# Patient Record
Sex: FEMALE | Race: WHITE | NOT HISPANIC OR LATINO | Employment: OTHER | URBAN - METROPOLITAN AREA
[De-identification: names, ages, dates, MRNs, and addresses within clinical notes are randomized per-mention and may not be internally consistent; named-entity substitution may affect disease eponyms.]

---

## 2020-03-10 ENCOUNTER — HOSPITAL ENCOUNTER (EMERGENCY)
Facility: HOSPITAL | Age: 40
Discharge: HOME | End: 2020-03-10
Admitting: EMERGENCY MEDICINE

## 2020-03-10 ENCOUNTER — APPOINTMENT (OUTPATIENT)
Dept: GENERAL RADIOLOGY | Facility: HOSPITAL | Age: 40
End: 2020-03-10

## 2020-03-10 VITALS
WEIGHT: 275.35 LBS | RESPIRATION RATE: 16 BRPM | OXYGEN SATURATION: 98 % | BODY MASS INDEX: 48.79 KG/M2 | DIASTOLIC BLOOD PRESSURE: 83 MMHG | HEART RATE: 79 BPM | SYSTOLIC BLOOD PRESSURE: 127 MMHG | HEIGHT: 63 IN | TEMPERATURE: 98.3 F

## 2020-03-10 DIAGNOSIS — S30.0XXA LUMBAR CONTUSION, INITIAL ENCOUNTER: Primary | ICD-10-CM

## 2020-03-10 PROCEDURE — 99282 EMERGENCY DEPT VISIT SF MDM: CPT

## 2020-03-10 PROCEDURE — 72110 X-RAY EXAM L-2 SPINE 4/>VWS: CPT

## 2020-03-10 RX ORDER — IBUPROFEN 800 MG/1
800 TABLET ORAL EVERY 8 HOURS PRN
Qty: 9 TABLET | Refills: 0 | Status: SHIPPED | OUTPATIENT
Start: 2020-03-10 | End: 2022-04-27
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
## 2020-06-01 ENCOUNTER — HOSPITAL ENCOUNTER (EMERGENCY)
Facility: HOSPITAL | Age: 40
Discharge: HOME | End: 2020-06-01
Admitting: EMERGENCY MEDICINE

## 2020-06-01 VITALS
DIASTOLIC BLOOD PRESSURE: 77 MMHG | RESPIRATION RATE: 18 BRPM | OXYGEN SATURATION: 100 % | TEMPERATURE: 98.2 F | HEART RATE: 90 BPM | WEIGHT: 289.24 LBS | BODY MASS INDEX: 49.38 KG/M2 | HEIGHT: 64 IN | SYSTOLIC BLOOD PRESSURE: 156 MMHG

## 2020-06-01 DIAGNOSIS — Z78.9 HAS RUN OUT OF MEDICATIONS: Primary | ICD-10-CM

## 2020-06-01 DIAGNOSIS — Z91.199 NONCOMPLIANCE WITH DIABETES TREATMENT: ICD-10-CM

## 2020-06-01 DIAGNOSIS — E11.9 TYPE 2 DIABETES MELLITUS WITHOUT COMPLICATION, WITHOUT LONG-TERM CURRENT USE OF INSULIN (HCC): ICD-10-CM

## 2020-06-01 LAB
ANION GAP SERPL CALCULATED.3IONS-SCNC: 10 MMOL/L (ref 5–15)
BACTERIA UR QL AUTO: ABNORMAL /HPF
BASOPHILS # BLD AUTO: 0 10*3/MM3 (ref 0–0.2)
BASOPHILS NFR BLD AUTO: 0.3 % (ref 0–1.5)
BILIRUB UR QL STRIP: NEGATIVE
BUN BLD-MCNC: 11 MG/DL (ref 6–20)
BUN/CREAT SERPL: 15.3 (ref 7–25)
CALCIUM SPEC-SCNC: 9.5 MG/DL (ref 8.6–10.5)
CHLORIDE SERPL-SCNC: 100 MMOL/L (ref 98–107)
CLARITY UR: CLEAR
CO2 SERPL-SCNC: 25 MMOL/L (ref 22–29)
COLOR UR: YELLOW
CREAT BLD-MCNC: 0.72 MG/DL (ref 0.57–1)
DEPRECATED RDW RBC AUTO: 43.3 FL (ref 37–54)
EOSINOPHIL # BLD AUTO: 0.3 10*3/MM3 (ref 0–0.4)
EOSINOPHIL NFR BLD AUTO: 3.1 % (ref 0.3–6.2)
ERYTHROCYTE [DISTWIDTH] IN BLOOD BY AUTOMATED COUNT: 15.8 % (ref 12.3–15.4)
GFR SERPL CREATININE-BSD FRML MDRD: 91 ML/MIN/1.73
GLUCOSE BLD-MCNC: 244 MG/DL (ref 65–99)
GLUCOSE BLDC GLUCOMTR-MCNC: 237 MG/DL (ref 70–105)
GLUCOSE UR STRIP-MCNC: ABNORMAL MG/DL
HCT VFR BLD AUTO: 33.5 % (ref 34–46.6)
HGB BLD-MCNC: 11.5 G/DL (ref 12–15.9)
HGB UR QL STRIP.AUTO: NEGATIVE
HYALINE CASTS UR QL AUTO: ABNORMAL /LPF
KETONES UR QL STRIP: NEGATIVE
LEUKOCYTE ESTERASE UR QL STRIP.AUTO: ABNORMAL
LYMPHOCYTES # BLD AUTO: 2.1 10*3/MM3 (ref 0.7–3.1)
LYMPHOCYTES NFR BLD AUTO: 25.3 % (ref 19.6–45.3)
MCH RBC QN AUTO: 26.7 PG (ref 26.6–33)
MCHC RBC AUTO-ENTMCNC: 34.5 G/DL (ref 31.5–35.7)
MCV RBC AUTO: 77.3 FL (ref 79–97)
MONOCYTES # BLD AUTO: 0.7 10*3/MM3 (ref 0.1–0.9)
MONOCYTES NFR BLD AUTO: 8.1 % (ref 5–12)
NEUTROPHILS # BLD AUTO: 5.3 10*3/MM3 (ref 1.7–7)
NEUTROPHILS NFR BLD AUTO: 63.2 % (ref 42.7–76)
NITRITE UR QL STRIP: NEGATIVE
NRBC BLD AUTO-RTO: 0 /100 WBC (ref 0–0.2)
PH UR STRIP.AUTO: 5.5 [PH] (ref 5–8)
PLATELET # BLD AUTO: 205 10*3/MM3 (ref 140–450)
PMV BLD AUTO: 8.7 FL (ref 6–12)
POTASSIUM BLD-SCNC: 3.7 MMOL/L (ref 3.5–5.2)
PROT UR QL STRIP: NEGATIVE
RBC # BLD AUTO: 4.33 10*6/MM3 (ref 3.77–5.28)
RBC # UR: ABNORMAL /HPF
REF LAB TEST METHOD: ABNORMAL
SODIUM BLD-SCNC: 135 MMOL/L (ref 136–145)
SP GR UR STRIP: >=1.03 (ref 1–1.03)
SQUAMOUS #/AREA URNS HPF: ABNORMAL /HPF
UROBILINOGEN UR QL STRIP: ABNORMAL
WBC NRBC COR # BLD: 8.3 10*3/MM3 (ref 3.4–10.8)
WBC UR QL AUTO: ABNORMAL /HPF

## 2020-06-01 PROCEDURE — 99284 EMERGENCY DEPT VISIT MOD MDM: CPT

## 2020-06-01 PROCEDURE — 82962 GLUCOSE BLOOD TEST: CPT

## 2020-06-01 PROCEDURE — 80048 BASIC METABOLIC PNL TOTAL CA: CPT | Performed by: NURSE PRACTITIONER

## 2020-06-01 PROCEDURE — P9612 CATHETERIZE FOR URINE SPEC: HCPCS

## 2020-06-01 PROCEDURE — 85025 COMPLETE CBC W/AUTO DIFF WBC: CPT | Performed by: NURSE PRACTITIONER

## 2020-06-01 PROCEDURE — 81001 URINALYSIS AUTO W/SCOPE: CPT | Performed by: NURSE PRACTITIONER

## 2020-06-01 NOTE — ED NOTES
Pt states she just moved here and does not have a PCP yet. Pt is out of her Metformin and reports blood sugars running between 240-320 and her normal is . Pt states she had a roastbeef sandwich and a regular coke prior to ED arrival and current sugar is 237. Pt is needing Metformin refill.      Purvi Mcbride RN  06/01/20 7291

## 2020-06-01 NOTE — ED PROVIDER NOTES
"Subjective   39-year-old female presents with a complaint of elevated blood sugars.  She reports that she recently moved to the area 2 days ago.  She reports that she has been out of her metformin for 4 weeks.  She has not established primary care.  She reports at 1230 she ate \"a roast beef sandwich and regular Coke\" she denies any other symptoms.  Denies fever sweats or chills, cough, chest pain, dyspnea, abnominal pain.  She reports that her blood sugar normally runs anywhere from 60 to 320 mg/dL.  Her blood sugar today is 237 mg/dL.  She is requesting a refill of her Metformin.    1. Location: Denies  2. Quality: None  3. Severity: 0  4. Worsening factors: Denies  5. Alleviating factors: Denies  6. Onset: 4 weeks  7. Radiation: None  8. Frequency: None  9. Co-morbidities: Past Medical History:  No date: Depression  No date: Diabetes mellitus (CMS/[facility])  Also reports anxiety, PTSD, PCOS, endometriosis, migraine, seasonal allergies.  10. Source: Patient            Review of Systems   Constitutional: Negative for chills, diaphoresis and fever.   HENT: Negative for sore throat.    Respiratory: Negative for cough and shortness of breath.    Cardiovascular: Negative for chest pain and palpitations.   Gastrointestinal: Negative for abdominal distention, abdominal pain, diarrhea, nausea and vomiting.   Endocrine: Negative for polydipsia, polyphagia and polyuria.   Genitourinary: Negative for difficulty urinating and flank pain.   Musculoskeletal: Negative for arthralgias.   Skin: Negative for color change, pallor and rash.   Neurological: Negative for seizures, syncope and headaches.   Psychiatric/Behavioral: Negative for agitation and confusion.   All other systems reviewed and are negative.      Past Medical History:   Diagnosis Date   • Depression    • Diabetes mellitus (CMS/H[facility])        Allergies   Allergen Reactions   • Vicodin [Hydrocodone-Acetaminophen] GI Intolerance       No past surgical history on file.    No " family history on file.    Social History     Socioeconomic History   • Marital status: Single     Spouse name: Not on file   • Number of children: Not on file   • Years of education: Not on file   • Highest education level: Not on file   Tobacco Use   • Smoking status: Former Smoker   Substance and Sexual Activity   • Alcohol use: Never     Frequency: Never   • Drug use: Never           Objective   Physical Exam   Constitutional: She is oriented to person, place, and time. Vital signs are normal. She appears well-developed and well-nourished. She is active and cooperative.  Non-toxic appearance. No distress.   HENT:   Head: Normocephalic and atraumatic.   Eyes: Pupils are equal, round, and reactive to light. Conjunctivae and EOM are normal.   Neck: Normal range of motion. Neck supple.   Cardiovascular: Normal rate, regular rhythm, S1 normal, S2 normal, normal heart sounds, intact distal pulses and normal pulses. Exam reveals no gallop and no friction rub.   No murmur heard.  Pulses:       Radial pulses are 2+ on the right side, and 2+ on the left side.        Dorsalis pedis pulses are 2+ on the right side, and 2+ on the left side.        Posterior tibial pulses are 2+ on the right side, and 2+ on the left side.   Pulmonary/Chest: Effort normal and breath sounds normal. No stridor. No respiratory distress.   Abdominal: Soft. Normal appearance and bowel sounds are normal. She exhibits no distension. There is no tenderness.   Musculoskeletal: Normal range of motion.   Neurological: She is alert and oriented to person, place, and time.   Skin: Skin is warm and dry. Capillary refill takes less than 2 seconds.   Psychiatric: She has a normal mood and affect. Her behavior is normal. Judgment and thought content normal.   Nursing note and vitals reviewed.      Procedures           ED Course  ED Course as of Jun 01 2037   Mon Jun 01, 2020   1928 Lab called to inform provider that the pH of patient's urine is consistent with  water. Order changed to cath UA.     [AL]      ED Course User Index  [AL] Margarita Leyva, NP      No radiology results for the last day  Medications - No data to display  Labs Reviewed   BASIC METABOLIC PANEL - Abnormal; Notable for the following components:       Result Value    Glucose 244 (*)     Sodium 135 (*)     All other components within normal limits    Narrative:     GFR Normal >60  Chronic Kidney Disease <60  Kidney Failure <15     CBC WITH AUTO DIFFERENTIAL - Abnormal; Notable for the following components:    Hemoglobin 11.5 (*)     Hematocrit 33.5 (*)     MCV 77.3 (*)     RDW 15.8 (*)     All other components within normal limits   URINALYSIS W/ MICROSCOPIC IF INDICATED (NO CULTURE) - Abnormal; Notable for the following components:    Glucose,  mg/dL (1+) (*)     Leuk Esterase, UA Small (1+) (*)     All other components within normal limits   URINALYSIS, MICROSCOPIC ONLY - Abnormal; Notable for the following components:    WBC, UA 6-12 (*)     Bacteria, UA Trace (*)     Squamous Epithelial Cells, UA 3-6 (*)     All other components within normal limits   POCT GLUCOSE FINGERSTICK - Abnormal; Notable for the following components:    Glucose 237 (*)     All other components within normal limits   CBC AND DIFFERENTIAL    Narrative:     The following orders were created for panel order CBC & Differential.  Procedure                               Abnormality         Status                     ---------                               -----------         ------                     CBC Auto Differential[[id number]]        Abnormal            Final result                 Please view results for these tests on the individual orders.                                          MDM  Number of Diagnoses or Management Options  Has run out of medications:   Noncompliance with diabetes treatment:   Type 2 diabetes mellitus without complication, without long-term current use of insulin (CMS/[facility]):   Diagnosis management  "comments: Chart Review: 3/10/2020 patient was seen at this facility for lumbar contusion.  Comorbidity: Past Medical History:  No date: Depression  No date: Diabetes mellitus (CMS/[facility])  Also reports anxiety, PTSD, PCOS, endometriosis, migraine, seasonal allergies.    Patient undressed and placed in gown for exam.  Appropriate PPE worn during patient exam. 39-year-old female presents with a complaint of elevated blood sugars.  She reports that she recently moved to the area 2 days ago.  She reports that she has been out of her metformin for 4 weeks.  She has not established primary care.  She reports at 1230 she ate \"a roast beef sandwich and regular Coke\" she denies any other symptoms.  Denies fever sweats or chills, cough, chest pain, dyspnea, abnominal pain.  She reports that her blood sugar normally runs anywhere from 60 to 320 mg/dL.  Her blood sugar today is 237 mg/dL.  She is requesting a refill of her Metformin.  CBC, BMP, and urinalysis obtained.Glucose is noted to be elevated 244, otherwise BMP was unremarkable.  CBC unremarkable.  UA was significant for small leuk esterase and trace bacteria but was also noted to be contaminated.  Informed patient that this will be cultured and the ED pharmacist will call with results in 2 days if antibiotics are necessary.  She verbalized understanding.  She was also counseled on following diabetic diet and exercise regimen.  She is noncompliant with her diabetic diet.  MARIA LUISA Renae was present at bedside when asking about her visit in March, after stating that she had just moved to this area 2 days ago.  Patient also initially had a pH of 0 on her urine but after collecting a cath specimen, it had increased to 5.5.  She did not have primary care established, she was given follow-up with Dr. Posada.    Disposition/Treatment: Discussed results with patient, verbalized understanding.  Discussed reasons to return to the ER, patient verbalized understanding.  Agreeable with " plan of care.  Patient was stable upon discharge.               Amount and/or Complexity of Data Reviewed  Clinical lab tests: reviewed    Patient Progress  Patient progress: stable      Final diagnoses:   Has run out of medications   Type 2 diabetes mellitus without complication, without long-term current use of insulin (CMS/[facility])   Noncompliance with diabetes treatment            Margarita Leyva NP  06/01/20 1679

## 2020-06-01 NOTE — ED NOTES
Called at 1934 to Urinalysis to request that they only use the urine we would be sending from a straight catheter. The urine that they were holding downstairs needed to be thrown out.     Suzan Rose  06/01/20 1940

## 2020-06-02 ENCOUNTER — TELEPHONE (OUTPATIENT)
Dept: FAMILY MEDICINE CLINIC | Facility: CLINIC | Age: 40
End: 2020-06-02

## 2020-06-02 NOTE — DISCHARGE INSTRUCTIONS
Follow diabetes eating and exercise regimen.  Avoid eating or drinking sugary substances.  Take metformin as prescribed.  Call Dr. Posada's office to schedule follow-up appointment.  It is important to establish primary care for primary care needs.  Turn to the ER for any new or worsening symptoms.

## 2020-06-02 NOTE — TELEPHONE ENCOUNTER
PATIENT CALLED AND STATED THAT SHE WAS REFERRED TO THE OFFICE TO SEE SHAYLEE OLIVERA. SHE STATES SHE WAS IN THE HOSPITAL AND THAT'S WHO REFERRED HER. SHE WAS AT [facility] IN [place]. SHE WAS IN THE EMERGENCY ROOM DUE TO HER BLOOD SUGAR BEING ELEVATED. PLEASE ADVISE.     PATIENT CALL BACK [phone]

## 2020-06-04 ENCOUNTER — OFFICE VISIT (OUTPATIENT)
Dept: FAMILY MEDICINE CLINIC | Facility: CLINIC | Age: 40
End: 2020-06-04

## 2020-06-04 ENCOUNTER — LAB (OUTPATIENT)
Dept: FAMILY MEDICINE CLINIC | Facility: CLINIC | Age: 40
End: 2020-06-04

## 2020-06-04 VITALS
TEMPERATURE: 98.6 F | OXYGEN SATURATION: 98 % | HEART RATE: 92 BPM | BODY MASS INDEX: 51.38 KG/M2 | SYSTOLIC BLOOD PRESSURE: 152 MMHG | DIASTOLIC BLOOD PRESSURE: 90 MMHG | WEIGHT: 290 LBS | RESPIRATION RATE: 8 BRPM | HEIGHT: 63 IN

## 2020-06-04 DIAGNOSIS — E11.65 TYPE 2 DIABETES MELLITUS WITH HYPERGLYCEMIA, WITHOUT LONG-TERM CURRENT USE OF INSULIN (HCC): ICD-10-CM

## 2020-06-04 DIAGNOSIS — H60.63 CHRONIC OTITIS EXTERNA OF BOTH EARS, UNSPECIFIED TYPE: ICD-10-CM

## 2020-06-04 DIAGNOSIS — Z00.00 PREVENTATIVE HEALTH CARE: ICD-10-CM

## 2020-06-04 DIAGNOSIS — G43.811 OTHER MIGRAINE WITH STATUS MIGRAINOSUS, INTRACTABLE: ICD-10-CM

## 2020-06-04 DIAGNOSIS — I10 ESSENTIAL HYPERTENSION: ICD-10-CM

## 2020-06-04 DIAGNOSIS — F33.1 MODERATE EPISODE OF RECURRENT MAJOR DEPRESSIVE DISORDER (HCC): ICD-10-CM

## 2020-06-04 DIAGNOSIS — Z23 IMMUNIZATION DUE: ICD-10-CM

## 2020-06-04 DIAGNOSIS — Z00.00 PREVENTATIVE HEALTH CARE: Primary | ICD-10-CM

## 2020-06-04 DIAGNOSIS — E66.01 CLASS 3 SEVERE OBESITY WITH SERIOUS COMORBIDITY AND BODY MASS INDEX (BMI) OF 50.0 TO 59.9 IN ADULT, UNSPECIFIED OBESITY TYPE (HCC): ICD-10-CM

## 2020-06-04 LAB
BASOPHILS # BLD AUTO: 0.02 10*3/MM3 (ref 0–0.2)
BASOPHILS NFR BLD AUTO: 0.2 % (ref 0–1.5)
DEPRECATED RDW RBC AUTO: 42.8 FL (ref 37–54)
EOSINOPHIL # BLD AUTO: 0.23 10*3/MM3 (ref 0–0.4)
EOSINOPHIL NFR BLD AUTO: 2.8 % (ref 0.3–6.2)
ERYTHROCYTE [DISTWIDTH] IN BLOOD BY AUTOMATED COUNT: 14.7 % (ref 12.3–15.4)
HCT VFR BLD AUTO: 35.3 % (ref 34–46.6)
HGB BLD-MCNC: 11.2 G/DL (ref 12–15.9)
IMM GRANULOCYTES # BLD AUTO: 0.05 10*3/MM3 (ref 0–0.05)
IMM GRANULOCYTES NFR BLD AUTO: 0.6 % (ref 0–0.5)
LYMPHOCYTES # BLD AUTO: 2.15 10*3/MM3 (ref 0.7–3.1)
LYMPHOCYTES NFR BLD AUTO: 25.9 % (ref 19.6–45.3)
MCH RBC QN AUTO: 25.5 PG (ref 26.6–33)
MCHC RBC AUTO-ENTMCNC: 31.7 G/DL (ref 31.5–35.7)
MCV RBC AUTO: 80.2 FL (ref 79–97)
MONOCYTES # BLD AUTO: 0.77 10*3/MM3 (ref 0.1–0.9)
MONOCYTES NFR BLD AUTO: 9.3 % (ref 5–12)
NEUTROPHILS # BLD AUTO: 5.08 10*3/MM3 (ref 1.7–7)
NEUTROPHILS NFR BLD AUTO: 61.2 % (ref 42.7–76)
NRBC BLD AUTO-RTO: 0 /100 WBC (ref 0–0.2)
PLATELET # BLD AUTO: 223 10*3/MM3 (ref 140–450)
PMV BLD AUTO: 11.1 FL (ref 6–12)
RBC # BLD AUTO: 4.4 10*6/MM3 (ref 3.77–5.28)
WBC NRBC COR # BLD: 8.3 10*3/MM3 (ref 3.4–10.8)

## 2020-06-04 PROCEDURE — 90732 PPSV23 VACC 2 YRS+ SUBQ/IM: CPT | Performed by: NURSE PRACTITIONER

## 2020-06-04 PROCEDURE — 90472 IMMUNIZATION ADMIN EACH ADD: CPT | Performed by: NURSE PRACTITIONER

## 2020-06-04 PROCEDURE — 80053 COMPREHEN METABOLIC PANEL: CPT | Performed by: NURSE PRACTITIONER

## 2020-06-04 PROCEDURE — 90471 IMMUNIZATION ADMIN: CPT | Performed by: NURSE PRACTITIONER

## 2020-06-04 PROCEDURE — 99214 OFFICE O/P EST MOD 30 MIN: CPT | Performed by: NURSE PRACTITIONER

## 2020-06-04 PROCEDURE — 90715 TDAP VACCINE 7 YRS/> IM: CPT | Performed by: NURSE PRACTITIONER

## 2020-06-04 PROCEDURE — 85025 COMPLETE CBC W/AUTO DIFF WBC: CPT | Performed by: NURSE PRACTITIONER

## 2020-06-04 PROCEDURE — 80061 LIPID PANEL: CPT | Performed by: NURSE PRACTITIONER

## 2020-06-04 PROCEDURE — 84443 ASSAY THYROID STIM HORMONE: CPT | Performed by: NURSE PRACTITIONER

## 2020-06-04 PROCEDURE — 99385 PREV VISIT NEW AGE 18-39: CPT | Performed by: NURSE PRACTITIONER

## 2020-06-04 PROCEDURE — 83036 HEMOGLOBIN GLYCOSYLATED A1C: CPT | Performed by: NURSE PRACTITIONER

## 2020-06-04 RX ORDER — LISINOPRIL 10 MG/1
10 TABLET ORAL DAILY
Qty: 30 TABLET | Refills: 3 | Status: SHIPPED | OUTPATIENT
Start: 2020-06-04 | End: 2020-11-23 | Stop reason: SDUPTHER

## 2020-06-04 NOTE — PROGRESS NOTES
"Subjective   Leigh Soni is a 39 y.o. female.     Chief Complaint   Patient presents with   • Follow-up     hospital       /90   Pulse 92   Temp 98.6 °F (37 °C) (Temporal)   Resp 8   Ht 160 cm (63\")   Wt 132 kg (290 lb)   LMP 2020   SpO2 98%   BMI 51.37 kg/m²     BP Readings from Last 3 Encounters:   20 152/90   20 156/77   03/10/20 127/83       Wt Readings from Last 3 Encounters:   20 132 kg (290 lb)   20 131 kg (289 lb 3.9 oz)   03/10/20 125 kg (275 lb 5.7 oz)     New pt needs to get est and here for hospital f/u.   Moved here recently from [place] and moved here by the encouragement of her friends. Living with her friends.   On disability for mental health and learning disabilities.   Went to ER recently because BS was elevated and was restarted on metformin. Had been off her meds for about 4-5 months.   BP was also elevated in ER.   Has never been treated for HTN.  She also has hx of depression, anxiety, and migraines.   Says she was on zoloft, wellbutrin, and topamax in the past, but doesn't remember doses. She would like to restart all her meds. Gets HA's daily. Could be from elevated BP.        Past Surgical History:   Procedure Laterality Date   • EAR TUBES     • LAPAROSCOPIC TUBAL LIGATION     • TONSILLECTOMY AND ADENOIDECTOMY         Social History     Socioeconomic History   • Marital status:      Spouse name: Not on file   • Number of children: 0   • Years of education: Not on file   • Highest education level: Not on file   Occupational History   • Occupation: disability    Tobacco Use   • Smoking status: Former Smoker     Types: Cigarettes     Last attempt to quit: 2018     Years since quittin.4   • Smokeless tobacco: Never Used   • Tobacco comment: socially    Substance and Sexual Activity   • Alcohol use: Never     Frequency: Never   • Drug use: Never       The following portions of the patient's history were reviewed and updated as " appropriate: allergies, current medications, past family history, past medical history, past social history, past surgical history and problem list.    Review of Systems   Constitutional: Negative for fatigue.   Eyes: Negative for blurred vision.   Respiratory: Negative for shortness of breath.    Cardiovascular: Negative for chest pain and palpitations.   Endocrine: Negative for polydipsia and polyuria.   Musculoskeletal: Negative for arthralgias.   Skin: Negative for rash and skin lesions.   Neurological: Positive for headache. Negative for dizziness.   Psychiatric/Behavioral: Positive for depressed mood. Negative for sleep disturbance, suicidal ideas and stress. The patient is nervous/anxious.        Objective   Physical Exam   Constitutional: She is oriented to person, place, and time. She appears well-developed and well-nourished.   HENT:   Head: Normocephalic.   Eyes: Pupils are equal, round, and reactive to light.   Neck: Normal range of motion.   Cardiovascular: Normal rate and regular rhythm.   Pulmonary/Chest: Effort normal and breath sounds normal.   Abdominal: Soft. Bowel sounds are normal. There is no tenderness.   Musculoskeletal: Normal range of motion.   Neurological: She is alert and oriented to person, place, and time.   Skin: Skin is warm and dry.   Psychiatric: She has a normal mood and affect. Her behavior is normal.       Diagnoses and all orders for this visit:    1. Preventative health care (Primary)  -     Comprehensive Metabolic Panel; Future  -     CBC & Differential; Future  -     Hemoglobin A1c; Future  -     TSH; Future  -     Lipid Panel; Future  -     Urinalysis With Culture If Indicated -; Future  -     MicroAlbumin, Urine, Random - Urine, Clean Catch; Future    2. Moderate episode of recurrent major depressive disorder (CMS/[facility])  Comments:  will restart zoloft, and then later wellbutrin if needed. Need to obtain medical records as well.   Orders:  -     sertraline (Zoloft) 50 MG  tablet; Take 1 tablet by mouth Daily.  Dispense: 30 tablet; Refill: 3    3. Chronic otitis externa of both ears, unspecified type  Comments:  pt has tubes in her ears, and requesting referral to ENT   Orders:  -     Ambulatory Referral to ENT (Otolaryngology)    4. Type 2 diabetes mellitus with hyperglycemia, without long-term current use of insulin (CMS/[facility])  Comments:  Restart metformin. Check labs today. Discussed weight loss and diet changes.   Orders:  -     Ambulatory Referral to Endocrinology  -     Hemoglobin A1c; Future  -     metFORMIN (GLUCOPHAGE) 1000 MG tablet; Take 1 tablet by mouth 2 (Two) Times a Day With Meals for 14 days.  Dispense: 60 tablet; Refill: 3    5. Other migraine with status migrainosus, intractable  Comments:  HA's most likely associated with HTN. Will start lisinopril, and if needed will restart topiramate     6. Essential hypertension  Comments:  Repeat Bp 150/100. Start lisinopril.   Orders:  -     lisinopril (PRINIVIL,ZESTRIL) 10 MG tablet; Take 1 tablet by mouth Daily for 180 days.  Dispense: 30 tablet; Refill: 3    7. Immunization due  -     Tdap Vaccine Greater Than or Equal To 8yo IM  -     Pneumococcal Polysaccharide Vaccine 23-Valent (PPSV23) Greater Than or Equal To 3yo Subcutaneous / IM    8. Class 3 severe obesity with serious comorbidity and body mass index (BMI) of 50.0 to 59.9 in adult, unspecified obesity type (CMS/[facility])    Tdap today  PNA today  Restart zoloft  Start lisinopril  Restart metformin  The importance of monitoring blood sugar regularly was reviewed.  The importance of monitoring the HgBA1C level regularly was reviewed.  The importance of monitoring urine microalbumin regularly to check for kidney damage was reviewed.   The importance of annual eye exams to prevent blindness was reviewed.  The importance of proper foot care and regularly checking feet to prevent sores and possibly loss of limbs was reviewed.   Discussed importance of regular exercise and  recommended starting or continuing a regular exercise program for good health. The patient was also encouraged to lose weight for better health.   During this visit for their annual exam, we reviewed their personal history, social history and family history. We went over their medications and all the recommended health maintenance items for their age group. They were given the opportunity to ask questions and discuss other concerns.       Return in about 4 weeks (around 7/2/2020) for HTN follow up.

## 2020-06-05 LAB
ALBUMIN SERPL-MCNC: 4.2 G/DL (ref 3.5–5.2)
ALBUMIN/GLOB SERPL: 1.1 G/DL
ALP SERPL-CCNC: 93 U/L (ref 39–117)
ALT SERPL W P-5'-P-CCNC: 22 U/L (ref 1–33)
ANION GAP SERPL CALCULATED.3IONS-SCNC: 10.1 MMOL/L (ref 5–15)
AST SERPL-CCNC: 23 U/L (ref 1–32)
BILIRUB SERPL-MCNC: 1 MG/DL (ref 0.2–1.2)
BUN BLD-MCNC: 8 MG/DL (ref 6–20)
BUN/CREAT SERPL: 14.3 (ref 7–25)
CALCIUM SPEC-SCNC: 9.5 MG/DL (ref 8.6–10.5)
CHLORIDE SERPL-SCNC: 100 MMOL/L (ref 98–107)
CHOLEST SERPL-MCNC: 137 MG/DL (ref 0–200)
CO2 SERPL-SCNC: 26.9 MMOL/L (ref 22–29)
CREAT BLD-MCNC: 0.56 MG/DL (ref 0.57–1)
GFR SERPL CREATININE-BSD FRML MDRD: 122 ML/MIN/1.73
GLOBULIN UR ELPH-MCNC: 4 GM/DL
GLUCOSE BLD-MCNC: 179 MG/DL (ref 65–99)
HBA1C MFR BLD: 7.8 % (ref 3.5–5.6)
HDLC SERPL-MCNC: 32 MG/DL (ref 40–60)
LDLC SERPL CALC-MCNC: 60 MG/DL (ref 0–100)
LDLC/HDLC SERPL: 1.87 {RATIO}
POTASSIUM BLD-SCNC: 3.7 MMOL/L (ref 3.5–5.2)
PROT SERPL-MCNC: 8.2 G/DL (ref 6–8.5)
SODIUM BLD-SCNC: 137 MMOL/L (ref 136–145)
TRIGL SERPL-MCNC: 226 MG/DL (ref 0–150)
TSH SERPL DL<=0.05 MIU/L-ACNC: 2.44 UIU/ML (ref 0.27–4.2)
VLDLC SERPL-MCNC: 45.2 MG/DL (ref 5–40)

## 2020-06-11 NOTE — PROGRESS NOTES
Let pt know that her BS was elevated at 179 and A1C was 7.8.   1. Is she taking her metformin bid as prescribed?   2. I want her to start bydrueon bcise weekly. She may need to come in office for sample, education on pen, and coupon card.   3. Her trigs are high at 226 (should be below 150). This will come down once her BS comes down. Needs to work on diet changes as well.  4. Her HDL/good chol is low at 32. Best way to improve this is with exercise.     She will need rx for bydrureon bcise. Like I said may need to come in for demo, sample, and coupon.  Need to repeat labs in 3 months: CMp, A1C, and lipid

## 2020-06-12 ENCOUNTER — TELEPHONE (OUTPATIENT)
Dept: FAMILY MEDICINE CLINIC | Facility: CLINIC | Age: 40
End: 2020-06-12

## 2020-06-12 DIAGNOSIS — E11.65 TYPE 2 DIABETES MELLITUS WITH HYPERGLYCEMIA, WITHOUT LONG-TERM CURRENT USE OF INSULIN (HCC): Primary | ICD-10-CM

## 2020-06-12 DIAGNOSIS — E78.5 HYPERLIPIDEMIA, UNSPECIFIED HYPERLIPIDEMIA TYPE: ICD-10-CM

## 2020-06-12 NOTE — TELEPHONE ENCOUNTER
----- Message from KEVIN Crawley sent at 6/11/2020  1:14 PM EDT -----  Let pt know that her BS was elevated at 179 and A1C was 7.8.   1. Is she taking her metformin bid as prescribed?   2. I want her to start bydrueon bcise weekly. She may need to come in office for sample, education on pen, and coupon card.   3. Her trigs are high at 226 (should be below 150). This will come down once her BS comes down. Needs to work on diet changes as well.  4. Her HDL/good chol is low at 32. Best way to improve this is with exercise.     She will need rx for bydrureon bcise. Like I said may need to come in for demo, sample, and coupon.  Need to repeat labs in 3 months: CMp, A1C, and lipid

## 2020-06-15 DIAGNOSIS — E11.65 UNCONTROLLED TYPE 2 DIABETES MELLITUS WITH HYPERGLYCEMIA (HCC): Primary | ICD-10-CM

## 2020-06-29 ENCOUNTER — TELEPHONE (OUTPATIENT)
Dept: FAMILY MEDICINE CLINIC | Facility: CLINIC | Age: 40
End: 2020-06-29

## 2020-06-29 NOTE — TELEPHONE ENCOUNTER
Pt called back in regards to her labs. She states she had NOT been taking her metformin like she was supposed to. While on the phone pts deidra requested to speak with me and states he will be ensuring that she takes it from now on. They're requesting to stay off the bydureon at least until they see what the labs look like in the next 3 months.. Also pt wanted to know if she should do anything for her iron?

## 2020-06-30 NOTE — TELEPHONE ENCOUNTER
Ok to repeat labs in 3 months after she is taking metformin correctly    I did not check iron levels.

## 2020-07-10 ENCOUNTER — HOSPITAL ENCOUNTER (EMERGENCY)
Facility: HOSPITAL | Age: 40
Discharge: HOME | End: 2020-07-10
Admitting: EMERGENCY MEDICINE

## 2020-07-10 VITALS
DIASTOLIC BLOOD PRESSURE: 69 MMHG | HEART RATE: 74 BPM | BODY MASS INDEX: 47.84 KG/M2 | RESPIRATION RATE: 16 BRPM | TEMPERATURE: 98.2 F | HEIGHT: 64 IN | OXYGEN SATURATION: 100 % | WEIGHT: 280.2 LBS | SYSTOLIC BLOOD PRESSURE: 102 MMHG

## 2020-07-10 DIAGNOSIS — R21 RASH: ICD-10-CM

## 2020-07-10 DIAGNOSIS — R09.81 SINUS CONGESTION: Primary | ICD-10-CM

## 2020-07-10 PROCEDURE — 99283 EMERGENCY DEPT VISIT LOW MDM: CPT

## 2020-07-10 RX ORDER — DIAPER,BRIEF,INFANT-TODD,DISP
EACH MISCELLANEOUS 2 TIMES DAILY
Qty: 28 G | Refills: 0 | Status: SHIPPED | OUTPATIENT
Start: 2020-07-10 | End: 2021-05-11

## 2020-07-10 RX ORDER — FLUTICASONE PROPIONATE 50 MCG
2 SPRAY, SUSPENSION (ML) NASAL DAILY
Qty: 9.9 ML | Refills: 0 | Status: SHIPPED | OUTPATIENT
Start: 2020-07-10 | End: 2020-10-12

## 2020-07-10 NOTE — DISCHARGE INSTRUCTIONS
Use Flonase, 2 sprays each nostril once a day to help with sinus congestion.  Mucinex over-the-counter to help with sinus congestion as well.  Recommend using coolmist humidifier at home to help alleviate symptoms as well.    Use hydrocortisone ointment to help with rash on her abdomen.  Follow-up with PCP or dermatology for further evaluation management especially if rash persists or becomes worse.    Return to the ER for new or worsening symptoms.

## 2020-07-10 NOTE — ED NOTES
Pt reports congestion for the past three days. Pt reports her throat is also sore when she swallows, but pt denies any other symptoms.      Christie Oliva RN  07/10/20 9119

## 2020-07-27 ENCOUNTER — OFFICE VISIT (OUTPATIENT)
Dept: FAMILY MEDICINE CLINIC | Facility: CLINIC | Age: 40
End: 2020-07-27

## 2020-07-27 VITALS
SYSTOLIC BLOOD PRESSURE: 109 MMHG | HEART RATE: 80 BPM | TEMPERATURE: 97.7 F | WEIGHT: 275.8 LBS | HEIGHT: 64 IN | BODY MASS INDEX: 47.08 KG/M2 | OXYGEN SATURATION: 98 % | DIASTOLIC BLOOD PRESSURE: 73 MMHG

## 2020-07-27 DIAGNOSIS — B35.4 TINEA CORPORIS: ICD-10-CM

## 2020-07-27 DIAGNOSIS — R06.83 SNORING: ICD-10-CM

## 2020-07-27 DIAGNOSIS — R40.0 DAYTIME SOMNOLENCE: ICD-10-CM

## 2020-07-27 DIAGNOSIS — E11.65 TYPE 2 DIABETES MELLITUS WITH HYPERGLYCEMIA, WITHOUT LONG-TERM CURRENT USE OF INSULIN (HCC): Primary | ICD-10-CM

## 2020-07-27 DIAGNOSIS — F81.9 LEARNING DISABILITY: ICD-10-CM

## 2020-07-27 DIAGNOSIS — I10 ESSENTIAL HYPERTENSION: ICD-10-CM

## 2020-07-27 DIAGNOSIS — F32.89 OTHER DEPRESSION: ICD-10-CM

## 2020-07-27 PROBLEM — F32.A DEPRESSION: Status: ACTIVE | Noted: 2020-07-27

## 2020-07-27 PROBLEM — E11.9 DIABETES MELLITUS: Status: ACTIVE | Noted: 2020-07-27

## 2020-07-27 PROCEDURE — 99214 OFFICE O/P EST MOD 30 MIN: CPT | Performed by: FAMILY MEDICINE

## 2020-07-27 RX ORDER — CLOTRIMAZOLE 1 %
CREAM (GRAM) TOPICAL 2 TIMES DAILY
Qty: 45 G | Refills: 2 | Status: SHIPPED | OUTPATIENT
Start: 2020-07-27 | End: 2021-05-11
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
## 2020-08-10 ENCOUNTER — TELEPHONE (OUTPATIENT)
Dept: FAMILY MEDICINE CLINIC | Facility: CLINIC | Age: 40
End: 2020-08-10

## 2020-08-10 ENCOUNTER — OFFICE VISIT (OUTPATIENT)
Dept: ENDOCRINOLOGY | Facility: CLINIC | Age: 40
End: 2020-08-10

## 2020-08-10 DIAGNOSIS — E11.65 UNCONTROLLED TYPE 2 DIABETES MELLITUS WITH HYPERGLYCEMIA (HCC): ICD-10-CM

## 2020-08-10 PROCEDURE — G0108 DIAB MANAGE TRN  PER INDIV: HCPCS | Performed by: DIETITIAN, REGISTERED

## 2020-08-10 NOTE — TELEPHONE ENCOUNTER
Pt called to see if insurance would cover Dapagliflozin Propanediol (Farxiga) 10 MG tablet [[id number]] (Order [id number]).  Said pharmacy did not have approval to give medication and believes it is an issue regarding insurance.

## 2020-08-18 ENCOUNTER — TELEPHONE (OUTPATIENT)
Dept: FAMILY MEDICINE CLINIC | Facility: CLINIC | Age: 40
End: 2020-08-18

## 2020-08-19 ENCOUNTER — TELEPHONE (OUTPATIENT)
Dept: FAMILY MEDICINE CLINIC | Facility: CLINIC | Age: 40
End: 2020-08-19

## 2020-08-20 NOTE — TELEPHONE ENCOUNTER
According to the Pa- it seems like they want patient to try metformin first and then when it fails drug may get approved. Pharmacy is unable to provided any info of what can get covered.

## 2020-08-21 NOTE — TELEPHONE ENCOUNTER
I do not see metformin listed on patients active medication list, should she be on metformin.please advise.

## 2020-08-21 NOTE — TELEPHONE ENCOUNTER
At our 1 and only visit, she told me she had some at home, she stopped it, but had restarted it recently.  We are going to leave it at that and I will have to talk to her when I see her.  Thanks

## 2020-08-25 NOTE — TELEPHONE ENCOUNTER
Yes I spoke with Ruiz(see separate phone note) and informed him patient to take the metformin and this would be addressed in upcoming office visit.

## 2020-08-26 ENCOUNTER — HOSPITAL ENCOUNTER (EMERGENCY)
Facility: HOSPITAL | Age: 40
Discharge: HOME | End: 2020-08-26
Admitting: EMERGENCY MEDICINE

## 2020-08-26 ENCOUNTER — OFFICE VISIT (OUTPATIENT)
Dept: FAMILY MEDICINE CLINIC | Facility: CLINIC | Age: 40
End: 2020-08-26

## 2020-08-26 ENCOUNTER — APPOINTMENT (OUTPATIENT)
Dept: CT IMAGING | Facility: HOSPITAL | Age: 40
End: 2020-08-26

## 2020-08-26 VITALS
SYSTOLIC BLOOD PRESSURE: 125 MMHG | WEIGHT: 276.4 LBS | OXYGEN SATURATION: 96 % | TEMPERATURE: 98.1 F | HEIGHT: 64 IN | HEART RATE: 98 BPM | DIASTOLIC BLOOD PRESSURE: 87 MMHG | BODY MASS INDEX: 47.19 KG/M2

## 2020-08-26 VITALS
WEIGHT: 293 LBS | RESPIRATION RATE: 18 BRPM | TEMPERATURE: 98.9 F | HEIGHT: 64 IN | DIASTOLIC BLOOD PRESSURE: 73 MMHG | OXYGEN SATURATION: 98 % | SYSTOLIC BLOOD PRESSURE: 129 MMHG | HEART RATE: 68 BPM | BODY MASS INDEX: 50.02 KG/M2

## 2020-08-26 DIAGNOSIS — R40.0 DAYTIME SOMNOLENCE: ICD-10-CM

## 2020-08-26 DIAGNOSIS — R10.84 GENERALIZED ABDOMINAL PAIN: Primary | ICD-10-CM

## 2020-08-26 DIAGNOSIS — F81.9 LEARNING DISABILITY: ICD-10-CM

## 2020-08-26 DIAGNOSIS — E11.65 TYPE 2 DIABETES MELLITUS WITH HYPERGLYCEMIA, WITHOUT LONG-TERM CURRENT USE OF INSULIN (HCC): Primary | ICD-10-CM

## 2020-08-26 DIAGNOSIS — G89.29 CHRONIC LEFT SHOULDER PAIN: ICD-10-CM

## 2020-08-26 DIAGNOSIS — M25.512 CHRONIC LEFT SHOULDER PAIN: ICD-10-CM

## 2020-08-26 DIAGNOSIS — I10 ESSENTIAL HYPERTENSION: ICD-10-CM

## 2020-08-26 DIAGNOSIS — R59.9 REACTIVE LYMPHOID HYPERPLASIA: ICD-10-CM

## 2020-08-26 DIAGNOSIS — R16.0 ENLARGED LIVER: ICD-10-CM

## 2020-08-26 DIAGNOSIS — R16.1 SPLEEN ENLARGED: ICD-10-CM

## 2020-08-26 DIAGNOSIS — R20.0 LEFT ARM NUMBNESS: ICD-10-CM

## 2020-08-26 LAB
ALBUMIN SERPL-MCNC: 3.6 G/DL (ref 3.5–5.2)
ALBUMIN/GLOB SERPL: 0.9 G/DL
ALP SERPL-CCNC: 107 U/L (ref 39–117)
ALT SERPL W P-5'-P-CCNC: 12 U/L (ref 1–33)
ANION GAP SERPL CALCULATED.3IONS-SCNC: 10 MMOL/L (ref 5–15)
AST SERPL-CCNC: 14 U/L (ref 1–32)
B-HCG UR QL: NEGATIVE
BACTERIA UR QL AUTO: ABNORMAL /HPF
BASOPHILS # BLD AUTO: 0 10*3/MM3 (ref 0–0.2)
BASOPHILS NFR BLD AUTO: 0.5 % (ref 0–1.5)
BILIRUB SERPL-MCNC: 0.5 MG/DL (ref 0–1.2)
BILIRUB UR QL STRIP: NEGATIVE
BUN SERPL-MCNC: 11 MG/DL (ref 6–20)
BUN SERPL-MCNC: ABNORMAL MG/DL
BUN/CREAT SERPL: ABNORMAL
CALCIUM SPEC-SCNC: 8.7 MG/DL (ref 8.6–10.5)
CHLORIDE SERPL-SCNC: 103 MMOL/L (ref 98–107)
CLARITY UR: CLEAR
CO2 SERPL-SCNC: 24 MMOL/L (ref 22–29)
COLOR UR: YELLOW
CREAT SERPL-MCNC: 0.64 MG/DL (ref 0.57–1)
DEPRECATED RDW RBC AUTO: 41.6 FL (ref 37–54)
EOSINOPHIL # BLD AUTO: 0.2 10*3/MM3 (ref 0–0.4)
EOSINOPHIL NFR BLD AUTO: 2.8 % (ref 0.3–6.2)
ERYTHROCYTE [DISTWIDTH] IN BLOOD BY AUTOMATED COUNT: 14.9 % (ref 12.3–15.4)
GFR SERPL CREATININE-BSD FRML MDRD: 104 ML/MIN/1.73
GLOBULIN UR ELPH-MCNC: 3.8 GM/DL
GLUCOSE SERPL-MCNC: 214 MG/DL (ref 65–99)
GLUCOSE UR STRIP-MCNC: ABNORMAL MG/DL
HCT VFR BLD AUTO: 30.3 % (ref 34–46.6)
HGB BLD-MCNC: 10.1 G/DL (ref 12–15.9)
HGB UR QL STRIP.AUTO: NEGATIVE
HYALINE CASTS UR QL AUTO: ABNORMAL /LPF
KETONES UR QL STRIP: NEGATIVE
LEUKOCYTE ESTERASE UR QL STRIP.AUTO: ABNORMAL
LIPASE SERPL-CCNC: 25 U/L (ref 13–60)
LYMPHOCYTES # BLD AUTO: 2.1 10*3/MM3 (ref 0.7–3.1)
LYMPHOCYTES NFR BLD AUTO: 27.8 % (ref 19.6–45.3)
MCH RBC QN AUTO: 26.7 PG (ref 26.6–33)
MCHC RBC AUTO-ENTMCNC: 33.5 G/DL (ref 31.5–35.7)
MCV RBC AUTO: 79.6 FL (ref 79–97)
MONOCYTES # BLD AUTO: 0.6 10*3/MM3 (ref 0.1–0.9)
MONOCYTES NFR BLD AUTO: 7.2 % (ref 5–12)
NEUTROPHILS NFR BLD AUTO: 4.7 10*3/MM3 (ref 1.7–7)
NEUTROPHILS NFR BLD AUTO: 61.7 % (ref 42.7–76)
NITRITE UR QL STRIP: NEGATIVE
NRBC BLD AUTO-RTO: 0 /100 WBC (ref 0–0.2)
PH UR STRIP.AUTO: 5.5 [PH] (ref 5–8)
PLATELET # BLD AUTO: 208 10*3/MM3 (ref 140–450)
PMV BLD AUTO: 8.4 FL (ref 6–12)
POTASSIUM SERPL-SCNC: 3.7 MMOL/L (ref 3.5–5.2)
PROT SERPL-MCNC: 7.4 G/DL (ref 6–8.5)
PROT UR QL STRIP: NEGATIVE
RBC # BLD AUTO: 3.8 10*6/MM3 (ref 3.77–5.28)
RBC # UR: ABNORMAL /HPF
REF LAB TEST METHOD: ABNORMAL
SODIUM SERPL-SCNC: 137 MMOL/L (ref 136–145)
SP GR UR STRIP: 1.02 (ref 1–1.03)
SQUAMOUS #/AREA URNS HPF: ABNORMAL /HPF
UROBILINOGEN UR QL STRIP: ABNORMAL
WBC # BLD AUTO: 7.6 10*3/MM3 (ref 3.4–10.8)
WBC UR QL AUTO: ABNORMAL /HPF

## 2020-08-26 PROCEDURE — 83690 ASSAY OF LIPASE: CPT | Performed by: NURSE PRACTITIONER

## 2020-08-26 PROCEDURE — 74177 CT ABD & PELVIS W/CONTRAST: CPT

## 2020-08-26 PROCEDURE — 0 IOPAMIDOL PER 1 ML: Performed by: NURSE PRACTITIONER

## 2020-08-26 PROCEDURE — 81001 URINALYSIS AUTO W/SCOPE: CPT | Performed by: NURSE PRACTITIONER

## 2020-08-26 PROCEDURE — 87086 URINE CULTURE/COLONY COUNT: CPT | Performed by: NURSE PRACTITIONER

## 2020-08-26 PROCEDURE — P9612 CATHETERIZE FOR URINE SPEC: HCPCS

## 2020-08-26 PROCEDURE — 81025 URINE PREGNANCY TEST: CPT | Performed by: NURSE PRACTITIONER

## 2020-08-26 PROCEDURE — 99214 OFFICE O/P EST MOD 30 MIN: CPT | Performed by: FAMILY MEDICINE

## 2020-08-26 PROCEDURE — 80053 COMPREHEN METABOLIC PANEL: CPT | Performed by: NURSE PRACTITIONER

## 2020-08-26 PROCEDURE — 99283 EMERGENCY DEPT VISIT LOW MDM: CPT

## 2020-08-26 PROCEDURE — 85025 COMPLETE CBC W/AUTO DIFF WBC: CPT | Performed by: NURSE PRACTITIONER

## 2020-08-26 RX ORDER — SODIUM CHLORIDE 0.9 % (FLUSH) 0.9 %
10 SYRINGE (ML) INJECTION AS NEEDED
Status: DISCONTINUED | OUTPATIENT
Start: 2020-08-26 | End: 2020-08-27 | Stop reason: HOSPADM

## 2020-08-26 RX ADMIN — IOPAMIDOL 100 ML: 755 INJECTION, SOLUTION INTRAVENOUS at 21:07

## 2020-08-26 NOTE — PROGRESS NOTES
Subjective   Leigh Soni is a 39 y.o. female.   Chief Complaint   Patient presents with   • Diabetes       History of Present Illness     Presents the office to follow-up on transitioning her care here.    Diabetes-I tried to start her on Farxiga, we were told by Medicaid that she had to fail metformin first.  At our last visit, she told me she was on it, then she stopped it because of diarrhea, but went back on it, so I assume she was on it.  We discussed this again today, and apparently after she was back on the metformin for a while it caused her diarrhea and she had to stop it again.  She does not check her blood sugars routinely.    HTN- blood pressure today is excellent.  No SE of meds    At last visit she also had a complaint of stopping breathing in her sleep and snoring loud.  Made a referral down to sleep medicine as I tried to get an overnight oximetry, but I do not have any report.  We are still awaiting sleep medicine consult.    Has been passing kidney stones x few days.  But that is all better now.    Today her main c/o is left arm being completely numb from shoulder to fingers.  Says she cannot feel anything.  (Holding cell phone in her left hand).  Numbness improves with massage to shoulder.  Neck feels fine.  No chest pain.       Patient Active Problem List    Diagnosis Date Noted   • Diabetes mellitus (CMS/[facility]) 07/27/2020   • Essential hypertension 07/27/2020   • Depression 07/27/2020   • Learning disability 07/27/2020     Note Last Updated: 7/27/2020     No other details             Past Surgical History:   Procedure Laterality Date   • EAR TUBES     • LAPAROSCOPIC TUBAL LIGATION     • TONSILLECTOMY AND ADENOIDECTOMY       Current Outpatient Medications on File Prior to Visit   Medication Sig   • Blood Glucose Monitoring Suppl (ACCU-CHEK GURJIT) device Use as instructed   • clotrimazole (LOTRIMIN) 1 % cream Apply  topically to the appropriate area as directed 2 (Two) Times a Day.   • fluticasone  (FLONASE) 50 MCG/ACT nasal spray 2 sprays into the nostril(s) as directed by provider Daily.   • glucose blood (Accu-Chek Karis) test strip Use as instructed to check blood glucose once daily   • hydrocortisone 0.5 % ointment Apply  topically to the appropriate area as directed 2 (Two) Times a Day.   • ibuprofen (ADVIL,MOTRIN) 800 MG tablet Take 1 tablet by mouth Every 8 (Eight) Hours As Needed for Moderate Pain .   • Lancets (ACCU-CHEK SOFT TOUCH) lancets Use with device and strips to check blood glucose once daily   • lisinopril (PRINIVIL,ZESTRIL) 10 MG tablet Take 1 tablet by mouth Daily for 180 days.   • sertraline (Zoloft) 50 MG tablet Take 1 tablet by mouth Daily.   • [DISCONTINUED] Dapagliflozin Propanediol (Farxiga) 10 MG tablet Take 10 mg by mouth Daily.     No current facility-administered medications on file prior to visit.      Allergies   Allergen Reactions   • Vicodin [Hydrocodone-Acetaminophen] GI Intolerance     Social History     Socioeconomic History   • Marital status: Single     Spouse name: Not on file   • Number of children: 0   • Years of education: Not on file   • Highest education level: Not on file   Occupational History   • Occupation: disability    Tobacco Use   • Smoking status: Former Smoker     Types: Cigarettes     Last attempt to quit: 2018     Years since quittin.6   • Smokeless tobacco: Never Used   • Tobacco comment: socially    Substance and Sexual Activity   • Alcohol use: Never     Frequency: Never   • Drug use: Never     Family History   Problem Relation Age of Onset   • Hypertension Mother    • Depression Mother    • Post-traumatic stress disorder Mother    • Hyperlipidemia Mother    • Hypertension Father    • Hyperlipidemia Father    • Hypertension Brother    • Depression Brother    • Parkinsonism Maternal Grandmother    • Dementia Maternal Grandmother    • Atrial fibrillation Maternal Grandfather    • Breast cancer Paternal Grandmother    • COPD Paternal Grandfather   "    The following portions of the patient's history were reviewed and updated as appropriate: allergies, current medications, past family history, past medical history, past social history, past surgical history and problem list.    Review of Systems   Constitutional: Negative for chills and fever.   Respiratory: Negative for cough and shortness of breath.    Gastrointestinal: Negative for abdominal pain.   Neurological: Negative for dizziness, headache and confusion.       Objective   /87 (BP Location: Right arm, Patient Position: Sitting, Cuff Size: Adult)   Pulse 98   Temp 98.1 °F (36.7 °C) (Infrared)   Ht 162.6 cm (64.02\")   Wt 125 kg (276 lb 6.4 oz)   SpO2 96%   BMI 47.42 kg/m²   Physical Exam   Constitutional: She is oriented to person, place, and time. She appears well-developed and well-nourished.   Wearing a face mask     HENT:   Head: Normocephalic and atraumatic.   Eyes: Conjunctivae and EOM are normal.   Neck: Normal range of motion.   Cardiovascular: Normal rate.   Pulmonary/Chest: Effort normal.   Musculoskeletal: Normal range of motion.   Complains of tenderness to palpation to the left supraspinatus muscle and left trapezius.  There is no obvious swelling of the left shoulder.  She cringes with almost any movement.  There is no crepitus to the shoulder.  She has decent passive range of motion.  She is holding her arm out in front of her and holding his cell phone up so her boyfriend who is on speaker phone can hear us.  This indicates to me she must have some decent tone and may be sensation to be able to hold the phone without dropping it.   Neurological: She is alert and oriented to person, place, and time.   Skin: Skin is warm and dry. No rash noted.   Psychiatric: She has a normal mood and affect. Her behavior is normal.       Assessment/Plan   Diagnoses and all orders for this visit:    1. Type 2 diabetes mellitus with hyperglycemia, without long-term current use of insulin (CMS/[facility]) " (Primary)  -     Comprehensive Metabolic Panel  -     Lipid Panel  -     Hemoglobin A1c  -     Dapagliflozin Propanediol (Farxiga) 10 MG tablet; Take 10 mg by mouth Daily.  Dispense: 30 tablet; Refill: 5    2. Essential hypertension  -     CBC & Differential    3. Daytime somnolence    4. Learning disability    5. Chronic left shoulder pain  -     XR Shoulder 2+ View Left (In Office)    6. Left arm numbness  -     EMG & Nerve Conduction Test; Future      She is clearly intolerant to metformin.  We will attempt again to get Farxiga covered.  Get labs today as above.  Her left arm numbness is unclear.  Does not sound like classic neuropathy.  She does really carry her shoulders hunched forward.  It could be she has some type of traction neuropathy.  We will get an x-ray of her shoulder to look at the bony anatomy.  We will get some nerve conduction tests to see if this seems to be nerve related and higher.  If we do not get a clear diagnosis, will consider making a referral to physical therapy as she may just be having some numbness due to stretching of the brachial plexus.  We will plan on rechecking her here again in about 6 weeks to reevaluate her arm.  Recheck again in 3 months to reevaluate her diabetes.         Return in about 6 weeks (around 10/7/2020) for recheck her arm.    Call with any problems or concerns before next visit

## 2020-08-27 ENCOUNTER — TELEPHONE (OUTPATIENT)
Dept: FAMILY MEDICINE CLINIC | Facility: CLINIC | Age: 40
End: 2020-08-27

## 2020-08-27 LAB
ALBUMIN SERPL-MCNC: 3.8 G/DL (ref 3.8–4.8)
ALBUMIN/GLOB SERPL: 0.9 {RATIO} (ref 1.2–2.2)
ALP SERPL-CCNC: 110 IU/L (ref 39–117)
ALT SERPL-CCNC: 15 IU/L (ref 0–32)
AST SERPL-CCNC: 18 IU/L (ref 0–40)
BACTERIA SPEC AEROBE CULT: NO GROWTH
BASOPHILS # BLD AUTO: 0 X10E3/UL (ref 0–0.2)
BASOPHILS NFR BLD AUTO: 0 %
BILIRUB SERPL-MCNC: 0.7 MG/DL (ref 0–1.2)
BUN SERPL-MCNC: 13 MG/DL (ref 6–20)
BUN/CREAT SERPL: 22 (ref 9–23)
CALCIUM SERPL-MCNC: 9.5 MG/DL (ref 8.7–10.2)
CHLORIDE SERPL-SCNC: 103 MMOL/L (ref 96–106)
CHOLEST SERPL-MCNC: 138 MG/DL (ref 100–199)
CO2 SERPL-SCNC: 23 MMOL/L (ref 20–29)
CREAT SERPL-MCNC: 0.58 MG/DL (ref 0.57–1)
EOSINOPHIL # BLD AUTO: 0.3 X10E3/UL (ref 0–0.4)
EOSINOPHIL NFR BLD AUTO: 3 %
ERYTHROCYTE [DISTWIDTH] IN BLOOD BY AUTOMATED COUNT: 14.2 % (ref 11.7–15.4)
GLOBULIN SER CALC-MCNC: 4.2 G/DL (ref 1.5–4.5)
GLUCOSE SERPL-MCNC: 143 MG/DL (ref 65–99)
HBA1C MFR BLD: 7.4 % (ref 4.8–5.6)
HCT VFR BLD AUTO: 32 % (ref 34–46.6)
HDLC SERPL-MCNC: 26 MG/DL
HGB BLD-MCNC: 10.4 G/DL (ref 11.1–15.9)
IMM GRANULOCYTES # BLD AUTO: 0 X10E3/UL (ref 0–0.1)
IMM GRANULOCYTES NFR BLD AUTO: 0 %
LDLC SERPL CALC-MCNC: 63 MG/DL (ref 0–99)
LYMPHOCYTES # BLD AUTO: 2.2 X10E3/UL (ref 0.7–3.1)
LYMPHOCYTES NFR BLD AUTO: 25 %
MCH RBC QN AUTO: 26.7 PG (ref 26.6–33)
MCHC RBC AUTO-ENTMCNC: 32.5 G/DL (ref 31.5–35.7)
MCV RBC AUTO: 82 FL (ref 79–97)
MONOCYTES # BLD AUTO: 0.6 X10E3/UL (ref 0.1–0.9)
MONOCYTES NFR BLD AUTO: 7 %
NEUTROPHILS # BLD AUTO: 5.7 X10E3/UL (ref 1.4–7)
NEUTROPHILS NFR BLD AUTO: 65 %
PLATELET # BLD AUTO: 244 X10E3/UL (ref 150–450)
POTASSIUM SERPL-SCNC: 4.2 MMOL/L (ref 3.5–5.2)
PROT SERPL-MCNC: 8 G/DL (ref 6–8.5)
RBC # BLD AUTO: 3.9 X10E6/UL (ref 3.77–5.28)
SODIUM SERPL-SCNC: 138 MMOL/L (ref 134–144)
TRIGL SERPL-MCNC: 246 MG/DL (ref 0–149)
VLDLC SERPL CALC-MCNC: 49 MG/DL (ref 5–40)
WBC # BLD AUTO: 8.8 X10E3/UL (ref 3.4–10.8)

## 2020-08-27 NOTE — TELEPHONE ENCOUNTER
Farxiga denied again.  I sent in a prescription to [facility] for Januvia.  Please let Marylou know that she will have a new prescription for her diabetes at the pharmacy.  Thanks

## 2020-08-27 NOTE — ED PROVIDER NOTES
Subjective   Patient is a 39-year-old female who presents with lower abdominal pain for the last 2 days.  She states she has a history of polycystic ovarian and started her period on Monday.  She denies fever or chills nausea or vomiting.  States the pain got worse tonight radiating down into her lower abdomen which brought her to the emergency room.  She rates her pain a 7/10.          Review of Systems   Constitutional: Negative for chills, fatigue and fever.   HENT: Negative for congestion, tinnitus and trouble swallowing.    Eyes: Negative for photophobia, discharge and redness.   Respiratory: Negative for cough and shortness of breath.    Cardiovascular: Negative for chest pain and palpitations.   Gastrointestinal: Positive for abdominal pain. Negative for diarrhea, nausea and vomiting.   Genitourinary: Negative for dysuria, frequency and urgency.   Musculoskeletal: Negative for back pain, joint swelling and myalgias.   Skin: Negative for rash.   Neurological: Negative for dizziness and headaches.   Psychiatric/Behavioral: Negative for confusion.   All other systems reviewed and are negative.      Past Medical History:   Diagnosis Date   • Depression    • Diabetes mellitus (CMS/HCC)    • HTN (hypertension)        Allergies   Allergen Reactions   • Vicodin [Hydrocodone-Acetaminophen] GI Intolerance       Past Surgical History:   Procedure Laterality Date   • EAR TUBES     • LAPAROSCOPIC TUBAL LIGATION     • TONSILLECTOMY AND ADENOIDECTOMY         Family History   Problem Relation Age of Onset   • Hypertension Mother    • Depression Mother    • Post-traumatic stress disorder Mother    • Hyperlipidemia Mother    • Hypertension Father    • Hyperlipidemia Father    • Hypertension Brother    • Depression Brother    • Parkinsonism Maternal Grandmother    • Dementia Maternal Grandmother    • Atrial fibrillation Maternal Grandfather    • Breast cancer Paternal Grandmother    • COPD Paternal Grandfather        Social  "History     Socioeconomic History   • Marital status: Single     Spouse name: Not on file   • Number of children: 0   • Years of education: Not on file   • Highest education level: Not on file   Occupational History   • Occupation: disability    Tobacco Use   • Smoking status: Former Smoker     Types: Cigarettes     Last attempt to quit: 2018     Years since quittin.6   • Smokeless tobacco: Never Used   • Tobacco comment: socially    Substance and Sexual Activity   • Alcohol use: Never     Frequency: Never   • Drug use: Never           Objective   Physical Exam   Constitutional: She is oriented to person, place, and time. She appears well-developed and well-nourished.   HENT:   Head: Normocephalic and atraumatic.   Eyes: Pupils are equal, round, and reactive to light. Conjunctivae and EOM are normal.   Neck: Normal range of motion. Neck supple.   Cardiovascular: Normal rate, regular rhythm, normal heart sounds and intact distal pulses.   Pulmonary/Chest: Effort normal and breath sounds normal. No respiratory distress. She has no wheezes.   Abdominal: Soft. Bowel sounds are normal. She exhibits no distension and no mass. There is tenderness in the suprapubic area. There is no rigidity, no rebound, no guarding, no CVA tenderness, no tenderness at McBurney's point and negative Herring's sign.   Musculoskeletal: Normal range of motion. She exhibits no deformity.   Neurological: She is alert and oriented to person, place, and time. She displays normal reflexes. No cranial nerve deficit or sensory deficit. GCS eye subscore is 4. GCS verbal subscore is 5. GCS motor subscore is 6.   Skin: Skin is warm and dry. Capillary refill takes less than 2 seconds.   Psychiatric: She has a normal mood and affect.   Vitals reviewed.      Procedures           ED Course      /73   Pulse 68   Temp 98.9 °F (37.2 °C) (Oral)   Resp 18   Ht 162.6 cm (64\")   Wt (!) 176 kg (387 lb 9.1 oz)   LMP 2020   SpO2 98%   BMI 66.53 " kg/m²   Labs Reviewed   COMPREHENSIVE METABOLIC PANEL - Abnormal; Notable for the following components:       Result Value    Glucose 214 (*)     All other components within normal limits    Narrative:     GFR Normal >60  Chronic Kidney Disease <60  Kidney Failure <15     CBC WITH AUTO DIFFERENTIAL - Abnormal; Notable for the following components:    Hemoglobin 10.1 (*)     Hematocrit 30.3 (*)     All other components within normal limits   URINALYSIS W/ CULTURE IF INDICATED - Abnormal; Notable for the following components:    Glucose,  mg/dL (Trace) (*)     Leuk Esterase, UA Moderate (2+) (*)     All other components within normal limits   URINALYSIS, MICROSCOPIC ONLY - Abnormal; Notable for the following components:    RBC, UA 0-2 (*)     WBC, UA 6-12 (*)     Squamous Epithelial Cells, UA 3-6 (*)     All other components within normal limits   LIPASE - Normal   PREGNANCY, URINE - Normal   BUN - Normal   URINE CULTURE   CBC AND DIFFERENTIAL    Narrative:     The following orders were created for panel order CBC & Differential.  Procedure                               Abnormality         Status                     ---------                               -----------         ------                     CBC Auto Differential[[id number]]        Abnormal            Final result                 Please view results for these tests on the individual orders.     Medications   sodium chloride 0.9 % flush 10 mL (has no administration in time range)   iopamidol (ISOVUE-370) 76 % injection 100 mL (100 mL Intravenous Given 8/26/20 2107)     Xr Shoulder 2+ View Left (in Office)    Result Date: 8/26/2020  No acute osseous abnormality.  Electronically Signed By-Rex Tobias On:8/26/2020 2:13 PM This report was finalized on [id number] by  Rex Tobias, .    Ct Abdomen Pelvis With Contrast    Result Date: 8/26/2020   1. Hepatosplenomegaly. 2. Questionable mild fatty infiltration of the liver. 3. There are lymph nodes throughout  the abdomen and pelvis only a few of which are enlarged by size criteria. This could be on the basis of generalized reactive hyperplasia. With the presence of hepatosplenomegaly, I cannot exclude leukemia or lymphoma.  Electronically Signed By-Maxwell Gonzales On:8/26/2020 10:15 PM This report was finalized on [id number] by  Maxwell Gonzales, .                                         MDM  Number of Diagnoses or Management Options  Enlarged liver:   Generalized abdominal pain:   Reactive lymphoid hyperplasia:   Spleen enlarged:   Diagnosis management comments: Patient had IV established and blood work was obtained.  Patient was found to have enlarged liver and large spleen and multiple enlarged lymph nodes throughout the abdomen the CT was reviewed by myself and discussed with Dr. Link who also reviewed the CAT scan.-I discussed the results with the patient who wishes to follow-up with the [facility] for lymphoma rule out on an outpatient basis.  The patient was told of the imperative nature that she follow-up.-She verbalized understood the follow-up necessity       Amount and/or Complexity of Data Reviewed  Clinical lab tests: reviewed  Tests in the radiology section of CPT®: reviewed    Patient Progress  Patient progress: improved      Final diagnoses:   Generalized abdominal pain   Spleen enlarged   Enlarged liver   Reactive lymphoid hyperplasia            Reina Cheney, APRN  08/26/20 2324

## 2020-08-28 ENCOUNTER — TELEPHONE (OUTPATIENT)
Dept: FAMILY MEDICINE CLINIC | Facility: CLINIC | Age: 40
End: 2020-08-28

## 2020-09-02 ENCOUNTER — CONSULT (OUTPATIENT)
Dept: ONCOLOGY | Facility: CLINIC | Age: 40
End: 2020-09-02

## 2020-09-02 ENCOUNTER — LAB (OUTPATIENT)
Dept: LAB | Facility: HOSPITAL | Age: 40
End: 2020-09-02

## 2020-09-02 VITALS
TEMPERATURE: 98.2 F | SYSTOLIC BLOOD PRESSURE: 134 MMHG | WEIGHT: 272 LBS | HEIGHT: 63 IN | RESPIRATION RATE: 20 BRPM | HEART RATE: 78 BPM | BODY MASS INDEX: 48.2 KG/M2 | DIASTOLIC BLOOD PRESSURE: 81 MMHG

## 2020-09-02 DIAGNOSIS — R59.1 LYMPHADENOPATHY: ICD-10-CM

## 2020-09-02 DIAGNOSIS — R59.1 LYMPHADENOPATHY: Primary | ICD-10-CM

## 2020-09-02 LAB
ALBUMIN SERPL-MCNC: 4.1 G/DL (ref 3.5–5.2)
ALBUMIN/GLOB SERPL: 0.9 G/DL
ALP SERPL-CCNC: 103 U/L (ref 39–117)
ALT SERPL W P-5'-P-CCNC: 17 U/L (ref 1–33)
ANION GAP SERPL CALCULATED.3IONS-SCNC: 11 MMOL/L (ref 5–15)
AST SERPL-CCNC: 21 U/L (ref 1–32)
B2 MICROGLOB SERPL-MCNC: 2.4 MG/L (ref 0.8–2.2)
BASOPHILS # BLD AUTO: 0.03 10*3/MM3 (ref 0–0.2)
BASOPHILS NFR BLD AUTO: 0.3 % (ref 0–1.5)
BILIRUB SERPL-MCNC: 1.2 MG/DL (ref 0–1.2)
BUN SERPL-MCNC: 14 MG/DL (ref 6–20)
BUN SERPL-MCNC: ABNORMAL MG/DL
BUN/CREAT SERPL: ABNORMAL
CALCIUM SPEC-SCNC: 9.3 MG/DL (ref 8.6–10.5)
CHLORIDE SERPL-SCNC: 99 MMOL/L (ref 98–107)
CO2 SERPL-SCNC: 27 MMOL/L (ref 22–29)
CREAT SERPL-MCNC: 0.66 MG/DL (ref 0.57–1)
DEPRECATED RDW RBC AUTO: 42.1 FL (ref 37–54)
EOSINOPHIL # BLD AUTO: 0.28 10*3/MM3 (ref 0–0.4)
EOSINOPHIL NFR BLD AUTO: 2.9 % (ref 0.3–6.2)
ERYTHROCYTE [DISTWIDTH] IN BLOOD BY AUTOMATED COUNT: 14.6 % (ref 12.3–15.4)
GFR SERPL CREATININE-BSD FRML MDRD: 101 ML/MIN/1.73
GLOBULIN UR ELPH-MCNC: 4.4 GM/DL
GLUCOSE SERPL-MCNC: 143 MG/DL (ref 65–99)
HCT VFR BLD AUTO: 34.4 % (ref 34–46.6)
HGB BLD-MCNC: 11.1 G/DL (ref 12–15.9)
LDH SERPL-CCNC: 180 U/L (ref 135–214)
LYMPHOCYTES # BLD AUTO: 2.48 10*3/MM3 (ref 0.7–3.1)
LYMPHOCYTES NFR BLD AUTO: 25.6 % (ref 19.6–45.3)
MCH RBC QN AUTO: 26.3 PG (ref 26.6–33)
MCHC RBC AUTO-ENTMCNC: 32.3 G/DL (ref 31.5–35.7)
MCV RBC AUTO: 81.5 FL (ref 79–97)
MONOCYTES # BLD AUTO: 0.81 10*3/MM3 (ref 0.1–0.9)
MONOCYTES NFR BLD AUTO: 8.4 % (ref 5–12)
NEUTROPHILS NFR BLD AUTO: 6.1 10*3/MM3 (ref 1.7–7)
NEUTROPHILS NFR BLD AUTO: 62.8 % (ref 42.7–76)
PLATELET # BLD AUTO: 279 10*3/MM3 (ref 140–450)
PMV BLD AUTO: 10.2 FL (ref 6–12)
POTASSIUM SERPL-SCNC: 4.3 MMOL/L (ref 3.5–5.2)
PROT SERPL-MCNC: 8.5 G/DL (ref 6–8.5)
RBC # BLD AUTO: 4.22 10*6/MM3 (ref 3.77–5.28)
SODIUM SERPL-SCNC: 137 MMOL/L (ref 136–145)
URATE SERPL-MCNC: 7.5 MG/DL (ref 2.4–5.7)
WBC # BLD AUTO: 9.7 10*3/MM3 (ref 3.4–10.8)

## 2020-09-02 PROCEDURE — 83615 LACTATE (LD) (LDH) ENZYME: CPT

## 2020-09-02 PROCEDURE — 85025 COMPLETE CBC W/AUTO DIFF WBC: CPT

## 2020-09-02 PROCEDURE — 82232 ASSAY OF BETA-2 PROTEIN: CPT

## 2020-09-02 PROCEDURE — 84550 ASSAY OF BLOOD/URIC ACID: CPT

## 2020-09-02 PROCEDURE — 36415 COLL VENOUS BLD VENIPUNCTURE: CPT

## 2020-09-02 PROCEDURE — 80053 COMPREHEN METABOLIC PANEL: CPT

## 2020-09-02 PROCEDURE — 99204 OFFICE O/P NEW MOD 45 MIN: CPT | Performed by: INTERNAL MEDICINE

## 2020-09-03 ENCOUNTER — TELEPHONE (OUTPATIENT)
Dept: ONCOLOGY | Facility: HOSPITAL | Age: 40
End: 2020-09-03

## 2020-09-03 NOTE — TELEPHONE ENCOUNTER
Distress Screening   Patient Name: Leigh Soni  YOB: 1980  MRN #: [id number]    Patient completed distress screenin2020  Distress Level: 5/10  Problems indicated: Fear/anxiety, nervousness. Note: this is due to this visit today.     OSW called patient regarding distress screening. Unable to leave a message     OSW received a call back. Leigh is pleasant, she states her distress was related to her visit yesterday and she feels better about her health. She said basic needs are met. She was told of OSW role and gave v/u.     Referrals: None needed at this time.     Electronically signed by:   Gladys Quarles LCSW, OSW-C  20, 3:35 PM

## 2020-09-08 ENCOUNTER — TELEPHONE (OUTPATIENT)
Dept: ONCOLOGY | Facility: CLINIC | Age: 40
End: 2020-09-08

## 2020-09-08 NOTE — TELEPHONE ENCOUNTER
Caller: Ruiz    Relationship to patient: Marlene Larsen call back number: [phone]    Type of visit: CT    Requested date: Any day, anytime     Additional notes: States pt was told at her 09/02 appt that she'd need a CT

## 2020-09-10 ENCOUNTER — APPOINTMENT (OUTPATIENT)
Dept: LAB | Facility: HOSPITAL | Age: 40
End: 2020-09-10

## 2020-09-16 NOTE — PROGRESS NOTES
Hematology/Oncology Outpatient Follow Up    PATIENT NAME:Leigh Soni  :1980  MRN: [id number]  PRIMARY CARE PHYSICIAN: Bree Wan MD  REFERRING PHYSICIAN: Bree Wan, *    Chief Complaint   Patient presents with   • Follow-up     lymphadenopathy        HISTORY OF PRESENT ILLNESS:   This is a 39-year-old female who developed low abdominal pain.  Patient states at the time she was on her menstrual cycle and she thought she was having a kidney stone.  She has regular menstrual cycles every 28 days which usually lasts about 4 to 5 days and not very heavy.  Her last Pap smear was approximately 1 year ago.  Patient has a history of abnormal Pap smears in the past.  She is scheduled to see her gynecologist for routine GYN evaluation.  Due to the abdominal pain she had CT scans of the abdomen and pelvis done on this showed hepatosplenomegaly with the liver measuring up to 21 cm and the spleen 14 cm.  There may be slight fatty infiltration of the liver but no definite liver lesions were seen.  The pelvic structures were unremarkable.  There were lymph nodes within the abdomen and pelvis the largest located in the portocaval space measuring 1.4 x 2.1 cm.  Lymphoproliferative disease or myeloproliferative disease has been recommended to be ruled out.     Patient is accompanied today by her spouse for this appointment.  She denies any changes to her bowel habits.  She denies nausea or vomiting.  She denies night sweats, or fatigue.  She has lost a significant amount of weight due to increasing activity level.    · 2020 patient had biochemical markers including beta-2 microglobulin which was slightly high at 2.4 creatinine was 0.66 and liver function tests are normal LDH is normal at 180, uric acid is slightly high at 7.5 white count is 9.7, hemoglobin 11.1 platelets are 279  · 2020 patient had CT scan of the neck and chest which showed mild prominent lymph nodes seen anterior to each  submandibular gland nonspecific may be reactive and CT scan of the chest does not show any acute abnormalities.  There is evidence of splenomegaly       Past Medical History:   Diagnosis Date   • Depression    • Diabetes mellitus (CMS/HCC)    • HTN (hypertension)        Past Surgical History:   Procedure Laterality Date   • EAR TUBES     • LAPAROSCOPIC TUBAL LIGATION     • TONSILLECTOMY AND ADENOIDECTOMY           Current Outpatient Medications:   •  Blood Glucose Monitoring Suppl (ACCU-CHEK GURJIT) device, Use as instructed, Disp: 1 each, Rfl: 0  •  clotrimazole (LOTRIMIN) 1 % cream, Apply  topically to the appropriate area as directed 2 (Two) Times a Day., Disp: 45 g, Rfl: 2  •  fluticasone (FLONASE) 50 MCG/ACT nasal spray, 2 sprays into the nostril(s) as directed by provider Daily., Disp: 9.9 mL, Rfl: 0  •  glucose blood (Accu-Chek Gurjit) test strip, Use as instructed to check blood glucose once daily, Disp: 100 each, Rfl: 3  •  hydrocortisone 0.5 % ointment, Apply  topically to the appropriate area as directed 2 (Two) Times a Day., Disp: 28 g, Rfl: 0  •  ibuprofen (ADVIL,MOTRIN) 800 MG tablet, Take 1 tablet by mouth Every 8 (Eight) Hours As Needed for Moderate Pain ., Disp: 9 tablet, Rfl: 0  •  Lancets (ACCU-CHEK SOFT TOUCH) lancets, Use with device and strips to check blood glucose once daily, Disp: 100 each, Rfl: 3  •  lisinopril (PRINIVIL,ZESTRIL) 10 MG tablet, Take 1 tablet by mouth Daily for 180 days., Disp: 30 tablet, Rfl: 3  •  sertraline (Zoloft) 50 MG tablet, Take 1 tablet by mouth Daily., Disp: 30 tablet, Rfl: 3  •  SITagliptin (Januvia) 100 MG tablet, Take 1 tablet by mouth Daily., Disp: 30 tablet, Rfl: 5  No current facility-administered medications for this visit.     Allergies   Allergen Reactions   • Vicodin [Hydrocodone-Acetaminophen] GI Intolerance       Family History   Problem Relation Age of Onset   • Hypertension Mother    • Depression Mother    • Post-traumatic stress disorder Mother    •  Hyperlipidemia Mother    • Hypertension Father    • Hyperlipidemia Father    • Hypertension Brother    • Depression Brother    • Parkinsonism Maternal Grandmother    • Dementia Maternal Grandmother    • Atrial fibrillation Maternal Grandfather    • Breast cancer Paternal Grandmother    • COPD Paternal Grandfather        Cancer-related family history includes Breast cancer in her paternal grandmother.    Social History     Tobacco Use   • Smoking status: Former Smoker     Types: Cigarettes     Quit date:      Years since quittin.7   • Smokeless tobacco: Never Used   • Tobacco comment: socially    Substance Use Topics   • Alcohol use: Never     Frequency: Never   • Drug use: Never     I have reviewed and confirmed the accuracy of the patient's history: Chief complaint, HPI and ROS as entered by the MA/LPN/RN. Kathi Pepper Hameed MD 20     SUBJECTIVE:  She has no specific complaints today.  She is accompanied today by her spouse for this appointment.          REVIEW OF SYSTEMS:  Review of Systems   Constitutional: Negative for chills and fever.   HENT: Negative for ear pain, mouth sores, nosebleeds and sore throat.    Eyes: Negative for photophobia and visual disturbance.   Respiratory: Negative for wheezing and stridor.    Cardiovascular: Negative for chest pain and palpitations.   Gastrointestinal: Negative for abdominal pain, diarrhea, nausea and vomiting.   Endocrine: Negative for cold intolerance and heat intolerance.   Genitourinary: Negative for dysuria and hematuria.   Musculoskeletal: Negative for joint swelling and neck stiffness.   Skin: Negative for color change and rash.   Neurological: Negative for seizures and syncope.   Hematological: Negative for adenopathy.        No obvious bleeding   Psychiatric/Behavioral: Negative for agitation, confusion and hallucinations.       OBJECTIVE:    Vitals:    20 1418   BP: 121/79   Pulse: 70   Resp: 20   Temp: 97.7 °F (36.5 °C)   TempSrc:  "Temporal   Weight: 124 kg (274 lb)   Height: 160 cm (63\")   PainSc: 0-No pain     Body mass index is 48.54 kg/m².    ECOG  (1) Restricted in physically strenuous activity, ambulatory and able to do work of light nature    Physical Exam  Vitals signs and nursing note reviewed.   Constitutional:       General: She is not in acute distress.     Appearance: She is not diaphoretic.   HENT:      Head: Normocephalic and atraumatic.   Eyes:      General: No scleral icterus.        Right eye: No discharge.         Left eye: No discharge.      Conjunctiva/sclera: Conjunctivae normal.   Neck:      Musculoskeletal: Normal range of motion and neck supple.      Thyroid: No thyromegaly.   Cardiovascular:      Rate and Rhythm: Normal rate and regular rhythm.      Heart sounds: Normal heart sounds. No friction rub. No gallop.    Pulmonary:      Effort: Pulmonary effort is normal. No respiratory distress.      Breath sounds: No stridor. No wheezing.   Abdominal:      General: Bowel sounds are normal.      Palpations: Abdomen is soft. There is no mass.      Tenderness: There is no abdominal tenderness. There is no guarding or rebound.   Musculoskeletal: Normal range of motion.         General: No tenderness.   Lymphadenopathy:      Cervical: No cervical adenopathy.   Skin:     General: Skin is warm.      Findings: No erythema or rash.   Neurological:      Mental Status: She is alert and oriented to person, place, and time.      Motor: No abnormal muscle tone.   Psychiatric:         Behavior: Behavior normal.         RECENT LABS  WBC   Date Value Ref Range Status   09/17/2020 7.14 3.40 - 10.80 10*3/mm3 Final   08/26/2020 8.8 3.4 - 10.8 x10E3/uL Final     RBC   Date Value Ref Range Status   09/17/2020 3.95 3.77 - 5.28 10*6/mm3 Final   08/26/2020 3.90 3.77 - 5.28 x10E6/uL Final     Hemoglobin   Date Value Ref Range Status   09/17/2020 10.2 (L) 12.0 - 15.9 g/dL Final     Hematocrit   Date Value Ref Range Status   09/17/2020 32.0 (L) 34.0 " - 46.6 % Final     MCV   Date Value Ref Range Status   09/17/2020 81.0 79.0 - 97.0 fL Final     MCH   Date Value Ref Range Status   09/17/2020 25.8 (L) 26.6 - 33.0 pg Final     MCHC   Date Value Ref Range Status   09/17/2020 31.9 31.5 - 35.7 g/dL Final     RDW   Date Value Ref Range Status   09/17/2020 14.3 12.3 - 15.4 % Final     RDW-SD   Date Value Ref Range Status   09/17/2020 41.5 37.0 - 54.0 fl Final     MPV   Date Value Ref Range Status   09/17/2020 9.8 6.0 - 12.0 fL Final     Platelets   Date Value Ref Range Status   09/17/2020 215 140 - 450 10*3/mm3 Final     Neutrophil %   Date Value Ref Range Status   09/17/2020 61.4 42.7 - 76.0 % Final     Lymphocyte %   Date Value Ref Range Status   09/17/2020 26.5 19.6 - 45.3 % Final     Monocyte %   Date Value Ref Range Status   09/17/2020 9.1 5.0 - 12.0 % Final     Eosinophil %   Date Value Ref Range Status   09/17/2020 2.7 0.3 - 6.2 % Final     Basophil %   Date Value Ref Range Status   09/17/2020 0.3 0.0 - 1.5 % Final     Immature Grans %   Date Value Ref Range Status   06/04/2020 0.6 (H) 0.0 - 0.5 % Final     Neutrophils, Absolute   Date Value Ref Range Status   09/17/2020 4.39 1.70 - 7.00 10*3/mm3 Final     Lymphocytes, Absolute   Date Value Ref Range Status   09/17/2020 1.89 0.70 - 3.10 10*3/mm3 Final     Monocytes, Absolute   Date Value Ref Range Status   09/17/2020 0.65 0.10 - 0.90 10*3/mm3 Final     Eosinophils, Absolute   Date Value Ref Range Status   09/17/2020 0.19 0.00 - 0.40 10*3/mm3 Final     Basophils, Absolute   Date Value Ref Range Status   09/17/2020 0.02 0.00 - 0.20 10*3/mm3 Final     Immature Grans, Absolute   Date Value Ref Range Status   06/04/2020 0.05 0.00 - 0.05 10*3/mm3 Final     nRBC   Date Value Ref Range Status   08/26/2020 0.0 0.0 - 0.2 /100 WBC Final       Lab Results   Component Value Date    GLUCOSE 143 (H) 09/02/2020    BUN  09/02/2020      Comment:      Testing performed by alternate method    BUN 14 09/02/2020    CREATININE 0.66  09/02/2020    EGFRIFNONA 101 09/02/2020    EGFRIFAFRI 136 08/26/2020    BCR  09/02/2020      Comment:      Testing not performed    K 4.3 09/02/2020    CO2 27.0 09/02/2020    CALCIUM 9.3 09/02/2020    PROTENTOTREF 8.0 08/26/2020    ALBUMIN 4.10 09/02/2020    LABIL2 0.9 (L) 08/26/2020    AST 21 09/02/2020    ALT 17 09/02/2020         Assessment/Plan     Lymphadenopathy  - CBC & Differential  - JAK2 Mutation Analysis, Qual  - BCR-ABL FISH  - Erythropoietin  - Flow Cytometry, Blood  - Ambulatory Referral to General Surgery    Hepatosplenomegaly  - JAK2 Mutation Analysis, Qual  - BCR-ABL FISH  - Erythropoietin  - Flow Cytometry, Blood  - Ambulatory Referral to General Surgery      ASSESSMENT:          1. Abdominal lymphadenopathy 2.1 cm largest size of unclear etiology  2. Lower abdominal discomfort  3. Anemia    PLANS:     · Reviewed her CT scan with patient and her spouse who is present today.  Discussed that her lymph nodes may be reactive or could still be due to a lymphoproliferative or myeloproliferative disease.   · Check Stephen 2 analysis, BCR/ABL, EPO level, flow cytometry.  · Refer to surgery for elmira biopsy if possible  · Follow-up 3 weeks  · May benefit from GI referral as well           I will plan to see her back in approximately 3 weeks recommendations        Patient verbalized understanding and is in agreement of the above plan.

## 2020-09-17 ENCOUNTER — HOSPITAL ENCOUNTER (OUTPATIENT)
Dept: PET IMAGING | Facility: HOSPITAL | Age: 40
Discharge: HOME | End: 2020-09-17
Admitting: INTERNAL MEDICINE

## 2020-09-17 ENCOUNTER — LAB (OUTPATIENT)
Dept: LAB | Facility: HOSPITAL | Age: 40
End: 2020-09-17

## 2020-09-17 ENCOUNTER — OFFICE VISIT (OUTPATIENT)
Dept: ONCOLOGY | Facility: CLINIC | Age: 40
End: 2020-09-17

## 2020-09-17 VITALS
BODY MASS INDEX: 48.55 KG/M2 | HEIGHT: 63 IN | SYSTOLIC BLOOD PRESSURE: 121 MMHG | WEIGHT: 274 LBS | HEART RATE: 70 BPM | DIASTOLIC BLOOD PRESSURE: 79 MMHG | TEMPERATURE: 97.7 F | RESPIRATION RATE: 20 BRPM

## 2020-09-17 DIAGNOSIS — R59.1 LYMPHADENOPATHY: Primary | ICD-10-CM

## 2020-09-17 DIAGNOSIS — R16.2 HEPATOSPLENOMEGALY: ICD-10-CM

## 2020-09-17 DIAGNOSIS — R59.1 LYMPHADENOPATHY: ICD-10-CM

## 2020-09-17 LAB
BASOPHILS # BLD AUTO: 0.02 10*3/MM3 (ref 0–0.2)
BASOPHILS NFR BLD AUTO: 0.3 % (ref 0–1.5)
DEPRECATED RDW RBC AUTO: 41.5 FL (ref 37–54)
EOSINOPHIL # BLD AUTO: 0.19 10*3/MM3 (ref 0–0.4)
EOSINOPHIL NFR BLD AUTO: 2.7 % (ref 0.3–6.2)
ERYTHROCYTE [DISTWIDTH] IN BLOOD BY AUTOMATED COUNT: 14.3 % (ref 12.3–15.4)
HCT VFR BLD AUTO: 32 % (ref 34–46.6)
HGB BLD-MCNC: 10.2 G/DL (ref 12–15.9)
LYMPHOCYTES # BLD AUTO: 1.89 10*3/MM3 (ref 0.7–3.1)
LYMPHOCYTES NFR BLD AUTO: 26.5 % (ref 19.6–45.3)
MCH RBC QN AUTO: 25.8 PG (ref 26.6–33)
MCHC RBC AUTO-ENTMCNC: 31.9 G/DL (ref 31.5–35.7)
MCV RBC AUTO: 81 FL (ref 79–97)
MONOCYTES # BLD AUTO: 0.65 10*3/MM3 (ref 0.1–0.9)
MONOCYTES NFR BLD AUTO: 9.1 % (ref 5–12)
NEUTROPHILS NFR BLD AUTO: 4.39 10*3/MM3 (ref 1.7–7)
NEUTROPHILS NFR BLD AUTO: 61.4 % (ref 42.7–76)
PLATELET # BLD AUTO: 215 10*3/MM3 (ref 140–450)
PMV BLD AUTO: 9.8 FL (ref 6–12)
RBC # BLD AUTO: 3.95 10*6/MM3 (ref 3.77–5.28)
WBC # BLD AUTO: 7.14 10*3/MM3 (ref 3.4–10.8)

## 2020-09-17 PROCEDURE — 99214 OFFICE O/P EST MOD 30 MIN: CPT | Performed by: INTERNAL MEDICINE

## 2020-09-17 PROCEDURE — 85025 COMPLETE CBC W/AUTO DIFF WBC: CPT

## 2020-09-17 PROCEDURE — 70491 CT SOFT TISSUE NECK W/DYE: CPT

## 2020-09-17 PROCEDURE — 71260 CT THORAX DX C+: CPT

## 2020-09-17 PROCEDURE — 36415 COLL VENOUS BLD VENIPUNCTURE: CPT

## 2020-09-17 PROCEDURE — 0 IOPAMIDOL PER 1 ML: Performed by: INTERNAL MEDICINE

## 2020-09-17 RX ADMIN — IOPAMIDOL 150 ML: 755 INJECTION, SOLUTION INTRAVENOUS at 13:10

## 2020-09-18 LAB
EPO SERPL-ACNC: 12.9 MIU/ML (ref 2.6–18.5)
REF LAB TEST METHOD: NORMAL

## 2020-09-23 LAB
JAK2 P.V617F BLD/T QL: NORMAL
LAB DIRECTOR NAME PROVIDER: NORMAL
LABORATORY COMMENT REPORT: NORMAL

## 2020-09-29 LAB
CELLS ANALYZED: 200
CELLS COUNTED: 200
CHROM ANALY RESULT (ISCN): NORMAL
CLINICAL CYTOGENETICIST SPEC: NORMAL
DIAGNOSTIC IMP SPEC-IMP: NORMAL
SPECIMEN SOURCE: NORMAL

## 2020-10-01 NOTE — PROGRESS NOTES
Hematology/Oncology Outpatient Follow Up    PATIENT NAME:Leigh Soni  :1980  MRN: [id number]  PRIMARY CARE PHYSICIAN: Bree Wan MD  REFERRING PHYSICIAN: Bree Wan, *    Chief Complaint   Patient presents with   • Follow-up     lymphadenopathy        HISTORY OF PRESENT ILLNESS:   This is a 39-year-old female who developed low abdominal pain.  Patient states at the time she was on her menstrual cycle and she thought she was having a kidney stone.  She has regular menstrual cycles every 28 days which usually lasts about 4 to 5 days and not very heavy.  Her last Pap smear was approximately 1 year ago.  Patient has a history of abnormal Pap smears in the past.  She is scheduled to see her gynecologist for routine GYN evaluation.  Due to the abdominal pain she had CT scans of the abdomen and pelvis done on this showed hepatosplenomegaly with the liver measuring up to 21 cm and the spleen 14 cm.  There may be slight fatty infiltration of the liver but no definite liver lesions were seen.  The pelvic structures were unremarkable.  There were lymph nodes within the abdomen and pelvis the largest located in the portocaval space measuring 1.4 x 2.1 cm.  Lymphoproliferative disease or myeloproliferative disease has been recommended to be ruled out.     Patient is accompanied today by her spouse for this appointment.  She denies any changes to her bowel habits.  She denies nausea or vomiting.  She denies night sweats, or fatigue.  She has lost a significant amount of weight due to increasing activity level.    · 2020 patient had biochemical markers including beta-2 microglobulin which was slightly high at 2.4 creatinine was 0.66 and liver function tests are normal LDH is normal at 180, uric acid is slightly high at 7.5 white count is 9.7, hemoglobin 11.1 platelets are 279  · 2020 patient had CT scan of the neck and chest which showed mild prominent lymph nodes seen anterior to each  submandibular gland nonspecific may be reactive and CT scan of the chest does not show any acute abnormalities.  There is evidence of splenomegaly  · 9/17/2020 flow cytometry was negative.  Erythropoietin, Stephen 2 and BCR ABL were normal patient had labs  · 10/6/2020-patient was seen by Dr. Calvo, abdominal lymph node biopsy was not recommended.  Dr. Kraus recommends ENT evaluation and biopsy of the submental lymph nodes.       Past Medical History:   Diagnosis Date   • Depression    • Diabetes mellitus (CMS/HCC)    • HTN (hypertension)        Past Surgical History:   Procedure Laterality Date   • EAR TUBES     • LAPAROSCOPIC TUBAL LIGATION     • TONSILLECTOMY AND ADENOIDECTOMY           Current Outpatient Medications:   •  Blood Glucose Monitoring Suppl (ACCU-CHEK KARIS) device, Use as instructed, Disp: 1 each, Rfl: 0  •  brompheniramine-pseudoephedrine-DM (Bromfed DM) 30-2-10 MG/5ML syrup, Take 5 mL by mouth 4 (Four) Times a Day As Needed for Congestion or Cough., Disp: 119 mL, Rfl: 0  •  clotrimazole (LOTRIMIN) 1 % cream, Apply  topically to the appropriate area as directed 2 (Two) Times a Day., Disp: 45 g, Rfl: 2  •  fluticasone (FLONASE) 50 MCG/ACT nasal spray, 2 sprays into the nostril(s) as directed by provider Daily., Disp: 9.9 mL, Rfl: 0  •  glucose blood (Accu-Chek Karis) test strip, Use as instructed to check blood glucose once daily, Disp: 100 each, Rfl: 3  •  hydrocortisone 0.5 % ointment, Apply  topically to the appropriate area as directed 2 (Two) Times a Day., Disp: 28 g, Rfl: 0  •  ibuprofen (ADVIL,MOTRIN) 800 MG tablet, Take 1 tablet by mouth Every 8 (Eight) Hours As Needed for Moderate Pain ., Disp: 9 tablet, Rfl: 0  •  Lancets (ACCU-CHEK SOFT TOUCH) lancets, Use with device and strips to check blood glucose once daily, Disp: 100 each, Rfl: 3  •  lisinopril (PRINIVIL,ZESTRIL) 10 MG tablet, Take 1 tablet by mouth Daily for 180 days., Disp: 30 tablet, Rfl: 3  •  predniSONE (DELTASONE) 10 MG (21)  dose pack, Take  by mouth Daily. Use as directed on package, Disp: 21 each, Rfl: 0  •  sertraline (Zoloft) 50 MG tablet, Take 1 tablet by mouth Daily., Disp: 30 tablet, Rfl: 3  •  SITagliptin (Januvia) 100 MG tablet, Take 1 tablet by mouth Daily., Disp: 30 tablet, Rfl: 5    Allergies   Allergen Reactions   • Vicodin [Hydrocodone-Acetaminophen] GI Intolerance       Family History   Problem Relation Age of Onset   • Hypertension Mother    • Depression Mother    • Post-traumatic stress disorder Mother    • Hyperlipidemia Mother    • Hypertension Father    • Hyperlipidemia Father    • Hypertension Brother    • Depression Brother    • Parkinsonism Maternal Grandmother    • Dementia Maternal Grandmother    • Atrial fibrillation Maternal Grandfather    • Breast cancer Paternal Grandmother    • COPD Paternal Grandfather        Cancer-related family history includes Breast cancer in her paternal grandmother.    Social History     Tobacco Use   • Smoking status: Former Smoker     Types: Cigarettes     Quit date:      Years since quittin.7   • Smokeless tobacco: Never Used   • Tobacco comment: socially    Substance Use Topics   • Alcohol use: Never     Frequency: Never   • Drug use: Never     I have reviewed and confirmed the accuracy of the patient's history: Chief complaint, HPI and ROS as entered by the MA/LPN/RN. Kathi Hameed MD 10/06/20     SUBJECTIVE:  She has no specific complaints today.  She is accompanied today by her spouse for this appointment.  Patient has had problems with increased menstrual blood loss.  She is the process of having a Mirena IUD placed by her gynecologist          REVIEW OF SYSTEMS:  Review of Systems   Constitutional: Negative for chills and fever.   HENT: Negative for ear pain, mouth sores, nosebleeds and sore throat.    Eyes: Negative for photophobia and visual disturbance.   Respiratory: Negative for wheezing and stridor.    Cardiovascular: Negative for chest pain and  "palpitations.   Gastrointestinal: Negative for abdominal pain, diarrhea, nausea and vomiting.   Endocrine: Negative for cold intolerance and heat intolerance.   Genitourinary: Negative for dysuria and hematuria.   Musculoskeletal: Negative for joint swelling and neck stiffness.   Skin: Negative for color change and rash.   Neurological: Negative for seizures and syncope.   Hematological: Negative for adenopathy.        No obvious bleeding   Psychiatric/Behavioral: Negative for agitation, confusion and hallucinations.   ROS as documented    OBJECTIVE:    Vitals:    10/06/20 1432   BP: 135/61   Pulse: 93   Resp: 20   Temp: 98 °F (36.7 °C)   TempSrc: Temporal   Weight: 123 kg (272 lb)   Height: 162.6 cm (64\")   PainSc: 0-No pain     Body mass index is 46.69 kg/m².    ECOG  (1) Restricted in physically strenuous activity, ambulatory and able to do work of light nature    Physical Exam  Vitals signs and nursing note reviewed.   Constitutional:       General: She is not in acute distress.     Appearance: She is not diaphoretic.   HENT:      Head: Normocephalic and atraumatic.   Eyes:      General: No scleral icterus.        Right eye: No discharge.         Left eye: No discharge.      Conjunctiva/sclera: Conjunctivae normal.   Neck:      Musculoskeletal: Normal range of motion and neck supple.      Thyroid: No thyromegaly.   Cardiovascular:      Rate and Rhythm: Normal rate and regular rhythm.      Heart sounds: Normal heart sounds. No friction rub. No gallop.    Pulmonary:      Effort: Pulmonary effort is normal. No respiratory distress.      Breath sounds: No stridor. No wheezing.   Abdominal:      General: Bowel sounds are normal.      Palpations: Abdomen is soft. There is no mass.      Tenderness: There is no abdominal tenderness. There is no guarding or rebound.   Musculoskeletal: Normal range of motion.         General: No tenderness.   Lymphadenopathy:      Cervical: No cervical adenopathy.   Skin:     General: " Skin is warm.      Findings: No erythema or rash.   Neurological:      Mental Status: She is alert and oriented to person, place, and time.      Motor: No abnormal muscle tone.   Psychiatric:         Behavior: Behavior normal.     I have reexamined the patient and the results are consistent with the previously documented exam. Kathi Hameed MD     RECENT LABS  WBC   Date Value Ref Range Status   10/06/2020 9.53 3.40 - 10.80 10*3/mm3 Final   08/26/2020 8.8 3.4 - 10.8 x10E3/uL Final     RBC   Date Value Ref Range Status   10/06/2020 4.17 3.77 - 5.28 10*6/mm3 Final   08/26/2020 3.90 3.77 - 5.28 x10E6/uL Final     Hemoglobin   Date Value Ref Range Status   10/06/2020 10.9 (L) 12.0 - 15.9 g/dL Final     Hematocrit   Date Value Ref Range Status   10/06/2020 33.7 (L) 34.0 - 46.6 % Final     MCV   Date Value Ref Range Status   10/06/2020 80.8 79.0 - 97.0 fL Final     MCH   Date Value Ref Range Status   10/06/2020 26.1 (L) 26.6 - 33.0 pg Final     MCHC   Date Value Ref Range Status   10/06/2020 32.3 31.5 - 35.7 g/dL Final     RDW   Date Value Ref Range Status   10/06/2020 14.2 12.3 - 15.4 % Final     RDW-SD   Date Value Ref Range Status   10/06/2020 41.0 37.0 - 54.0 fl Final     MPV   Date Value Ref Range Status   10/06/2020 10.4 6.0 - 12.0 fL Final     Platelets   Date Value Ref Range Status   10/06/2020 223 140 - 450 10*3/mm3 Final     Neutrophil %   Date Value Ref Range Status   10/06/2020 79.1 (H) 42.7 - 76.0 % Final     Lymphocyte %   Date Value Ref Range Status   10/06/2020 14.0 (L) 19.6 - 45.3 % Final     Monocyte %   Date Value Ref Range Status   10/06/2020 5.2 5.0 - 12.0 % Final     Eosinophil %   Date Value Ref Range Status   10/06/2020 1.6 0.3 - 6.2 % Final     Basophil %   Date Value Ref Range Status   10/06/2020 0.1 0.0 - 1.5 % Final     Immature Grans %   Date Value Ref Range Status   06/04/2020 0.6 (H) 0.0 - 0.5 % Final     Neutrophils, Absolute   Date Value Ref Range Status   10/06/2020 7.54 (H) 1.70  - 7.00 10*3/mm3 Final     Lymphocytes, Absolute   Date Value Ref Range Status   10/06/2020 1.33 0.70 - 3.10 10*3/mm3 Final     Monocytes, Absolute   Date Value Ref Range Status   10/06/2020 0.50 0.10 - 0.90 10*3/mm3 Final     Eosinophils, Absolute   Date Value Ref Range Status   10/06/2020 0.15 0.00 - 0.40 10*3/mm3 Final     Basophils, Absolute   Date Value Ref Range Status   10/06/2020 0.01 0.00 - 0.20 10*3/mm3 Final     Immature Grans, Absolute   Date Value Ref Range Status   06/04/2020 0.05 0.00 - 0.05 10*3/mm3 Final     nRBC   Date Value Ref Range Status   08/26/2020 0.0 0.0 - 0.2 /100 WBC Final       Lab Results   Component Value Date    GLUCOSE 143 (H) 09/02/2020    BUN  09/02/2020      Comment:      Testing performed by alternate method    BUN 14 09/02/2020    CREATININE 0.66 09/02/2020    EGFRIFNONA 101 09/02/2020    EGFRIFAFRI 136 08/26/2020    BCR  09/02/2020      Comment:      Testing not performed    K 4.3 09/02/2020    CO2 27.0 09/02/2020    CALCIUM 9.3 09/02/2020    PROTENTOTREF 8.0 08/26/2020    ALBUMIN 4.10 09/02/2020    LABIL2 0.9 (L) 08/26/2020    AST 21 09/02/2020    ALT 17 09/02/2020         Assessment/Plan     Lymphadenopathy  - CBC & Differential    Hepatosplenomegaly  - CBC & Differential      ASSESSMENT:          1. Abdominal lymphadenopathy 2.1 cm largest size of unclear etiology: Surgical biopsy recommended by Dr. Calvo.  Recommend ENT evaluation for possible submental lymph node biopsy  2. Lower abdominal discomfort  3. Chronic anemia anemia  4. Menorrhagia: Followed by GYN.  In the process of getting a Mirena IUD    PLANS:     · Reviewed her CT scan with patient and her spouse who is present today.  Discussed that her lymph nodes may be reactive or could still be due to a lymphoproliferative or myeloproliferative disease.   · Stephen 2 analysis, BCR/ABL, EPO level, flow cytometry were all normal.  · Refer to ENT for submental elmira biopsy if possible  · Complete anemia work-up  · Patient  asked to begin ferrous sulfate 325 mg p.o. daily due to menorrhagia  · Follow-up 4 weeks  · May benefit from GI referral as well  in the future          I will plan to see her back in approximately 4 weeks recommendations        Patient verbalized understanding and is in agreement of the above plan.

## 2020-10-06 ENCOUNTER — OFFICE VISIT (OUTPATIENT)
Dept: SURGERY | Facility: CLINIC | Age: 40
End: 2020-10-06

## 2020-10-06 ENCOUNTER — LAB (OUTPATIENT)
Dept: LAB | Facility: HOSPITAL | Age: 40
End: 2020-10-06

## 2020-10-06 ENCOUNTER — OFFICE VISIT (OUTPATIENT)
Dept: ONCOLOGY | Facility: CLINIC | Age: 40
End: 2020-10-06

## 2020-10-06 VITALS
DIASTOLIC BLOOD PRESSURE: 81 MMHG | WEIGHT: 272 LBS | SYSTOLIC BLOOD PRESSURE: 118 MMHG | TEMPERATURE: 97.2 F | HEART RATE: 98 BPM | OXYGEN SATURATION: 97 % | BODY MASS INDEX: 46.44 KG/M2 | HEIGHT: 64 IN

## 2020-10-06 VITALS
SYSTOLIC BLOOD PRESSURE: 135 MMHG | DIASTOLIC BLOOD PRESSURE: 61 MMHG | HEART RATE: 93 BPM | RESPIRATION RATE: 20 BRPM | BODY MASS INDEX: 46.44 KG/M2 | WEIGHT: 272 LBS | TEMPERATURE: 98 F | HEIGHT: 64 IN

## 2020-10-06 DIAGNOSIS — R59.1 LYMPHADENOPATHY: Primary | ICD-10-CM

## 2020-10-06 DIAGNOSIS — R59.1 LYMPHADENOPATHY: ICD-10-CM

## 2020-10-06 DIAGNOSIS — R16.2 HEPATOSPLENOMEGALY: ICD-10-CM

## 2020-10-06 LAB
BASOPHILS # BLD AUTO: 0.01 10*3/MM3 (ref 0–0.2)
BASOPHILS NFR BLD AUTO: 0.1 % (ref 0–1.5)
DEPRECATED RDW RBC AUTO: 41 FL (ref 37–54)
EOSINOPHIL # BLD AUTO: 0.15 10*3/MM3 (ref 0–0.4)
EOSINOPHIL NFR BLD AUTO: 1.6 % (ref 0.3–6.2)
ERYTHROCYTE [DISTWIDTH] IN BLOOD BY AUTOMATED COUNT: 14.2 % (ref 12.3–15.4)
FERRITIN SERPL-MCNC: 31.32 NG/ML (ref 13–150)
HCT VFR BLD AUTO: 33.7 % (ref 34–46.6)
HGB BLD-MCNC: 10.9 G/DL (ref 12–15.9)
IRON 24H UR-MRATE: 27 MCG/DL (ref 37–145)
IRON SATN MFR SERPL: 7 % (ref 20–50)
LDH SERPL-CCNC: 195 U/L (ref 135–214)
LYMPHOCYTES # BLD AUTO: 1.33 10*3/MM3 (ref 0.7–3.1)
LYMPHOCYTES NFR BLD AUTO: 14 % (ref 19.6–45.3)
MCH RBC QN AUTO: 26.1 PG (ref 26.6–33)
MCHC RBC AUTO-ENTMCNC: 32.3 G/DL (ref 31.5–35.7)
MCV RBC AUTO: 80.8 FL (ref 79–97)
MONOCYTES # BLD AUTO: 0.5 10*3/MM3 (ref 0.1–0.9)
MONOCYTES NFR BLD AUTO: 5.2 % (ref 5–12)
NEUTROPHILS NFR BLD AUTO: 7.54 10*3/MM3 (ref 1.7–7)
NEUTROPHILS NFR BLD AUTO: 79.1 % (ref 42.7–76)
PLATELET # BLD AUTO: 223 10*3/MM3 (ref 140–450)
PMV BLD AUTO: 10.4 FL (ref 6–12)
RBC # BLD AUTO: 4.17 10*6/MM3 (ref 3.77–5.28)
RETICS # AUTO: 0.06 10*6/MM3 (ref 0.02–0.13)
RETICS/RBC NFR AUTO: 1.42 % (ref 0.7–1.9)
TIBC SERPL-MCNC: 386 MCG/DL (ref 298–536)
TRANSFERRIN SERPL-MCNC: 259 MG/DL (ref 200–360)
WBC # BLD AUTO: 9.53 10*3/MM3 (ref 3.4–10.8)

## 2020-10-06 PROCEDURE — 99202 OFFICE O/P NEW SF 15 MIN: CPT | Performed by: SURGERY

## 2020-10-06 PROCEDURE — 99214 OFFICE O/P EST MOD 30 MIN: CPT | Performed by: INTERNAL MEDICINE

## 2020-10-06 PROCEDURE — 83540 ASSAY OF IRON: CPT | Performed by: INTERNAL MEDICINE

## 2020-10-06 PROCEDURE — 85025 COMPLETE CBC W/AUTO DIFF WBC: CPT

## 2020-10-06 PROCEDURE — 85045 AUTOMATED RETICULOCYTE COUNT: CPT | Performed by: INTERNAL MEDICINE

## 2020-10-06 PROCEDURE — 82746 ASSAY OF FOLIC ACID SERUM: CPT | Performed by: INTERNAL MEDICINE

## 2020-10-06 PROCEDURE — 82607 VITAMIN B-12: CPT | Performed by: INTERNAL MEDICINE

## 2020-10-06 PROCEDURE — 83615 LACTATE (LD) (LDH) ENZYME: CPT | Performed by: INTERNAL MEDICINE

## 2020-10-06 PROCEDURE — 84466 ASSAY OF TRANSFERRIN: CPT | Performed by: INTERNAL MEDICINE

## 2020-10-06 PROCEDURE — 36415 COLL VENOUS BLD VENIPUNCTURE: CPT

## 2020-10-06 PROCEDURE — 82728 ASSAY OF FERRITIN: CPT | Performed by: INTERNAL MEDICINE

## 2020-10-06 RX ORDER — FERROUS SULFATE 325(65) MG
325 TABLET ORAL
Qty: 90 TABLET | Refills: 1 | Status: SHIPPED | OUTPATIENT
Start: 2020-10-06 | End: 2022-04-27

## 2020-10-06 NOTE — PROGRESS NOTES
Subjective   Leigh Soni is a 39 y.o. female.     History of present illness  Noa is pleasant 39-year-old female seen in the office today at the request of Dr Hameed for possible lymph node biopsy of a deep portacaval node.  He had a CT scan done for abdominal discomfort and the CT showed some hepatosplenomegaly with possible lymph node in the portacaval space being abnormal.  It measured about 1.4 x 2 cm.  We have been asked to biopsy that noted.    She has had no constitutional symptoms like nausea vomiting night sweats fatigue she has lost some weight but this is been intentional.    Past Medical History:   Diagnosis Date   • Depression    • Diabetes mellitus (CMS/HCC)    • HTN (hypertension)        Past Surgical History:   Procedure Laterality Date   • EAR TUBES     • LAPAROSCOPIC TUBAL LIGATION     • TONSILLECTOMY AND ADENOIDECTOMY         Outpatient Encounter Medications as of 10/6/2020   Medication Sig Dispense Refill   • Blood Glucose Monitoring Suppl (ACCU-CHEK KARIS) device Use as instructed 1 each 0   • brompheniramine-pseudoephedrine-DM (Bromfed DM) 30-2-10 MG/5ML syrup Take 5 mL by mouth 4 (Four) Times a Day As Needed for Congestion or Cough. 119 mL 0   • clotrimazole (LOTRIMIN) 1 % cream Apply  topically to the appropriate area as directed 2 (Two) Times a Day. 45 g 2   • fluticasone (FLONASE) 50 MCG/ACT nasal spray 2 sprays into the nostril(s) as directed by provider Daily. 9.9 mL 0   • glucose blood (Accu-Chek Karis) test strip Use as instructed to check blood glucose once daily 100 each 3   • hydrocortisone 0.5 % ointment Apply  topically to the appropriate area as directed 2 (Two) Times a Day. 28 g 0   • ibuprofen (ADVIL,MOTRIN) 800 MG tablet Take 1 tablet by mouth Every 8 (Eight) Hours As Needed for Moderate Pain . 9 tablet 0   • Lancets (ACCU-CHEK SOFT TOUCH) lancets Use with device and strips to check blood glucose once daily 100 each 3   • lisinopril (PRINIVIL,ZESTRIL) 10 MG tablet Take 1  tablet by mouth Daily for 180 days. 30 tablet 3   • predniSONE (DELTASONE) 10 MG (21) dose pack Take  by mouth Daily. Use as directed on package 21 each 0   • sertraline (Zoloft) 50 MG tablet Take 1 tablet by mouth Daily. 30 tablet 3   • SITagliptin (Januvia) 100 MG tablet Take 1 tablet by mouth Daily. 30 tablet 5     No facility-administered encounter medications on file as of 10/6/2020.        Allergies   Allergen Reactions   • Vicodin [Hydrocodone-Acetaminophen] GI Intolerance       Family History   Problem Relation Age of Onset   • Hypertension Mother    • Depression Mother    • Post-traumatic stress disorder Mother    • Hyperlipidemia Mother    • Hypertension Father    • Hyperlipidemia Father    • Hypertension Brother    • Depression Brother    • Parkinsonism Maternal Grandmother    • Dementia Maternal Grandmother    • Atrial fibrillation Maternal Grandfather    • Breast cancer Paternal Grandmother    • COPD Paternal Grandfather        Social History     Socioeconomic History   • Marital status: Single     Spouse name: Not on file   • Number of children: 0   • Years of education: Not on file   • Highest education level: Not on file   Occupational History   • Occupation: disability    Tobacco Use   • Smoking status: Former Smoker     Types: Cigarettes     Quit date:      Years since quittin.7   • Smokeless tobacco: Never Used   • Tobacco comment: socially    Substance and Sexual Activity   • Alcohol use: Never     Frequency: Never   • Drug use: Never   • Sexual activity: Defer       The following portions of the patient's history were reviewed and updated as appropriate: allergies, current medications, past family history, past medical history, past social history, past surgical history and problem list.    Objective       Assessment/Plan   There are no diagnoses linked to this encounter.    Review of systems is done and unremarkable with exception the chief complaint the above-noted  exceptions.    Physical exam shows a pleasant obese 39-year-old female.  HEENT is unremarkable heart and lungs are fine abdomen is obese tender extremities with equal range of motion and usage neuro with no obvious focal deficit.    Impression I reviewed her CT scan and do not feel that I can get to this area laparoscopically to sample the node.  An open surgery to do this in my opinion is questionable due to the overwhelming percentage that this will be a reactive node.    Recommendation: I would not recommend a laparotomy to sample this node at this point rather I would suggest that she be seen by an ENT surgeon to see if they feel 1 of the submandibular gland nodes can be biopsied.           Stephen Calvo DO  10/6/2020  12:12 EDT

## 2020-10-07 ENCOUNTER — TELEPHONE (OUTPATIENT)
Dept: ENDOCRINOLOGY | Facility: CLINIC | Age: 40
End: 2020-10-07

## 2020-10-07 LAB
FOLATE SERPL-MCNC: 9.34 NG/ML (ref 4.78–24.2)
VIT B12 BLD-MCNC: 447 PG/ML (ref 211–946)

## 2020-10-07 NOTE — TELEPHONE ENCOUNTER
Tried to call patient back in regards to the vm she left asking to move her appt on 10-12 to 10-14. New patient appointment can not be moved to 14th, but into feb 2021

## 2020-10-12 ENCOUNTER — OFFICE VISIT (OUTPATIENT)
Dept: ENDOCRINOLOGY | Facility: CLINIC | Age: 40
End: 2020-10-12

## 2020-10-12 VITALS
HEART RATE: 72 BPM | SYSTOLIC BLOOD PRESSURE: 115 MMHG | OXYGEN SATURATION: 98 % | HEIGHT: 64 IN | WEIGHT: 267 LBS | DIASTOLIC BLOOD PRESSURE: 80 MMHG | BODY MASS INDEX: 45.58 KG/M2 | TEMPERATURE: 98.2 F

## 2020-10-12 DIAGNOSIS — E66.01 CLASS 3 SEVERE OBESITY DUE TO EXCESS CALORIES WITHOUT SERIOUS COMORBIDITY WITH BODY MASS INDEX (BMI) OF 45.0 TO 49.9 IN ADULT (HCC): ICD-10-CM

## 2020-10-12 DIAGNOSIS — R73.9 HYPERGLYCEMIA: ICD-10-CM

## 2020-10-12 DIAGNOSIS — I10 ESSENTIAL HYPERTENSION: ICD-10-CM

## 2020-10-12 DIAGNOSIS — E11.65 TYPE 2 DIABETES MELLITUS WITH HYPERGLYCEMIA, WITHOUT LONG-TERM CURRENT USE OF INSULIN (HCC): Primary | ICD-10-CM

## 2020-10-12 PROBLEM — E66.813 CLASS 3 SEVERE OBESITY DUE TO EXCESS CALORIES WITHOUT SERIOUS COMORBIDITY WITH BODY MASS INDEX (BMI) OF 45.0 TO 49.9 IN ADULT: Status: ACTIVE | Noted: 2020-10-12

## 2020-10-12 LAB
GLUCOSE BLDC GLUCOMTR-MCNC: 167 MG/DL (ref 70–105)
GLUCOSE BLDC GLUCOMTR-MCNC: 167 MG/DL (ref 70–130)

## 2020-10-12 PROCEDURE — 82962 GLUCOSE BLOOD TEST: CPT | Performed by: INTERNAL MEDICINE

## 2020-10-12 PROCEDURE — 99204 OFFICE O/P NEW MOD 45 MIN: CPT | Performed by: INTERNAL MEDICINE

## 2020-10-12 RX ORDER — DIAZEPAM 5 MG/1
TABLET ORAL
COMMUNITY
Start: 2020-10-06 | End: 2020-10-19

## 2020-10-12 RX ORDER — MISOPROSTOL 200 UG/1
TABLET ORAL
COMMUNITY
Start: 2020-10-06 | End: 2020-10-19

## 2020-10-12 NOTE — PATIENT INSTRUCTIONS
Please get all labs done in the next few days fasting  Continue current medications  Continue to work on your diet and activity  Please finish your diabetes education  Annual eye exam and flu vaccine.

## 2020-10-12 NOTE — PROGRESS NOTES
Endocrine Consult Outpatient  Referred by Dr. Bree Wan for diabetes consultation  Patient Care Team:  Bree Wan MD as PCP - General (Family Medicine)     Chief Complaint: Uncontrolled type 2 diabetes    HPI: 39-year-old female with history of type 2 diabetes diagnosed in  is referred for diabetes consultation she also has history of hypertension and obesity.  For type 2 diabetes: She is currently on Januvia 100 mg p.o. daily.  She is allergic to metformin and her insurance would not pay for Farxiga.  She has not done therapy education yet but she is been working on her diet.  She tells me she checks blood sugars 3 times a day runs between .  Hypertension: On lisinopril.  Obesity: She has been working on her diet and has measured weight but she is not sure how much.    Old records reviewed: Labs from 2020 showed an A1c of 7.4, LDL 63 with triglycerides 246  Labs from 2020 showed a sodium 137 with potassium 4.3, chloride 99, CO 27, glucose 1 4043, BUN 14, creatinine 0.6, AST 24 and ALT 17.    Past Medical History:   Diagnosis Date   • Anger    • Anxiety    • Depression    • Diabetes mellitus (CMS/HCC)    • HTN (hypertension)    • PTSD (post-traumatic stress disorder)    • Type 2 diabetes mellitus (CMS/HCC)        Social History     Socioeconomic History   • Marital status: Single     Spouse name: Not on file   • Number of children: 0   • Years of education: Not on file   • Highest education level: Not on file   Occupational History   • Occupation: disability    Tobacco Use   • Smoking status: Former Smoker     Types: Cigarettes     Quit date: 2018     Years since quittin.7   • Smokeless tobacco: Never Used   • Tobacco comment: socially    Substance and Sexual Activity   • Alcohol use: Never     Frequency: Never   • Drug use: Never   • Sexual activity: Defer       Family History   Problem Relation Age of Onset   • Hypertension Mother    • Depression Mother    •  Post-traumatic stress disorder Mother    • Hyperlipidemia Mother    • Hypertension Father    • Hyperlipidemia Father    • Hypertension Brother    • Depression Brother    • Parkinsonism Maternal Grandmother    • Dementia Maternal Grandmother    • Atrial fibrillation Maternal Grandfather    • Breast cancer Paternal Grandmother    • COPD Paternal Grandfather        Allergies   Allergen Reactions   • Vicodin [Hydrocodone-Acetaminophen] GI Intolerance       ROS:   Constitutional:  Denies fatigue, tiredness.    Eyes:  Denies change in visual acuity   HENT:  Denies nasal congestion or sore throat   Respiratory: denies cough, shortness of breath.   Cardiovascular:  denies chest pain, edema   GI:  Denies abdominal pain, nausea, vomiting.    :  Denies dysuria   Musculoskeletal:  Denies back pain or joint pain   Integument:  Denies dry skin, rash   Neurologic:  Denies headache, focal weakness or sensory changes   Endocrine:  Denies polyuria or polydipsia   Psychiatric:  Denies depression or anxiety      Vitals:    10/12/20 1337   BP: 115/80   Pulse: 72   Temp: 98.2 °F (36.8 °C)   SpO2: 98%        Physical Exam:  GEN: NAD, conversant, obese  EYES: EOMI, PERRL, no conjunctival erythema  NECK: no thyromegaly, full ROM   CV: RRR, no murmurs/rubs/gallops, no peripheral edema  LUNG: CTAB, no wheezes/rales/ronchi  SKIN: no rashes, no acanthosis  MSK: no deformities, full ROM of all extremities  NEURO: no tremors, DTR normal  PSYCH: AOX3, appropriate mood, affect normal  FEET: Has callus on right and left foot, monofilament intact and good DP pulses on both feet. No ulcer seen.     Results Review:     I reviewed the patient's new clinical results.    Lab Results   Component Value Date    GLUCOSE 143 (H) 09/02/2020    BUN  09/02/2020      Comment:      Testing performed by alternate method    BUN 14 09/02/2020    CREATININE 0.66 09/02/2020    EGFRIFNONA 101 09/02/2020    EGFRIFAFRI 136 08/26/2020    BCR  09/02/2020      Comment:       Testing not performed    K 4.3 09/02/2020    CO2 27.0 09/02/2020    CALCIUM 9.3 09/02/2020    PROTENTOTREF 8.0 08/26/2020    ALBUMIN 4.10 09/02/2020    LABIL2 0.9 (L) 08/26/2020    AST 21 09/02/2020    ALT 17 09/02/2020    CHOL 137 06/04/2020    TRIG 246 (H) 08/26/2020    HDL 26 (L) 08/26/2020    LDL 63 08/26/2020     Lab Results   Component Value Date    HGBA1C 7.4 (H) 08/26/2020    HGBA1C 7.8 (H) 06/04/2020     Lab Results   Component Value Date    CREATININE 0.66 09/02/2020     Lab Results   Component Value Date    TSH 2.440 06/04/2020       Medication Review: Reviewed.       Current Outpatient Medications:   •  Blood Glucose Monitoring Suppl (ACCU-CHEK GURJIT) device, Use as instructed, Disp: 1 each, Rfl: 0  •  clotrimazole (LOTRIMIN) 1 % cream, Apply  topically to the appropriate area as directed 2 (Two) Times a Day., Disp: 45 g, Rfl: 2  •  diazePAM (VALIUM) 5 MG tablet, TAKE 1 TABLET BY MOUTH 1 HOUR PRIOR TO PROCEDURE., Disp: , Rfl:   •  ferrous sulfate 325 (65 FE) MG tablet, Take 1 tablet by mouth Daily With Breakfast., Disp: 90 tablet, Rfl: 1  •  glucose blood (Accu-Chek Gurjit) test strip, Use as instructed to check blood glucose once daily, Disp: 100 each, Rfl: 3  •  hydrocortisone 0.5 % ointment, Apply  topically to the appropriate area as directed 2 (Two) Times a Day., Disp: 28 g, Rfl: 0  •  ibuprofen (ADVIL,MOTRIN) 800 MG tablet, Take 1 tablet by mouth Every 8 (Eight) Hours As Needed for Moderate Pain ., Disp: 9 tablet, Rfl: 0  •  Lancets (ACCU-CHEK SOFT TOUCH) lancets, Use with device and strips to check blood glucose once daily, Disp: 100 each, Rfl: 3  •  lisinopril (PRINIVIL,ZESTRIL) 10 MG tablet, Take 1 tablet by mouth Daily for 180 days., Disp: 30 tablet, Rfl: 3  •  miSOPROStol (CYTOTEC) 200 MCG tablet, TAKE 1 TABLET BY MOUTH AS A ONE TIME DOSE. TAKE AT BEDTIME THE NIGHT BEFORE IUD INSERTION., Disp: , Rfl:   •  predniSONE (DELTASONE) 10 MG (21) dose pack, Take  by mouth Daily. Use as directed on  package, Disp: 21 each, Rfl: 0  •  sertraline (Zoloft) 50 MG tablet, Take 1 tablet by mouth Daily., Disp: 30 tablet, Rfl: 3  •  SITagliptin (Januvia) 100 MG tablet, Take 1 tablet by mouth Daily., Disp: 30 tablet, Rfl: 5    Assessment/Plan   1.  Diabetes mellitus type 2: Uncontrolled with last A1c 7.4 but she has been losing some weight.  She has been working on her diet.  I will refer her for comprehensive diabetes [place] and follow labs.  For now continue Januvia 100 mg p.o. daily.  Recommend to get annual eye exam and flu vaccine.    2.  Hypertension: Well-controlled, continue current medication    3.  Obesity: She has lost about 20 pounds since June and will refer her for nutrition counseling as well.  She is been working on her diet.                      Stephen Chaves MD FACE.

## 2020-10-19 ENCOUNTER — OFFICE VISIT (OUTPATIENT)
Dept: FAMILY MEDICINE CLINIC | Facility: CLINIC | Age: 40
End: 2020-10-19

## 2020-10-19 VITALS
OXYGEN SATURATION: 96 % | HEIGHT: 64 IN | HEART RATE: 84 BPM | BODY MASS INDEX: 45.68 KG/M2 | WEIGHT: 267.6 LBS | DIASTOLIC BLOOD PRESSURE: 81 MMHG | TEMPERATURE: 97.3 F | SYSTOLIC BLOOD PRESSURE: 122 MMHG

## 2020-10-19 DIAGNOSIS — R06.83 SNORING: ICD-10-CM

## 2020-10-19 DIAGNOSIS — I10 ESSENTIAL HYPERTENSION: Primary | ICD-10-CM

## 2020-10-19 DIAGNOSIS — F39 MOOD DISORDER (HCC): ICD-10-CM

## 2020-10-19 DIAGNOSIS — F32.89 OTHER DEPRESSION: ICD-10-CM

## 2020-10-19 DIAGNOSIS — L84 CALLUS OF FOOT: ICD-10-CM

## 2020-10-19 DIAGNOSIS — G47.33 OSA (OBSTRUCTIVE SLEEP APNEA): ICD-10-CM

## 2020-10-19 DIAGNOSIS — J30.89 PERENNIAL ALLERGIC RHINITIS: ICD-10-CM

## 2020-10-19 DIAGNOSIS — R59.1 LYMPHADENOPATHY: ICD-10-CM

## 2020-10-19 DIAGNOSIS — Z23 NEED FOR INFLUENZA VACCINATION: ICD-10-CM

## 2020-10-19 PROCEDURE — 99214 OFFICE O/P EST MOD 30 MIN: CPT | Performed by: FAMILY MEDICINE

## 2020-10-19 PROCEDURE — 90471 IMMUNIZATION ADMIN: CPT | Performed by: FAMILY MEDICINE

## 2020-10-19 PROCEDURE — 90686 IIV4 VACC NO PRSV 0.5 ML IM: CPT | Performed by: FAMILY MEDICINE

## 2020-10-19 RX ORDER — FLUTICASONE PROPIONATE 50 MCG
2 SPRAY, SUSPENSION (ML) NASAL DAILY
Qty: 1 BOTTLE | Refills: 5 | Status: SHIPPED | OUTPATIENT
Start: 2020-10-19 | End: 2021-02-17

## 2020-10-19 RX ORDER — MELATONIN 5 MG
5 TABLET,CHEWABLE ORAL
Qty: 30 TABLET | Refills: 3 | Status: SHIPPED | OUTPATIENT
Start: 2020-10-19 | End: 2021-04-13 | Stop reason: SDUPTHER

## 2020-10-19 RX ORDER — CETIRIZINE HYDROCHLORIDE 5 MG/1
5 TABLET ORAL DAILY
Qty: 30 TABLET | Refills: 5 | Status: SHIPPED | OUTPATIENT
Start: 2020-10-19 | End: 2021-04-13

## 2020-10-19 NOTE — PROGRESS NOTES
Subjective   Leigh Soni is a 39 y.o. female.   Chief Complaint   Patient presents with   • Hypertension       History of Present Illness   Presents to the office today to follow-up on ongoing medical problems.  She established back here with me in late July.    Diabetes-last labs by me were at the end of August.  Her A1c at that time was 7.4%.  She will be due for another A1c at the end of November.  When I first saw her she was awaiting an appointment with endocrinology.  Had been referred there by previous PCP.    Hypertension-excellent control.      PCOS-this is followed by Dr. Chaves, as is her diabetes.    She has some depression and takes Zoloft for this.    Since I last saw her she went to the emergency room with abdominal pain.  She had a CT scan done that showed hepatosplenomegaly, fatty infiltration of the liver and lymph nodes throughout the abdomen and pelvis.  Apparently the possibility of leukemia or lymphoma was raised.  She has been evaluated by Dr. SHIRIN Sauceda most recent assessment was that it was not completely clear why the lymph nodes were enlarged and that she could still have a lymphoproliferative or myeloproliferative disease.  Advanced labs were done.  She was referred to general surgery for consideration of a lymph node biopsy.  She saw Dr. Calvo he did not recommend doing a laparotomy to sample the lymph node.  He identified a submandibular enlarged node and recommended she see ENT to see if that could be biopsied.  This is currently in process.    She has a list of additional issues today, as well.  First of all, she tells me that the endocrinologist wanted her to be referred to a podiatrist because she has calluses on her feet.  She was told I would arrange that referral.    She has chronic allergic rhinitis.  Her symptoms are year-round and consist of nasal congestion, rhinorrhea, itchy nose, itchy watery eyes.  She would like to be referred to an allergist.      Depression -she feels  the Zoloft is not working.  She is with her fiancé today who describes her mood as self-deprecating.  She has no sense of self-esteem.  She cries a lot.  Her mood is labile.  She will become verbally aggressive toward friends and family without realizing it.  She tells me she does not regret that because she does not remember doing it.    She is fatigued all the time.  Her fiancé says she snores badly.  She does not sleep well at night.  He gave her one of his melatonin and and apparently she slept all night but was groggy the next day.  They cut her dose down to 5 mg and she was much better the next day.  Apparently she was diagnosed with obstructive sleep apnea and should have a CPAP machine.  Apparently her previous boyfriend threw it away.      Patient Active Problem List    Diagnosis Date Noted   • Callus of foot 10/19/2020   • Perennial allergic rhinitis 10/19/2020   • NESHA (obstructive sleep apnea) 10/19/2020   • Class 3 severe obesity due to excess calories without serious comorbidity with body mass index (BMI) of 45.0 to 49.9 in adult (CMS/[facility]) 10/12/2020   • Lymphadenopathy 10/06/2020   • Type 2 diabetes mellitus with hyperglycemia, without long-term current use of insulin (CMS/[facility]) 07/27/2020   • Essential hypertension 07/27/2020   • Depression 07/27/2020   • Learning disability 07/27/2020     Note Last Updated: 7/27/2020     No other details             Past Surgical History:   Procedure Laterality Date   • EAR TUBES     • LAPAROSCOPIC TUBAL LIGATION     • TONSILLECTOMY AND ADENOIDECTOMY       Current Outpatient Medications on File Prior to Visit   Medication Sig   • Blood Glucose Monitoring Suppl (ACCU-CHEK GURJIT) device Use as instructed   • clotrimazole (LOTRIMIN) 1 % cream Apply  topically to the appropriate area as directed 2 (Two) Times a Day.   • ferrous sulfate 325 (65 FE) MG tablet Take 1 tablet by mouth Daily With Breakfast.   • glucose blood (Accu-Chek Gurjit) test strip Use as instructed to  check blood glucose once daily   • Lancets (ACCU-CHEK SOFT TOUCH) lancets Use with device and strips to check blood glucose once daily   • lisinopril (PRINIVIL,ZESTRIL) 10 MG tablet Take 1 tablet by mouth Daily for 180 days.   • sertraline (Zoloft) 50 MG tablet Take 1 tablet by mouth Daily.   • SITagliptin (Januvia) 100 MG tablet Take 1 tablet by mouth Daily.   • hydrocortisone 0.5 % ointment Apply  topically to the appropriate area as directed 2 (Two) Times a Day.   • ibuprofen (ADVIL,MOTRIN) 800 MG tablet Take 1 tablet by mouth Every 8 (Eight) Hours As Needed for Moderate Pain .   • [DISCONTINUED] diazePAM (VALIUM) 5 MG tablet TAKE 1 TABLET BY MOUTH 1 HOUR PRIOR TO PROCEDURE.   • [DISCONTINUED] miSOPROStol (CYTOTEC) 200 MCG tablet TAKE 1 TABLET BY MOUTH AS A ONE TIME DOSE. TAKE AT BEDTIME THE NIGHT BEFORE IUD INSERTION.   • [DISCONTINUED] predniSONE (DELTASONE) 10 MG (21) dose pack Take  by mouth Daily. Use as directed on package     No current facility-administered medications on file prior to visit.      Allergies   Allergen Reactions   • Vicodin [Hydrocodone-Acetaminophen] GI Intolerance     Social History     Socioeconomic History   • Marital status: Single     Spouse name: Not on file   • Number of children: 0   • Years of education: Not on file   • Highest education level: Not on file   Occupational History   • Occupation: disability    Tobacco Use   • Smoking status: Former Smoker     Types: Cigarettes     Quit date:      Years since quittin.8   • Smokeless tobacco: Never Used   • Tobacco comment: socially    Substance and Sexual Activity   • Alcohol use: Never     Frequency: Never   • Drug use: Never   • Sexual activity: Defer     Family History   Problem Relation Age of Onset   • Hypertension Mother    • Depression Mother    • Post-traumatic stress disorder Mother    • Hyperlipidemia Mother    • Hypertension Father    • Hyperlipidemia Father    • Hypertension Brother    • Depression Brother    •  "Parkinsonism Maternal Grandmother    • Dementia Maternal Grandmother    • Atrial fibrillation Maternal Grandfather    • Breast cancer Paternal Grandmother    • COPD Paternal Grandfather      The following portions of the patient's history were reviewed and updated as appropriate: allergies, current medications, past family history, past medical history, past social history, past surgical history and problem list.    Review of Systems   Constitutional: Negative for chills and fever.   HENT: Positive for sinus pressure.    Respiratory: Negative for cough and shortness of breath.    Neurological: Positive for headache.       Objective   /81 (BP Location: Right arm, Patient Position: Sitting, Cuff Size: Large Adult)   Pulse 84   Temp 97.3 °F (36.3 °C) (Infrared)   Ht 162.6 cm (64.02\")   Wt 121 kg (267 lb 9.6 oz)   SpO2 96%   BMI 45.91 kg/m²   Physical Exam  Constitutional:       Appearance: She is well-developed. She is obese.      Comments: Wearing a face mask  Nasal quality to her voice.  She sniffles a lot during the exam.   HENT:      Head: Normocephalic and atraumatic.   Eyes:      Conjunctiva/sclera: Conjunctivae normal.   Neck:      Musculoskeletal: Normal range of motion.   Cardiovascular:      Rate and Rhythm: Normal rate.   Pulmonary:      Effort: Pulmonary effort is normal.   Musculoskeletal: Normal range of motion.   Skin:     General: Skin is warm and dry.      Findings: No rash.   Neurological:      Mental Status: She is alert and oriented to person, place, and time.   Psychiatric:         Behavior: Behavior normal.         Cognition and Memory: Cognition is impaired.         Judgment: Judgment is impulsive.                 Assessment/Plan   Diagnoses and all orders for this visit:    1. Essential hypertension (Primary)    2. Lymphadenopathy    3. Other depression    4. Callus of foot  -     Ambulatory Referral to Podiatry    5. Perennial allergic rhinitis  -     Ambulatory Referral to " Allergy  -     fluticasone (Flonase) 50 MCG/ACT nasal spray; 2 sprays into the nostril(s) as directed by provider Daily.  Dispense: 1 bottle; Refill: 5  -     cetirizine (zyrTEC) 5 MG tablet; Take 1 tablet by mouth Daily.  Dispense: 30 tablet; Refill: 5    6. Mood disorder (CMS/HCC)  -     Cariprazine HCl (Vraylar) 1.5 MG capsule capsule; Take 1 capsule by mouth Daily.  Dispense: 30 capsule; Refill: 3  -     Melatonin 5 MG chewable tablet; Chew 5 mg every night at bedtime.  Dispense: 30 tablet; Refill: 3    7. Snoring    8. Need for influenza vaccination  -     Fluarix/Fluzone/Afluria Quad>6 Months    9. NESHA (obstructive sleep apnea)    Will make the referral to podiatry and an allergist per her request as above.  We will discontinue Zoloft and start her on Vraylar 1.5 mg/day.  We will also give her melatonin 5 mg at nighttime.  We will start her on Flonase and Zyrtec for her allergies.   If she is breathing better she may actually sleep better and may have a little more energy.  If not, she will need to go back to pulmonology and probably be retested in order to get her another CPAP machine.  We will check her back here in about a month to reevaluate her mood, energy, and breathing.  Flu shot today.           Return in about 4 weeks (around 11/16/2020).    Call with any problems or concerns before next visit

## 2020-11-02 NOTE — PROGRESS NOTES
Hematology/Oncology Outpatient Follow Up    PATIENT NAME:Leigh Soni  :1980  MRN: [id number]  PRIMARY CARE PHYSICIAN: Bree Wan MD  REFERRING PHYSICIAN: Bree Wan, *    Chief Complaint   Patient presents with   • Follow-up     lymphadenopathy        HISTORY OF PRESENT ILLNESS:   This is a 39-year-old female who developed low abdominal pain.  Patient states at the time she was on her menstrual cycle and she thought she was having a kidney stone.  She has regular menstrual cycles every 28 days which usually lasts about 4 to 5 days and not very heavy.  Her last Pap smear was approximately 1 year ago.  Patient has a history of abnormal Pap smears in the past.  She is scheduled to see her gynecologist for routine GYN evaluation.  Due to the abdominal pain she had CT scans of the abdomen and pelvis done on this showed hepatosplenomegaly with the liver measuring up to 21 cm and the spleen 14 cm.  There may be slight fatty infiltration of the liver but no definite liver lesions were seen.  The pelvic structures were unremarkable.  There were lymph nodes within the abdomen and pelvis the largest located in the portocaval space measuring 1.4 x 2.1 cm.  Lymphoproliferative disease or myeloproliferative disease has been recommended to be ruled out.     Patient is accompanied today by her spouse for this appointment.  She denies any changes to her bowel habits.  She denies nausea or vomiting.  She denies night sweats, or fatigue.  She has lost a significant amount of weight due to increasing activity level.    · 2020 patient had biochemical markers including beta-2 microglobulin which was slightly high at 2.4 creatinine was 0.66 and liver function tests are normal LDH is normal at 180, uric acid is slightly high at 7.5 white count is 9.7, hemoglobin 11.1 platelets are 279  · 2020 patient had CT scan of the neck and chest which showed mild prominent lymph nodes seen anterior to each  submandibular gland nonspecific may be reactive and CT scan of the chest does not show any acute abnormalities.  There is evidence of splenomegaly  · 9/17/2020 flow cytometry was negative.  Erythropoietin, Stephen 2 and BCR ABL were normal patient had labs  · 10/6/2020-patient was seen by Dr. Calvo, abdominal lymph node biopsy was not recommended.  Dr. Kraus recommends ENT evaluation and biopsy of the submental lymph nodes.  · 10/6/2020 patient had anemia work-up which showed a normal folate, normal B12.  Reticulocyte count was normal.  Ferritin was low at 31 LDH was normal.  Reticulocyte count is normal, SPEP with EDA and haptoglobin levels are still pending       Past Medical History:   Diagnosis Date   • Anger    • Anxiety    • Depression    • Diabetes mellitus (CMS/HCC)    • HTN (hypertension)    • PTSD (post-traumatic stress disorder)    • Type 2 diabetes mellitus (CMS/HCC)        Past Surgical History:   Procedure Laterality Date   • EAR TUBES     • LAPAROSCOPIC TUBAL LIGATION     • TONSILLECTOMY AND ADENOIDECTOMY           Current Outpatient Medications:   •  Blood Glucose Monitoring Suppl (ACCU-CHEK KARIS) device, Use as instructed, Disp: 1 each, Rfl: 0  •  Cariprazine HCl (Vraylar) 1.5 MG capsule capsule, Take 1 capsule by mouth Daily., Disp: 30 capsule, Rfl: 3  •  cetirizine (zyrTEC) 5 MG tablet, Take 1 tablet by mouth Daily., Disp: 30 tablet, Rfl: 5  •  clotrimazole (LOTRIMIN) 1 % cream, Apply  topically to the appropriate area as directed 2 (Two) Times a Day., Disp: 45 g, Rfl: 2  •  ferrous sulfate 325 (65 FE) MG tablet, Take 1 tablet by mouth Daily With Breakfast., Disp: 90 tablet, Rfl: 1  •  fluticasone (Flonase) 50 MCG/ACT nasal spray, 2 sprays into the nostril(s) as directed by provider Daily., Disp: 1 bottle, Rfl: 5  •  glucose blood (Accu-Chek Karis) test strip, Use as instructed to check blood glucose once daily, Disp: 100 each, Rfl: 3  •  hydrocortisone 0.5 % ointment, Apply  topically to the  appropriate area as directed 2 (Two) Times a Day., Disp: 28 g, Rfl: 0  •  ibuprofen (ADVIL,MOTRIN) 800 MG tablet, Take 1 tablet by mouth Every 8 (Eight) Hours As Needed for Moderate Pain ., Disp: 9 tablet, Rfl: 0  •  Lancets (ACCU-CHEK SOFT TOUCH) lancets, Use with device and strips to check blood glucose once daily, Disp: 100 each, Rfl: 3  •  lisinopril (PRINIVIL,ZESTRIL) 10 MG tablet, Take 1 tablet by mouth Daily for 180 days., Disp: 30 tablet, Rfl: 3  •  Melatonin 5 MG chewable tablet, Chew 5 mg every night at bedtime., Disp: 30 tablet, Rfl: 3  •  sertraline (Zoloft) 50 MG tablet, Take 1 tablet by mouth Daily., Disp: 30 tablet, Rfl: 3  •  SITagliptin (Januvia) 100 MG tablet, Take 1 tablet by mouth Daily., Disp: 30 tablet, Rfl: 5    Allergies   Allergen Reactions   • Vicodin [Hydrocodone-Acetaminophen] GI Intolerance       Family History   Problem Relation Age of Onset   • Hypertension Mother    • Depression Mother    • Post-traumatic stress disorder Mother    • Hyperlipidemia Mother    • Hypertension Father    • Hyperlipidemia Father    • Hypertension Brother    • Depression Brother    • Parkinsonism Maternal Grandmother    • Dementia Maternal Grandmother    • Atrial fibrillation Maternal Grandfather    • Breast cancer Paternal Grandmother    • COPD Paternal Grandfather        Cancer-related family history includes Breast cancer in her paternal grandmother.    Social History     Tobacco Use   • Smoking status: Former Smoker     Types: Cigarettes     Quit date: 2018     Years since quittin.8   • Smokeless tobacco: Never Used   • Tobacco comment: socially    Substance Use Topics   • Alcohol use: Never     Frequency: Never   • Drug use: Never     I have reviewed and confirmed the accuracy of the patient's history: Chief complaint, HPI and ROS as entered by the MA/LPN/RN. Kathi Pepper Hameed MD 20     SUBJECTIVE:  She has no specific complaints today.  She is accompanied today by her spouse for this  "appointment.  Patient has had problems with increased menstrual blood loss.  She has had a Mirena IUD placed by her gynecologist Dr. Reyes          REVIEW OF SYSTEMS:  Review of Systems   Constitutional: Negative for chills and fever.   HENT: Negative for ear pain, mouth sores, nosebleeds and sore throat.    Eyes: Negative for photophobia and visual disturbance.   Respiratory: Negative for wheezing and stridor.    Cardiovascular: Negative for chest pain and palpitations.   Gastrointestinal: Negative for abdominal pain, diarrhea, nausea and vomiting.   Endocrine: Negative for cold intolerance and heat intolerance.   Genitourinary: Negative for dysuria and hematuria.   Musculoskeletal: Negative for joint swelling and neck stiffness.   Skin: Negative for color change and rash.   Neurological: Negative for seizures and syncope.   Hematological: Negative for adenopathy.        No obvious bleeding   Psychiatric/Behavioral: Negative for agitation, confusion and hallucinations.   I have reviewed and confirmed the accuracy of the ROS as documented by the MA/LPN/RN Kathi Hameed MD      OBJECTIVE:    Vitals:    11/04/20 1534   BP: 122/76   Pulse: 83   Resp: 20   Temp: 98.4 °F (36.9 °C)   TempSrc: Temporal   Weight: 126 kg (277 lb)   Height: 162.6 cm (64\")   PainSc:   5   PainLoc: Comment: abdominal pain     Body mass index is 47.55 kg/m².    ECOG  (1) Restricted in physically strenuous activity, ambulatory and able to do work of light nature    Physical Exam  Vitals signs and nursing note reviewed.   Constitutional:       General: She is not in acute distress.     Appearance: She is not diaphoretic.   HENT:      Head: Normocephalic and atraumatic.   Eyes:      General: No scleral icterus.        Right eye: No discharge.         Left eye: No discharge.      Conjunctiva/sclera: Conjunctivae normal.   Neck:      Musculoskeletal: Normal range of motion and neck supple.      Thyroid: No thyromegaly.   Cardiovascular:    "   Rate and Rhythm: Normal rate and regular rhythm.      Heart sounds: Normal heart sounds. No friction rub. No gallop.    Pulmonary:      Effort: Pulmonary effort is normal. No respiratory distress.      Breath sounds: No stridor. No wheezing.   Abdominal:      General: Bowel sounds are normal.      Palpations: Abdomen is soft. There is no mass.      Tenderness: There is no abdominal tenderness. There is no guarding or rebound.   Musculoskeletal: Normal range of motion.         General: No tenderness.   Lymphadenopathy:      Cervical: No cervical adenopathy.   Skin:     General: Skin is warm.      Findings: No erythema or rash.   Neurological:      Mental Status: She is alert and oriented to person, place, and time.      Motor: No abnormal muscle tone.   Psychiatric:         Behavior: Behavior normal.     I have reexamined the patient and the results are consistent with the previously documented exam. Kathi Hameed MD     RECENT LABS  WBC   Date Value Ref Range Status   11/04/2020 9.46 3.40 - 10.80 10*3/mm3 Final   08/26/2020 8.8 3.4 - 10.8 x10E3/uL Final     RBC   Date Value Ref Range Status   11/04/2020 4.04 3.77 - 5.28 10*6/mm3 Final   08/26/2020 3.90 3.77 - 5.28 x10E6/uL Final     Hemoglobin   Date Value Ref Range Status   11/04/2020 10.5 (L) 12.0 - 15.9 g/dL Final     Hematocrit   Date Value Ref Range Status   11/04/2020 32.8 (L) 34.0 - 46.6 % Final     MCV   Date Value Ref Range Status   11/04/2020 81.2 79.0 - 97.0 fL Final     MCH   Date Value Ref Range Status   11/04/2020 26.0 (L) 26.6 - 33.0 pg Final     MCHC   Date Value Ref Range Status   11/04/2020 32.0 31.5 - 35.7 g/dL Final     RDW   Date Value Ref Range Status   11/04/2020 14.9 12.3 - 15.4 % Final     RDW-SD   Date Value Ref Range Status   11/04/2020 43.1 37.0 - 54.0 fl Final     MPV   Date Value Ref Range Status   11/04/2020 10.2 6.0 - 12.0 fL Final     Platelets   Date Value Ref Range Status   11/04/2020 251 140 - 450 10*3/mm3 Final      Neutrophil %   Date Value Ref Range Status   11/04/2020 60.0 42.7 - 76.0 % Final     Lymphocyte %   Date Value Ref Range Status   11/04/2020 28.2 19.6 - 45.3 % Final     Monocyte %   Date Value Ref Range Status   11/04/2020 8.7 5.0 - 12.0 % Final     Eosinophil %   Date Value Ref Range Status   11/04/2020 2.9 0.3 - 6.2 % Final     Basophil %   Date Value Ref Range Status   11/04/2020 0.2 0.0 - 1.5 % Final     Immature Grans %   Date Value Ref Range Status   06/04/2020 0.6 (H) 0.0 - 0.5 % Final     Neutrophils, Absolute   Date Value Ref Range Status   11/04/2020 5.68 1.70 - 7.00 10*3/mm3 Final     Lymphocytes, Absolute   Date Value Ref Range Status   11/04/2020 2.67 0.70 - 3.10 10*3/mm3 Final     Monocytes, Absolute   Date Value Ref Range Status   11/04/2020 0.82 0.10 - 0.90 10*3/mm3 Final     Eosinophils, Absolute   Date Value Ref Range Status   11/04/2020 0.27 0.00 - 0.40 10*3/mm3 Final     Basophils, Absolute   Date Value Ref Range Status   11/04/2020 0.02 0.00 - 0.20 10*3/mm3 Final     Immature Grans, Absolute   Date Value Ref Range Status   06/04/2020 0.05 0.00 - 0.05 10*3/mm3 Final     nRBC   Date Value Ref Range Status   08/26/2020 0.0 0.0 - 0.2 /100 WBC Final       Lab Results   Component Value Date    GLUCOSE 143 (H) 09/02/2020    BUN  09/02/2020      Comment:      Testing performed by alternate method    BUN 14 09/02/2020    CREATININE 0.66 09/02/2020    EGFRIFNONA 101 09/02/2020    EGFRIFAFRI 136 08/26/2020    BCR  09/02/2020      Comment:      Testing not performed    K 4.3 09/02/2020    CO2 27.0 09/02/2020    CALCIUM 9.3 09/02/2020    PROTENTOTREF 8.0 08/26/2020    ALBUMIN 4.10 09/02/2020    LABIL2 0.9 (L) 08/26/2020    AST 21 09/02/2020    ALT 17 09/02/2020         Assessment/Plan     Lymphadenopathy  - CBC & Differential      ASSESSMENT:          1. Abdominal lymphadenopathy 2.1 cm largest size of unclear etiology: Surgical biopsy recommended by Dr. Calvo.  Recommend ENT evaluation for possible  submental lymph node biopsy.  This referral is still pending  2. Chronic anemia   3. Iron deficiency on oral iron supplementation  4. Menorrhagia: Followed by GYN.  Has Mirena IUD placement.    PLANS:     · Reviewed her CT scan with patient and her spouse who is present today.  Discussed that her lymph nodes may be reactive or could still be due to a lymphoproliferative or myeloproliferative disease.   · Stephen 2 analysis, BCR/ABL, EPO level, flow cytometry were all normal.  · Refer to ENT for submental elmira biopsy if possible: Renew consult  · Completed anemia work-up: She has iron deficiency.  ·   · Patient asked to begin ferrous sulfate 325 mg p.o. daily due to menorrhagia: Follow-up with [facility] ORTIZ  · Follow-up 6 weeks  · GI referral  · Check UA today          I will plan to see her back in approximately 4 weeks recommendations        Patient verbalized understanding and is in agreement of the above plan.

## 2020-11-04 ENCOUNTER — OFFICE VISIT (OUTPATIENT)
Dept: ONCOLOGY | Facility: CLINIC | Age: 40
End: 2020-11-04

## 2020-11-04 ENCOUNTER — TELEPHONE (OUTPATIENT)
Dept: ONCOLOGY | Facility: CLINIC | Age: 40
End: 2020-11-04

## 2020-11-04 ENCOUNTER — LAB (OUTPATIENT)
Dept: LAB | Facility: HOSPITAL | Age: 40
End: 2020-11-04

## 2020-11-04 VITALS
HEART RATE: 83 BPM | RESPIRATION RATE: 20 BRPM | TEMPERATURE: 98.4 F | SYSTOLIC BLOOD PRESSURE: 122 MMHG | BODY MASS INDEX: 47.29 KG/M2 | WEIGHT: 277 LBS | DIASTOLIC BLOOD PRESSURE: 76 MMHG | HEIGHT: 64 IN

## 2020-11-04 DIAGNOSIS — R59.1 LYMPHADENOPATHY: Primary | ICD-10-CM

## 2020-11-04 DIAGNOSIS — R59.1 LYMPHADENOPATHY: ICD-10-CM

## 2020-11-04 LAB
BASOPHILS # BLD AUTO: 0.02 10*3/MM3 (ref 0–0.2)
BASOPHILS NFR BLD AUTO: 0.2 % (ref 0–1.5)
DEPRECATED RDW RBC AUTO: 43.1 FL (ref 37–54)
EOSINOPHIL # BLD AUTO: 0.27 10*3/MM3 (ref 0–0.4)
EOSINOPHIL NFR BLD AUTO: 2.9 % (ref 0.3–6.2)
ERYTHROCYTE [DISTWIDTH] IN BLOOD BY AUTOMATED COUNT: 14.9 % (ref 12.3–15.4)
HCT VFR BLD AUTO: 32.8 % (ref 34–46.6)
HGB BLD-MCNC: 10.5 G/DL (ref 12–15.9)
LYMPHOCYTES # BLD AUTO: 2.67 10*3/MM3 (ref 0.7–3.1)
LYMPHOCYTES NFR BLD AUTO: 28.2 % (ref 19.6–45.3)
MCH RBC QN AUTO: 26 PG (ref 26.6–33)
MCHC RBC AUTO-ENTMCNC: 32 G/DL (ref 31.5–35.7)
MCV RBC AUTO: 81.2 FL (ref 79–97)
MONOCYTES # BLD AUTO: 0.82 10*3/MM3 (ref 0.1–0.9)
MONOCYTES NFR BLD AUTO: 8.7 % (ref 5–12)
NEUTROPHILS NFR BLD AUTO: 5.68 10*3/MM3 (ref 1.7–7)
NEUTROPHILS NFR BLD AUTO: 60 % (ref 42.7–76)
PLATELET # BLD AUTO: 251 10*3/MM3 (ref 140–450)
PMV BLD AUTO: 10.2 FL (ref 6–12)
RBC # BLD AUTO: 4.04 10*6/MM3 (ref 3.77–5.28)
WBC # BLD AUTO: 9.46 10*3/MM3 (ref 3.4–10.8)

## 2020-11-04 PROCEDURE — 36415 COLL VENOUS BLD VENIPUNCTURE: CPT

## 2020-11-04 PROCEDURE — 85025 COMPLETE CBC W/AUTO DIFF WBC: CPT

## 2020-11-04 PROCEDURE — 99214 OFFICE O/P EST MOD 30 MIN: CPT | Performed by: INTERNAL MEDICINE

## 2020-11-05 ENCOUNTER — TELEPHONE (OUTPATIENT)
Dept: ONCOLOGY | Facility: CLINIC | Age: 40
End: 2020-11-05

## 2020-11-05 NOTE — TELEPHONE ENCOUNTER
Spoke with [facility].  Patient scheduled for 11- at 11:00.  Patient notified and v/u.  Records faxed.

## 2020-11-05 NOTE — TELEPHONE ENCOUNTER
Informed patient's  that [place] [employer] has patient's PCP as Zee Contreras MD.  Informed him that this would need to be changed before [facility] could see her.  He v/u.  Gave him my direct number and instructed him to call me back once this has been changed.  He v/u.

## 2020-11-05 NOTE — TELEPHONE ENCOUNTER
Received call from Stephanie, [facility].  She states the patient's insurance requires this referral to come from the patient's PCP, Zee Contreras MD or any practitioners in that facility.  Spoke with patient and she states her PCP is Dr. Felicia Wna.  Stephanie notified and she states patient will have to call the insurance company to get this changed as the [place] [employer] website states patient sees Dr. Contreras. Patient not able to talk right now and has requested that I call her back at 2:00PM.

## 2020-11-05 NOTE — TELEPHONE ENCOUNTER
Per phone call from [facility] - patient had an appt on 6.10.20 but she cancelled the appt w/ Dr. Paredes

## 2020-11-12 ENCOUNTER — OFFICE VISIT (OUTPATIENT)
Dept: PODIATRY | Facility: CLINIC | Age: 40
End: 2020-11-12

## 2020-11-12 VITALS
HEART RATE: 87 BPM | DIASTOLIC BLOOD PRESSURE: 84 MMHG | HEIGHT: 64 IN | SYSTOLIC BLOOD PRESSURE: 127 MMHG | WEIGHT: 278 LBS | BODY MASS INDEX: 47.46 KG/M2

## 2020-11-12 DIAGNOSIS — M21.6X2 EQUINUS DEFORMITY OF BOTH FEET: ICD-10-CM

## 2020-11-12 DIAGNOSIS — E11.42 DM TYPE 2 WITH DIABETIC PERIPHERAL NEUROPATHY (HCC): ICD-10-CM

## 2020-11-12 DIAGNOSIS — M21.6X1 EQUINUS DEFORMITY OF BOTH FEET: ICD-10-CM

## 2020-11-12 DIAGNOSIS — L84 CALLUS OF FOOT: Primary | ICD-10-CM

## 2020-11-12 PROCEDURE — 99203 OFFICE O/P NEW LOW 30 MIN: CPT | Performed by: PODIATRIST

## 2020-11-12 NOTE — PROGRESS NOTES
11/12/2020  Foot and Ankle Surgery - New Patient   Provider: LINDA HendricksM  Location: [facility]    Subjective:  Leigh Soni is a 39 y.o. female.     Chief Complaint   Patient presents with   • Left Foot - Callouses   • Right Foot - Callouses   • Annual Exam     DM check       HPI: Patient is a 39-year-old diabetic female that was referred to me for routine foot check.  Patient states that she is doing relatively well and denies any significant pain or limitation.  She does have calluses involving the feet.  She is unaware of any previous open wounds or infections to her feet.  She does complain of numbness and tingling sensations which have been present for several years.  She feels that she is extremely tight in her legs as well as the feet with mild discomfort with increased activity.  Patient's most recent A1c was 7.4%.    Allergies   Allergen Reactions   • Vicodin [Hydrocodone-Acetaminophen] GI Intolerance       Past Medical History:   Diagnosis Date   • Anger    • Anxiety    • Depression    • Diabetes mellitus (CMS/HCC)    • HTN (hypertension)    • PTSD (post-traumatic stress disorder)    • Type 2 diabetes mellitus (CMS/HCC)        Past Surgical History:   Procedure Laterality Date   • EAR TUBES     • LAPAROSCOPIC TUBAL LIGATION     • TONSILLECTOMY AND ADENOIDECTOMY         Family History   Problem Relation Age of Onset   • Hypertension Mother    • Depression Mother    • Post-traumatic stress disorder Mother    • Hyperlipidemia Mother    • Hypertension Father    • Hyperlipidemia Father    • Hypertension Brother    • Depression Brother    • Parkinsonism Maternal Grandmother    • Dementia Maternal Grandmother    • Atrial fibrillation Maternal Grandfather    • Breast cancer Paternal Grandmother    • COPD Paternal Grandfather        Social History     Socioeconomic History   • Marital status: Single     Spouse name: Not on file   • Number of children: 0   • Years of education: Not on file   •  Highest education level: Not on file   Occupational History   • Occupation: disability    Tobacco Use   • Smoking status: Former Smoker     Types: Cigarettes     Quit date: 2018     Years since quittin.8   • Smokeless tobacco: Never Used   • Tobacco comment: socially    Substance and Sexual Activity   • Alcohol use: Never     Frequency: Never   • Drug use: Never   • Sexual activity: Defer        Current Outpatient Medications on File Prior to Visit   Medication Sig Dispense Refill   • Blood Glucose Monitoring Suppl (ACCU-CHEK GURJIT) device Use as instructed 1 each 0   • Cariprazine HCl (Vraylar) 1.5 MG capsule capsule Take 1 capsule by mouth Daily. 30 capsule 3   • cetirizine (zyrTEC) 5 MG tablet Take 1 tablet by mouth Daily. 30 tablet 5   • clotrimazole (LOTRIMIN) 1 % cream Apply  topically to the appropriate area as directed 2 (Two) Times a Day. 45 g 2   • ferrous sulfate 325 (65 FE) MG tablet Take 1 tablet by mouth Daily With Breakfast. 90 tablet 1   • fluticasone (Flonase) 50 MCG/ACT nasal spray 2 sprays into the nostril(s) as directed by provider Daily. 1 bottle 5   • glucose blood (Accu-Chek Gurjit) test strip Use as instructed to check blood glucose once daily 100 each 3   • hydrocortisone 0.5 % ointment Apply  topically to the appropriate area as directed 2 (Two) Times a Day. 28 g 0   • ibuprofen (ADVIL,MOTRIN) 800 MG tablet Take 1 tablet by mouth Every 8 (Eight) Hours As Needed for Moderate Pain . 9 tablet 0   • Lancets (ACCU-CHEK SOFT TOUCH) lancets Use with device and strips to check blood glucose once daily 100 each 3   • lisinopril (PRINIVIL,ZESTRIL) 10 MG tablet Take 1 tablet by mouth Daily for 180 days. 30 tablet 3   • Melatonin 5 MG chewable tablet Chew 5 mg every night at bedtime. 30 tablet 3   • sertraline (Zoloft) 50 MG tablet Take 1 tablet by mouth Daily. 30 tablet 3   • SITagliptin (Januvia) 100 MG tablet Take 1 tablet by mouth Daily. 30 tablet 5     No current facility-administered  "medications on file prior to visit.        Review of Systems:  General: Denies fever, chills, fatigue, and weakness.  Eyes: Denies vision loss, blurry vision, and excessive redness.  ENT: Denies hearing issues and difficulty swallowing.  Cardiovascular: Denies palpitations, chest pain, or syncopal episodes.  Respiratory: Denies shortness of breath, wheezing, and coughing.  GI: Denies abdominal pain, nausea, and vomiting.   : Denies frequency, hematuria, and urgency.  Musculoskeletal: Denies muscle cramps, joint pains, and stiffness.  Derm: Denies rash, open wounds, or suspicious lesions.  Neuro: + Numbness and tingling sensations  Psych: Denies anxiety and depression.  Endocrine: Denies temperature intolerance and changes in appetite.  Heme: Denies bleeding disorders or abnormal bruising.     Objective   /84   Pulse 87   Ht 162.6 cm (64\")   Wt 126 kg (278 lb)   BMI 47.72 kg/m²     Foot/Ankle Exam:       General:   Appearance: appears stated age and healthy and obesity    Orientation: AAOx3    Affect: appropriate    Gait comment:  Early heel off    VASCULAR      Right Foot Vascularity   Normal vascular exam    Dorsalis pedis:  2+  Posterior tibial:  2+  Skin Temperature: warm    Edema Grading:  None  CFT:  < 3 seconds  Pedal Hair Growth:  Present  Varicosities: none       Left Foot Vascularity   Normal vascular exam    Dorsalis pedis:  2+  Posterior tibial:  2+  Skin Temperature: warm    Edema Grading:  None  CFT:  < 3 seconds  Pedal Hair Growth:  Present  Varicosities: none        NEUROLOGIC     Right Foot Neurologic   Light touch sensation:  Diminished  Hot/Cold sensation: diminished    Protective Sensation using [place]f Monofilament:  Diminished  Achilles reflex:  2+     Left Foot Neurologic   Light touch sensation:  Diminished  Hot/cold sensation: diminished    Protective Sensation using [place]f Monofilament:  Diminished  Achilles reflex:  2+     MUSCULOSKELETAL      Right Foot " Musculoskeletal    Amputation   Right toes amputated: No    Ecchymosis:  None  Tenderness: none    Arch:  Normal     Left Foot Musculoskeletal    Amputation   Left toes amputated: No    Ecchymosis:  None  Tenderness: none    Arch:  Normal     MUSCLE STRENGTH     Right Foot Muscle Strength   Normal strength    Foot dorsiflexion:  5  Foot plantar flexion:  5  Foot inversion:  5  Foot eversion:  5     Left Foot Muscle Strength   Normal strength    Foot dorsiflexion:  5  Foot plantar flexion:  5  Foot inversion:  5  Foot eversion:  5     DERMATOLOGIC     Right Foot Dermatologic   Skin: corn and dry skin    Skin: no right foot ulcer    Nails: onychomycosis, abnormally thick and dystrophic nails       Left Foot Dermatologic   Skin: corn and dry skin    Skin: no left foot ulcer    Nails: onychomycosis, abnormally thick and dystrophic nails        Right Foot Additional Comments No obvious deformity or instability.  Moderate+ equinus contracture with knee extended and flexed, bilateral.  No significant pain with palpation.      Assessment/Plan   Diagnoses and all orders for this visit:    1. Callus of foot (Primary)    2. Equinus deformity of both feet    3. DM type 2 with diabetic peripheral neuropathy (CMS/[facility])      Patient is a 39-year-old female that was referred to me for routine diabetic foot check.  She states that she is doing relatively well today without any significant pain or limitation.  She is concerned about a small callus formation that she has to the plantar aspect of her feet.  The callus formation is minor and stable.  She denies any previous open wound or infection.  She does have decreased protective sensation on exam today.  Overall, I do feel that she is at moderate risk of pedal complications.  We did discuss routine diabetic foot care and the importance of glycemic control.  She also has significant equinus contracture to both lower extremities.  I do feel that this could be causing some of her  symptoms.  I would like her to perform stretching and manual therapy exercises which were discussed.  She is to apply a diabetic foot cream routinely.  We also reviewed weight loss with diet and exercise.  I have asked that she call with any additional issues or concerns.  I would like to see her in 6 months for reevaluation.    No orders of the defined types were placed in this encounter.       Note is dictated utilizing voice recognition software. Unfortunately this leads to occasional typographical errors. I apologize in advance if the situation occurs. If questions occur please do not hesitate to call our office.

## 2020-11-18 ENCOUNTER — OFFICE VISIT (OUTPATIENT)
Dept: FAMILY MEDICINE CLINIC | Facility: CLINIC | Age: 40
End: 2020-11-18

## 2020-11-18 VITALS
TEMPERATURE: 97.3 F | HEART RATE: 78 BPM | WEIGHT: 278.2 LBS | BODY MASS INDEX: 47.5 KG/M2 | SYSTOLIC BLOOD PRESSURE: 119 MMHG | OXYGEN SATURATION: 100 % | DIASTOLIC BLOOD PRESSURE: 77 MMHG | HEIGHT: 64 IN

## 2020-11-18 DIAGNOSIS — R51.9 CHRONIC NONINTRACTABLE HEADACHE, UNSPECIFIED HEADACHE TYPE: ICD-10-CM

## 2020-11-18 DIAGNOSIS — F39 MOOD DISORDER (HCC): Primary | ICD-10-CM

## 2020-11-18 DIAGNOSIS — R06.83 SNORING: ICD-10-CM

## 2020-11-18 DIAGNOSIS — K59.04 CHRONIC IDIOPATHIC CONSTIPATION: ICD-10-CM

## 2020-11-18 DIAGNOSIS — G47.33 OSA (OBSTRUCTIVE SLEEP APNEA): ICD-10-CM

## 2020-11-18 DIAGNOSIS — G89.29 CHRONIC NONINTRACTABLE HEADACHE, UNSPECIFIED HEADACHE TYPE: ICD-10-CM

## 2020-11-18 PROBLEM — Z87.42 HISTORY OF PCOS: Status: ACTIVE | Noted: 2020-01-17

## 2020-11-18 PROBLEM — E11.9 TYPE 2 DIABETES MELLITUS: Status: ACTIVE | Noted: 2020-01-17

## 2020-11-18 PROBLEM — Z87.42 HISTORY OF ENDOMETRIOSIS: Status: ACTIVE | Noted: 2020-01-17

## 2020-11-18 PROCEDURE — 99214 OFFICE O/P EST MOD 30 MIN: CPT | Performed by: FAMILY MEDICINE

## 2020-11-18 RX ORDER — EPINEPHRINE 0.3 MG/.3ML
INJECTION SUBCUTANEOUS
COMMUNITY
Start: 2020-10-23

## 2020-11-18 NOTE — PROGRESS NOTES
"Subjective   Leigh Soni is a 39 y.o. female.   Chief Complaint   Patient presents with   • mood disorder   • Sleep Apnea   • Fatigue       History of Present Illness   Presents to the office today to follow-up on her mood and energy and breathing.  Mood disorder-stopped Zoloft at the last visit and put her on Vraylar 1.5 mg/day.  Continued melatonin 5 mg/day.  She tells me the Vraylar has been very helpful and it is made her mood much more stable.  She states that she does not \"go off\" on people anymore.  She feels that this would be helpful if it were increased a bit, though.    Complaint of dyspnea-she has chronic allergic rhinitis.  We referred her to an allergist per her request at the last visit.  I started her on Zyrtec and Flonase.  She tells me her insurance would not cover Zyrtec so she never got it.  She is using the Flonase.  It might help her nose a little bit.  She has an appointment on November 24 to see a sleep doctor about getting her CPAP redone.    Energy level-unchanged.  She states she has continued to gain weight since she had a Mirena placed.    She has an atypical lymphadenopathy being worked up by Dr. Hameed.  She tells me something about having to call Medicaid to get her primary doctor reassigned so that she can be referred to ENT for a lymph node biopsy.    She complains of constipation.  She states the only way she can have a bowel movement is if she drinks half a bottle of magnesium citrate.  She has tried, reportedly, over-the-counter Colace, Metamucil, without improvement.  She states it has been this way now for several weeks.  She actually forgot to talk to me about this until we were exiting the exam room and her phone rang and her fiancé reminded her to ask me about this.    She complains of chronic daily headaches for a long time.  She wants to see a neurologist about this.      Patient Active Problem List    Diagnosis Date Noted   • Callus of foot 10/19/2020   • Perennial " allergic rhinitis 10/19/2020   • NESHA (obstructive sleep apnea) 10/19/2020   • Class 3 severe obesity due to excess calories without serious comorbidity with body mass index (BMI) of 45.0 to 49.9 in adult (CMS/[facility]) 10/12/2020   • Lymphadenopathy 10/06/2020   • Essential hypertension 07/27/2020   • Depression 07/27/2020   • Learning disability 07/27/2020     Note Last Updated: 7/27/2020     No other details     • Type 2 diabetes mellitus (CMS/[facility]) 01/17/2020   • History of endometriosis 01/17/2020   • History of PCOS 01/17/2020           Past Surgical History:   Procedure Laterality Date   • EAR TUBES     • INTRAUTERINE DEVICE INSERTION  10/14/2020   • LAPAROSCOPIC TUBAL LIGATION     • TONSILLECTOMY AND ADENOIDECTOMY       Current Outpatient Medications on File Prior to Visit   Medication Sig   • Blood Glucose Monitoring Suppl (ACCU-CHEK GURJIT) device Use as instructed   • cetirizine (zyrTEC) 5 MG tablet Take 1 tablet by mouth Daily.   • ferrous sulfate 325 (65 FE) MG tablet Take 1 tablet by mouth Daily With Breakfast.   • fluticasone (Flonase) 50 MCG/ACT nasal spray 2 sprays into the nostril(s) as directed by provider Daily.   • glucose blood (Accu-Chek Gurjit) test strip Use as instructed to check blood glucose once daily   • hydrocortisone 0.5 % ointment Apply  topically to the appropriate area as directed 2 (Two) Times a Day.   • ibuprofen (ADVIL,MOTRIN) 800 MG tablet Take 1 tablet by mouth Every 8 (Eight) Hours As Needed for Moderate Pain .   • Lancets (ACCU-CHEK SOFT TOUCH) lancets Use with device and strips to check blood glucose once daily   • lisinopril (PRINIVIL,ZESTRIL) 10 MG tablet Take 1 tablet by mouth Daily for 180 days.   • Melatonin 5 MG chewable tablet Chew 5 mg every night at bedtime.   • SITagliptin (Januvia) 100 MG tablet Take 1 tablet by mouth Daily.   • [DISCONTINUED] Cariprazine HCl (Vraylar) 1.5 MG capsule capsule Take 1 capsule by mouth Daily.   • clotrimazole (LOTRIMIN) 1 % cream Apply   topically to the appropriate area as directed 2 (Two) Times a Day.   • EPINEPHrine (EPIPEN) 0.3 MG/0.3ML solution auto-injector injection USE AS DIRECTED FOR ACUTE ALLERGIC REACTION   • sertraline (Zoloft) 50 MG tablet Take 1 tablet by mouth Daily.     No current facility-administered medications on file prior to visit.      Allergies   Allergen Reactions   • Vicodin [Hydrocodone-Acetaminophen] GI Intolerance     Social History     Socioeconomic History   • Marital status: Single     Spouse name: Not on file   • Number of children: 0   • Years of education: Not on file   • Highest education level: Not on file   Occupational History   • Occupation: disability    Tobacco Use   • Smoking status: Former Smoker     Types: Cigarettes     Quit date:      Years since quittin.8   • Smokeless tobacco: Never Used   • Tobacco comment: socially    Substance and Sexual Activity   • Alcohol use: Never     Frequency: Never   • Drug use: Never   • Sexual activity: Defer     Family History   Problem Relation Age of Onset   • Hypertension Mother    • Depression Mother    • Post-traumatic stress disorder Mother    • Hyperlipidemia Mother    • Hypertension Father    • Hyperlipidemia Father    • Hypertension Brother    • Depression Brother    • Parkinsonism Maternal Grandmother    • Dementia Maternal Grandmother    • Atrial fibrillation Maternal Grandfather    • Breast cancer Paternal Grandmother    • COPD Paternal Grandfather      The following portions of the patient's history were reviewed and updated as appropriate: allergies, current medications, past family history, past medical history, past social history, past surgical history and problem list.    Review of Systems   Constitutional: Negative for chills and fever.   Respiratory: Negative for cough and shortness of breath.        Objective   /77 (BP Location: Left arm, Patient Position: Sitting, Cuff Size: Adult)   Pulse 78   Temp 97.3 °F (36.3 °C) (Infrared)   Ht  "162.6 cm (64.02\")   Wt 126 kg (278 lb 3.2 oz)   SpO2 100%   BMI 47.73 kg/m²   Physical Exam  Constitutional:       Appearance: She is well-developed.      Comments: Wearing a face mask     HENT:      Head: Normocephalic and atraumatic.   Eyes:      Conjunctiva/sclera: Conjunctivae normal.   Neck:      Musculoskeletal: Normal range of motion.   Cardiovascular:      Rate and Rhythm: Normal rate.   Pulmonary:      Effort: Pulmonary effort is normal.   Musculoskeletal: Normal range of motion.   Skin:     General: Skin is warm and dry.      Findings: No rash.   Neurological:      Mental Status: She is alert and oriented to person, place, and time.   Psychiatric:         Mood and Affect: Mood normal.         Behavior: Behavior normal. Behavior is cooperative.         Cognition and Memory: Cognition is impaired.         Judgment: Judgment is impulsive.         Assessment/Plan   Diagnoses and all orders for this visit:    1. Mood disorder (CMS/HCC) (Primary)  -     Cariprazine HCl (Vraylar) 3 MG capsule capsule; Take 1 capsule by mouth Daily.  Dispense: 30 capsule; Refill: 5    2. Snoring    3. NESHA (obstructive sleep apnea)    4. Chronic nonintractable headache, unspecified headache type  -     Ambulatory Referral to Neurology    5. Chronic idiopathic constipation  -     linaclotide (Linzess) 145 MCG capsule capsule; Take 1 capsule by mouth Every Morning Before Breakfast.  Dispense: 30 capsule; Refill: 5    Her exam is essentially normal.  We will go ahead and increase her Vraylar to 3 mg/day and recheck her in a month and see how she is doing with that.  Definitely keep follow-up with the sleep medicine doctor to get a new CPAP machine.  We will make a referral to neurology as above.  Will do a trial of Linzess 145 mcg/day to see if that helps her chronic constipation.             Return in about 4 weeks (around 12/16/2020).    Call with any problems or concerns before next visit  "

## 2020-11-23 DIAGNOSIS — I10 ESSENTIAL HYPERTENSION: ICD-10-CM

## 2020-11-23 RX ORDER — LISINOPRIL 10 MG/1
10 TABLET ORAL DAILY
Qty: 30 TABLET | Refills: 3 | Status: SHIPPED | OUTPATIENT
Start: 2020-11-23 | End: 2021-05-10

## 2020-11-23 NOTE — TELEPHONE ENCOUNTER
Patients SO called said that Noa is taking Linzess and is giving her bad diarrhea, wanting to know if she can take this every other day instead of every day. Also needs refill on Lisinopril 10mg

## 2020-12-07 ENCOUNTER — LAB (OUTPATIENT)
Dept: LAB | Facility: HOSPITAL | Age: 40
End: 2020-12-07

## 2020-12-07 ENCOUNTER — OFFICE VISIT (OUTPATIENT)
Dept: ONCOLOGY | Facility: CLINIC | Age: 40
End: 2020-12-07

## 2020-12-07 VITALS
SYSTOLIC BLOOD PRESSURE: 105 MMHG | RESPIRATION RATE: 22 BRPM | HEART RATE: 88 BPM | HEIGHT: 64 IN | BODY MASS INDEX: 46.98 KG/M2 | DIASTOLIC BLOOD PRESSURE: 65 MMHG | WEIGHT: 275.2 LBS | TEMPERATURE: 96.8 F

## 2020-12-07 DIAGNOSIS — R59.1 LYMPHADENOPATHY: Primary | ICD-10-CM

## 2020-12-07 DIAGNOSIS — R59.1 LYMPHADENOPATHY: ICD-10-CM

## 2020-12-07 DIAGNOSIS — E61.1 IRON DEFICIENCY: ICD-10-CM

## 2020-12-07 LAB
BASOPHILS # BLD AUTO: 0.02 10*3/MM3 (ref 0–0.2)
BASOPHILS NFR BLD AUTO: 0.2 % (ref 0–1.5)
DEPRECATED RDW RBC AUTO: 43.3 FL (ref 37–54)
EOSINOPHIL # BLD AUTO: 0.31 10*3/MM3 (ref 0–0.4)
EOSINOPHIL NFR BLD AUTO: 3.3 % (ref 0.3–6.2)
ERYTHROCYTE [DISTWIDTH] IN BLOOD BY AUTOMATED COUNT: 14.9 % (ref 12.3–15.4)
HCT VFR BLD AUTO: 34.2 % (ref 34–46.6)
HGB BLD-MCNC: 11 G/DL (ref 12–15.9)
LYMPHOCYTES # BLD AUTO: 2.39 10*3/MM3 (ref 0.7–3.1)
LYMPHOCYTES NFR BLD AUTO: 25.2 % (ref 19.6–45.3)
MCH RBC QN AUTO: 26.3 PG (ref 26.6–33)
MCHC RBC AUTO-ENTMCNC: 32.2 G/DL (ref 31.5–35.7)
MCV RBC AUTO: 81.8 FL (ref 79–97)
MONOCYTES # BLD AUTO: 0.66 10*3/MM3 (ref 0.1–0.9)
MONOCYTES NFR BLD AUTO: 6.9 % (ref 5–12)
NEUTROPHILS NFR BLD AUTO: 6.12 10*3/MM3 (ref 1.7–7)
NEUTROPHILS NFR BLD AUTO: 64.4 % (ref 42.7–76)
PLATELET # BLD AUTO: 232 10*3/MM3 (ref 140–450)
PMV BLD AUTO: 10.1 FL (ref 6–12)
RBC # BLD AUTO: 4.18 10*6/MM3 (ref 3.77–5.28)
WBC # BLD AUTO: 9.5 10*3/MM3 (ref 3.4–10.8)

## 2020-12-07 PROCEDURE — 36415 COLL VENOUS BLD VENIPUNCTURE: CPT

## 2020-12-07 PROCEDURE — 85025 COMPLETE CBC W/AUTO DIFF WBC: CPT

## 2020-12-07 PROCEDURE — 99214 OFFICE O/P EST MOD 30 MIN: CPT | Performed by: INTERNAL MEDICINE

## 2020-12-10 ENCOUNTER — TELEPHONE (OUTPATIENT)
Dept: FAMILY MEDICINE CLINIC | Facility: CLINIC | Age: 40
End: 2020-12-10

## 2020-12-10 NOTE — TELEPHONE ENCOUNTER
Patient calling wanting to know if she can get a referral placed for advanced ent for swollen lymph nodes in her neck, wants to see dr orellana. Please advise.

## 2020-12-10 NOTE — TELEPHONE ENCOUNTER
Dr. Hameed has already placed this consult request.  If there are questions or issues about it, contact her office.  Thanks

## 2020-12-11 NOTE — TELEPHONE ENCOUNTER
Spoke w/pt, she voiced understanding. She also asked about GI consult that was placed to [facility]. I advised her to stop by office & get a green packet & informed her of instructions on that. She voiced understanding.

## 2021-02-01 ENCOUNTER — OFFICE VISIT (OUTPATIENT)
Dept: FAMILY MEDICINE CLINIC | Facility: CLINIC | Age: 41
End: 2021-02-01

## 2021-02-01 VITALS
DIASTOLIC BLOOD PRESSURE: 90 MMHG | BODY MASS INDEX: 48.04 KG/M2 | HEIGHT: 64 IN | WEIGHT: 281.4 LBS | TEMPERATURE: 97.1 F | HEART RATE: 85 BPM | OXYGEN SATURATION: 98 % | SYSTOLIC BLOOD PRESSURE: 119 MMHG

## 2021-02-01 DIAGNOSIS — F32.89 OTHER DEPRESSION: Primary | ICD-10-CM

## 2021-02-01 DIAGNOSIS — K59.04 CHRONIC IDIOPATHIC CONSTIPATION: ICD-10-CM

## 2021-02-01 DIAGNOSIS — F39 MOOD DISORDER (HCC): ICD-10-CM

## 2021-02-01 DIAGNOSIS — I10 ESSENTIAL HYPERTENSION: ICD-10-CM

## 2021-02-01 PROCEDURE — 99214 OFFICE O/P EST MOD 30 MIN: CPT | Performed by: FAMILY MEDICINE

## 2021-02-01 RX ORDER — ESCITALOPRAM OXALATE 10 MG/1
TABLET ORAL
Qty: 30 TABLET | Refills: 2 | Status: SHIPPED | OUTPATIENT
Start: 2021-02-01 | End: 2021-05-11

## 2021-02-01 NOTE — PROGRESS NOTES
Subjective   Leigh Soni is a 40 y.o. female.   Chief Complaint   Patient presents with   • mood disorder   • Depression       History of Present Illness   Patient is here for a 4 week f/u mood disorder and constipation. Patient states her constipation is much better. Patient states her mood is worse, has anger outbursts.  Patient states her depression is bad.  Patient states she has thoughts of worthelessness and would be better off dead.  Patient also c/o migraines.    Previously had been on zoloft, prozac and celexa.        Patient Active Problem List    Diagnosis Date Noted   • Callus of foot 10/19/2020   • Perennial allergic rhinitis 10/19/2020   • NESHA (obstructive sleep apnea) 10/19/2020   • Class 3 severe obesity due to excess calories without serious comorbidity with body mass index (BMI) of 45.0 to 49.9 in adult (CMS/[facility]) 10/12/2020   • Lymphadenopathy 10/06/2020   • Essential hypertension 07/27/2020   • Depression 07/27/2020   • Learning disability 07/27/2020     Note Last Updated: 7/27/2020     No other details     • Type 2 diabetes mellitus (CMS/[facility]) 01/17/2020   • History of endometriosis 01/17/2020   • History of PCOS 01/17/2020           Past Surgical History:   Procedure Laterality Date   • EAR TUBES     • INTRAUTERINE DEVICE INSERTION  10/14/2020   • LAPAROSCOPIC TUBAL LIGATION     • TONSILLECTOMY AND ADENOIDECTOMY       Current Outpatient Medications on File Prior to Visit   Medication Sig   • Blood Glucose Monitoring Suppl (ACCU-CHEK GURJIT) device Use as instructed   • Cariprazine HCl (Vraylar) 3 MG capsule capsule Take 1 capsule by mouth Daily.   • cetirizine (zyrTEC) 5 MG tablet Take 1 tablet by mouth Daily.   • clotrimazole (LOTRIMIN) 1 % cream Apply  topically to the appropriate area as directed 2 (Two) Times a Day.   • EPINEPHrine (EPIPEN) 0.3 MG/0.3ML solution auto-injector injection USE AS DIRECTED FOR ACUTE ALLERGIC REACTION   • ferrous sulfate 325 (65 FE) MG tablet Take 1 tablet by mouth  Daily With Breakfast.   • fluticasone (Flonase) 50 MCG/ACT nasal spray 2 sprays into the nostril(s) as directed by provider Daily.   • glucose blood (Accu-Chek Karis) test strip Use as instructed to check blood glucose once daily   • hydrocortisone 0.5 % ointment Apply  topically to the appropriate area as directed 2 (Two) Times a Day.   • ibuprofen (ADVIL,MOTRIN) 800 MG tablet Take 1 tablet by mouth Every 8 (Eight) Hours As Needed for Moderate Pain .   • Lancets (ACCU-CHEK SOFT TOUCH) lancets Use with device and strips to check blood glucose once daily   • linaclotide (Linzess) 145 MCG capsule capsule Take 1 capsule by mouth Every Morning Before Breakfast.   • lisinopril (PRINIVIL,ZESTRIL) 10 MG tablet Take 1 tablet by mouth Daily.   • Melatonin 5 MG chewable tablet Chew 5 mg every night at bedtime.   • SITagliptin (Januvia) 100 MG tablet Take 1 tablet by mouth Daily.   • [DISCONTINUED] sertraline (Zoloft) 50 MG tablet Take 1 tablet by mouth Daily.     No current facility-administered medications on file prior to visit.      Allergies   Allergen Reactions   • Vicodin [Hydrocodone-Acetaminophen] GI Intolerance     Social History     Socioeconomic History   • Marital status: Single     Spouse name: Not on file   • Number of children: 0   • Years of education: Not on file   • Highest education level: Not on file   Occupational History   • Occupation: disability    Tobacco Use   • Smoking status: Former Smoker     Types: Cigarettes     Quit date: 2018     Years since quitting: 3.0   • Smokeless tobacco: Never Used   • Tobacco comment: socially    Substance and Sexual Activity   • Alcohol use: Never     Frequency: Never   • Drug use: Never   • Sexual activity: Defer     Family History   Problem Relation Age of Onset   • Hypertension Mother    • Depression Mother    • Post-traumatic stress disorder Mother    • Hyperlipidemia Mother    • Hypertension Father    • Hyperlipidemia Father    • Hypertension Brother    •  "Depression Brother    • Parkinsonism Maternal Grandmother    • Dementia Maternal Grandmother    • Atrial fibrillation Maternal Grandfather    • Breast cancer Paternal Grandmother    • COPD Paternal Grandfather        Review of Systems    Objective   /90 (BP Location: Right arm, Patient Position: Sitting, Cuff Size: Adult)   Pulse 85   Temp 97.1 °F (36.2 °C) (Infrared)   Ht 162.6 cm (64.02\")   Wt 128 kg (281 lb 6.4 oz)   SpO2 98%   BMI 48.28 kg/m²   Physical Exam      Lab on 12/07/2020   Component Date Value Ref Range Status   • WBC 12/07/2020 9.50  3.40 - 10.80 10*3/mm3 Final   • RBC 12/07/2020 4.18  3.77 - 5.28 10*6/mm3 Final   • Hemoglobin 12/07/2020 11.0* 12.0 - 15.9 g/dL Final   • Hematocrit 12/07/2020 34.2  34.0 - 46.6 % Final   • MCV 12/07/2020 81.8  79.0 - 97.0 fL Final   • MCH 12/07/2020 26.3* 26.6 - 33.0 pg Final   • MCHC 12/07/2020 32.2  31.5 - 35.7 g/dL Final   • RDW 12/07/2020 14.9  12.3 - 15.4 % Final   • RDW-SD 12/07/2020 43.3  37.0 - 54.0 fl Final   • MPV 12/07/2020 10.1  6.0 - 12.0 fL Final   • Platelets 12/07/2020 232  140 - 450 10*3/mm3 Final   • Neutrophil % 12/07/2020 64.4  42.7 - 76.0 % Final   • Lymphocyte % 12/07/2020 25.2  19.6 - 45.3 % Final   • Monocyte % 12/07/2020 6.9  5.0 - 12.0 % Final   • Eosinophil % 12/07/2020 3.3  0.3 - 6.2 % Final   • Basophil % 12/07/2020 0.2  0.0 - 1.5 % Final   • Neutrophils, Absolute 12/07/2020 6.12  1.70 - 7.00 10*3/mm3 Final   • Lymphocytes, Absolute 12/07/2020 2.39  0.70 - 3.10 10*3/mm3 Final   • Monocytes, Absolute 12/07/2020 0.66  0.10 - 0.90 10*3/mm3 Final   • Eosinophils, Absolute 12/07/2020 0.31  0.00 - 0.40 10*3/mm3 Final   • Basophils, Absolute 12/07/2020 0.02  0.00 - 0.20 10*3/mm3 Final   Lab on 11/04/2020   Component Date Value Ref Range Status   • WBC 11/04/2020 9.46  3.40 - 10.80 10*3/mm3 Final   • RBC 11/04/2020 4.04  3.77 - 5.28 10*6/mm3 Final   • Hemoglobin 11/04/2020 10.5* 12.0 - 15.9 g/dL Final   • Hematocrit 11/04/2020 32.8* 34.0 " - 46.6 % Final   • MCV 11/04/2020 81.2  79.0 - 97.0 fL Final   • MCH 11/04/2020 26.0* 26.6 - 33.0 pg Final   • MCHC 11/04/2020 32.0  31.5 - 35.7 g/dL Final   • RDW 11/04/2020 14.9  12.3 - 15.4 % Final   • RDW-SD 11/04/2020 43.1  37.0 - 54.0 fl Final   • MPV 11/04/2020 10.2  6.0 - 12.0 fL Final   • Platelets 11/04/2020 251  140 - 450 10*3/mm3 Final   • Neutrophil % 11/04/2020 60.0  42.7 - 76.0 % Final   • Lymphocyte % 11/04/2020 28.2  19.6 - 45.3 % Final   • Monocyte % 11/04/2020 8.7  5.0 - 12.0 % Final   • Eosinophil % 11/04/2020 2.9  0.3 - 6.2 % Final   • Basophil % 11/04/2020 0.2  0.0 - 1.5 % Final   • Neutrophils, Absolute 11/04/2020 5.68  1.70 - 7.00 10*3/mm3 Final   • Lymphocytes, Absolute 11/04/2020 2.67  0.70 - 3.10 10*3/mm3 Final   • Monocytes, Absolute 11/04/2020 0.82  0.10 - 0.90 10*3/mm3 Final   • Eosinophils, Absolute 11/04/2020 0.27  0.00 - 0.40 10*3/mm3 Final   • Basophils, Absolute 11/04/2020 0.02  0.00 - 0.20 10*3/mm3 Final   Office Visit on 10/12/2020   Component Date Value Ref Range Status   • Glucose 10/12/2020 167* 70 - 130 mg/dL Final   • Glucose 10/12/2020 167* 70 - 105 mg/dL Final    Serial Number: [id number]:  [id number]   Lab on 10/06/2020   Component Date Value Ref Range Status   • WBC 10/06/2020 9.53  3.40 - 10.80 10*3/mm3 Final   • RBC 10/06/2020 4.17  3.77 - 5.28 10*6/mm3 Final   • Hemoglobin 10/06/2020 10.9* 12.0 - 15.9 g/dL Final   • Hematocrit 10/06/2020 33.7* 34.0 - 46.6 % Final   • MCV 10/06/2020 80.8  79.0 - 97.0 fL Final   • MCH 10/06/2020 26.1* 26.6 - 33.0 pg Final   • MCHC 10/06/2020 32.3  31.5 - 35.7 g/dL Final   • RDW 10/06/2020 14.2  12.3 - 15.4 % Final   • RDW-SD 10/06/2020 41.0  37.0 - 54.0 fl Final   • MPV 10/06/2020 10.4  6.0 - 12.0 fL Final   • Platelets 10/06/2020 223  140 - 450 10*3/mm3 Final   • Neutrophil % 10/06/2020 79.1* 42.7 - 76.0 % Final   • Lymphocyte % 10/06/2020 14.0* 19.6 - 45.3 % Final   • Monocyte % 10/06/2020 5.2  5.0 - 12.0 % Final   • Eosinophil  % 10/06/2020 1.6  0.3 - 6.2 % Final   • Basophil % 10/06/2020 0.1  0.0 - 1.5 % Final   • Neutrophils, Absolute 10/06/2020 7.54* 1.70 - 7.00 10*3/mm3 Final   • Lymphocytes, Absolute 10/06/2020 1.33  0.70 - 3.10 10*3/mm3 Final   • Monocytes, Absolute 10/06/2020 0.50  0.10 - 0.90 10*3/mm3 Final   • Eosinophils, Absolute 10/06/2020 0.15  0.00 - 0.40 10*3/mm3 Final   • Basophils, Absolute 10/06/2020 0.01  0.00 - 0.20 10*3/mm3 Final   Office Visit on 10/06/2020   Component Date Value Ref Range Status   • LDH 10/06/2020 195  135 - 214 U/L Final   • Iron 10/06/2020 27* 37 - 145 mcg/dL Final   • Iron Saturation 10/06/2020 7* 20 - 50 % Final   • Transferrin 10/06/2020 259  200 - 360 mg/dL Final   • TIBC 10/06/2020 386  298 - 536 mcg/dL Final   • Ferritin 10/06/2020 31.32  13.00 - 150.00 ng/mL Final   • Reticulocyte % 10/06/2020 1.42  0.70 - 1.90 % Final   • Reticulocyte Absolute 10/06/2020 0.0614  0.0200 - 0.1300 10*6/mm3 Final   • Vitamin B-12 10/06/2020 447  211 - 946 pg/mL Final   • Folate 10/06/2020 9.34  4.78 - 24.20 ng/mL Final         Assessment/Plan   Diagnoses and all orders for this visit:    1. Other depression (Primary)  -     Ambulatory Referral to Psychiatry    2. Mood disorder (CMS/[facility])    3. Chronic idiopathic constipation    Other orders  -     escitalopram (Lexapro) 10 MG tablet; Take 1/2 tablet by mouth x 6 days, then increase to full tablet daily  Dispense: 30 tablet; Refill: 2                 No follow-ups on file.    Call with any problems or concerns before next visit

## 2021-02-01 NOTE — PROGRESS NOTES
"Cecil Soni is a 40 y.o. female.   Chief Complaint   Patient presents with   • mood disorder   • Depression       History of Present Illness   Presents to the office today to follow-up on her mood disorder and depression.  At the last visit we increased her Vraylar to 3 mg/day.  Several visits ago we discontinued her Zoloft due to ineffectiveness.  She used to be seen at [facility].  She just \"did not like going there\".  Today she reports that the Vraylar helps, but she does not feel better than she did before.  She still is having a lot of anger outburst and feels very depressed.  She does not feel fulfilled.  She reports feelings of worthlessness.  At the last visit she also has migraine headaches.  Those have not changed much.  Upcoming appointment with neurology is pending.    Chronic constipation-greatly improved on Linzess once a day.    Hypertension-blood pressure is fairly well controlled now.      Patient Active Problem List    Diagnosis Date Noted   • Callus of foot 10/19/2020   • Perennial allergic rhinitis 10/19/2020   • NESHA (obstructive sleep apnea) 10/19/2020   • Class 3 severe obesity due to excess calories without serious comorbidity with body mass index (BMI) of 45.0 to 49.9 in adult (CMS/[facility]) 10/12/2020   • Lymphadenopathy 10/06/2020   • Essential hypertension 07/27/2020   • Depression 07/27/2020   • Learning disability 07/27/2020     Note Last Updated: 7/27/2020     No other details     • Type 2 diabetes mellitus (CMS/[facility]) 01/17/2020   • History of endometriosis 01/17/2020   • History of PCOS 01/17/2020           Past Surgical History:   Procedure Laterality Date   • EAR TUBES     • INTRAUTERINE DEVICE INSERTION  10/14/2020   • LAPAROSCOPIC TUBAL LIGATION     • TONSILLECTOMY AND ADENOIDECTOMY       Current Outpatient Medications on File Prior to Visit   Medication Sig   • Blood Glucose Monitoring Suppl (ACCU-CHEK GURJIT) device Use as instructed   • Cariprazine HCl (Vraylar) 3 MG " capsule capsule Take 1 capsule by mouth Daily.   • cetirizine (zyrTEC) 5 MG tablet Take 1 tablet by mouth Daily.   • clotrimazole (LOTRIMIN) 1 % cream Apply  topically to the appropriate area as directed 2 (Two) Times a Day.   • EPINEPHrine (EPIPEN) 0.3 MG/0.3ML solution auto-injector injection USE AS DIRECTED FOR ACUTE ALLERGIC REACTION   • ferrous sulfate 325 (65 FE) MG tablet Take 1 tablet by mouth Daily With Breakfast.   • fluticasone (Flonase) 50 MCG/ACT nasal spray 2 sprays into the nostril(s) as directed by provider Daily.   • glucose blood (Accu-Chek Karis) test strip Use as instructed to check blood glucose once daily   • hydrocortisone 0.5 % ointment Apply  topically to the appropriate area as directed 2 (Two) Times a Day.   • ibuprofen (ADVIL,MOTRIN) 800 MG tablet Take 1 tablet by mouth Every 8 (Eight) Hours As Needed for Moderate Pain .   • Lancets (ACCU-CHEK SOFT TOUCH) lancets Use with device and strips to check blood glucose once daily   • linaclotide (Linzess) 145 MCG capsule capsule Take 1 capsule by mouth Every Morning Before Breakfast.   • lisinopril (PRINIVIL,ZESTRIL) 10 MG tablet Take 1 tablet by mouth Daily.   • Melatonin 5 MG chewable tablet Chew 5 mg every night at bedtime.   • SITagliptin (Januvia) 100 MG tablet Take 1 tablet by mouth Daily.   • [DISCONTINUED] sertraline (Zoloft) 50 MG tablet Take 1 tablet by mouth Daily.     No current facility-administered medications on file prior to visit.      Allergies   Allergen Reactions   • Vicodin [Hydrocodone-Acetaminophen] GI Intolerance     Social History     Socioeconomic History   • Marital status: Single     Spouse name: Not on file   • Number of children: 0   • Years of education: Not on file   • Highest education level: Not on file   Occupational History   • Occupation: disability    Tobacco Use   • Smoking status: Former Smoker     Types: Cigarettes     Quit date: 2018     Years since quitting: 3.0   • Smokeless tobacco: Never Used   •  "Tobacco comment: socially    Substance and Sexual Activity   • Alcohol use: Never     Frequency: Never   • Drug use: Never   • Sexual activity: Defer     Family History   Problem Relation Age of Onset   • Hypertension Mother    • Depression Mother    • Post-traumatic stress disorder Mother    • Hyperlipidemia Mother    • Hypertension Father    • Hyperlipidemia Father    • Hypertension Brother    • Depression Brother    • Parkinsonism Maternal Grandmother    • Dementia Maternal Grandmother    • Atrial fibrillation Maternal Grandfather    • Breast cancer Paternal Grandmother    • COPD Paternal Grandfather        Review of Systems    Objective   /90 (BP Location: Right arm, Patient Position: Sitting, Cuff Size: Adult)   Pulse 85   Temp 97.1 °F (36.2 °C) (Infrared)   Ht 162.6 cm (64.02\")   Wt 128 kg (281 lb 6.4 oz)   SpO2 98%   BMI 48.28 kg/m²   Physical Exam  Constitutional:       Appearance: She is well-developed.      Comments: Wearing a face mask     HENT:      Head: Normocephalic and atraumatic.   Eyes:      Conjunctiva/sclera: Conjunctivae normal.   Neck:      Musculoskeletal: Normal range of motion.   Cardiovascular:      Rate and Rhythm: Normal rate.   Pulmonary:      Effort: Pulmonary effort is normal.   Musculoskeletal: Normal range of motion.   Skin:     General: Skin is warm and dry.      Findings: No rash.   Neurological:      Mental Status: She is alert and oriented to person, place, and time.   Psychiatric:         Behavior: Behavior normal.         Assessment/Plan   Diagnoses and all orders for this visit:    1. Other depression (Primary)  -     Ambulatory Referral to Psychiatry    2. Mood disorder (CMS/HCC)    3. Chronic idiopathic constipation    4. Essential hypertension    Other orders  -     escitalopram (Lexapro) 10 MG tablet; Take 1/2 tablet by mouth x 6 days, then increase to full tablet daily  Dispense: 30 tablet; Refill: 2    Continue Vraylar 3 mg/day.  Add Lexapro 10 mg 1/2 tablet " for 6 days and increase to a full tablet after that.  Recheck here in 2 weeks to see how she is doing.  We will make a referral back to [facility].  She needs a more in-depth psychiatry follow-up and I can give her.  She also needs counseling and other coordinated mental health care.  I explained to her that that is the most appropriate location for her to receive care.  Continue Linzess since it is helped her constipation.  Continue lisinopril since it has helped her blood pressure.  Keep follow-up with the neurologist as planned regarding her migraines.  Keep follow-up with Dr. Chaves for management of her diabetes.       Return in about 2 weeks (around 2/15/2021).    Call with any problems or concerns before next visit

## 2021-02-10 ENCOUNTER — APPOINTMENT (OUTPATIENT)
Dept: LAB | Facility: HOSPITAL | Age: 41
End: 2021-02-10

## 2021-02-17 ENCOUNTER — OFFICE VISIT (OUTPATIENT)
Dept: FAMILY MEDICINE CLINIC | Facility: CLINIC | Age: 41
End: 2021-02-17

## 2021-02-17 VITALS
OXYGEN SATURATION: 96 % | DIASTOLIC BLOOD PRESSURE: 90 MMHG | BODY MASS INDEX: 47.7 KG/M2 | SYSTOLIC BLOOD PRESSURE: 150 MMHG | HEART RATE: 93 BPM | WEIGHT: 279.4 LBS | HEIGHT: 64 IN | TEMPERATURE: 96.6 F
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
## 2021-02-17 DIAGNOSIS — F39 MOOD DISORDER (HCC): Primary | ICD-10-CM

## 2021-02-17 DIAGNOSIS — R09.81 NASAL CONGESTION: ICD-10-CM

## 2021-02-17 DIAGNOSIS — F32.89 OTHER DEPRESSION: ICD-10-CM

## 2021-02-17 PROCEDURE — 99214 OFFICE O/P EST MOD 30 MIN: CPT | Performed by: FAMILY MEDICINE

## 2021-02-17 RX ORDER — AZELASTINE 1 MG/ML
1 SPRAY, METERED NASAL 2 TIMES DAILY
Qty: 1 EACH | Refills: 1 | Status: SHIPPED | OUTPATIENT
Start: 2021-02-17 | End: 2021-04-13

## 2021-02-17 RX ORDER — AZITHROMYCIN 250 MG/1
TABLET, FILM COATED ORAL
Qty: 6 TABLET | Refills: 0 | Status: SHIPPED | OUTPATIENT
Start: 2021-02-17 | End: 2021-03-19

## 2021-02-17 RX ORDER — PREDNISONE 10 MG/1
10 TABLET ORAL 2 TIMES DAILY
Qty: 10 TABLET | Refills: 0 | Status: SHIPPED | OUTPATIENT
Start: 2021-02-17 | End: 2021-02-22

## 2021-02-17 NOTE — PROGRESS NOTES
Subjective   Leigh Soni is a 40 y.o. female.   Chief Complaint   Patient presents with   • Depression       History of Present Illness   Presents to the office today for follow-up on mood disorder.  Previous formal psychiatric diagnosis is not known to me.  As per previous notes, she used to be followed at [facility].  When I first met her, she told me she was on disability for mental health issues and learning disability.  She told me she was depressed.  I have never been able to obtain any other reliable history.  At our first visit, I recommended she return to [facility].  I have a single sheet of paper indicating she had an intake and evaluation on July 23 and her diagnosis was major depression single episode.    At our last, most recent visit we chose to continue Vraylar 3 mg/day and added Lexapro.  I made a referral to psychiatry.  She has an appointment pending on June 17.  She tells me today that her mood is good and she does not want to have any changes made to her medicine.    What is bothering her today is the fact that she went to the emergency room for an upper respiratory infection and was not given antibiotics.  I see we have an ER note from February 8.  Presenting complaint was congestion for 2 weeks.  No cough, no fever, no chest pain, no nausea vomiting diarrhea.  No exposure to anyone who is sick.  No loss of taste or smell.  They did a Covid swab on her.  She had a chest x-ray done it was normal.      Fell on ice 3 days ago.  Tailbone very sore.  Can walk OK.  Hips are OK.          Patient Active Problem List    Diagnosis Date Noted   • Callus of foot 10/19/2020   • Perennial allergic rhinitis 10/19/2020   • NESHA (obstructive sleep apnea) 10/19/2020   • Class 3 severe obesity due to excess calories without serious comorbidity with body mass index (BMI) of 45.0 to 49.9 in adult (CMS/[facility]) 10/12/2020   • Lymphadenopathy 10/06/2020   • Essential hypertension 07/27/2020   • Depression 07/27/2020    • Learning disability 07/27/2020     Note Last Updated: 7/27/2020     No other details     • Type 2 diabetes mellitus (CMS/[facility]) 01/17/2020   • History of endometriosis 01/17/2020   • History of PCOS 01/17/2020           Past Surgical History:   Procedure Laterality Date   • EAR TUBES     • INTRAUTERINE DEVICE INSERTION  10/14/2020   • LAPAROSCOPIC TUBAL LIGATION     • TONSILLECTOMY AND ADENOIDECTOMY       Current Outpatient Medications on File Prior to Visit   Medication Sig   • Blood Glucose Monitoring Suppl (ACCU-CHEK GURJIT) device Use as instructed   • Cariprazine HCl (Vraylar) 3 MG capsule capsule Take 1 capsule by mouth Daily.   • cetirizine (zyrTEC) 5 MG tablet Take 1 tablet by mouth Daily.   • clotrimazole (LOTRIMIN) 1 % cream Apply  topically to the appropriate area as directed 2 (Two) Times a Day.   • EPINEPHrine (EPIPEN) 0.3 MG/0.3ML solution auto-injector injection USE AS DIRECTED FOR ACUTE ALLERGIC REACTION   • escitalopram (Lexapro) 10 MG tablet Take 1/2 tablet by mouth x 6 days, then increase to full tablet daily   • ferrous sulfate 325 (65 FE) MG tablet Take 1 tablet by mouth Daily With Breakfast.   • glucose blood (Accu-Chek Gurjit) test strip Use as instructed to check blood glucose once daily   • hydrocortisone 0.5 % ointment Apply  topically to the appropriate area as directed 2 (Two) Times a Day.   • ibuprofen (ADVIL,MOTRIN) 800 MG tablet Take 1 tablet by mouth Every 8 (Eight) Hours As Needed for Moderate Pain .   • Lancets (ACCU-CHEK SOFT TOUCH) lancets Use with device and strips to check blood glucose once daily   • linaclotide (Linzess) 145 MCG capsule capsule Take 1 capsule by mouth Every Morning Before Breakfast.   • lisinopril (PRINIVIL,ZESTRIL) 10 MG tablet Take 1 tablet by mouth Daily.   • Melatonin 5 MG chewable tablet Chew 5 mg every night at bedtime.   • SITagliptin (Januvia) 100 MG tablet Take 1 tablet by mouth Daily.     No current facility-administered medications on file prior to  "visit.      Allergies   Allergen Reactions   • Vicodin [Hydrocodone-Acetaminophen] GI Intolerance     Social History     Socioeconomic History   • Marital status: Single     Spouse name: Not on file   • Number of children: 0   • Years of education: Not on file   • Highest education level: Not on file   Occupational History   • Occupation: disability    Tobacco Use   • Smoking status: Former Smoker     Types: Cigarettes     Quit date: 2018     Years since quitting: 3.1   • Smokeless tobacco: Never Used   • Tobacco comment: socially    Substance and Sexual Activity   • Alcohol use: Never     Frequency: Never   • Drug use: Never   • Sexual activity: Defer     Family History   Problem Relation Age of Onset   • Hypertension Mother    • Depression Mother    • Post-traumatic stress disorder Mother    • Hyperlipidemia Mother    • Hypertension Father    • Hyperlipidemia Father    • Hypertension Brother    • Depression Brother    • Parkinsonism Maternal Grandmother    • Dementia Maternal Grandmother    • Atrial fibrillation Maternal Grandfather    • Breast cancer Paternal Grandmother    • COPD Paternal Grandfather        Review of Systems    Objective   /90 (BP Location: Right arm, Patient Position: Sitting, Cuff Size: Adult)   Pulse 93   Temp 96.6 °F (35.9 °C) (Infrared)   Ht 162.6 cm (64.02\")   Wt 127 kg (279 lb 6.4 oz)   SpO2 96%   BMI 47.93 kg/m²   Physical Exam  Constitutional:       Appearance: She is well-developed.      Comments: Wearing a face mask     HENT:      Head: Normocephalic and atraumatic.      Nose: Congestion and rhinorrhea present. No nasal deformity or signs of injury. Rhinorrhea is clear.      Right Nostril: No epistaxis.      Left Nostril: No epistaxis.      Right Turbinates: Enlarged.      Left Turbinates: Enlarged.      Right Sinus: Maxillary sinus tenderness present. No frontal sinus tenderness.      Left Sinus: No maxillary sinus tenderness or frontal sinus tenderness.   Eyes:      " Conjunctiva/sclera: Conjunctivae normal.   Neck:      Musculoskeletal: Normal range of motion.   Cardiovascular:      Rate and Rhythm: Normal rate.   Pulmonary:      Effort: Pulmonary effort is normal.   Musculoskeletal: Normal range of motion.   Skin:     General: Skin is warm and dry.      Findings: No rash.   Neurological:      Mental Status: She is alert and oriented to person, place, and time.   Psychiatric:         Mood and Affect: Mood normal.         Speech: Speech normal.         Behavior: Behavior normal. Behavior is cooperative.      Comments: Voice has distinctly nasal quality         Assessment/Plan   Diagnoses and all orders for this visit:    1. Mood disorder (CMS/[facility]) (Primary)    2. Other depression    3. Nasal congestion  -     azelastine (ASTELIN) 0.1 % nasal spray; 1 spray into the nostril(s) as directed by provider 2 (Two) Times a Day. Use in each nostril as directed  Dispense: 1 each; Refill: 1  -     predniSONE (DELTASONE) 10 MG tablet; Take 1 tablet by mouth 2 (Two) Times a Day for 5 days.  Dispense: 10 tablet; Refill: 0  -     azithromycin (Zithromax Z-Iraj) 250 MG tablet; Take 2 tablets the first day, then 1 tablet daily for 4 days.  Dispense: 6 tablet; Refill: 0    4. Fall due to slipping on ice or snow, initial encounter    Her mood disorder appears to be stable. She does not have complaints insomnia, abnormal mood, excessive irritability. She does not have any relationship difficulties with family members or boyfriend. We'll therefore continue current doses of Vraylar and Lexapro.    Her symptoms are most suggestive of nasal congestion either related to allergic rhinitis or possibly a viral URI. Less likely is sinusitis although developing sinusitis cannot be ruled out at present time. We'll go ahead and treat her with a short round of prednisone and Astelin for her nasal congestion. We'll also start her on a azithromycin for possible developing sinusitis.  Her other symptoms regarding  her fall are likely due to mechanical injury. She does not have any difficulty walking. I suspect this will resolve with a little time. Let me know the pain and discomfort over her tailbone worsen or fail to resolve as expected.  Recheck here in about a month to make sure her mood is still stable.       Return in about 4 weeks (around 3/17/2021) for Recheck mood and nasal congestion.    Call with any problems or concerns before next visit

## 2021-02-23 ENCOUNTER — TELEPHONE (OUTPATIENT)
Dept: ONCOLOGY | Facility: CLINIC | Age: 41
End: 2021-02-23

## 2021-02-23 NOTE — TELEPHONE ENCOUNTER
----- Message from Tasneem Alvarado RN sent at 12/22/2020  8:33 AM EST -----  Pt needs scheduled for UA  ----- Message -----  From: SYSTEM  Sent: 12/19/2020  12:22 AM EST  To: [facility]

## 2021-03-08 ENCOUNTER — OFFICE (OUTPATIENT)
Dept: URBAN - METROPOLITAN AREA CLINIC 64 | Facility: CLINIC | Age: 41
End: 2021-03-08
Payer: COMMERCIAL

## 2021-03-08 VITALS
WEIGHT: 282 LBS | SYSTOLIC BLOOD PRESSURE: 114 MMHG | DIASTOLIC BLOOD PRESSURE: 62 MMHG | HEART RATE: 92 BPM | HEIGHT: 64 IN

## 2021-03-08 DIAGNOSIS — D50.0 IRON DEFICIENCY ANEMIA SECONDARY TO BLOOD LOSS (CHRONIC): ICD-10-CM

## 2021-03-08 DIAGNOSIS — R10.13 EPIGASTRIC PAIN: ICD-10-CM

## 2021-03-08 DIAGNOSIS — K21.9 GASTRO-ESOPHAGEAL REFLUX DISEASE WITHOUT ESOPHAGITIS: ICD-10-CM

## 2021-03-08 PROCEDURE — 99204 OFFICE O/P NEW MOD 45 MIN: CPT | Performed by: INTERNAL MEDICINE

## 2021-03-08 RX ORDER — PANTOPRAZOLE SODIUM 20 MG/1
20 TABLET, DELAYED RELEASE ORAL
Qty: 90 | Refills: 3 | Status: ACTIVE
Start: 2021-03-08

## 2021-03-19 ENCOUNTER — OFFICE VISIT (OUTPATIENT)
Dept: FAMILY MEDICINE CLINIC | Facility: CLINIC | Age: 41
End: 2021-03-19

## 2021-03-19 VITALS
HEART RATE: 78 BPM | BODY MASS INDEX: 49.64 KG/M2 | SYSTOLIC BLOOD PRESSURE: 113 MMHG | OXYGEN SATURATION: 97 % | TEMPERATURE: 97.3 F | HEIGHT: 64 IN | WEIGHT: 290.8 LBS | DIASTOLIC BLOOD PRESSURE: 72 MMHG

## 2021-03-19 DIAGNOSIS — F32.89 OTHER DEPRESSION: Primary | ICD-10-CM

## 2021-03-19 DIAGNOSIS — F81.9 LEARNING DISABILITY: ICD-10-CM

## 2021-03-19 PROCEDURE — 99214 OFFICE O/P EST MOD 30 MIN: CPT | Performed by: FAMILY MEDICINE

## 2021-03-19 RX ORDER — LAMOTRIGINE 25 MG/1
TABLET ORAL
Qty: 60 TABLET | Refills: 5 | Status: SHIPPED | OUTPATIENT
Start: 2021-03-19 | End: 2021-04-13
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
## 2021-03-25 ENCOUNTER — TELEPHONE (OUTPATIENT)
Dept: BARIATRICS/WEIGHT MGMT | Facility: CLINIC | Age: 41
End: 2021-03-25

## 2021-04-08 ENCOUNTER — TELEPHONE (OUTPATIENT)
Dept: BARIATRICS/WEIGHT MGMT | Facility: CLINIC | Age: 41
End: 2021-04-08

## 2021-04-08 NOTE — TELEPHONE ENCOUNTER
Patient was a no show for her first SWL. No voicemail to see if patient wants to continue the program.

## 2021-04-13 ENCOUNTER — OFFICE VISIT (OUTPATIENT)
Dept: FAMILY MEDICINE CLINIC | Facility: CLINIC | Age: 41
End: 2021-04-13

## 2021-04-13 VITALS
WEIGHT: 289 LBS | HEART RATE: 87 BPM | HEIGHT: 64 IN | TEMPERATURE: 98.2 F | DIASTOLIC BLOOD PRESSURE: 83 MMHG | SYSTOLIC BLOOD PRESSURE: 127 MMHG | BODY MASS INDEX: 49.34 KG/M2 | OXYGEN SATURATION: 98 %

## 2021-04-13 DIAGNOSIS — F39 MOOD DISORDER (HCC): Primary | ICD-10-CM

## 2021-04-13 DIAGNOSIS — F50.2 BULIMIA: ICD-10-CM

## 2021-04-13 DIAGNOSIS — F32.89 OTHER DEPRESSION: ICD-10-CM

## 2021-04-13 PROCEDURE — 99213 OFFICE O/P EST LOW 20 MIN: CPT | Performed by: FAMILY MEDICINE

## 2021-04-13 RX ORDER — LAMOTRIGINE 100 MG/1
100 TABLET ORAL DAILY
Qty: 30 TABLET | Refills: 5 | Status: SHIPPED | OUTPATIENT
Start: 2021-04-13 | End: 2021-06-17 | Stop reason: SDUPTHER

## 2021-04-13 RX ORDER — MELATONIN 5 MG
5 TABLET,CHEWABLE ORAL
Qty: 30 TABLET | Refills: 3 | Status: SHIPPED | OUTPATIENT
Start: 2021-04-13

## 2021-04-13 RX ORDER — LAMOTRIGINE 25 MG/1
TABLET ORAL
Qty: 60 TABLET | Refills: 5 | Status: CANCELLED | OUTPATIENT
Start: 2021-04-13

## 2021-04-13 NOTE — PROGRESS NOTES
Subjective   Noa Soni is a 40 y.o. female.   Chief Complaint   Patient presents with   • Mood Disorder       History of Present Illness   Presents to the office today to follow-up on mood disorder.  At the last visit I added Lamictal to her Vraylar.  She has titrated up to 50 mg/day.  She tells me she very much likes the Lamictal and feels that it has dramatically helped stabilize her mood.  She tells me she has not had about 1 or 2 outbursts since starting this.  She is accompanied by her fiancé today who tells me that she is still plagued by memories of her family telling her she is fat.  He states she will eat and then go to the bathroom and make herself vomit.  Her weight is only down 1 pound from what it was back in March.  Apparently even by telephone, her mother discourages her on a daily basis.  This is her current biggest source of stress.  She states she is not financially dependent on them for her wellbeing.  They have just done this to her her whole life.      Patient Active Problem List    Diagnosis Date Noted   • Callus of foot 10/19/2020   • Perennial allergic rhinitis 10/19/2020   • NESHA (obstructive sleep apnea) 10/19/2020   • Class 3 severe obesity due to excess calories without serious comorbidity with body mass index (BMI) of 45.0 to 49.9 in adult (CMS/[facility]) 10/12/2020   • Lymphadenopathy 10/06/2020   • Essential hypertension 07/27/2020   • Depression 07/27/2020   • Learning disability 07/27/2020     Note Last Updated: 7/27/2020     No other details     • Type 2 diabetes mellitus (CMS/[facility]) 01/17/2020   • History of endometriosis 01/17/2020   • History of PCOS 01/17/2020           Past Surgical History:   Procedure Laterality Date   • EAR TUBES     • INTRAUTERINE DEVICE INSERTION  10/14/2020   • LAPAROSCOPIC TUBAL LIGATION     • TONSILLECTOMY AND ADENOIDECTOMY       Current Outpatient Medications on File Prior to Visit   Medication Sig   • Blood Glucose Monitoring Suppl (ACCU-CHEK GURJIT) device  Use as instructed   • Cariprazine HCl (Vraylar) 3 MG capsule capsule Take 1 capsule by mouth Daily.   • clotrimazole (LOTRIMIN) 1 % cream Apply  topically to the appropriate area as directed 2 (Two) Times a Day.   • EPINEPHrine (EPIPEN) 0.3 MG/0.3ML solution auto-injector injection USE AS DIRECTED FOR ACUTE ALLERGIC REACTION   • escitalopram (Lexapro) 10 MG tablet Take 1/2 tablet by mouth x 6 days, then increase to full tablet daily   • ferrous sulfate 325 (65 FE) MG tablet Take 1 tablet by mouth Daily With Breakfast.   • glucose blood (Accu-Chek Karis) test strip Use as instructed to check blood glucose once daily   • hydrocortisone 0.5 % ointment Apply  topically to the appropriate area as directed 2 (Two) Times a Day.   • ibuprofen (ADVIL,MOTRIN) 800 MG tablet Take 1 tablet by mouth Every 8 (Eight) Hours As Needed for Moderate Pain .   • Lancets (ACCU-CHEK SOFT TOUCH) lancets Use with device and strips to check blood glucose once daily   • linaclotide (Linzess) 145 MCG capsule capsule Take 1 capsule by mouth Every Morning Before Breakfast.   • lisinopril (PRINIVIL,ZESTRIL) 10 MG tablet Take 1 tablet by mouth Daily.   • SITagliptin (Januvia) 100 MG tablet Take 1 tablet by mouth Daily.     No current facility-administered medications on file prior to visit.     Allergies   Allergen Reactions   • Vicodin [Hydrocodone-Acetaminophen] GI Intolerance     Social History     Socioeconomic History   • Marital status: Single     Spouse name: Not on file   • Number of children: 0   • Years of education: Not on file   • Highest education level: Not on file   Tobacco Use   • Smoking status: Former Smoker     Types: Cigarettes     Quit date: 2018     Years since quitting: 3.2   • Smokeless tobacco: Never Used   • Tobacco comment: socially    Substance and Sexual Activity   • Alcohol use: Never   • Drug use: Never   • Sexual activity: Defer     Family History   Problem Relation Age of Onset   • Hypertension Mother    •  "Depression Mother    • Post-traumatic stress disorder Mother    • Hyperlipidemia Mother    • Hypertension Father    • Hyperlipidemia Father    • Hypertension Brother    • Depression Brother    • Parkinsonism Maternal Grandmother    • Dementia Maternal Grandmother    • Atrial fibrillation Maternal Grandfather    • Breast cancer Paternal Grandmother    • COPD Paternal Grandfather        Review of Systems    Objective   /83 (BP Location: Right arm, Patient Position: Sitting, Cuff Size: Large Adult)   Pulse 87   Temp 98.2 °F (36.8 °C) (Oral)   Ht 162.6 cm (64.02\")   Wt 131 kg (289 lb)   SpO2 98%   BMI 49.58 kg/m²   Physical Exam  Constitutional:       Appearance: She is well-developed and overweight.      Comments: Wearing a face mask     HENT:      Head: Normocephalic and atraumatic.   Eyes:      Conjunctiva/sclera: Conjunctivae normal.   Cardiovascular:      Rate and Rhythm: Normal rate.   Pulmonary:      Effort: Pulmonary effort is normal.   Musculoskeletal:         General: Normal range of motion.      Cervical back: Normal range of motion.   Skin:     General: Skin is warm and dry.      Findings: No rash.   Neurological:      Mental Status: She is alert and oriented to person, place, and time.   Psychiatric:         Speech: Speech normal.         Behavior: Behavior normal.         Judgment: Judgment is impulsive.           No visits with results within 4 Month(s) from this visit.   Latest known visit with results is:   Lab on 12/07/2020   Component Date Value Ref Range Status   • WBC 12/07/2020 9.50  3.40 - 10.80 10*3/mm3 Final   • RBC 12/07/2020 4.18  3.77 - 5.28 10*6/mm3 Final   • Hemoglobin 12/07/2020 11.0* 12.0 - 15.9 g/dL Final   • Hematocrit 12/07/2020 34.2  34.0 - 46.6 % Final   • MCV 12/07/2020 81.8  79.0 - 97.0 fL Final   • MCH 12/07/2020 26.3* 26.6 - 33.0 pg Final   • MCHC 12/07/2020 32.2  31.5 - 35.7 g/dL Final   • RDW 12/07/2020 14.9  12.3 - 15.4 % Final   • RDW-SD 12/07/2020 43.3  37.0 - " "54.0 fl Final   • MPV 12/07/2020 10.1  6.0 - 12.0 fL Final   • Platelets 12/07/2020 232  140 - 450 10*3/mm3 Final   • Neutrophil % 12/07/2020 64.4  42.7 - 76.0 % Final   • Lymphocyte % 12/07/2020 25.2  19.6 - 45.3 % Final   • Monocyte % 12/07/2020 6.9  5.0 - 12.0 % Final   • Eosinophil % 12/07/2020 3.3  0.3 - 6.2 % Final   • Basophil % 12/07/2020 0.2  0.0 - 1.5 % Final   • Neutrophils, Absolute 12/07/2020 6.12  1.70 - 7.00 10*3/mm3 Final   • Lymphocytes, Absolute 12/07/2020 2.39  0.70 - 3.10 10*3/mm3 Final   • Monocytes, Absolute 12/07/2020 0.66  0.10 - 0.90 10*3/mm3 Final   • Eosinophils, Absolute 12/07/2020 0.31  0.00 - 0.40 10*3/mm3 Final   • Basophils, Absolute 12/07/2020 0.02  0.00 - 0.20 10*3/mm3 Final         Assessment/Plan   Diagnoses and all orders for this visit:    1. Mood disorder (CMS/[facility]) (Primary)  -     Melatonin 5 MG chewable tablet; Chew 5 mg every night at bedtime.  Dispense: 30 tablet; Refill: 3  -     lamoTRIgine (LaMICtal) 100 MG tablet; Take 1 tablet by mouth Daily.  Dispense: 30 tablet; Refill: 5    2. Other depression    3. Bulimia    Other orders  -     Cancel: lamoTRIgine (LaMICtal) 25 MG tablet; Take 1 tablet daily for 2 weeks, then increase to 2 tablets daily  Dispense: 60 tablet; Refill: 5    It sounds like she has had a positive response to Lamictal.  Will Increase Lamictal to 100mg per day.  Continue Vraylar.  Consider bipolar disorder as underlying diagnosis.  Symptoms of bulimia reported today.  I explained to her that there really is no medication that will make her not do that.  We have to treat the underlying cause.  She does have an appointment with psychiatry coming up in about 2 months.  It sounds like they are going to do some counseling for her as well.  Advised her to talk to her mother about \"calling her fat\" and tell her how that makes her feel.           Return in about 4 weeks (around 5/11/2021) for Recheck mood.    Call with any problems or concerns before next " visit

## 2021-04-26 ENCOUNTER — TELEPHONE (OUTPATIENT)
Dept: ONCOLOGY | Facility: CLINIC | Age: 41
End: 2021-04-26

## 2021-04-26 NOTE — TELEPHONE ENCOUNTER
Received call from patient's  stating patient needs to be schedule to see Dr. Hameed.  Patient scheduled for Thursday 5- at 2:15.   v/u.

## 2021-05-09 DIAGNOSIS — I10 ESSENTIAL HYPERTENSION: ICD-10-CM

## 2021-05-10 RX ORDER — LISINOPRIL 10 MG/1
TABLET ORAL
Qty: 30 TABLET | Refills: 0 | Status: SHIPPED | OUTPATIENT
Start: 2021-05-10 | End: 2021-06-28

## 2021-05-10 RX ORDER — SITAGLIPTIN 100 MG/1
TABLET, FILM COATED ORAL
Qty: 30 TABLET | Refills: 0 | Status: SHIPPED | OUTPATIENT
Start: 2021-05-10 | End: 2021-07-12 | Stop reason: SDUPTHER

## 2021-05-11 ENCOUNTER — OFFICE VISIT (OUTPATIENT)
Dept: FAMILY MEDICINE CLINIC | Facility: CLINIC | Age: 41
End: 2021-05-11

## 2021-05-11 VITALS
WEIGHT: 288 LBS | TEMPERATURE: 97.8 F | SYSTOLIC BLOOD PRESSURE: 132 MMHG | HEART RATE: 85 BPM | HEIGHT: 64 IN | OXYGEN SATURATION: 98 % | BODY MASS INDEX: 49.17 KG/M2 | DIASTOLIC BLOOD PRESSURE: 78 MMHG

## 2021-05-11 DIAGNOSIS — F39 MOOD DISORDER (HCC): Primary | ICD-10-CM

## 2021-05-11 DIAGNOSIS — F32.89 OTHER DEPRESSION: ICD-10-CM

## 2021-05-11 DIAGNOSIS — L03.90 CELLULITIS, UNSPECIFIED CELLULITIS SITE: ICD-10-CM

## 2021-05-11 PROCEDURE — 99214 OFFICE O/P EST MOD 30 MIN: CPT | Performed by: FAMILY MEDICINE

## 2021-05-11 RX ORDER — FLUOXETINE HYDROCHLORIDE 20 MG/1
20 CAPSULE ORAL DAILY
Qty: 30 CAPSULE | Refills: 3 | Status: SHIPPED | OUTPATIENT
Start: 2021-05-11 | End: 2021-06-17

## 2021-05-11 RX ORDER — DOXYCYCLINE HYCLATE 100 MG/1
100 TABLET, DELAYED RELEASE ORAL 2 TIMES DAILY
Qty: 14 TABLET | Refills: 0 | Status: SHIPPED | OUTPATIENT
Start: 2021-05-11 | End: 2021-05-18

## 2021-05-11 NOTE — PROGRESS NOTES
Subjective   Noa Soni is a 40 y.o. female.   Chief Complaint   Patient presents with   • Mood Disorder       History of Present Illness   Patient presents today with follow up on her mood, she says she is feeling much better. She also has a knot on left shoulder.  Above reviewed and verified.  Last visit was about a month ago.  At the last visit we increased her Lamictal to 100 mg/day and chose to continue Vraylar.  She states that she is feeling very good today.  As is her habit, she got her fiancé on the phone and he provided additional history.  She was not going to share this with me.  He states that for the past 2 to 3 months she has been more reclusive than ever.  She does not want to get out and go and do anything for enjoyment even going for a walk or going to the park or going to fall [place] or fishing.  These are all activities that she and her fiancé used to do when they first got together.    New complaint-not on the left shoulder.  Noticed it last night.  She had her first Covid shot over 2 weeks ago.  This not was not there in the interim.    Patient Active Problem List    Diagnosis Date Noted   • Callus of foot 10/19/2020   • Perennial allergic rhinitis 10/19/2020   • NESHA (obstructive sleep apnea) 10/19/2020   • Class 3 severe obesity due to excess calories without serious comorbidity with body mass index (BMI) of 45.0 to 49.9 in adult (CMS/[facility]) 10/12/2020   • Lymphadenopathy 10/06/2020   • Essential hypertension 07/27/2020   • Depression 07/27/2020   • Learning disability 07/27/2020     Note Last Updated: 7/27/2020     No other details     • Type 2 diabetes mellitus (CMS/[facility]) 01/17/2020   • History of endometriosis 01/17/2020   • History of PCOS 01/17/2020           Past Surgical History:   Procedure Laterality Date   • EAR TUBES     • INTRAUTERINE DEVICE INSERTION  10/14/2020   • LAPAROSCOPIC TUBAL LIGATION     • TONSILLECTOMY AND ADENOIDECTOMY       Current Outpatient Medications on File Prior  to Visit   Medication Sig   • Blood Glucose Monitoring Suppl (ACCU-CHEK GURJIT) device Use as instructed   • Cariprazine HCl (Vraylar) 3 MG capsule capsule Take 1 capsule by mouth Daily.   • EPINEPHrine (EPIPEN) 0.3 MG/0.3ML solution auto-injector injection USE AS DIRECTED FOR ACUTE ALLERGIC REACTION   • ferrous sulfate 325 (65 FE) MG tablet Take 1 tablet by mouth Daily With Breakfast.   • glucose blood (Accu-Chek Gurjit) test strip Use as instructed to check blood glucose once daily   • ibuprofen (ADVIL,MOTRIN) 800 MG tablet Take 1 tablet by mouth Every 8 (Eight) Hours As Needed for Moderate Pain .   • Januvia 100 MG tablet Take 1 tablet by mouth once daily   • lamoTRIgine (LaMICtal) 100 MG tablet Take 1 tablet by mouth Daily.   • Lancets (ACCU-CHEK SOFT TOUCH) lancets Use with device and strips to check blood glucose once daily   • linaclotide (Linzess) 145 MCG capsule capsule Take 1 capsule by mouth Every Morning Before Breakfast.   • lisinopril (PRINIVIL,ZESTRIL) 10 MG tablet Take 1 tablet by mouth once daily   • Melatonin 5 MG chewable tablet Chew 5 mg every night at bedtime.   • [DISCONTINUED] escitalopram (Lexapro) 10 MG tablet Take 1/2 tablet by mouth x 6 days, then increase to full tablet daily   • [DISCONTINUED] clotrimazole (LOTRIMIN) 1 % cream Apply  topically to the appropriate area as directed 2 (Two) Times a Day.   • [DISCONTINUED] hydrocortisone 0.5 % ointment Apply  topically to the appropriate area as directed 2 (Two) Times a Day.     No current facility-administered medications on file prior to visit.     Allergies   Allergen Reactions   • Vicodin [Hydrocodone-Acetaminophen] GI Intolerance     Social History     Socioeconomic History   • Marital status: Single     Spouse name: Not on file   • Number of children: 0   • Years of education: Not on file   • Highest education level: Not on file   Tobacco Use   • Smoking status: Former Smoker     Types: Cigarettes     Quit date: 2018     Years since  "quitting: 3.3   • Smokeless tobacco: Never Used   • Tobacco comment: socially    Substance and Sexual Activity   • Alcohol use: Never   • Drug use: Never   • Sexual activity: Defer     Family History   Problem Relation Age of Onset   • Hypertension Mother    • Depression Mother    • Post-traumatic stress disorder Mother    • Hyperlipidemia Mother    • Hypertension Father    • Hyperlipidemia Father    • Hypertension Brother    • Depression Brother    • Parkinsonism Maternal Grandmother    • Dementia Maternal Grandmother    • Atrial fibrillation Maternal Grandfather    • Breast cancer Paternal Grandmother    • COPD Paternal Grandfather        Review of Systems    Objective   /78 (BP Location: Right arm, Patient Position: Sitting, Cuff Size: Adult)   Pulse 85   Temp 97.8 °F (36.6 °C) (Infrared)   Ht 162 cm (63.78\")   Wt 131 kg (288 lb)   SpO2 98%   BMI 49.78 kg/m²   Physical Exam  Constitutional:       Appearance: She is well-developed and overweight.      Comments: Wearing a face mask     HENT:      Head: Normocephalic and atraumatic.   Eyes:      Conjunctiva/sclera: Conjunctivae normal.   Cardiovascular:      Rate and Rhythm: Normal rate.   Pulmonary:      Effort: Pulmonary effort is normal.   Musculoskeletal:         General: Normal range of motion.      Cervical back: Normal range of motion.      Comments: She has a quarter sized slightly raised area of warm erythema over the left lateral upper shoulder.  There is a small punctate red dot in the middle which to me seems to be a possible portal of entry for possibly an envenomation.   Skin:     General: Skin is warm and dry.      Findings: No rash.   Neurological:      Mental Status: She is alert and oriented to person, place, and time.   Psychiatric:         Speech: Speech normal.         Behavior: Behavior normal.         Judgment: Judgment is impulsive.           No visits with results within 4 Month(s) from this visit.   Latest known visit with " results is:   Lab on 12/07/2020   Component Date Value Ref Range Status   • WBC 12/07/2020 9.50  3.40 - 10.80 10*3/mm3 Final   • RBC 12/07/2020 4.18  3.77 - 5.28 10*6/mm3 Final   • Hemoglobin 12/07/2020 11.0* 12.0 - 15.9 g/dL Final   • Hematocrit 12/07/2020 34.2  34.0 - 46.6 % Final   • MCV 12/07/2020 81.8  79.0 - 97.0 fL Final   • MCH 12/07/2020 26.3* 26.6 - 33.0 pg Final   • MCHC 12/07/2020 32.2  31.5 - 35.7 g/dL Final   • RDW 12/07/2020 14.9  12.3 - 15.4 % Final   • RDW-SD 12/07/2020 43.3  37.0 - 54.0 fl Final   • MPV 12/07/2020 10.1  6.0 - 12.0 fL Final   • Platelets 12/07/2020 232  140 - 450 10*3/mm3 Final   • Neutrophil % 12/07/2020 64.4  42.7 - 76.0 % Final   • Lymphocyte % 12/07/2020 25.2  19.6 - 45.3 % Final   • Monocyte % 12/07/2020 6.9  5.0 - 12.0 % Final   • Eosinophil % 12/07/2020 3.3  0.3 - 6.2 % Final   • Basophil % 12/07/2020 0.2  0.0 - 1.5 % Final   • Neutrophils, Absolute 12/07/2020 6.12  1.70 - 7.00 10*3/mm3 Final   • Lymphocytes, Absolute 12/07/2020 2.39  0.70 - 3.10 10*3/mm3 Final   • Monocytes, Absolute 12/07/2020 0.66  0.10 - 0.90 10*3/mm3 Final   • Eosinophils, Absolute 12/07/2020 0.31  0.00 - 0.40 10*3/mm3 Final   • Basophils, Absolute 12/07/2020 0.02  0.00 - 0.20 10*3/mm3 Final         Assessment/Plan   Diagnoses and all orders for this visit:    1. Mood disorder (CMS/HCC) (Primary)    2. Other depression  -     FLUoxetine (PROzac) 20 MG capsule; Take 1 capsule by mouth Daily.  Dispense: 30 capsule; Refill: 3    3. Cellulitis, unspecified cellulitis site  Comments:  left shoulder  Orders:  -     doxycycline (DORYX) 100 MG enteric coated tablet; Take 1 tablet by mouth 2 (Two) Times a Day for 7 days.  Dispense: 14 tablet; Refill: 0    I recommend she continue the Vraylar and Lamictal at this point.  We will switch her Lexapro to fluoxetine for hopefully a more activating effect.  We will start 20 mg every morning.  We will reevaluate in a few weeks and consider adjusting upward if  appropriate.    It looks like she may have had an insect bite on her left upper shoulder.  We will treat her with doxycycline.  I have also advised some ice if the area is sore.  Let me know if it worsens or fails to improve as expected.           Return in about 4 weeks (around 6/8/2021).    Call with any problems or concerns before next visit  Answers for HPI/ROS submitted by the patient on 5/11/2021  Please describe your symptoms.: Depressed anxiety a knot on my left shoulder  Have you had these symptoms before?: Yes  How long have you been having these symptoms?: Greater than 2 weeks  What is the primary reason for your visit?: Other

## 2021-05-12 ENCOUNTER — TELEPHONE (OUTPATIENT)
Dept: FAMILY MEDICINE CLINIC | Facility: CLINIC | Age: 41
End: 2021-05-12

## 2021-05-12 NOTE — TELEPHONE ENCOUNTER
Pharmacy needs clarification:    You rx Doxycycline Hyclate 100mg tablets.    Insurance wants Doxycycline Mono caplets.

## 2021-05-12 NOTE — TELEPHONE ENCOUNTER
Please tell them to just change it to the doxycycline mono that they have.  Dose remains the same at 100 mg twice per day.  I do not have any different way to order this through epic.  Thanks

## 2021-06-17 ENCOUNTER — OFFICE VISIT (OUTPATIENT)
Dept: PSYCHIATRY | Facility: CLINIC | Age: 41
End: 2021-06-17

## 2021-06-17 DIAGNOSIS — G47.33 OSA (OBSTRUCTIVE SLEEP APNEA): ICD-10-CM

## 2021-06-17 DIAGNOSIS — F41.1 GENERALIZED ANXIETY DISORDER: Chronic | ICD-10-CM

## 2021-06-17 DIAGNOSIS — F43.12 CHRONIC POSTTRAUMATIC STRESS DISORDER: Chronic | ICD-10-CM

## 2021-06-17 DIAGNOSIS — F33.2 SEVERE RECURRENT MAJOR DEPRESSION WITHOUT PSYCHOTIC FEATURES (HCC): Primary | ICD-10-CM

## 2021-06-17 PROCEDURE — 90792 PSYCH DIAG EVAL W/MED SRVCS: CPT | Performed by: PSYCHIATRY & NEUROLOGY

## 2021-06-17 RX ORDER — LAMOTRIGINE 100 MG/1
100 TABLET ORAL DAILY
Qty: 30 TABLET | Refills: 5 | Status: SHIPPED | OUTPATIENT
Start: 2021-06-17 | End: 2022-07-08

## 2021-06-17 RX ORDER — FLUOXETINE HYDROCHLORIDE 40 MG/1
40 CAPSULE ORAL DAILY
Qty: 30 CAPSULE | Refills: 2 | Status: SHIPPED | OUTPATIENT
Start: 2021-06-17 | End: 2022-01-13
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
## 2021-06-28 DIAGNOSIS — I10 ESSENTIAL HYPERTENSION: ICD-10-CM

## 2021-06-28 RX ORDER — LISINOPRIL 10 MG/1
TABLET ORAL
Qty: 90 TABLET | Refills: 3 | Status: SHIPPED | OUTPATIENT
Start: 2021-06-28 | End: 2022-07-08

## 2021-07-12 DIAGNOSIS — F33.2 SEVERE RECURRENT MAJOR DEPRESSION WITHOUT PSYCHOTIC FEATURES (HCC): ICD-10-CM

## 2021-08-06 RX ORDER — SITAGLIPTIN 100 MG/1
TABLET, FILM COATED ORAL
Qty: 30 TABLET | Refills: 5 | Status: SHIPPED | OUTPATIENT
Start: 2021-08-06 | End: 2021-11-11

## 2021-08-06 NOTE — TELEPHONE ENCOUNTER
Caller: Ruiz Bello    Relationship: Emergency Contact    Best call back number: [phone]    Medication needed:   Requested Prescriptions     Pending Prescriptions Disp Refills   • Januvia 100 MG tablet [Pharmacy Med Name: Januvia 100 MG Oral Tablet] 30 tablet 0     Sig: Take 1 tablet by mouth once daily       When do you need the refill by: ASAP    What additional details did the patient provide when requesting the medication: PATIENT IS COMPLETELY OUT OF MEDICATION, PLEASE ADVISE WHEN SENT TO PHARMACY    Does the patient have less than a 3 day supply:  [x] Yes  [] No    What is the patient's preferred pharmacy: [facility] [address] [place] [address] [facility] [phone] [facility] [phone] FX

## 2021-08-22 DIAGNOSIS — K59.04 CHRONIC IDIOPATHIC CONSTIPATION: ICD-10-CM

## 2021-08-23 RX ORDER — LINACLOTIDE 145 UG/1
CAPSULE, GELATIN COATED ORAL
Qty: 30 CAPSULE | Refills: 0 | Status: SHIPPED | OUTPATIENT
Start: 2021-08-23 | End: 2022-03-30

## 2021-08-25 ENCOUNTER — OFFICE VISIT (OUTPATIENT)
Dept: FAMILY MEDICINE CLINIC | Facility: CLINIC | Age: 41
End: 2021-08-25

## 2021-08-25 VITALS
SYSTOLIC BLOOD PRESSURE: 158 MMHG | BODY MASS INDEX: 47.97 KG/M2 | OXYGEN SATURATION: 98 % | HEIGHT: 64 IN | HEART RATE: 71 BPM | WEIGHT: 281 LBS | DIASTOLIC BLOOD PRESSURE: 100 MMHG | TEMPERATURE: 97.5 F

## 2021-08-25 DIAGNOSIS — M25.562 CHRONIC PAIN OF BOTH KNEES: Primary | ICD-10-CM

## 2021-08-25 DIAGNOSIS — I10 ESSENTIAL HYPERTENSION: ICD-10-CM

## 2021-08-25 DIAGNOSIS — M25.561 CHRONIC PAIN OF BOTH KNEES: Primary | ICD-10-CM

## 2021-08-25 DIAGNOSIS — G89.29 CHRONIC PAIN OF BOTH KNEES: Primary | ICD-10-CM

## 2021-08-25 PROCEDURE — 99214 OFFICE O/P EST MOD 30 MIN: CPT | Performed by: FAMILY MEDICINE
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
## 2021-08-26 ENCOUNTER — TELEPHONE (OUTPATIENT)
Dept: FAMILY MEDICINE CLINIC | Facility: CLINIC | Age: 41
End: 2021-08-26

## 2021-08-26 NOTE — TELEPHONE ENCOUNTER
HUB TO READ:  Please tell Noa tomorrow that her knee x-rays did actually show fairly moderate arthritis throughout both knees.  Her kneecaps looked okay.  Thanks

## 2021-08-26 NOTE — TELEPHONE ENCOUNTER
PATIENT'S  MARCO A CALLED AND STATES THE PRESCRIPTION FOR VOLTAREN GEL IS NOW OVER THE COUNTER AND NOT COVED UNDER INSURANCE. HE WANTS TO SEE IF SOMETHING ELSE CAN BE CALLED IN THAT IS COVERED UNDER INSURANCE     CALL BACK NUMBER [phone]      [address] - [address] [facility] - [phone] [place] [phone]   [phone]

## 2021-08-27 NOTE — TELEPHONE ENCOUNTER
I am not aware of any other topicals that could be used to treat her knee pain that are prescription only.  Over-the-counter Biofreeze is likely to be the least expensive option.  Thanks

## 2021-11-10 ENCOUNTER — OFFICE VISIT (OUTPATIENT)
Dept: FAMILY MEDICINE CLINIC | Facility: CLINIC | Age: 41
End: 2021-11-10

## 2021-11-10 VITALS
HEART RATE: 79 BPM | TEMPERATURE: 98.2 F | WEIGHT: 290 LBS | SYSTOLIC BLOOD PRESSURE: 132 MMHG | OXYGEN SATURATION: 98 % | DIASTOLIC BLOOD PRESSURE: 78 MMHG | HEIGHT: 64 IN | BODY MASS INDEX: 49.51 KG/M2

## 2021-11-10 DIAGNOSIS — I10 ESSENTIAL HYPERTENSION: Primary | ICD-10-CM

## 2021-11-10 DIAGNOSIS — E11.65 TYPE 2 DIABETES MELLITUS WITH HYPERGLYCEMIA, WITHOUT LONG-TERM CURRENT USE OF INSULIN (HCC): ICD-10-CM

## 2021-11-10 DIAGNOSIS — F41.1 GENERALIZED ANXIETY DISORDER: Chronic | ICD-10-CM

## 2021-11-10 DIAGNOSIS — M17.0 PRIMARY OSTEOARTHRITIS OF BOTH KNEES: ICD-10-CM

## 2021-11-10 DIAGNOSIS — F32.89 OTHER DEPRESSION: ICD-10-CM

## 2021-11-10 DIAGNOSIS — Z11.59 NEED FOR HEPATITIS C SCREENING TEST: ICD-10-CM

## 2021-11-10 DIAGNOSIS — G47.33 OSA (OBSTRUCTIVE SLEEP APNEA): ICD-10-CM

## 2021-11-10 PROBLEM — M17.9 OSTEOARTHRITIS OF KNEE: Status: ACTIVE | Noted: 2021-11-10

## 2021-11-10 PROCEDURE — 99214 OFFICE O/P EST MOD 30 MIN: CPT | Performed by: FAMILY MEDICINE

## 2021-11-10 NOTE — PROGRESS NOTES
Subjective   Noa Soni is a 40 y.o. female.   Chief Complaint   Patient presents with   • Knee Pain       History of Present Illness   Patient presents today with knee pain, is wanting a referral to pain management.  Above reviewed and verified.  She returns today not quite 3 months after our last visit.  She is requesting referral to Dr. Connors.  We have not assessed her other chronic medical problems since early May.    She was to go see pain management because of chronic bilateral knee pain.  Please see last visit for details.  She reports that traumatic injury to her knees and chronic pain following that.  I thought she would be better served by orthopedics.  I made a referral to Dr. Leiva per her request.  He recommended doing exercises, weight loss, NSAIDs if creatinine was okay and considering Supartz if that did not help.  She is under the impression is that nothing could be done.  She initially requested to go see pain management because of her knees, but I thought Ortho evaluation might be better.    Other chronic medical problems reviewed per active problem list as below.  She reports no recent blood sugar numbers.  She does not think she needs refills on her medicine.  Her mood is actually doing pretty good right now.      Patient Active Problem List    Diagnosis Date Noted   • Osteoarthritis of knee 11/10/2021   • Severe recurrent major depression without psychotic features (HCC) 06/17/2021   • Chronic posttraumatic stress disorder 06/17/2021   • Generalized anxiety disorder 06/17/2021   • Callus of foot 10/19/2020   • Perennial allergic rhinitis 10/19/2020   • NESHA (obstructive sleep apnea) 10/19/2020   • Class 3 severe obesity due to excess calories without serious comorbidity with body mass index (BMI) of 45.0 to 49.9 in adult (HCC) 10/12/2020   • Lymphadenopathy 10/06/2020   • Essential hypertension 07/27/2020   • Depression 07/27/2020   • Learning disability 07/27/2020     Note Last Updated:  7/27/2020     No other details     • Type 2 diabetes mellitus (HCC) 01/17/2020   • History of endometriosis 01/17/2020   • History of PCOS 01/17/2020           Past Surgical History:   Procedure Laterality Date   • EAR TUBES     • INTRAUTERINE DEVICE INSERTION  10/14/2020   • LAPAROSCOPIC TUBAL LIGATION     • TONSILLECTOMY AND ADENOIDECTOMY       Current Outpatient Medications on File Prior to Visit   Medication Sig   • Blood Glucose Monitoring Suppl (ACCU-CHEK GURJIT) device Use as instructed   • Cariprazine HCl (Vraylar) 3 MG capsule capsule Take 1 capsule by mouth Daily.   • Diclofenac Sodium (VOLTAREN) 1 % gel gel Apply 4 g topically to the appropriate area as directed 4 (Four) Times a Day As Needed (pain).   • EPINEPHrine (EPIPEN) 0.3 MG/0.3ML solution auto-injector injection USE AS DIRECTED FOR ACUTE ALLERGIC REACTION   • ferrous sulfate 325 (65 FE) MG tablet Take 1 tablet by mouth Daily With Breakfast.   • FLUoxetine (PROzac) 40 MG capsule Take 1 capsule by mouth Daily.   • glucose blood (Accu-Chek Gurjit) test strip Use as instructed to check blood glucose once daily   • ibuprofen (ADVIL,MOTRIN) 800 MG tablet Take 1 tablet by mouth Every 8 (Eight) Hours As Needed for Moderate Pain .   • Januvia 100 MG tablet Take 1 tablet by mouth once daily   • lamoTRIgine (LaMICtal) 100 MG tablet Take 1 tablet by mouth Daily.   • Lancets (ACCU-CHEK SOFT TOUCH) lancets Use with device and strips to check blood glucose once daily   • Linzess 145 MCG capsule capsule TAKE 1 CAPSULE BY MOUTH ONCE DAILY IN THE MORNING BEFORE BREAKFAST   • lisinopril (PRINIVIL,ZESTRIL) 10 MG tablet Take 1 tablet by mouth once daily   • Melatonin 5 MG chewable tablet Chew 5 mg every night at bedtime.     No current facility-administered medications on file prior to visit.     Allergies   Allergen Reactions   • Abilify [Aripiprazole] Rash   • Vicodin [Hydrocodone-Acetaminophen] GI Intolerance     Social History     Socioeconomic History   • Marital  "status: Single   • Number of children: 0   Tobacco Use   • Smoking status: Former Smoker     Types: Cigarettes     Quit date: 2018     Years since quitting: 3.8   • Smokeless tobacco: Never Used   • Tobacco comment: socially    Vaping Use   • Vaping Use: Never used   Substance and Sexual Activity   • Alcohol use: Never   • Drug use: Never   • Sexual activity: Defer     Family History   Problem Relation Age of Onset   • Hypertension Mother    • Depression Mother    • Post-traumatic stress disorder Mother    • Hyperlipidemia Mother    • Hypertension Father    • Hyperlipidemia Father    • Hypertension Brother    • Depression Brother    • Parkinsonism Maternal Grandmother    • Dementia Maternal Grandmother    • Atrial fibrillation Maternal Grandfather    • Breast cancer Paternal Grandmother    • COPD Paternal Grandfather        Review of Systems    Objective   /78 (BP Location: Left arm, Patient Position: Sitting, Cuff Size: Large Adult)   Pulse 79   Temp 98.2 °F (36.8 °C) (Oral)   Ht 162 cm (63.78\")   Wt 132 kg (290 lb)   SpO2 98%   BMI 50.12 kg/m²   Physical Exam  Constitutional:       Appearance: She is well-developed. She is obese. She is not toxic-appearing.      Comments: Wearing a face mask     HENT:      Head: Normocephalic and atraumatic.   Eyes:      Conjunctiva/sclera: Conjunctivae normal.   Cardiovascular:      Rate and Rhythm: Normal rate.   Pulmonary:      Effort: Pulmonary effort is normal.   Musculoskeletal:         General: Normal range of motion.      Cervical back: Normal range of motion.      Comments: Both knees are mildly enlarged which is consistent with some probable underlying osteoarthritis.     Skin:     General: Skin is warm and dry.      Findings: No rash.   Neurological:      Mental Status: She is alert and oriented to person, place, and time.   Psychiatric:         Behavior: Behavior normal.         Assessment/Plan   Diagnoses and all orders for this visit:    1. Essential " hypertension (Primary)  -     Comprehensive Metabolic Panel  -     CBC & Differential    2. Type 2 diabetes mellitus with hyperglycemia, without long-term current use of insulin (HCC)  -     Hemoglobin A1c  -     Lipid Panel  -     MicroAlbumin, Urine, Random - Urine, Clean Catch    3. Generalized anxiety disorder    4. Other depression    5. NESHA (obstructive sleep apnea)    6. Need for hepatitis C screening test  -     Hepatitis C Antibody    7. Primary osteoarthritis of both knees  -     Ambulatory Referral to Pain Management    She has multiple chronic medical problems.  Anxiety and depression seem to be under pretty good control right now.  She has also seen Dr. Valencia for this problem.  She is now on Vraylar 3 mg/day, Lamictal, Prozac.  Blood pressure control is good.  Systolic blood pressure is at goal below 135 systolic.  Continue lisinopril at current dose.    We will get labs as above to follow-up on the status of her diabetes.  I will follow-up with her when the results of these tests are back and make any other changes as needed.  I recommend a flu shot today.  She is not sure she wants to get 1.    I will go ahead make a referral to Dr. Connors per her request.           Call with any problems or concerns before next visit  Return in about 3 months (around 2/10/2022).      Much of this report is an electronic transcription of spoken language to printed text using Dragon dictation software.  As such, the subtleties and finesse of spoken language may permit erroneous, or at times, nonsensical words or phrases to be inadvertently transcribed; thus changes may be made at a later date to rectify these errors.

## 2021-11-11 DIAGNOSIS — E11.65 TYPE 2 DIABETES MELLITUS WITH HYPERGLYCEMIA, WITHOUT LONG-TERM CURRENT USE OF INSULIN (HCC): Primary | ICD-10-CM

## 2021-11-11 LAB
ALBUMIN SERPL-MCNC: 4.6 G/DL (ref 3.8–4.8)
ALBUMIN/GLOB SERPL: 1.3 {RATIO} (ref 1.2–2.2)
ALP SERPL-CCNC: 120 IU/L (ref 44–121)
ALT SERPL-CCNC: 21 IU/L (ref 0–32)
AST SERPL-CCNC: 24 IU/L (ref 0–40)
BASOPHILS # BLD AUTO: 0 X10E3/UL (ref 0–0.2)
BASOPHILS NFR BLD AUTO: 0 %
BILIRUB SERPL-MCNC: 1.2 MG/DL (ref 0–1.2)
BUN SERPL-MCNC: 9 MG/DL (ref 6–20)
BUN/CREAT SERPL: 13 (ref 9–23)
CALCIUM SERPL-MCNC: 9.6 MG/DL (ref 8.7–10.2)
CHLORIDE SERPL-SCNC: 99 MMOL/L (ref 96–106)
CHOLEST SERPL-MCNC: 179 MG/DL (ref 100–199)
CO2 SERPL-SCNC: 22 MMOL/L (ref 20–29)
CREAT SERPL-MCNC: 0.71 MG/DL (ref 0.57–1)
EOSINOPHIL # BLD AUTO: 0.1 X10E3/UL (ref 0–0.4)
EOSINOPHIL NFR BLD AUTO: 1 %
ERYTHROCYTE [DISTWIDTH] IN BLOOD BY AUTOMATED COUNT: 12.9 % (ref 11.7–15.4)
GLOBULIN SER CALC-MCNC: 3.5 G/DL (ref 1.5–4.5)
GLUCOSE SERPL-MCNC: 184 MG/DL (ref 65–99)
HBA1C MFR BLD: 9.1 % (ref 4.8–5.6)
HCT VFR BLD AUTO: 41 % (ref 34–46.6)
HCV AB S/CO SERPL IA: <0.1 S/CO RATIO (ref 0–0.9)
HDLC SERPL-MCNC: 31 MG/DL
HGB BLD-MCNC: 13.9 G/DL (ref 11.1–15.9)
IMM GRANULOCYTES # BLD AUTO: 0.1 X10E3/UL (ref 0–0.1)
IMM GRANULOCYTES NFR BLD AUTO: 1 %
LDLC SERPL CALC-MCNC: 106 MG/DL (ref 0–99)
LYMPHOCYTES # BLD AUTO: 2.8 X10E3/UL (ref 0.7–3.1)
LYMPHOCYTES NFR BLD AUTO: 28 %
MCH RBC QN AUTO: 28.7 PG (ref 26.6–33)
MCHC RBC AUTO-ENTMCNC: 33.9 G/DL (ref 31.5–35.7)
MCV RBC AUTO: 85 FL (ref 79–97)
MICROALBUMIN UR-MCNC: 22.9 UG/ML
MONOCYTES # BLD AUTO: 0.7 X10E3/UL (ref 0.1–0.9)
MONOCYTES NFR BLD AUTO: 7 %
NEUTROPHILS # BLD AUTO: 6.2 X10E3/UL (ref 1.4–7)
NEUTROPHILS NFR BLD AUTO: 63 %
PLATELET # BLD AUTO: 268 X10E3/UL (ref 150–450)
POTASSIUM SERPL-SCNC: 4.2 MMOL/L (ref 3.5–5.2)
PROT SERPL-MCNC: 8.1 G/DL (ref 6–8.5)
RBC # BLD AUTO: 4.84 X10E6/UL (ref 3.77–5.28)
SODIUM SERPL-SCNC: 137 MMOL/L (ref 134–144)
TRIGL SERPL-MCNC: 245 MG/DL (ref 0–149)
VLDLC SERPL CALC-MCNC: 42 MG/DL (ref 5–40)
WBC # BLD AUTO: 9.8 X10E3/UL (ref 3.4–10.8)

## 2021-11-11 RX ORDER — DULAGLUTIDE 0.75 MG/.5ML
0.75 INJECTION, SOLUTION SUBCUTANEOUS WEEKLY
Qty: 4 PEN | Refills: 5 | Status: SHIPPED | OUTPATIENT
Start: 2021-11-11 | End: 2022-05-01 | Stop reason: DRUGHIGH

## 2021-11-12 ENCOUNTER — TELEPHONE (OUTPATIENT)
Dept: FAMILY MEDICINE CLINIC | Facility: CLINIC | Age: 41
End: 2021-11-12

## 2021-11-12 NOTE — TELEPHONE ENCOUNTER
I attempted to call patient, VM not set up yet. If patient calls back,   HUB TO READ:  Following message.

## 2021-11-12 NOTE — TELEPHONE ENCOUNTER
Please tell them that fine, she does not have to take the Metformin.  Have her start the new Trulicity shot that I gave her.  I can treat patients if I have no idea what they are doing with her medications.  Lets start with what I know she is on now.  Thanks

## 2021-11-12 NOTE — TELEPHONE ENCOUNTER
Patients  called regarding patients labs from 11/10/21.    He said that she was on metformin in 2020 and it gave her severe diarrhea and she cannot take it.    He also states that patient did not take her medicine for 'a long time' and he feels this is why her a1c went up to 9.1.

## 2021-11-20 DIAGNOSIS — R09.81 NASAL CONGESTION: Primary | ICD-10-CM

## 2021-11-20 RX ORDER — FLUTICASONE PROPIONATE 50 MCG
2 SPRAY, SUSPENSION (ML) NASAL DAILY
Qty: 9.9 ML | Refills: 0 | Status: SHIPPED | OUTPATIENT
Start: 2021-11-20 | End: 2022-04-27

## 2021-11-20 RX ORDER — BROMPHENIRAMINE MALEATE, PSEUDOEPHEDRINE HYDROCHLORIDE, AND DEXTROMETHORPHAN HYDROBROMIDE 2; 30; 10 MG/5ML; MG/5ML; MG/5ML
5 SYRUP ORAL 4 TIMES DAILY PRN
Qty: 100 ML | Refills: 0 | Status: SHIPPED | OUTPATIENT
Start: 2021-11-20 | End: 2021-11-20

## 2021-11-20 RX ORDER — GUAIFENESIN 600 MG/1
1200 TABLET, EXTENDED RELEASE ORAL 2 TIMES DAILY
Qty: 14 TABLET | Refills: 0 | Status: SHIPPED | OUTPATIENT
Start: 2021-11-20 | End: 2022-04-27

## 2021-11-20 RX ORDER — FEXOFENADINE HCL 180 MG/1
180 TABLET ORAL DAILY
Qty: 30 TABLET | Refills: 0 | Status: SHIPPED | OUTPATIENT
Start: 2021-11-20 | End: 2022-04-27

## 2021-12-10 NOTE — ED PROVIDER NOTES
Peter is a 74 year old male who presents to the Immediate Care for chest congestion.  He has had some chest congestion for about the last 2 weeks.  He has had a cough as well.  He does have some occasional fevers.  He denies vomiting or diarrhea.  He denies any hemoptysis.  He does have some shortness of breath.  His symptoms worsen when he rolls on his right side.  He states he recently had pneumonia and did have a pleural effusion and a thoracentesis done about 6 weeks ago.  He has not seen anybody during this time for his current symptoms.    PMH:   see chart   Alg:      ALLERGIES:  Amoxicillin   Meds:  see chart   Tobacco Use: none     OBJECTIVE:  Visit Vitals  BP (!) 160/80 (BP Location: RUE - Right upper extremity, Patient Position: Sitting, Cuff Size: Regular)   Pulse (!) 114   Temp (!) 100.7 °F (38.2 °C) (Temporal)   Resp 18   SpO2 98%      Gen: alert & oriented, pleasant and comfortable  HEENT:   No conjunctival injection or scleral icterus               EOMI/PERRL; no photophobia               No pharyngeal erythema               Mucous-membranes moist  Neck:  Supple, no masses or thyromegaly          No lymphadenopathy   Lungs:  decreased aeration at the right base; no wheezes  CV: Sinus tachycardia, no murmurs  Ext:  No cyanosis, clubbing or edema           No warmth; calves are symmetric          No calf pain or swelling; no palpable cords     X-Ray: pleural effusion on right , independently reviewed by myself and discussed with the patient, pending official radiology interpretation     ASSESSMENT:/PLAN:  Pleural Effusion    I discussed this with the patient.  Reassurance given  Given his symptoms and exam, and the potential for a more serious etiology, I recommended that he be evaluated in the ER.    An ambulance was offered and advised but the pt declined.    [facility] was informed of the pt's pending arrival.    He was in stable condition at the time of discharge  The patient indicates understanding  Subjective   Chief Complaint: Sinus congestion    Patient is a 39-year-old  man with history of migraines presents the ER with chief complaint of sinus congestion for 3 days.  Patient reports sinus congestion with which she states makes it hard to breathe.  Patient denies any chest pain, shortness of breath, sore throat or other respiratory symptoms.  Denies any headache or frontal maxillary sinus pressure.  She denies any fever or chills.  Denies any abdominal pain, nausea vomiting or diarrhea.  She does report rash over the last month across her abdomen that is itchy.  Patient states she has not tried Mucinex and Sudafed, Flonase or any other over-the-counter medications for her sinus congestion.  She states she has tried no creams for her abdomen.  She has no other complaints.      Location: Nose    Quality: Congestion    Duration: 3 days    Timing: Constant    Severity: Mild    Associated Symptoms: None    PCP: Kristi Garrido          Review of Systems   Constitutional: Negative for fever.   HENT: Positive for congestion and sinus pressure. Negative for drooling, ear pain, facial swelling, sneezing, sore throat and trouble swallowing.    Respiratory: Negative for shortness of breath and wheezing.    Cardiovascular: Negative for chest pain.   Gastrointestinal: Negative for abdominal pain, diarrhea, nausea and vomiting.   Genitourinary: Negative for dysuria.   Skin: Positive for rash.   Neurological: Negative for headaches.   Psychiatric/Behavioral: Negative for behavioral problems.   All other systems reviewed and are negative.      Past Medical History:   Diagnosis Date   • Depression    • Diabetes mellitus (CMS/HCC)    • HTN (hypertension)        Allergies   Allergen Reactions   • Vicodin [Hydrocodone-Acetaminophen] GI Intolerance       Past Surgical History:   Procedure Laterality Date   • EAR TUBES     • LAPAROSCOPIC TUBAL LIGATION     • TONSILLECTOMY AND ADENOIDECTOMY         Family History   Problem  Relation Age of Onset   • Hypertension Mother    • Depression Mother    • Post-traumatic stress disorder Mother    • Hyperlipidemia Mother    • Hypertension Father    • Hyperlipidemia Father    • Hypertension Brother    • Depression Brother    • Parkinsonism Maternal Grandmother    • Dementia Maternal Grandmother    • Atrial fibrillation Maternal Grandfather    • Breast cancer Paternal Grandmother    • COPD Paternal Grandfather        Social History     Socioeconomic History   • Marital status: Single     Spouse name: Not on file   • Number of children: 0   • Years of education: Not on file   • Highest education level: Not on file   Occupational History   • Occupation: disability    Tobacco Use   • Smoking status: Former Smoker     Types: Cigarettes     Last attempt to quit: 2018     Years since quittin.5   • Smokeless tobacco: Never Used   • Tobacco comment: socially    Substance and Sexual Activity   • Alcohol use: Never     Frequency: Never   • Drug use: Never           Objective   Physical Exam   Constitutional: She is oriented to person, place, and time. She appears well-developed and well-nourished.  Non-toxic appearance. She does not appear ill. No distress.   HENT:   Head: Normocephalic and atraumatic.   Mouth/Throat: Oropharynx is clear and moist. No oropharyngeal exudate.   Nares patent, turbinates nonerythematous, no nasal drainage or discharge  No tenderness to palpation of frontal or maxillary sinus   Eyes: Pupils are equal, round, and reactive to light. EOM are normal.   Neck: Normal range of motion.   Cardiovascular: Normal rate, regular rhythm, normal heart sounds and intact distal pulses.   Pulmonary/Chest: Effort normal and breath sounds normal. No respiratory distress. She has no wheezes. She has no rales.   Abdominal: Soft. Normal appearance and bowel sounds are normal. She exhibits no distension. There is no tenderness. There is no CVA tenderness.   Lymphadenopathy:     She has no cervical  of these issues and agrees with the plan.    Electronically signed by:  Keith E Weiler, MD on 12/10/2021     Electronically signed by:  Keith E Weiler, MD on 12/10/2021    "adenopathy.   Neurological: She is alert and oriented to person, place, and time.   Skin: Skin is warm and dry. Capillary refill takes less than 2 seconds. Rash noted.   Patient has diffuse erythematous somewhat scaling rash across abdomen and upper legs with overlying scabs.  No obvious bleeding or drainage, no macular erythema, warmth or edema to suggest cellulitis.   Psychiatric: She has a normal mood and affect. Her behavior is normal.   Nursing note and vitals reviewed.      Procedures           ED Course    /59   Pulse 83   Temp 97.9 °F (36.6 °C) (Oral)   Resp 14   Ht 162.6 cm (64\")   Wt 127 kg (280 lb 3.3 oz)   LMP 06/30/2020   SpO2 98%   Breastfeeding No   BMI 48.10 kg/m²   Labs Reviewed - No data to display  Medications - No data to display  No radiology results for the last day                                         MDM  Number of Diagnoses or Management Options  Diagnosis management comments: MEDICAL DECISION  Epic Chart Review:  Comorbidities: Migraines  Differentials: Sinusitis, sinus congestion, sinus headache; this list is not all inclusive and does not constitute the entirety of considered causes  Radiology interpretation: Not warranted  Lab interpretation: Not warranted    While in the ED  appropriate PPE was worn during exam and throughout all encounters with the patient.  Patient had the evaluation.  Patient still exam significant for diffuse erythematous rash across abdomen and upper legs that appears to be in various stages of healing with overlying scab, non-edematous, not no warmth, no fluctuance or drainage, does not appear cellulitic.  Patient will be discharged with prescription for Flonase, also encouraged to use Mucinex over-the-counter to help with sinus congestion, patient has had symptoms for 3 days, most likely viral sinusitis.  She was recommended to use a humidifier at home to help with her congestion.  Patient also given prescription for hydrocortisone ointment and " advised to follow-up with dermatology for further evaluation and management.    Discharge plan and instructions were discussed with the patient who verbalized understanding and is in agreement with the plan, all questions were answered at this time.  Patient is aware of signs symptoms that would require immediate return to the emergency room.  Patient understands importance of following up with primary care provider for further evaluation and worsening concerns as well as blood pressure recheck in the next 4 weeks.    Patient remained afebrile, nontoxic-appearing, no acute respiratory distress throughout entire emergency room stay.  Patient was discharged in improved stable condition with an upright steady gait.      Patient Progress  Patient progress: stable      Final diagnoses:   Sinus congestion   Rash            Zee Arriaga, PA  07/10/20 [id number]

## 2022-01-12 DIAGNOSIS — F41.1 GENERALIZED ANXIETY DISORDER: Chronic | ICD-10-CM

## 2022-01-12 DIAGNOSIS — F43.12 CHRONIC POSTTRAUMATIC STRESS DISORDER: Chronic | ICD-10-CM

## 2022-01-12 DIAGNOSIS — F33.2 SEVERE RECURRENT MAJOR DEPRESSION WITHOUT PSYCHOTIC FEATURES: ICD-10-CM

## 2022-01-13 RX ORDER — FLUOXETINE HYDROCHLORIDE 40 MG/1
CAPSULE ORAL
Qty: 30 CAPSULE | Refills: 0 | Status: SHIPPED | OUTPATIENT
Start: 2022-01-13 | End: 2022-04-27

## 2022-03-24 DIAGNOSIS — F43.12 CHRONIC POSTTRAUMATIC STRESS DISORDER: Chronic | ICD-10-CM

## 2022-03-24 DIAGNOSIS — F41.1 GENERALIZED ANXIETY DISORDER: Chronic | ICD-10-CM

## 2022-03-24 DIAGNOSIS — F33.2 SEVERE RECURRENT MAJOR DEPRESSION WITHOUT PSYCHOTIC FEATURES: ICD-10-CM

## 2022-03-24 RX ORDER — FLUOXETINE HYDROCHLORIDE 40 MG/1
CAPSULE ORAL
Qty: 30 CAPSULE | Refills: 0 | OUTPATIENT
Start: 2022-03-24

## 2022-03-28 ENCOUNTER — TELEPHONE (OUTPATIENT)
Dept: PAIN MEDICINE | Facility: CLINIC | Age: 42
End: 2022-03-28

## 2022-03-28 NOTE — TELEPHONE ENCOUNTER
Caller: Ruiz Bello    Relationship to patient: Emergency Contact    Best call back number: [phone]  Patient is needing: CALLBACK; PATIENT CALLED TO CHECK TIME OF TODAY'S APPT, BUT APPT WAS CXLD 3.23.22 AT 6:12PM VIA INTERFACE  PATIENT WANTING TO BE SEEN TODAY        UNABLE TO WARM TRANSFER

## 2022-03-29 DIAGNOSIS — K59.04 CHRONIC IDIOPATHIC CONSTIPATION: ICD-10-CM

## 2022-03-30 RX ORDER — LINACLOTIDE 145 UG/1
CAPSULE, GELATIN COATED ORAL
Qty: 30 CAPSULE | Refills: 0 | Status: SHIPPED | OUTPATIENT
Start: 2022-03-30 | End: 2022-08-11

## 2022-04-27 ENCOUNTER — OFFICE VISIT (OUTPATIENT)
Dept: FAMILY MEDICINE CLINIC | Facility: CLINIC | Age: 42
End: 2022-04-27

## 2022-04-27 VITALS
HEIGHT: 64 IN | BODY MASS INDEX: 49.51 KG/M2 | DIASTOLIC BLOOD PRESSURE: 85 MMHG | WEIGHT: 290 LBS | TEMPERATURE: 98.4 F | RESPIRATION RATE: 18 BRPM | OXYGEN SATURATION: 96 % | SYSTOLIC BLOOD PRESSURE: 125 MMHG | HEART RATE: 86 BPM

## 2022-04-27 DIAGNOSIS — I10 ESSENTIAL HYPERTENSION: Primary | ICD-10-CM

## 2022-04-27 DIAGNOSIS — G43.009 MIGRAINE WITHOUT AURA AND WITHOUT STATUS MIGRAINOSUS, NOT INTRACTABLE: ICD-10-CM

## 2022-04-27 DIAGNOSIS — F33.2 SEVERE RECURRENT MAJOR DEPRESSION WITHOUT PSYCHOTIC FEATURES: ICD-10-CM

## 2022-04-27 DIAGNOSIS — E11.65 TYPE 2 DIABETES MELLITUS WITH HYPERGLYCEMIA, WITHOUT LONG-TERM CURRENT USE OF INSULIN: ICD-10-CM

## 2022-04-27 DIAGNOSIS — B35.3 TINEA PEDIS OF BOTH FEET: ICD-10-CM

## 2022-04-27 DIAGNOSIS — F41.1 GENERALIZED ANXIETY DISORDER: Chronic | ICD-10-CM

## 2022-04-27 DIAGNOSIS — F43.12 CHRONIC POSTTRAUMATIC STRESS DISORDER: Chronic | ICD-10-CM

## 2022-04-27 PROBLEM — E66.01 MORBID OBESITY: Status: ACTIVE | Noted: 2022-04-27

## 2022-04-27 PROCEDURE — 99214 OFFICE O/P EST MOD 30 MIN: CPT | Performed by: FAMILY MEDICINE

## 2022-04-27 RX ORDER — CLOTRIMAZOLE 1 %
1 CREAM (GRAM) TOPICAL 2 TIMES DAILY
Qty: 45 G | Refills: 1 | Status: SHIPPED | OUTPATIENT
Start: 2022-04-27

## 2022-04-27 RX ORDER — SUMATRIPTAN 50 MG/1
TABLET, FILM COATED ORAL
Qty: 12 TABLET | Refills: 0 | Status: SHIPPED | OUTPATIENT
Start: 2022-04-27

## 2022-04-27 RX ORDER — FEXOFENADINE HCL 180 MG/1
180 TABLET ORAL DAILY
Qty: 90 TABLET | Refills: 0 | Status: SHIPPED | OUTPATIENT
Start: 2022-04-27 | End: 2022-08-28

## 2022-04-27 RX ORDER — ACETAMINOPHEN 500 MG
2000 TABLET ORAL 2 TIMES DAILY
COMMUNITY
End: 2023-03-06

## 2022-04-27 RX ORDER — SITAGLIPTIN 100 MG/1
100 TABLET, FILM COATED ORAL DAILY
COMMUNITY
Start: 2022-03-29 | End: 2022-08-09

## 2022-04-27 RX ORDER — FERROUS SULFATE 325(65) MG
325 TABLET ORAL
Qty: 90 TABLET | Refills: 1 | Status: SHIPPED | OUTPATIENT
Start: 2022-04-27

## 2022-04-27 RX ORDER — FLUOXETINE HYDROCHLORIDE 40 MG/1
40 CAPSULE ORAL DAILY
Qty: 90 CAPSULE | Refills: 0 | Status: SHIPPED | OUTPATIENT
Start: 2022-04-27 | End: 2022-07-08

## 2022-04-28 LAB
ALBUMIN SERPL-MCNC: 3.9 G/DL (ref 3.8–4.8)
ALBUMIN/GLOB SERPL: 1 {RATIO} (ref 1.2–2.2)
ALP SERPL-CCNC: 103 IU/L (ref 44–121)
ALT SERPL-CCNC: 23 IU/L (ref 0–32)
AST SERPL-CCNC: 33 IU/L (ref 0–40)
BASOPHILS # BLD AUTO: 0 X10E3/UL (ref 0–0.2)
BASOPHILS NFR BLD AUTO: 0 %
BILIRUB SERPL-MCNC: 1.3 MG/DL (ref 0–1.2)
BUN SERPL-MCNC: 5 MG/DL (ref 6–20)
BUN/CREAT SERPL: 9 (ref 9–23)
CALCIUM SERPL-MCNC: 9.2 MG/DL (ref 8.7–10.2)
CHLORIDE SERPL-SCNC: 98 MMOL/L (ref 96–106)
CO2 SERPL-SCNC: 20 MMOL/L (ref 20–29)
CREAT SERPL-MCNC: 0.58 MG/DL (ref 0.57–1)
EGFRCR SERPLBLD CKD-EPI 2021: 118 ML/MIN/1.73
EOSINOPHIL # BLD AUTO: 0.1 X10E3/UL (ref 0–0.4)
EOSINOPHIL NFR BLD AUTO: 1 %
ERYTHROCYTE [DISTWIDTH] IN BLOOD BY AUTOMATED COUNT: 12.8 % (ref 11.7–15.4)
GLOBULIN SER CALC-MCNC: 3.8 G/DL (ref 1.5–4.5)
GLUCOSE SERPL-MCNC: 190 MG/DL (ref 65–99)
HBA1C MFR BLD: 9.7 % (ref 4.8–5.6)
HCT VFR BLD AUTO: 40 % (ref 34–46.6)
HGB BLD-MCNC: 13.3 G/DL (ref 11.1–15.9)
IMM GRANULOCYTES # BLD AUTO: 0 X10E3/UL (ref 0–0.1)
IMM GRANULOCYTES NFR BLD AUTO: 1 %
LYMPHOCYTES # BLD AUTO: 2.5 X10E3/UL (ref 0.7–3.1)
LYMPHOCYTES NFR BLD AUTO: 37 %
MCH RBC QN AUTO: 27.9 PG (ref 26.6–33)
MCHC RBC AUTO-ENTMCNC: 33.3 G/DL (ref 31.5–35.7)
MCV RBC AUTO: 84 FL (ref 79–97)
MONOCYTES # BLD AUTO: 0.4 X10E3/UL (ref 0.1–0.9)
MONOCYTES NFR BLD AUTO: 6 %
NEUTROPHILS # BLD AUTO: 3.8 X10E3/UL (ref 1.4–7)
NEUTROPHILS NFR BLD AUTO: 55 %
PLATELET # BLD AUTO: 241 X10E3/UL (ref 150–450)
POTASSIUM SERPL-SCNC: 3.9 MMOL/L (ref 3.5–5.2)
PROT SERPL-MCNC: 7.7 G/DL (ref 6–8.5)
RBC # BLD AUTO: 4.76 X10E6/UL (ref 3.77–5.28)
SODIUM SERPL-SCNC: 136 MMOL/L (ref 134–144)
WBC # BLD AUTO: 6.8 X10E3/UL (ref 3.4–10.8)

## 2022-05-01 DIAGNOSIS — E11.65 TYPE 2 DIABETES MELLITUS WITH HYPERGLYCEMIA, WITHOUT LONG-TERM CURRENT USE OF INSULIN: Primary | ICD-10-CM

## 2022-05-01 RX ORDER — DULAGLUTIDE 1.5 MG/.5ML
1.5 INJECTION, SOLUTION SUBCUTANEOUS
Qty: 12 PEN | Refills: 3 | Status: SHIPPED | OUTPATIENT
Start: 2022-05-01 | End: 2023-03-12 | Stop reason: DRUGHIGH

## 2022-05-03 ENCOUNTER — OFFICE VISIT (OUTPATIENT)
Dept: PODIATRY | Facility: CLINIC | Age: 42
End: 2022-05-03

## 2022-05-03 VITALS
DIASTOLIC BLOOD PRESSURE: 86 MMHG | BODY MASS INDEX: 49.51 KG/M2 | SYSTOLIC BLOOD PRESSURE: 148 MMHG | HEIGHT: 64 IN | HEART RATE: 90 BPM | WEIGHT: 290 LBS

## 2022-05-03 DIAGNOSIS — E11.42 DM TYPE 2 WITH DIABETIC PERIPHERAL NEUROPATHY: ICD-10-CM

## 2022-05-03 DIAGNOSIS — M79.671 FOOT PAIN, BILATERAL: Primary | ICD-10-CM

## 2022-05-03 DIAGNOSIS — M79.672 FOOT PAIN, BILATERAL: Primary | ICD-10-CM

## 2022-05-03 DIAGNOSIS — B35.3 TINEA PEDIS OF LEFT FOOT: ICD-10-CM

## 2022-05-03 DIAGNOSIS — M72.2 PLANTAR FASCIITIS: ICD-10-CM

## 2022-05-03 PROCEDURE — 99213 OFFICE O/P EST LOW 20 MIN: CPT

## 2022-05-03 NOTE — PROGRESS NOTES
05/03/2022  Foot and Ankle Surgery - Established Patient/Follow-up  Provider: KEVIN Baker   Location: [facility]    Subjective:  Noa Soni is a 41 y.o. female.     Chief Complaint   Patient presents with   • Right Foot - Follow-up     Diabetic Exam     with pain   • Left Foot - Follow-up     Diabetic Exam      with pain   • Follow-up     Last visit with PCP Dr. Wan 04/26/2022        HPI: Patient presents to office today for diabetic foot check and is also having complaints of pain to bilateral plantar feet.  She reports severe pain to her left arch and plantar heel patient and also that the bottoms of her feet are yellow and painful bilaterally.  She has recently seen her primary care provider and been placed on antifungal topical treatment for athlete's foot treatment.  Patient reports her most recent A1c was 9.7% 6 days ago.  Patient denies any new known wounds or other issues with her feet since last visit.    Allergies   Allergen Reactions   • Abilify [Aripiprazole] Rash   • Metformin Diarrhea   • Vicodin [Hydrocodone-Acetaminophen] GI Intolerance       Current Outpatient Medications on File Prior to Visit   Medication Sig Dispense Refill   • acetaminophen (TYLENOL) 500 MG tablet Take 2,000 mg by mouth 2 (Two) Times a Day.     • Blood Glucose Monitoring Suppl (ACCU-CHEK GURJIT) device Use as instructed 1 each 0   • Cariprazine HCl (Vraylar) 3 MG capsule capsule Take 1 capsule by mouth Daily. 30 capsule 5   • clotrimazole (LOTRIMIN) 1 % cream Apply 1 application topically to the appropriate area as directed 2 (Two) Times a Day. 45 g 1   • Dulaglutide (Trulicity) 1.5 MG/0.5ML solution pen-injector Inject 1.5 mg under the skin into the appropriate area as directed Every 7 (Seven) Days. 12 pen 3   • EPINEPHrine (EPIPEN) 0.3 MG/0.3ML solution auto-injector injection USE AS DIRECTED FOR ACUTE ALLERGIC REACTION     • ferrous sulfate 325 (65 FE) MG tablet Take 1 tablet by mouth Daily With  "Breakfast. 90 tablet 1   • fexofenadine (ALLEGRA) 180 MG tablet Take 1 tablet by mouth Daily. 90 tablet 0   • FLUoxetine (PROzac) 40 MG capsule Take 1 capsule by mouth Daily. 90 capsule 0   • glucose blood (Accu-Chek Karis) test strip Use as instructed to check blood glucose once daily 100 each 3   • Januvia 100 MG tablet Take 100 mg by mouth Daily.     • lamoTRIgine (LaMICtal) 100 MG tablet Take 1 tablet by mouth Daily. 30 tablet 5   • Lancets (ACCU-CHEK SOFT TOUCH) lancets Use with device and strips to check blood glucose once daily 100 each 3   • Linzess 145 MCG capsule capsule TAKE 1 CAPSULE BY MOUTH ONCE DAILY IN THE MORNING BEFORE BREAKFAST 30 capsule 0   • lisinopril (PRINIVIL,ZESTRIL) 10 MG tablet Take 1 tablet by mouth once daily 90 tablet 3   • Melatonin 5 MG chewable tablet Chew 5 mg every night at bedtime. 30 tablet 3   • SUMAtriptan (Imitrex) 50 MG tablet Take one tablet at onset of headache. May repeat dose one time in 2 hours if headache not relieved. 12 tablet 0     No current facility-administered medications on file prior to visit.       Objective   /86   Pulse 90   Ht 162 cm (63.78\")   Wt 132 kg (290 lb)   LMP  (LMP Unknown)   BMI 50.12 kg/m²     Foot/Ankle Exam:       General:   Appearance: disheveled and obesity    Orientation: AAOx3    Affect: appropriate    Gait: unimpaired    Assistance: independent    Shoe Gear:  Casual shoes and socks    VASCULAR      Right Foot Vascularity   Dorsalis pedis:  2+  Skin Temperature: warm       Left Foot Vascularity   Dorsalis pedis:  2+  Skin Temperature: warm        NEUROLOGIC     Right Foot Neurologic   Light touch sensation:  Normal (Assessment difficult as patient was wincing with pain with light touch throughout her foot bilaterally)  Number of sites sensed: Assessment very difficult as patient was hesitant to closed eyes.     Left Foot Neurologic   Light touch sensation:  Normal     MUSCULOSKELETAL      Right Foot Musculoskeletal "   Ecchymosis:  None  Tenderness: plantar heel    Tenderness comment:  Patient had pain with light touch throughout foot     Left Foot Musculoskeletal   Ecchymosis:  None  Tenderness: plantar heel    Tenderness comment:  Patient had pain with light touch throughout foot     DERMATOLOGIC     Right Foot Dermatologic   Skin: skin intact    Nails: onychomycosis, abnormally thick and dystrophic nails       Left Foot Dermatologic   Skin: fissure and tinea    Nails: onychomycosis, abnormally thick, dystrophic nails and absent       Image:       Assessment/Plan   Diagnoses and all orders for this visit:    1. Foot pain, bilateral (Primary)  -     XR Foot 3+ View Bilateral    2. DM type 2 with diabetic peripheral neuropathy (HCC)    3. Tinea pedis of left foot    4. Plantar fasciitis      On exam patients feet appear stable for the most part with small fissure/tinea pedis between great toe and second toe of left foot.  Exam was somewhat limited as patient did have pain with light touch throughout her foot.  X-ray images were obtained today and independently reviewed with no obvious signs of acute fracture or dislocation.  Do feel patient's pain to the bottom of her bilateral feet are very likely related to neuropathy causing pain to the bottoms of both of her feet, and possible plantar fasciitis causing bilateral heel pain.  Pathophysiology of both were discussed with patient and significant other at length.  Will recommend patient proceed with over-the-counter arch supports with metatarsal pad to better support and stabilize her foot and hopefully decrease pain.  Do feel patient is at moderate risk of pedal complications to her feet and this was discussed with patient.Explained importance of diabetic foot care, daily foot checks, and glycemic control. Patient should check both feet on a daily basis, monitor and control blood sugars, make sure that both feet and in between toes are towel dried after baths or showers. Avoid  barefoot walking at all times. Check shoes before putting them on.   Patient was given information on proper foot care. Call the office at the first signs of a wound or with signs of infection.  Asked for patient to continue with Lotrimin topical cream prescribed by primary care provider to the area between her left first and second toe.  Discussed with patient the importance of keeping feet clean and dry and changing socks daily to help treat and prevent tinea pedis.  Patient and family verbalized understanding and are agreeable to plan at this time asked for patient to follow-up in 6 weeks for reevaluation.    Orders Placed This Encounter   Procedures   • XR Foot 3+ View Bilateral     Order Specific Question:   Reason for Exam:     Answer:   Sudden Bilateral foort Pain    From heel to arch    Rm 13  WB     Order Specific Question:   Patient Pregnant     Answer:   No     Order Specific Question:   Does this patient have a diabetic monitoring/medication delivering device on?     Answer:   No     Order Specific Question:   Release to patient     Answer:   Immediate          Note is dictated utilizing voice recognition software. Unfortunately this leads to occasional typographical errors. I apologize in advance if the situation occurs. If questions occur please do not hesitate to call our office.

## 2022-05-17 ENCOUNTER — TELEPHONE (OUTPATIENT)
Dept: FAMILY MEDICINE CLINIC | Facility: CLINIC | Age: 42
End: 2022-05-17

## 2022-05-17 NOTE — TELEPHONE ENCOUNTER
Caller: Noa Soni    Relationship: Self    Best call back number: [phone]    What is the medical concern/diagnosis: HAS CHOKING ISSUES WHEN SHE EATS    What specialty or service is being requested: ENT    Any additional details: PLEASE CALL PATIENT BACK TO DISCUSS

## 2022-05-17 NOTE — TELEPHONE ENCOUNTER
CALLED PATIENT TO LET HER KNOW SHE WILL NEED AN APPOINTMENT TO BE EVALUATED SHE MADE AN APPT FOR TOMORROW

## 2022-05-18 ENCOUNTER — OFFICE VISIT (OUTPATIENT)
Dept: FAMILY MEDICINE CLINIC | Facility: CLINIC | Age: 42
End: 2022-05-18

## 2022-05-18 VITALS
TEMPERATURE: 97.5 F | WEIGHT: 288.6 LBS | DIASTOLIC BLOOD PRESSURE: 80 MMHG | HEART RATE: 89 BPM | HEIGHT: 64 IN | BODY MASS INDEX: 49.27 KG/M2 | OXYGEN SATURATION: 95 % | SYSTOLIC BLOOD PRESSURE: 120 MMHG | RESPIRATION RATE: 16 BRPM

## 2022-05-18 DIAGNOSIS — R13.10 DYSPHAGIA, UNSPECIFIED TYPE: Primary | ICD-10-CM

## 2022-05-18 DIAGNOSIS — R09.81 NASAL CONGESTION: ICD-10-CM

## 2022-05-18 DIAGNOSIS — H65.33 CHRONIC MUCOID OTITIS MEDIA, BILATERAL: ICD-10-CM

## 2022-05-18 PROCEDURE — 99214 OFFICE O/P EST MOD 30 MIN: CPT | Performed by: FAMILY MEDICINE

## 2022-05-18 NOTE — PROGRESS NOTES
Subjective   Noa Soni is a 41 y.o. female.   Chief Complaint   Patient presents with   • Choking       History of Present Illness   Presents to the office today with a complaint of choking while she is eating.  Please see below.      Patient Active Problem List    Diagnosis Date Noted   • Morbid obesity (HCC) 04/27/2022   • Nasal congestion 11/20/2021   • Osteoarthritis of knee 11/10/2021   • Severe recurrent major depression without psychotic features (Formerly [facility]) 06/17/2021   • Chronic posttraumatic stress disorder 06/17/2021   • Generalized anxiety disorder 06/17/2021   • Callus of foot 10/19/2020   • Perennial allergic rhinitis 10/19/2020   • NESHA (obstructive sleep apnea) 10/19/2020   • Class 3 severe obesity due to excess calories without serious comorbidity with body mass index (BMI) of 45.0 to 49.9 in adult (Formerly [facility]) 10/12/2020   • Lymphadenopathy 10/06/2020   • Essential hypertension 07/27/2020   • Depression 07/27/2020   • Learning disability 07/27/2020     Note Last Updated: 7/27/2020     No other details     • Type 2 diabetes mellitus (HCC) 01/17/2020   • History of endometriosis 01/17/2020   • History of PCOS 01/17/2020           Past Surgical History:   Procedure Laterality Date   • EAR TUBES     • INTRAUTERINE DEVICE INSERTION  10/14/2020   • LAPAROSCOPIC TUBAL LIGATION     • TONSILLECTOMY AND ADENOIDECTOMY       Current Outpatient Medications on File Prior to Visit   Medication Sig   • acetaminophen (TYLENOL) 500 MG tablet Take 2,000 mg by mouth 2 (Two) Times a Day.   • Blood Glucose Monitoring Suppl (ACCU-CHEK GURJIT) device Use as instructed   • Cariprazine HCl (Vraylar) 3 MG capsule capsule Take 1 capsule by mouth Daily.   • Dulaglutide (Trulicity) 1.5 MG/0.5ML solution pen-injector Inject 1.5 mg under the skin into the appropriate area as directed Every 7 (Seven) Days.   • ferrous sulfate 325 (65 FE) MG tablet Take 1 tablet by mouth Daily With Breakfast.   • fexofenadine (ALLEGRA) 180 MG tablet Take 1  tablet by mouth Daily.   • FLUoxetine (PROzac) 40 MG capsule Take 1 capsule by mouth Daily.   • glucose blood (Accu-Chek Karis) test strip Use as instructed to check blood glucose once daily   • Januvia 100 MG tablet Take 100 mg by mouth Daily.   • lamoTRIgine (LaMICtal) 100 MG tablet Take 1 tablet by mouth Daily.   • Lancets (ACCU-CHEK SOFT TOUCH) lancets Use with device and strips to check blood glucose once daily   • Linzess 145 MCG capsule capsule TAKE 1 CAPSULE BY MOUTH ONCE DAILY IN THE MORNING BEFORE BREAKFAST   • lisinopril (PRINIVIL,ZESTRIL) 10 MG tablet Take 1 tablet by mouth once daily   • Melatonin 5 MG chewable tablet Chew 5 mg every night at bedtime.   • SUMAtriptan (Imitrex) 50 MG tablet Take one tablet at onset of headache. May repeat dose one time in 2 hours if headache not relieved.   • clotrimazole (LOTRIMIN) 1 % cream Apply 1 application topically to the appropriate area as directed 2 (Two) Times a Day.   • EPINEPHrine (EPIPEN) 0.3 MG/0.3ML solution auto-injector injection USE AS DIRECTED FOR ACUTE ALLERGIC REACTION     No current facility-administered medications on file prior to visit.     Allergies   Allergen Reactions   • Abilify [Aripiprazole] Rash   • Metformin Diarrhea   • Vicodin [Hydrocodone-Acetaminophen] GI Intolerance     Social History     Socioeconomic History   • Marital status: Single   • Number of children: 0   Tobacco Use   • Smoking status: Former Smoker     Packs/day: 0.25     Years: 11.00     Pack years: 2.75     Types: Cigarettes     Start date:      Quit date: 2018     Years since quittin.3   • Smokeless tobacco: Never Used   • Tobacco comment: socially    Vaping Use   • Vaping Use: Never used   Substance and Sexual Activity   • Alcohol use: Never   • Drug use: Never   • Sexual activity: Not Currently     Partners: Male     Family History   Problem Relation Age of Onset   • Hypertension Mother    • Depression Mother    • Post-traumatic stress disorder Mother    •  "Hyperlipidemia Mother    • Hypertension Father    • Hyperlipidemia Father    • Hypertension Brother    • Depression Brother    • Parkinsonism Maternal Grandmother    • Dementia Maternal Grandmother    • Atrial fibrillation Maternal Grandfather    • Breast cancer Paternal Grandmother    • COPD Paternal Grandfather        Review of Systems    Objective   /80 (BP Location: Right arm, Patient Position: Sitting, Cuff Size: Large Adult)   Pulse 89   Temp 97.5 °F (36.4 °C) (Infrared)   Resp 16   Ht 162 cm (63.78\")   Wt 131 kg (288 lb 9.6 oz)   LMP  (LMP Unknown)   SpO2 95%   Breastfeeding No   BMI 49.88 kg/m²   Physical Exam  Constitutional:       Appearance: She is well-developed. She is obese.      Comments: Wearing a face mask     HENT:      Head: Normocephalic and atraumatic.      Ears:      Comments: Both EACs are partially ceruminous.  There is a little bit of blood in the right EAC.  Very hard time seeing both TMs.  I cannot tell if there is a foreign object such as a tube anywhere in there.  Eyes:      Conjunctiva/sclera: Conjunctivae normal.   Cardiovascular:      Rate and Rhythm: Normal rate.   Pulmonary:      Effort: Pulmonary effort is normal.   Abdominal:      General: There is distension.      Palpations: Abdomen is soft. There is no mass.   Musculoskeletal:         General: Normal range of motion.      Cervical back: Normal range of motion.   Skin:     General: Skin is warm and dry.      Findings: No rash.   Neurological:      Mental Status: She is alert and oriented to person, place, and time.   Psychiatric:         Behavior: Behavior normal.           Office Visit on 04/27/2022   Component Date Value Ref Range Status   • Hemoglobin A1C 04/27/2022 9.7 (A) 4.8 - 5.6 % Final    Comment:          Prediabetes: 5.7 - 6.4           Diabetes: >6.4           Glycemic control for adults with diabetes: <7.0     • WBC 04/27/2022 6.8  3.4 - 10.8 x10E3/uL Final   • RBC 04/27/2022 4.76  3.77 - 5.28 " x10E6/uL Final   • Hemoglobin 04/27/2022 13.3  11.1 - 15.9 g/dL Final   • Hematocrit 04/27/2022 40.0  34.0 - 46.6 % Final   • MCV 04/27/2022 84  79 - 97 fL Final   • MCH 04/27/2022 27.9  26.6 - 33.0 pg Final   • MCHC 04/27/2022 33.3  31.5 - 35.7 g/dL Final   • RDW 04/27/2022 12.8  11.7 - 15.4 % Final   • Platelets 04/27/2022 241  150 - 450 x10E3/uL Final   • Neutrophil Rel % 04/27/2022 55  Not Estab. % Final   • Lymphocyte Rel % 04/27/2022 37  Not Estab. % Final   • Monocyte Rel % 04/27/2022 6  Not Estab. % Final   • Eosinophil Rel % 04/27/2022 1  Not Estab. % Final   • Basophil Rel % 04/27/2022 0  Not Estab. % Final   • Neutrophils Absolute 04/27/2022 3.8  1.4 - 7.0 x10E3/uL Final   • Lymphocytes Absolute 04/27/2022 2.5  0.7 - 3.1 x10E3/uL Final   • Monocytes Absolute 04/27/2022 0.4  0.1 - 0.9 x10E3/uL Final   • Eosinophils Absolute 04/27/2022 0.1  0.0 - 0.4 x10E3/uL Final   • Basophils Absolute 04/27/2022 0.0  0.0 - 0.2 x10E3/uL Final   • Immature Granulocyte Rel % 04/27/2022 1  Not Estab. % Final   • Immature Grans Absolute 04/27/2022 0.0  0.0 - 0.1 x10E3/uL Final   • Glucose 04/27/2022 190 (A) 65 - 99 mg/dL Final   • BUN 04/27/2022 5 (A) 6 - 20 mg/dL Final   • Creatinine 04/27/2022 0.58  0.57 - 1.00 mg/dL Final   • EGFR Result 04/27/2022 118  >59 mL/min/1.73 Final   • BUN/Creatinine Ratio 04/27/2022 9  9 - 23 Final   • Sodium 04/27/2022 136  134 - 144 mmol/L Final   • Potassium 04/27/2022 3.9  3.5 - 5.2 mmol/L Final   • Chloride 04/27/2022 98  96 - 106 mmol/L Final   • Total CO2 04/27/2022 20  20 - 29 mmol/L Final   • Calcium 04/27/2022 9.2  8.7 - 10.2 mg/dL Final   • Total Protein 04/27/2022 7.7  6.0 - 8.5 g/dL Final   • Albumin 04/27/2022 3.9  3.8 - 4.8 g/dL Final   • Globulin 04/27/2022 3.8  1.5 - 4.5 g/dL Final   • A/G Ratio 04/27/2022 1.0 (A) 1.2 - 2.2 Final   • Total Bilirubin 04/27/2022 1.3 (A) 0.0 - 1.2 mg/dL Final   • Alkaline Phosphatase 04/27/2022 103  44 - 121 IU/L Final   • AST (SGOT) 04/27/2022 33   0 - 40 IU/L Final   • ALT (SGPT) 04/27/2022 23  0 - 32 IU/L Final         Assessment & Plan   Diagnoses and all orders for this visit:    1. Dysphagia, unspecified type (Primary)  -     FL Upper GI Single Contrast With KUB; Future    2. Chronic mucoid otitis media, bilateral  -     Ambulatory Referral to ENT (Otolaryngology)    3. Nasal congestion  -     Ambulatory Referral to ENT (Otolaryngology)    Numerous issues today.  First of all his complaint of choking while eating.  She cannot tell me if she is breathing through her mouth while she is eating and that is causing choking.  She has terrible nasal congestion.  She never has to cough food out in order to breathe.  She chokes and gags while she is eating and then coughs it up and swallows again.  She eats fast.  She does not drink a lot of water while she eats.  She has chronic bilateral ear infections.  She is having hard time hearing out of her ears.  She has had tubes in her ears multiple times since she was 5 years old.  She been referred to ENT 2 or 3 times in the last year and apparently has never gone.  Will make a referral to ENT because of her bilateral chronic ear infections.  May need ENT services due to nasal congestion.  We are going to do an upper GI because of this dysphagia she is having.  If it all looks good, I suspect she is choking because she is trying to breathe through her mouth and eat at the same time.  Follow-up here as planned in July.      Call with any problems or concerns before next visit       Return if symptoms worsen or fail to improve.      Much of this report is an electronic transcription of spoken language to printed text using Dragon dictation software.  As such, the subtleties and finesse of spoken language may permit erroneous, or at times, nonsensical words or phrases to be inadvertently transcribed; thus changes may be made at a later date to rectify these errors.     Bree Wan MD5/18/202214:39 EDT  This  note has been electronically signed

## 2022-05-24 ENCOUNTER — OFFICE VISIT (OUTPATIENT)
Dept: PAIN MEDICINE | Facility: CLINIC | Age: 42
End: 2022-05-24

## 2022-05-24 VITALS
WEIGHT: 288 LBS | RESPIRATION RATE: 16 BRPM | OXYGEN SATURATION: 96 % | SYSTOLIC BLOOD PRESSURE: 156 MMHG | DIASTOLIC BLOOD PRESSURE: 87 MMHG | BODY MASS INDEX: 49.78 KG/M2 | HEART RATE: 90 BPM

## 2022-05-24 DIAGNOSIS — M25.562 CHRONIC PAIN OF BOTH KNEES: ICD-10-CM

## 2022-05-24 DIAGNOSIS — G89.29 CHRONIC RIGHT SHOULDER PAIN: ICD-10-CM

## 2022-05-24 DIAGNOSIS — M54.50 CHRONIC BILATERAL LOW BACK PAIN WITHOUT SCIATICA: ICD-10-CM

## 2022-05-24 DIAGNOSIS — Z79.899 HIGH RISK MEDICATION USE: Primary | ICD-10-CM

## 2022-05-24 DIAGNOSIS — M25.561 CHRONIC PAIN OF BOTH KNEES: ICD-10-CM

## 2022-05-24 DIAGNOSIS — G89.29 CHRONIC BILATERAL LOW BACK PAIN WITHOUT SCIATICA: ICD-10-CM

## 2022-05-24 DIAGNOSIS — M25.511 CHRONIC RIGHT SHOULDER PAIN: ICD-10-CM

## 2022-05-24 DIAGNOSIS — G89.29 CHRONIC PAIN OF BOTH KNEES: ICD-10-CM

## 2022-05-24 DIAGNOSIS — M17.2 POST-TRAUMATIC OSTEOARTHRITIS OF BOTH KNEES: ICD-10-CM

## 2022-05-24 PROCEDURE — 99204 OFFICE O/P NEW MOD 45 MIN: CPT | Performed by: PHYSICAL MEDICINE & REHABILITATION

## 2022-05-24 RX ORDER — ACETAMINOPHEN AND CODEINE PHOSPHATE 300; 30 MG/1; MG/1
1 TABLET ORAL EVERY 8 HOURS PRN
Qty: 21 TABLET | Refills: 0 | Status: SHIPPED | OUTPATIENT
Start: 2022-05-24 | End: 2022-06-08

## 2022-05-24 RX ORDER — ACETAMINOPHEN AND CODEINE PHOSPHATE 300; 30 MG/1; MG/1
1 TABLET ORAL EVERY 8 HOURS PRN
Qty: 90 TABLET | Refills: 1 | Status: SHIPPED | OUTPATIENT
Start: 2022-05-24 | End: 2022-06-08

## 2022-05-24 NOTE — PROGRESS NOTES
Subjective   Noa Soni is a 41 y.o. female.     Chronic bilateral knee pain, also low back and right shoulder pain, nonradiating, 10/10 at worst, 8/10 at best, always present, varies, began years ago, worsening over time, aching, sharp, worse with bending and walking, interferes with sleep, exercise. X-ray b/l knees with mod OA worst in medial compartment. Saw PCP, notes reviewed, with referral to Dr. Leiva who decline steroid injections with DM2, has failed NSAIDs, Tylenol, Voltaren gel, sees Dr. Valencia on multiple medications. No FH of substance abuse.        The following portions of the patient's history were reviewed and updated as appropriate: allergies, current medications, past family history, past medical history, past social history, past surgical history and problem list.    Review of Systems   Constitutional: Negative for chills, fatigue and fever.   HENT: Positive for hearing loss. Negative for trouble swallowing.    Eyes: Negative for visual disturbance.   Respiratory: Negative for shortness of breath.    Cardiovascular: Negative for chest pain.   Gastrointestinal: Positive for abdominal pain and nausea. Negative for constipation, diarrhea and vomiting.   Genitourinary: Negative for urinary incontinence.   Musculoskeletal: Positive for arthralgias and back pain. Negative for joint swelling, myalgias and neck pain.   Neurological: Positive for headache. Negative for dizziness, weakness and numbness.       Objective   Physical Exam  Constitutional:       Appearance: Normal appearance. She is well-developed.   HENT:      Head: Normocephalic and atraumatic.   Eyes:      Pupils: Pupils are equal, round, and reactive to light.   Cardiovascular:      Rate and Rhythm: Normal rate and regular rhythm.      Heart sounds: Normal heart sounds.   Pulmonary:      Effort: Pulmonary effort is normal.      Breath sounds: Normal breath sounds.   Abdominal:      General: Bowel sounds are normal. There is no  distension.      Palpations: Abdomen is soft.      Tenderness: There is no abdominal tenderness.   Musculoskeletal:      Cervical back: Normal range of motion.   Neurological:      Mental Status: She is alert and oriented to person, place, and time.      Sensory: No sensory deficit.      Deep Tendon Reflexes: Reflexes are normal and symmetric. Reflexes normal.   Psychiatric:         Mood and Affect: Mood normal.         Behavior: Behavior normal.         Thought Content: Thought content normal.         Judgment: Judgment normal.           Assessment & Plan   Diagnoses and all orders for this visit:    1. High risk medication use (Primary)  -     Urine Drug Screen - Urine, Clean Catch; Future    2. Chronic pain of both knees    3. Post-traumatic osteoarthritis of both knees    4. Chronic right shoulder pain    5. Chronic bilateral low back pain without sciatica        Discussed risks and benefits of opioid treatment for chonic pain with patient, including expectations related to prescription requests, alternative modalities to opioids for managing pain, her treatment plan, risks of dependency and addiction, and safe storage practices for prescribed opioids, as well as proper and improper disposal of all medications.  Will obtain UDS today, pain contract today.  Inspect report reviewed, prior notes reviewed, consistent with patient's stated history.  Treatment plan will consist of continuing current medication as long as it remains effective and is necessary, while evaluating patient at each visit and determining if the medication can be lowered or discontinued, while also using nonopioid therapies to reduce reliance on opioids.  Begin Tylenol #3 TID prn. Failed NSAIDs, Tylenol, Voltaren gel.  Schedule b/l Monovisc injections, cannot have steroids with DM2.  RTC for injections then in 2 months for f/u.    INSPECT REPORT     As part of the patient's treatment plan, I am prescribing controlled substances. The patient  has been made aware of appropriate use of such medications, including potential risk of somnolence, limited ability to drive and/or work safely, and the potential for dependence or overdose. It has also bee made clear that these medications are for use by this patient only, without concomitant use of alcohol or other substances unless prescribed.      Patient has completed prescribing agreement detailing terms of continued prescribing of controlled substances, including monitoring INSPECT reports, urine drug screening, and pill counts if necessary. The patient is aware that inappropriate use will results in cessation of prescribing such medications.     INSPECT report has been reviewed and scanned into the patient's chart.     As the clinician, I personally reviewed the INSPECT while the patient was in the office today.     History and physical exam exhibit continued safe and appropriate use of controlled substances.

## 2022-05-25 ENCOUNTER — PATIENT ROUNDING (BHMG ONLY) (OUTPATIENT)
Dept: PAIN MEDICINE | Facility: CLINIC | Age: 42
End: 2022-05-25

## 2022-05-25 NOTE — PROGRESS NOTES
May 25, 2022    Hello, may I speak with Noa Soni?    My name is Mary Ellen    I am  with [facility]  [address]  [place] [address].    Before we get started may I verify your date of birth? 1980    I am calling to officially welcome you to our practice and ask about your recent visit. Is this a good time to talk?     Tell me about your visit with us. What things went well?  Voice box not set up       We're always looking for ways to make our patients' experiences even better. Do you have recommendations on ways we may improve?      Overall were you satisfied with your first visit to our practice?        I appreciate you taking the time to speak with me today. Is there anything else I can do for you?       Thank you, and have a great day.

## 2022-05-26 ENCOUNTER — TELEPHONE (OUTPATIENT)
Dept: PAIN MEDICINE | Facility: CLINIC | Age: 42
End: 2022-05-26

## 2022-05-26 NOTE — TELEPHONE ENCOUNTER
I've never had that happen before. She should stop it immediately. Let's get her in ASAP so we can start something else, and please have her bring her remaining Tylenol #3 for count and disposal. Thanks.

## 2022-05-26 NOTE — TELEPHONE ENCOUNTER
Caller: MARCO A DANIELSON    Relationship to patient: FADY     Best call back number: [phone] PATIENT'S PHONE NUMNER [phone]    Patient is needing: PATIENT'S FADYMARCO A WAS CALLING TO LET DR. ALLEN KNOW THAT THE NEW MEDICATION HE PRESCRIBED THE PATIENT IS CAUSING HER TO TALK AND ACT OUT OF HER MIND. MARCO A SPOKE WITH THE PHARMACY AND WAS ADVISED TO CALL DR. ALLEN AND LET HIM KNOW WHAT WAS GOING ON. I DID NOT SEE A  VERBAL ON FILE FOR MARCO A SO I ADVISED HIM I COULD SEND A MESSAGE TO DR. ALLEN AND SOMEONE WOULD REACH BACK OUT TO DISCUSS THIS ISSUE. THANK YOU!

## 2022-05-31 ENCOUNTER — OFFICE VISIT (OUTPATIENT)
Dept: PAIN MEDICINE | Facility: CLINIC | Age: 42
End: 2022-05-31

## 2022-05-31 VITALS
RESPIRATION RATE: 16 BRPM | OXYGEN SATURATION: 97 % | WEIGHT: 288 LBS | HEART RATE: 90 BPM | SYSTOLIC BLOOD PRESSURE: 125 MMHG | BODY MASS INDEX: 51.03 KG/M2 | HEIGHT: 63 IN | DIASTOLIC BLOOD PRESSURE: 89 MMHG

## 2022-05-31 DIAGNOSIS — G89.29 CHRONIC RIGHT SHOULDER PAIN: ICD-10-CM

## 2022-05-31 DIAGNOSIS — M25.511 CHRONIC RIGHT SHOULDER PAIN: ICD-10-CM

## 2022-05-31 DIAGNOSIS — M25.562 CHRONIC PAIN OF BOTH KNEES: ICD-10-CM

## 2022-05-31 DIAGNOSIS — G89.29 CHRONIC PAIN OF BOTH KNEES: ICD-10-CM

## 2022-05-31 DIAGNOSIS — M54.50 CHRONIC BILATERAL LOW BACK PAIN WITHOUT SCIATICA: Primary | ICD-10-CM

## 2022-05-31 DIAGNOSIS — M25.561 CHRONIC PAIN OF BOTH KNEES: ICD-10-CM

## 2022-05-31 DIAGNOSIS — G89.29 CHRONIC BILATERAL LOW BACK PAIN WITHOUT SCIATICA: Primary | ICD-10-CM

## 2022-05-31 PROCEDURE — 99214 OFFICE O/P EST MOD 30 MIN: CPT | Performed by: PHYSICAL MEDICINE & REHABILITATION

## 2022-05-31 RX ORDER — OXYCODONE HYDROCHLORIDE AND ACETAMINOPHEN 5; 325 MG/1; MG/1
1 TABLET ORAL EVERY 8 HOURS PRN
Qty: 90 TABLET | Refills: 0 | Status: SHIPPED | OUTPATIENT
Start: 2022-05-31 | End: 2022-07-18

## 2022-05-31 NOTE — PROGRESS NOTES
Subjective   Noa Soni is a 41 y.o. female.     Chronic bilateral knee pain, also low back and right shoulder pain, nonradiating, 10/10 at worst, 8/10 at best, always present, varies, began years ago, worsening over time, aching, sharp, worse with bending and walking, interferes with sleep, exercise. X-ray b/l knees with mod OA worst in medial compartment. Saw PCP, notes reviewed, with referral to Dr. Leiva who decline steroid injections with DM2, has failed NSAIDs, Tylenol, Voltaren gel, sees Dr. Valencia on multiple medications. No FH of substance abuse.        The following portions of the patient's history were reviewed and updated as appropriate: allergies, current medications, past family history, past medical history, past social history, past surgical history and problem list.    Review of Systems   Constitutional: Negative for chills, fatigue and fever.   HENT: Positive for hearing loss. Negative for trouble swallowing.    Eyes: Negative for visual disturbance.   Respiratory: Negative for shortness of breath.    Cardiovascular: Negative for chest pain.   Gastrointestinal: Positive for abdominal pain and nausea. Negative for constipation, diarrhea and vomiting.   Genitourinary: Negative for urinary incontinence.   Musculoskeletal: Positive for arthralgias and back pain. Negative for joint swelling, myalgias and neck pain.   Neurological: Positive for headache. Negative for dizziness, weakness and numbness.       Objective   Physical Exam  Constitutional:       Appearance: Normal appearance. She is well-developed.   HENT:      Head: Normocephalic and atraumatic.   Eyes:      Pupils: Pupils are equal, round, and reactive to light.   Cardiovascular:      Rate and Rhythm: Normal rate and regular rhythm.      Heart sounds: Normal heart sounds.   Pulmonary:      Effort: Pulmonary effort is normal.      Breath sounds: Normal breath sounds.   Abdominal:      General: Bowel sounds are normal. There is no  distension.      Palpations: Abdomen is soft.      Tenderness: There is no abdominal tenderness.   Musculoskeletal:      Cervical back: Normal range of motion.   Neurological:      Mental Status: She is alert and oriented to person, place, and time.      Sensory: No sensory deficit.      Deep Tendon Reflexes: Reflexes are normal and symmetric. Reflexes normal.   Psychiatric:         Mood and Affect: Mood normal.         Behavior: Behavior normal.         Thought Content: Thought content normal.         Judgment: Judgment normal.           Assessment & Plan   Diagnoses and all orders for this visit:    1. Chronic bilateral low back pain without sciatica (Primary)    2. Chronic pain of both knees    3. Chronic right shoulder pain      UDS in order 5/24/22.  Discussed risks and benefits of opioid treatment for chonic pain with patient, including expectations related to prescription requests, alternative modalities to opioids for managing pain, her treatment plan, risks of dependency and addiction, and safe storage practices for prescribed opioids, as well as proper and improper disposal of all medications.  Treatment plan will consist of continuing current medication as long as it remains effective and is necessary, while evaluating patient at each visit and determining if the medication can be lowered or discontinued, while also using nonopioid therapies to reduce reliance on opioids.  Failed Tylenol #3 TID prn with AMS. Failed NSAIDs, Tylenol, Voltaren gel, allergic to Hydrocodone. Begin Percocet 5mg TID prn.  Schedule b/l Monovisc injections, cannot have steroids with DM2.  RTC for injections then in 2 months for f/u.    INSPECT REPORT     As part of the patient's treatment plan, I am prescribing controlled substances. The patient has been made aware of appropriate use of such medications, including potential risk of somnolence, limited ability to drive and/or work safely, and the potential for dependence or  overdose. It has also bee made clear that these medications are for use by this patient only, without concomitant use of alcohol or other substances unless prescribed.      Patient has completed prescribing agreement detailing terms of continued prescribing of controlled substances, including monitoring INSPECT reports, urine drug screening, and pill counts if necessary. The patient is aware that inappropriate use will results in cessation of prescribing such medications.     INSPECT report has been reviewed and scanned into the patient's chart.     As the clinician, I personally reviewed the INSPECT while the patient was in the office today.     History and physical exam exhibit continued safe and appropriate use of controlled substances.

## 2022-06-01 ENCOUNTER — TELEPHONE (OUTPATIENT)
Dept: PAIN MEDICINE | Facility: CLINIC | Age: 42
End: 2022-06-01

## 2022-06-01 NOTE — TELEPHONE ENCOUNTER
Provider: DR ALLEN     Caller: MARCO A DANIELSON     Relationship to Patient: SIGNIFICANT OTHER     Phone Number: [phone]    Reason for Call: PATIENT WILL NEED TO GET A PRIOR AUTHORIZATION TO HAVE MEDICATION REFILLED PLEASE ADVISE

## 2022-06-03 ENCOUNTER — HOSPITAL ENCOUNTER (OUTPATIENT)
Dept: GENERAL RADIOLOGY | Facility: HOSPITAL | Age: 42
Discharge: HOME | End: 2022-06-03
Admitting: FAMILY MEDICINE

## 2022-06-03 DIAGNOSIS — R13.10 DYSPHAGIA, UNSPECIFIED TYPE: ICD-10-CM

## 2022-06-03 PROCEDURE — 74240 X-RAY XM UPR GI TRC 1CNTRST: CPT

## 2022-06-03 RX ADMIN — BARIUM SULFATE 135 ML: 980 POWDER, FOR SUSPENSION ORAL at 10:24

## 2022-06-03 RX ADMIN — BARIUM SULFATE 183 ML: 960 POWDER, FOR SUSPENSION ORAL at 10:24

## 2022-06-07 DIAGNOSIS — R13.10 DYSPHAGIA, UNSPECIFIED TYPE: Primary | ICD-10-CM

## 2022-06-07 DIAGNOSIS — K21.9 GASTROESOPHAGEAL REFLUX DISEASE, UNSPECIFIED WHETHER ESOPHAGITIS PRESENT: ICD-10-CM

## 2022-06-08 ENCOUNTER — HOSPITAL ENCOUNTER (OUTPATIENT)
Dept: PAIN MEDICINE | Facility: HOSPITAL | Age: 42
Discharge: HOME | End: 2022-06-08
Admitting: PHYSICAL MEDICINE & REHABILITATION

## 2022-06-08 VITALS
SYSTOLIC BLOOD PRESSURE: 122 MMHG | TEMPERATURE: 96.9 F | OXYGEN SATURATION: 94 % | HEART RATE: 73 BPM | DIASTOLIC BLOOD PRESSURE: 81 MMHG | RESPIRATION RATE: 16 BRPM

## 2022-06-08 DIAGNOSIS — G89.29 CHRONIC PAIN OF BOTH KNEES: Primary | ICD-10-CM

## 2022-06-08 DIAGNOSIS — M17.2 POST-TRAUMATIC OSTEOARTHRITIS OF BOTH KNEES: ICD-10-CM

## 2022-06-08 DIAGNOSIS — M25.562 CHRONIC PAIN OF BOTH KNEES: Primary | ICD-10-CM

## 2022-06-08 DIAGNOSIS — M25.561 CHRONIC PAIN OF BOTH KNEES: Primary | ICD-10-CM

## 2022-06-08 PROCEDURE — 25010000002 HYALURONAN 88 MG/4ML SOLUTION PREFILLED SYRINGE: Performed by: PHYSICAL MEDICINE & REHABILITATION

## 2022-06-08 PROCEDURE — 20610 DRAIN/INJ JOINT/BURSA W/O US: CPT | Performed by: PHYSICAL MEDICINE & REHABILITATION

## 2022-06-08 RX ORDER — LIDOCAINE HYDROCHLORIDE 10 MG/ML
5 INJECTION, SOLUTION EPIDURAL; INFILTRATION; INTRACAUDAL; PERINEURAL ONCE
Status: COMPLETED | OUTPATIENT
Start: 2022-06-08 | End: 2022-06-08

## 2022-06-08 RX ADMIN — Medication 88 MG: at 13:46

## 2022-06-08 RX ADMIN — LIDOCAINE HYDROCHLORIDE 5 ML: 10 INJECTION, SOLUTION EPIDURAL; INFILTRATION; INTRACAUDAL; PERINEURAL at 13:46

## 2022-06-08 NOTE — PROCEDURES
"Procedures     Chronic bilateral knee pain, also low back and right shoulder pain, nonradiating, 10/10 at worst, 8/10 at best, always present, varies, began years ago, worsening over time, aching, sharp, worse with bending and walking, interferes with sleep, exercise. X-ray b/l knees with mod OA worst in medial compartment. Saw PCP, notes reviewed, with referral to Dr. Leiva who decline steroid injections with DM2, has failed NSAIDs, Tylenol, Voltaren gel, sees Dr. Valencia on multiple medications. No FH of substance abuse.     UDS in order 5/24/22.  Discussed risks and benefits of opioid treatment for chonic pain with patient, including expectations related to prescription requests, alternative modalities to opioids for managing pain, her treatment plan, risks of dependency and addiction, and safe storage practices for prescribed opioids, as well as proper and improper disposal of all medications.  Treatment plan will consist of continuing current medication as long as it remains effective and is necessary, while evaluating patient at each visit and determining if the medication can be lowered or discontinued, while also using nonopioid therapies to reduce reliance on opioids.  Failed Tylenol #3 TID prn with AMS. Failed NSAIDs, Tylenol, Voltaren gel, allergic to Hydrocodone. Began Percocet 5mg TID prn.  Perform b/l Monovisc injections, cannot have steroids with DM2.  RTC in 2 months for f/u.      KNEE MONOVISC INJECTION    PREOPERATIVE DIAGNOSIS:  Knee pain    POSTOPERATIVE DIAGNOSIS:  Knee pain    PROCEDURE PERFORMED:  BILATERAL MONOVISC KNEE INJECTION     The patient understands the risks and benefits of the procedure and wishes to proceed. The patient was seen in the preoperative area. Patient's consent was obtained and updated. Vitals were taken. Patient was then placed in a seated position for the injection. Site marked for an inferior lateral approach, and a 22 gauge 1.5\" needle was passed through skin " anesthetized with 1% Lidocaine without epinephrine. The needle tip was advanced to the joint space, the syringes were exchanged, and Monovisc was injected after negative aspiration. The patient tolerated with no geetha-procedural complications.  A sterile dressing was placed over the puncture site.        INSPECT REPORT     As part of the patient's treatment plan, I am prescribing controlled substances. The patient has been made aware of appropriate use of such medications, including potential risk of somnolence, limited ability to drive and/or work safely, and the potential for dependence or overdose. It has also bee made clear that these medications are for use by this patient only, without concomitant use of alcohol or other substances unless prescribed.      Patient has completed prescribing agreement detailing terms of continued prescribing of controlled substances, including monitoring INSPECT reports, urine drug screening, and pill counts if necessary. The patient is aware that inappropriate use will results in cessation of prescribing such medications.     INSPECT report has been reviewed and scanned into the patient's chart.     As the clinician, I personally reviewed the INSPECT while the patient was in the office today.     History and physical exam exhibit continued safe and appropriate use of controlled substances.

## 2022-07-07 DIAGNOSIS — I10 ESSENTIAL HYPERTENSION: ICD-10-CM

## 2022-07-07 DIAGNOSIS — F33.2 SEVERE RECURRENT MAJOR DEPRESSION WITHOUT PSYCHOTIC FEATURES: ICD-10-CM

## 2022-07-07 DIAGNOSIS — F43.12 CHRONIC POSTTRAUMATIC STRESS DISORDER: Chronic | ICD-10-CM

## 2022-07-07 DIAGNOSIS — F41.1 GENERALIZED ANXIETY DISORDER: Chronic | ICD-10-CM

## 2022-07-08 RX ORDER — FLUOXETINE HYDROCHLORIDE 40 MG/1
CAPSULE ORAL
Qty: 90 CAPSULE | Refills: 0 | Status: SHIPPED | OUTPATIENT
Start: 2022-07-08 | End: 2022-08-22

## 2022-07-08 RX ORDER — LAMOTRIGINE 100 MG/1
TABLET ORAL
Qty: 90 TABLET | Refills: 0 | Status: SHIPPED | OUTPATIENT
Start: 2022-07-08 | End: 2022-11-21

## 2022-07-08 RX ORDER — LISINOPRIL 10 MG/1
TABLET ORAL
Qty: 90 TABLET | Refills: 0 | Status: SHIPPED | OUTPATIENT
Start: 2022-07-08 | End: 2022-11-21

## 2022-07-18 ENCOUNTER — OFFICE VISIT (OUTPATIENT)
Dept: PAIN MEDICINE | Facility: CLINIC | Age: 42
End: 2022-07-18

## 2022-07-18 VITALS
HEART RATE: 78 BPM | WEIGHT: 288 LBS | SYSTOLIC BLOOD PRESSURE: 135 MMHG | BODY MASS INDEX: 51.03 KG/M2 | DIASTOLIC BLOOD PRESSURE: 89 MMHG | HEIGHT: 63 IN | RESPIRATION RATE: 16 BRPM | OXYGEN SATURATION: 97 %

## 2022-07-18 DIAGNOSIS — G89.29 CHRONIC BILATERAL LOW BACK PAIN WITHOUT SCIATICA: ICD-10-CM

## 2022-07-18 DIAGNOSIS — G89.29 CHRONIC RIGHT SHOULDER PAIN: ICD-10-CM

## 2022-07-18 DIAGNOSIS — M25.561 CHRONIC PAIN OF BOTH KNEES: Primary | ICD-10-CM

## 2022-07-18 DIAGNOSIS — M54.50 CHRONIC BILATERAL LOW BACK PAIN WITHOUT SCIATICA: ICD-10-CM

## 2022-07-18 DIAGNOSIS — G89.29 CHRONIC PAIN OF BOTH KNEES: Primary | ICD-10-CM

## 2022-07-18 DIAGNOSIS — M25.562 CHRONIC PAIN OF BOTH KNEES: Primary | ICD-10-CM

## 2022-07-18 DIAGNOSIS — M25.511 CHRONIC RIGHT SHOULDER PAIN: ICD-10-CM

## 2022-07-18 PROCEDURE — 99214 OFFICE O/P EST MOD 30 MIN: CPT | Performed by: PHYSICAL MEDICINE & REHABILITATION

## 2022-07-18 PROCEDURE — G0463 HOSPITAL OUTPT CLINIC VISIT: HCPCS | Performed by: PHYSICAL MEDICINE & REHABILITATION

## 2022-07-18 RX ORDER — OXYCODONE AND ACETAMINOPHEN 7.5; 325 MG/1; MG/1
1 TABLET ORAL EVERY 6 HOURS PRN
Qty: 120 TABLET | Refills: 0 | Status: SHIPPED | OUTPATIENT
Start: 2022-07-18 | End: 2022-09-07 | Stop reason: SDUPTHER

## 2022-07-18 NOTE — PROGRESS NOTES
Subjective   Noa Soni is a 41 y.o. female.     Chronic bilateral knee pain, also low back and right shoulder pain, nonradiating, 10/10 at worst, 8/10 at best, always present, varies, began years ago, worsening over time, aching, sharp, worse with bending and walking, interferes with sleep, exercise. X-ray b/l knees with mod OA worst in medial compartment. Saw PCP, notes reviewed, with referral to Dr. Leiva who decline steroid injections with DM2, has failed NSAIDs, Tylenol, Voltaren gel, sees Dr. Valencia on multiple medications. No FH of substance abuse. Began Percocet 5mg TID prn.       The following portions of the patient's history were reviewed and updated as appropriate: allergies, current medications, past family history, past medical history, past social history, past surgical history and problem list.    Review of Systems   Constitutional: Negative for chills, fatigue and fever.   HENT: Positive for hearing loss. Negative for trouble swallowing.    Eyes: Negative for visual disturbance.   Respiratory: Negative for shortness of breath.    Cardiovascular: Negative for chest pain.   Gastrointestinal: Positive for abdominal pain and nausea. Negative for constipation, diarrhea and vomiting.   Genitourinary: Negative for urinary incontinence.   Musculoskeletal: Positive for arthralgias and back pain. Negative for joint swelling, myalgias and neck pain.   Neurological: Positive for headache. Negative for dizziness, weakness and numbness.       Objective   Physical Exam  Constitutional:       Appearance: Normal appearance. She is well-developed.   HENT:      Head: Normocephalic and atraumatic.   Eyes:      Pupils: Pupils are equal, round, and reactive to light.   Cardiovascular:      Rate and Rhythm: Normal rate and regular rhythm.      Heart sounds: Normal heart sounds.   Pulmonary:      Effort: Pulmonary effort is normal.      Breath sounds: Normal breath sounds.   Abdominal:      General: Bowel sounds are  normal. There is no distension.      Palpations: Abdomen is soft.      Tenderness: There is no abdominal tenderness.   Musculoskeletal:      Cervical back: Normal range of motion.   Neurological:      Mental Status: She is alert and oriented to person, place, and time.      Sensory: No sensory deficit.      Deep Tendon Reflexes: Reflexes are normal and symmetric. Reflexes normal.   Psychiatric:         Mood and Affect: Mood normal.         Behavior: Behavior normal.         Thought Content: Thought content normal.         Judgment: Judgment normal.           Assessment & Plan   Diagnoses and all orders for this visit:    1. Chronic bilateral low back pain without sciatica (Primary)    2. Chronic pain of both knees    3. Chronic right shoulder pain      UDS in order 5/24/22.  Discussed risks and benefits of opioid treatment for chonic pain with patient, including expectations related to prescription requests, alternative modalities to opioids for managing pain, her treatment plan, risks of dependency and addiction, and safe storage practices for prescribed opioids, as well as proper and improper disposal of all medications.  Treatment plan will consist of continuing current medication as long as it remains effective and is necessary, while evaluating patient at each visit and determining if the medication can be lowered or discontinued, while also using nonopioid therapies to reduce reliance on opioids.  Failed Tylenol #3 TID prn with AMS. Failed NSAIDs, Tylenol, Voltaren gel, allergic to Hydrocodone. Began Percocet 5mg TID prn, helping but not sufficient, increase to 7.5mg TID prn.  Performed b/l Monovisc injections with significant improvement, will repeat as necessary up to q6 months, cannot have steroids with DM2.  RTC in 2 months for f/u.    INSPECT REPORT     As part of the patient's treatment plan, I am prescribing controlled substances. The patient has been made aware of appropriate use of such medications,  including potential risk of somnolence, limited ability to drive and/or work safely, and the potential for dependence or overdose. It has also bee made clear that these medications are for use by this patient only, without concomitant use of alcohol or other substances unless prescribed.      Patient has completed prescribing agreement detailing terms of continued prescribing of controlled substances, including monitoring INSPECT reports, urine drug screening, and pill counts if necessary. The patient is aware that inappropriate use will results in cessation of prescribing such medications.     INSPECT report has been reviewed and scanned into the patient's chart.     As the clinician, I personally reviewed the INSPECT while the patient was in the office today.     History and physical exam exhibit continued safe and appropriate use of controlled substances.

## 2022-07-29 ENCOUNTER — OFFICE VISIT (OUTPATIENT)
Dept: FAMILY MEDICINE CLINIC | Facility: CLINIC | Age: 42
End: 2022-07-29

## 2022-07-29 VITALS
BODY MASS INDEX: 50.39 KG/M2 | HEART RATE: 77 BPM | HEIGHT: 63 IN | TEMPERATURE: 97.3 F | DIASTOLIC BLOOD PRESSURE: 84 MMHG | OXYGEN SATURATION: 95 % | WEIGHT: 284.4 LBS | SYSTOLIC BLOOD PRESSURE: 131 MMHG

## 2022-07-29 DIAGNOSIS — R13.10 DYSPHAGIA, UNSPECIFIED TYPE: ICD-10-CM

## 2022-07-29 DIAGNOSIS — M25.561 CHRONIC PAIN OF BOTH KNEES: ICD-10-CM

## 2022-07-29 DIAGNOSIS — I10 ESSENTIAL HYPERTENSION: Primary | ICD-10-CM

## 2022-07-29 DIAGNOSIS — E11.65 TYPE 2 DIABETES MELLITUS WITH HYPERGLYCEMIA, WITHOUT LONG-TERM CURRENT USE OF INSULIN: ICD-10-CM

## 2022-07-29 DIAGNOSIS — G89.29 CHRONIC PAIN OF BOTH KNEES: ICD-10-CM

## 2022-07-29 DIAGNOSIS — M25.562 CHRONIC PAIN OF BOTH KNEES: ICD-10-CM

## 2022-07-29 DIAGNOSIS — F39 MOOD DISORDER: ICD-10-CM

## 2022-07-29 DIAGNOSIS — K21.9 GASTROESOPHAGEAL REFLUX DISEASE, UNSPECIFIED WHETHER ESOPHAGITIS PRESENT: ICD-10-CM

## 2022-07-29 PROCEDURE — 99214 OFFICE O/P EST MOD 30 MIN: CPT | Performed by: FAMILY MEDICINE

## 2022-07-29 RX ORDER — LANCETS 30 GAUGE
EACH MISCELLANEOUS
Qty: 100 EACH | Refills: 3 | Status: SHIPPED | OUTPATIENT
Start: 2022-07-29

## 2022-07-29 NOTE — PROGRESS NOTES
"Subjective   Noa Soni is a 41 y.o. female.   Chief Complaint   Patient presents with   • Diabetes   • Hypertension   • Anxiety   • Depression       History of Present Illness   Presents to the office today for ongoing follow-up on problems as below.  I last saw her 3 months ago when she had a complaint of choking while eating.  We did an upper GI and I sent her to ENT.  Upper GI showed GERD.  Sx have resolved spontaneously.    She also has a complaint of chronic knee pain.  She sees pain management for this.    HTN-she checks her blood pressure at home she says, but she does not know what the numbers are.    She has diabetes and as of 3 months ago her A1c went up to 9.7%.  I sent a prescription in for Trulicity and also made a referral to dietary counseling at the [facility].  Her weight is down from 288 pounds to 284 pounds.  Taking medications, but not checking blood sugars.  Tells me she has had glucometers but lost them.    She has a mood disorder which may be bipolar type II or some atypical depression.  She is on Prozac, Lamictal and overall doing okay.  She has seen Dr. Valencia who put her on Vraylar 3 mg/day as well.  She didn't \"like her\" and won't be going back      Patient Active Problem List    Diagnosis Date Noted   • Chronic pain of both knees 05/24/2022   • Chronic bilateral low back pain without sciatica 05/24/2022   • Chronic right shoulder pain 05/24/2022   • Morbid obesity (HCC) 04/27/2022   • Nasal congestion 11/20/2021   • Post-traumatic osteoarthritis of both knees 11/10/2021   • Severe recurrent major depression without psychotic features (HCC) 06/17/2021   • Chronic posttraumatic stress disorder 06/17/2021   • Generalized anxiety disorder 06/17/2021   • Callus of foot 10/19/2020   • Perennial allergic rhinitis 10/19/2020   • NESHA (obstructive sleep apnea) 10/19/2020   • Class 3 severe obesity due to excess calories without serious comorbidity with body mass index (BMI) of 45.0 to 49.9 " in adult (HCC) 10/12/2020   • Lymphadenopathy 10/06/2020   • Essential hypertension 07/27/2020   • Depression 07/27/2020   • Learning disability 07/27/2020     Note Last Updated: 7/27/2020     No other details     • Type 2 diabetes mellitus (HCC) 01/17/2020   • History of endometriosis 01/17/2020   • History of PCOS 01/17/2020           Past Surgical History:   Procedure Laterality Date   • EAR TUBES     • INTRAUTERINE DEVICE INSERTION  10/14/2020   • LAPAROSCOPIC TUBAL LIGATION     • TONSILLECTOMY AND ADENOIDECTOMY       Current Outpatient Medications on File Prior to Visit   Medication Sig   • acetaminophen (TYLENOL) 500 MG tablet Take 2,000 mg by mouth 2 (Two) Times a Day.   • Cariprazine HCl (Vraylar) 3 MG capsule capsule Take 1 capsule by mouth Daily.   • clotrimazole (LOTRIMIN) 1 % cream Apply 1 application topically to the appropriate area as directed 2 (Two) Times a Day.   • Dulaglutide (Trulicity) 1.5 MG/0.5ML solution pen-injector Inject 1.5 mg under the skin into the appropriate area as directed Every 7 (Seven) Days.   • EPINEPHrine (EPIPEN) 0.3 MG/0.3ML solution auto-injector injection USE AS DIRECTED FOR ACUTE ALLERGIC REACTION   • ferrous sulfate 325 (65 FE) MG tablet Take 1 tablet by mouth Daily With Breakfast.   • fexofenadine (ALLEGRA) 180 MG tablet Take 1 tablet by mouth Daily.   • FLUoxetine (PROzac) 40 MG capsule Take 1 capsule by mouth once daily   • Januvia 100 MG tablet Take 100 mg by mouth Daily.   • lamoTRIgine (LaMICtal) 100 MG tablet Take 1 tablet by mouth once daily   • Linzess 145 MCG capsule capsule TAKE 1 CAPSULE BY MOUTH ONCE DAILY IN THE MORNING BEFORE BREAKFAST   • lisinopril (PRINIVIL,ZESTRIL) 10 MG tablet Take 1 tablet by mouth once daily   • Melatonin 5 MG chewable tablet Chew 5 mg every night at bedtime.   • oxyCODONE-acetaminophen (Percocet) 7.5-325 MG per tablet Take 1 tablet by mouth Every 6 (Six) Hours As Needed for Moderate Pain .   • oxyCODONE-acetaminophen (Percocet)  7.5-325 MG per tablet Take 1 tablet by mouth Every 6 (Six) Hours As Needed for Moderate Pain .   • SUMAtriptan (Imitrex) 50 MG tablet Take one tablet at onset of headache. May repeat dose one time in 2 hours if headache not relieved.   • [DISCONTINUED] Blood Glucose Monitoring Suppl (ACCU-CHEK GURJIT) device Use as instructed   • [DISCONTINUED] glucose blood (Accu-Chek Gurjit) test strip Use as instructed to check blood glucose once daily   • [DISCONTINUED] Lancets (ACCU-CHEK SOFT TOUCH) lancets Use with device and strips to check blood glucose once daily     No current facility-administered medications on file prior to visit.     Allergies   Allergen Reactions   • Abilify [Aripiprazole] Rash   • Metformin Diarrhea   • Tylenol With Codeine #3 [Acetaminophen-Codeine] Hallucinations   • Vicodin [Hydrocodone-Acetaminophen] GI Intolerance     Social History     Socioeconomic History   • Marital status: Single   • Number of children: 0   Tobacco Use   • Smoking status: Former Smoker     Packs/day: 0.25     Years: 11.00     Pack years: 2.75     Types: Cigarettes     Start date:      Quit date: 2018     Years since quittin.5   • Smokeless tobacco: Never Used   • Tobacco comment: socially    Vaping Use   • Vaping Use: Never used   Substance and Sexual Activity   • Alcohol use: Never   • Drug use: Never   • Sexual activity: Not Currently     Partners: Male     Family History   Problem Relation Age of Onset   • Hypertension Mother    • Depression Mother    • Post-traumatic stress disorder Mother    • Hyperlipidemia Mother    • Hypertension Father    • Hyperlipidemia Father    • Hypertension Brother    • Depression Brother    • Parkinsonism Maternal Grandmother    • Dementia Maternal Grandmother    • Atrial fibrillation Maternal Grandfather    • Breast cancer Paternal Grandmother    • COPD Paternal Grandfather        Review of Systems    Objective   /84 (BP Location: Right arm, Patient Position: Sitting, Cuff Size:  "Adult)   Pulse 77   Temp 97.3 °F (36.3 °C) (Infrared)   Ht 160 cm (62.99\")   Wt 129 kg (284 lb 6.4 oz)   SpO2 95%   BMI 50.39 kg/m²   Physical Exam  Constitutional:       Appearance: She is well-developed. She is obese. She is not toxic-appearing.      Comments: Wearing a face mask     HENT:      Head: Normocephalic and atraumatic.   Eyes:      Conjunctiva/sclera: Conjunctivae normal.   Cardiovascular:      Rate and Rhythm: Normal rate.   Pulmonary:      Effort: Pulmonary effort is normal.   Musculoskeletal:         General: Normal range of motion.      Cervical back: Normal range of motion.      Comments: Both knees are mildly enlarged which is consistent with some probable underlying osteoarthritis.     Skin:     General: Skin is warm and dry.      Findings: No rash.   Neurological:      Mental Status: She is alert and oriented to person, place, and time.   Psychiatric:         Behavior: Behavior normal.           Office Visit on 04/27/2022   Component Date Value Ref Range Status   • Hemoglobin A1C 04/27/2022 9.7 (A) 4.8 - 5.6 % Final    Comment:          Prediabetes: 5.7 - 6.4           Diabetes: >6.4           Glycemic control for adults with diabetes: <7.0     • WBC 04/27/2022 6.8  3.4 - 10.8 x10E3/uL Final   • RBC 04/27/2022 4.76  3.77 - 5.28 x10E6/uL Final   • Hemoglobin 04/27/2022 13.3  11.1 - 15.9 g/dL Final   • Hematocrit 04/27/2022 40.0  34.0 - 46.6 % Final   • MCV 04/27/2022 84  79 - 97 fL Final   • MCH 04/27/2022 27.9  26.6 - 33.0 pg Final   • MCHC 04/27/2022 33.3  31.5 - 35.7 g/dL Final   • RDW 04/27/2022 12.8  11.7 - 15.4 % Final   • Platelets 04/27/2022 241  150 - 450 x10E3/uL Final   • Neutrophil Rel % 04/27/2022 55  Not Estab. % Final   • Lymphocyte Rel % 04/27/2022 37  Not Estab. % Final   • Monocyte Rel % 04/27/2022 6  Not Estab. % Final   • Eosinophil Rel % 04/27/2022 1  Not Estab. % Final   • Basophil Rel % 04/27/2022 0  Not Estab. % Final   • Neutrophils Absolute 04/27/2022 3.8  1.4 - " 7.0 x10E3/uL Final   • Lymphocytes Absolute 04/27/2022 2.5  0.7 - 3.1 x10E3/uL Final   • Monocytes Absolute 04/27/2022 0.4  0.1 - 0.9 x10E3/uL Final   • Eosinophils Absolute 04/27/2022 0.1  0.0 - 0.4 x10E3/uL Final   • Basophils Absolute 04/27/2022 0.0  0.0 - 0.2 x10E3/uL Final   • Immature Granulocyte Rel % 04/27/2022 1  Not Estab. % Final   • Immature Grans Absolute 04/27/2022 0.0  0.0 - 0.1 x10E3/uL Final   • Glucose 04/27/2022 190 (A) 65 - 99 mg/dL Final   • BUN 04/27/2022 5 (A) 6 - 20 mg/dL Final   • Creatinine 04/27/2022 0.58  0.57 - 1.00 mg/dL Final   • EGFR Result 04/27/2022 118  >59 mL/min/1.73 Final   • BUN/Creatinine Ratio 04/27/2022 9  9 - 23 Final   • Sodium 04/27/2022 136  134 - 144 mmol/L Final   • Potassium 04/27/2022 3.9  3.5 - 5.2 mmol/L Final   • Chloride 04/27/2022 98  96 - 106 mmol/L Final   • Total CO2 04/27/2022 20  20 - 29 mmol/L Final   • Calcium 04/27/2022 9.2  8.7 - 10.2 mg/dL Final   • Total Protein 04/27/2022 7.7  6.0 - 8.5 g/dL Final   • Albumin 04/27/2022 3.9  3.8 - 4.8 g/dL Final   • Globulin 04/27/2022 3.8  1.5 - 4.5 g/dL Final   • A/G Ratio 04/27/2022 1.0 (A) 1.2 - 2.2 Final   • Total Bilirubin 04/27/2022 1.3 (A) 0.0 - 1.2 mg/dL Final   • Alkaline Phosphatase 04/27/2022 103  44 - 121 IU/L Final   • AST (SGOT) 04/27/2022 33  0 - 40 IU/L Final   • ALT (SGPT) 04/27/2022 23  0 - 32 IU/L Final         Assessment & Plan   Diagnoses and all orders for this visit:    1. Essential hypertension (Primary)  -     Comprehensive Metabolic Panel  -     CBC & Differential    2. Dysphagia, unspecified type    3. Gastroesophageal reflux disease, unspecified whether esophagitis present    4. Type 2 diabetes mellitus with hyperglycemia, without long-term current use of insulin ([facility])  -     glucose blood test strip; Use as instructed to check blood glucose once daily  Dispense: 100 each; Refill: 3  -     Blood Glucose Monitoring Suppl kit; Use as directed to check blood glucose  Dispense: 1 each;  Refill: 0  -     Lancets misc; Use once daily with meter and strips to check blood glucose  Dispense: 100 each; Refill: 3  -     Hemoglobin A1c    5. Chronic pain of both knees    6. Mood disorder (HCC)    Physical exam is unchanged.  Blood pressure looks pretty good at 131/84.  Continue lisinopril 10 mg/day.  Continue Trulicity, Januvia for diabetes.  Repeat A1c today.  I have sent in new supplies for glucometer so she can test her sugar.  Check blood sugar once a day.  Her dysphagia and reflux have spontaneously resolved.  Keep follow-up with pain management regarding her knee pain.  Regarding her mood disorder, I encouraged her to see the psychiatrist.  We do not have a lot of other options around here.  She is not sure what she is going to do.  She is going to talk to her boyfriend about it.  Continue Prozac, Lamictal, Vraylar as currently ordered.  I will follow-up with her when her blood tests are back.        Call with any problems or concerns before next visit       Return in about 3 months (around 10/29/2022).      Much of this report is an electronic transcription of spoken language to printed text using Dragon dictation software.  As such, the subtleties and finesse of spoken language may permit erroneous, or at times, nonsensical words or phrases to be inadvertently transcribed; thus changes may be made at a later date to rectify these errors.     Bree Wan MD7/29/202215:46 EDT  This note has been electronically signed

## 2022-07-30 LAB
ALBUMIN SERPL-MCNC: 4.3 G/DL (ref 3.8–4.8)
ALBUMIN/GLOB SERPL: 1.3 {RATIO} (ref 1.2–2.2)
ALP SERPL-CCNC: 111 IU/L (ref 44–121)
ALT SERPL-CCNC: 31 IU/L (ref 0–32)
AST SERPL-CCNC: 45 IU/L (ref 0–40)
BASOPHILS # BLD AUTO: 0 X10E3/UL (ref 0–0.2)
BASOPHILS NFR BLD AUTO: 0 %
BILIRUB SERPL-MCNC: 1.2 MG/DL (ref 0–1.2)
BUN SERPL-MCNC: 6 MG/DL (ref 6–20)
BUN/CREAT SERPL: 10 (ref 9–23)
CALCIUM SERPL-MCNC: 9.3 MG/DL (ref 8.7–10.2)
CHLORIDE SERPL-SCNC: 101 MMOL/L (ref 96–106)
CO2 SERPL-SCNC: 20 MMOL/L (ref 20–29)
CREAT SERPL-MCNC: 0.6 MG/DL (ref 0.57–1)
EGFRCR SERPLBLD CKD-EPI 2021: 117 ML/MIN/1.73
EOSINOPHIL # BLD AUTO: 0.1 X10E3/UL (ref 0–0.4)
EOSINOPHIL NFR BLD AUTO: 1 %
ERYTHROCYTE [DISTWIDTH] IN BLOOD BY AUTOMATED COUNT: 13.1 % (ref 11.7–15.4)
GLOBULIN SER CALC-MCNC: 3.4 G/DL (ref 1.5–4.5)
GLUCOSE SERPL-MCNC: 175 MG/DL (ref 65–99)
HBA1C MFR BLD: 8.3 % (ref 4.8–5.6)
HCT VFR BLD AUTO: 37.2 % (ref 34–46.6)
HGB BLD-MCNC: 12.6 G/DL (ref 11.1–15.9)
IMM GRANULOCYTES # BLD AUTO: 0 X10E3/UL (ref 0–0.1)
IMM GRANULOCYTES NFR BLD AUTO: 1 %
LYMPHOCYTES # BLD AUTO: 1.9 X10E3/UL (ref 0.7–3.1)
LYMPHOCYTES NFR BLD AUTO: 28 %
MCH RBC QN AUTO: 29 PG (ref 26.6–33)
MCHC RBC AUTO-ENTMCNC: 33.9 G/DL (ref 31.5–35.7)
MCV RBC AUTO: 86 FL (ref 79–97)
MONOCYTES # BLD AUTO: 0.5 X10E3/UL (ref 0.1–0.9)
MONOCYTES NFR BLD AUTO: 7 %
NEUTROPHILS # BLD AUTO: 4.3 X10E3/UL (ref 1.4–7)
NEUTROPHILS NFR BLD AUTO: 63 %
PLATELET # BLD AUTO: 221 X10E3/UL (ref 150–450)
POTASSIUM SERPL-SCNC: 3.9 MMOL/L (ref 3.5–5.2)
PROT SERPL-MCNC: 7.7 G/DL (ref 6–8.5)
RBC # BLD AUTO: 4.35 X10E6/UL (ref 3.77–5.28)
SODIUM SERPL-SCNC: 139 MMOL/L (ref 134–144)
WBC # BLD AUTO: 6.8 X10E3/UL (ref 3.4–10.8)

## 2022-08-01 ENCOUNTER — TELEPHONE (OUTPATIENT)
Dept: FAMILY MEDICINE CLINIC | Facility: CLINIC | Age: 42
End: 2022-08-01

## 2022-08-01 NOTE — TELEPHONE ENCOUNTER
Called [facility] to see what was going on with her lancets. Екатерина pharmacist states her insurance will not pay for lancets she will have to pay for those out of pocket. Called patient back and let her know voiced she understood.    Dear Dr. Sal Lynch,     I had the pleasure of seeing your patient , Ron Nuñez, in our Cardiology Clinic on November 8, 2018. Below please find my consultation summary. History of Present Illness    In cardiology clinic for evaluation and management of cardiovascular disorder    Patient last seen by me in 2015 then she lost to follow-up she has been follow-up with her primary care physician for management of her hypertension. Patient is known to have a pregnancy related hypertension. According the patient recently she has been noticed to have a escalated hypertension requiring increasing dose of antihypertensive medication. Patient occasionally complains of dizziness at nighttime. She also has a difficulty in sleeping. She is been never been diagnosed with sleep apnea in the past. According to patient she has a history of hypothyroidism and her thyroid hormones levels are not very well controlled. Currently she has been on 3 antihypertensive medication. Today in the office her blood pressure is 100/70 mmHg with a heart rate of 85 bpm.    According the patient she has been compliant with her medications diet and exercise. Her echocardiogram in 2015 revealed left ventricular hypertrophy with normal LV function. Assessment   1. Essential hypertension (I10)   Â· Pregnancy related   2. Graves disease (E05.00)   Â· Pt previously used to have Graves disease but she is now hypothyroid. 3. Hypothyroidism (E03.9)    Plan    Continue current medications as directed. Eat a heart healthy diet. Exercise regularly. Proper usage and side effects of meds reviewed and discussed. Recommendations    Continue current medical regimen  Risk factors and lifestyle modification  Regular exercise and weight reduction  Strongly recommend to obtain sleep study to rule out obstructive sleep apnea causing her hypertension    Thanks  .       Signatures   Electronically signed by : Henry Salas MD; Nov 8 2018 12: 49PM CST

## 2022-08-09 DIAGNOSIS — F33.2 SEVERE RECURRENT MAJOR DEPRESSION WITHOUT PSYCHOTIC FEATURES: ICD-10-CM

## 2022-08-09 RX ORDER — CARIPRAZINE 3 MG/1
CAPSULE, GELATIN COATED ORAL
Qty: 30 CAPSULE | Refills: 0 | Status: SHIPPED | OUTPATIENT
Start: 2022-08-09 | End: 2022-11-22

## 2022-08-09 RX ORDER — SITAGLIPTIN 100 MG/1
TABLET, FILM COATED ORAL
Qty: 30 TABLET | Refills: 0 | Status: SHIPPED | OUTPATIENT
Start: 2022-08-09 | End: 2022-10-17

## 2022-08-10 DIAGNOSIS — K59.04 CHRONIC IDIOPATHIC CONSTIPATION: ICD-10-CM

## 2022-08-11 RX ORDER — LINACLOTIDE 145 UG/1
CAPSULE, GELATIN COATED ORAL
Qty: 30 CAPSULE | Refills: 0 | Status: SHIPPED | OUTPATIENT
Start: 2022-08-11 | End: 2022-10-17

## 2022-08-20 DIAGNOSIS — F43.12 CHRONIC POSTTRAUMATIC STRESS DISORDER: Chronic | ICD-10-CM

## 2022-08-20 DIAGNOSIS — F41.1 GENERALIZED ANXIETY DISORDER: Chronic | ICD-10-CM

## 2022-08-20 DIAGNOSIS — F33.2 SEVERE RECURRENT MAJOR DEPRESSION WITHOUT PSYCHOTIC FEATURES: ICD-10-CM

## 2022-08-22 RX ORDER — FLUOXETINE HYDROCHLORIDE 40 MG/1
CAPSULE ORAL
Qty: 90 CAPSULE | Refills: 0 | Status: SHIPPED | OUTPATIENT
Start: 2022-08-22

## 2022-08-28 RX ORDER — FEXOFENADINE HCL 180 MG/1
TABLET ORAL
Qty: 30 TABLET | Refills: 0 | Status: SHIPPED | OUTPATIENT
Start: 2022-08-28 | End: 2022-09-28

## 2022-09-07 ENCOUNTER — OFFICE VISIT (OUTPATIENT)
Dept: PAIN MEDICINE | Facility: CLINIC | Age: 42
End: 2022-09-07

## 2022-09-07 VITALS
OXYGEN SATURATION: 98 % | WEIGHT: 284 LBS | DIASTOLIC BLOOD PRESSURE: 83 MMHG | BODY MASS INDEX: 50.32 KG/M2 | SYSTOLIC BLOOD PRESSURE: 144 MMHG | HEART RATE: 87 BPM | RESPIRATION RATE: 16 BRPM | HEIGHT: 63 IN

## 2022-09-07 DIAGNOSIS — M54.50 CHRONIC BILATERAL LOW BACK PAIN WITHOUT SCIATICA: Primary | ICD-10-CM

## 2022-09-07 DIAGNOSIS — M25.511 CHRONIC RIGHT SHOULDER PAIN: ICD-10-CM

## 2022-09-07 DIAGNOSIS — M25.562 CHRONIC PAIN OF BOTH KNEES: ICD-10-CM

## 2022-09-07 DIAGNOSIS — G89.29 CHRONIC RIGHT SHOULDER PAIN: ICD-10-CM

## 2022-09-07 DIAGNOSIS — M25.561 CHRONIC PAIN OF BOTH KNEES: ICD-10-CM

## 2022-09-07 DIAGNOSIS — G89.29 CHRONIC BILATERAL LOW BACK PAIN WITHOUT SCIATICA: Primary | ICD-10-CM

## 2022-09-07 DIAGNOSIS — G89.29 CHRONIC PAIN OF BOTH KNEES: ICD-10-CM

## 2022-09-07 PROCEDURE — G0463 HOSPITAL OUTPT CLINIC VISIT: HCPCS | Performed by: PHYSICAL MEDICINE & REHABILITATION

## 2022-09-07 PROCEDURE — 99213 OFFICE O/P EST LOW 20 MIN: CPT | Performed by: PHYSICAL MEDICINE & REHABILITATION

## 2022-09-07 RX ORDER — OXYCODONE AND ACETAMINOPHEN 7.5; 325 MG/1; MG/1
1 TABLET ORAL EVERY 6 HOURS PRN
Qty: 120 TABLET | Refills: 0 | Status: SHIPPED | OUTPATIENT
Start: 2022-09-07 | End: 2023-01-04

## 2022-09-07 NOTE — PROGRESS NOTES
Subjective   Noa Soni is a 41 y.o. female.     Chronic bilateral knee pain, also low back and right shoulder pain, nonradiating, 10/10 at worst, 8/10 at best, always present, varies, began years ago, worsening over time, aching, sharp, worse with bending and walking, interferes with sleep, exercise. X-ray b/l knees with mod OA worst in medial compartment. Saw PCP, notes reviewed, with referral to Dr. Leiva who decline steroid injections with DM2, has failed NSAIDs, Tylenol, Voltaren gel, sees Dr. Valencia on multiple medications. No FH of substance abuse. Began Percocet 5mg TID prn, then 7.5mg TID prn.       The following portions of the patient's history were reviewed and updated as appropriate: allergies, current medications, past family history, past medical history, past social history, past surgical history and problem list.    Review of Systems   Constitutional: Negative for chills, fatigue and fever.   HENT: Positive for hearing loss. Negative for trouble swallowing.    Eyes: Negative for visual disturbance.   Respiratory: Negative for shortness of breath.    Cardiovascular: Negative for chest pain.   Gastrointestinal: Positive for abdominal pain and nausea. Negative for constipation, diarrhea and vomiting.   Genitourinary: Negative for urinary incontinence.   Musculoskeletal: Positive for arthralgias and back pain. Negative for joint swelling, myalgias and neck pain.   Neurological: Positive for headache. Negative for dizziness, weakness and numbness.       Objective   Physical Exam  Constitutional:       Appearance: Normal appearance. She is well-developed.   HENT:      Head: Normocephalic and atraumatic.   Eyes:      Pupils: Pupils are equal, round, and reactive to light.   Cardiovascular:      Rate and Rhythm: Normal rate and regular rhythm.      Heart sounds: Normal heart sounds.   Pulmonary:      Effort: Pulmonary effort is normal.      Breath sounds: Normal breath sounds.   Abdominal:      General:  Bowel sounds are normal. There is no distension.      Palpations: Abdomen is soft.      Tenderness: There is no abdominal tenderness.   Musculoskeletal:      Cervical back: Normal range of motion.   Neurological:      Mental Status: She is alert and oriented to person, place, and time.      Sensory: No sensory deficit.      Deep Tendon Reflexes: Reflexes are normal and symmetric. Reflexes normal.   Psychiatric:         Mood and Affect: Mood normal.         Behavior: Behavior normal.         Thought Content: Thought content normal.         Judgment: Judgment normal.           Assessment & Plan   Diagnoses and all orders for this visit:    1. Chronic bilateral low back pain without sciatica (Primary)    2. Chronic pain of both knees    3. Chronic right shoulder pain      UDS in order 5/24/22.  Discussed risks and benefits of opioid treatment for chonic pain with patient, including expectations related to prescription requests, alternative modalities to opioids for managing pain, her treatment plan, risks of dependency and addiction, and safe storage practices for prescribed opioids, as well as proper and improper disposal of all medications.  Treatment plan will consist of continuing current medication as long as it remains effective and is necessary, while evaluating patient at each visit and determining if the medication can be lowered or discontinued, while also using nonopioid therapies to reduce reliance on opioids.  Failed Tylenol #3 TID prn with AMS. Failed NSAIDs, Tylenol, Voltaren gel, allergic to Hydrocodone. Began Percocet 5mg TID prn, helping but not sufficient, increased to 7.5mg TID prn, helping more, continue for now. Denies side effects.  Performed b/l Monovisc injections with significant improvement, will repeat as necessary up to q6 months, cannot have steroids with DM2.  RTC in 2 months for f/u.    INSPECT REPORT     As part of the patient's treatment plan, I am prescribing controlled substances.  The patient has been made aware of appropriate use of such medications, including potential risk of somnolence, limited ability to drive and/or work safely, and the potential for dependence or overdose. It has also bee made clear that these medications are for use by this patient only, without concomitant use of alcohol or other substances unless prescribed.      Patient has completed prescribing agreement detailing terms of continued prescribing of controlled substances, including monitoring INSPECT reports, urine drug screening, and pill counts if necessary. The patient is aware that inappropriate use will results in cessation of prescribing such medications.     INSPECT report has been reviewed and scanned into the patient's chart.     As the clinician, I personally reviewed the INSPECT while the patient was in the office today.     History and physical exam exhibit continued safe and appropriate use of controlled substances.

## 2022-09-28 RX ORDER — FEXOFENADINE HCL 180 MG/1
TABLET ORAL
Qty: 90 TABLET | Refills: 0 | Status: SHIPPED | OUTPATIENT
Start: 2022-09-28 | End: 2023-02-10

## 2022-10-15 DIAGNOSIS — F33.2 SEVERE RECURRENT MAJOR DEPRESSION WITHOUT PSYCHOTIC FEATURES: ICD-10-CM

## 2022-10-15 DIAGNOSIS — K59.04 CHRONIC IDIOPATHIC CONSTIPATION: ICD-10-CM

## 2022-10-16 RX ORDER — CARIPRAZINE 3 MG/1
CAPSULE, GELATIN COATED ORAL
Qty: 30 CAPSULE | Refills: 0 | OUTPATIENT
Start: 2022-10-16

## 2022-10-17 RX ORDER — LINACLOTIDE 145 UG/1
CAPSULE, GELATIN COATED ORAL
Qty: 30 CAPSULE | Refills: 0 | Status: SHIPPED | OUTPATIENT
Start: 2022-10-17 | End: 2022-11-21

## 2022-10-17 RX ORDER — SITAGLIPTIN 100 MG/1
TABLET, FILM COATED ORAL
Qty: 30 TABLET | Refills: 0 | Status: SHIPPED | OUTPATIENT
Start: 2022-10-17 | End: 2022-11-21

## 2022-11-07 ENCOUNTER — TELEPHONE (OUTPATIENT)
Dept: FAMILY MEDICINE CLINIC | Facility: CLINIC | Age: 42
End: 2022-11-07

## 2022-11-07 ENCOUNTER — OFFICE VISIT (OUTPATIENT)
Dept: PAIN MEDICINE | Facility: CLINIC | Age: 42
End: 2022-11-07

## 2022-11-07 VITALS
SYSTOLIC BLOOD PRESSURE: 149 MMHG | OXYGEN SATURATION: 97 % | DIASTOLIC BLOOD PRESSURE: 82 MMHG | RESPIRATION RATE: 16 BRPM | HEART RATE: 88 BPM

## 2022-11-07 DIAGNOSIS — M25.562 CHRONIC PAIN OF BOTH KNEES: ICD-10-CM

## 2022-11-07 DIAGNOSIS — G89.29 CHRONIC PAIN OF BOTH KNEES: ICD-10-CM

## 2022-11-07 DIAGNOSIS — M25.511 CHRONIC RIGHT SHOULDER PAIN: ICD-10-CM

## 2022-11-07 DIAGNOSIS — G89.29 CHRONIC BILATERAL LOW BACK PAIN WITHOUT SCIATICA: Primary | ICD-10-CM

## 2022-11-07 DIAGNOSIS — M54.50 CHRONIC BILATERAL LOW BACK PAIN WITHOUT SCIATICA: Primary | ICD-10-CM

## 2022-11-07 DIAGNOSIS — G89.29 CHRONIC RIGHT SHOULDER PAIN: ICD-10-CM

## 2022-11-07 DIAGNOSIS — M17.2 POST-TRAUMATIC OSTEOARTHRITIS OF BOTH KNEES: ICD-10-CM

## 2022-11-07 DIAGNOSIS — M25.561 CHRONIC PAIN OF BOTH KNEES: ICD-10-CM

## 2022-11-07 PROCEDURE — G0463 HOSPITAL OUTPT CLINIC VISIT: HCPCS

## 2022-11-07 PROCEDURE — 99214 OFFICE O/P EST MOD 30 MIN: CPT | Performed by: PHYSICAL MEDICINE & REHABILITATION

## 2022-11-07 PROCEDURE — G0463 HOSPITAL OUTPT CLINIC VISIT: HCPCS | Performed by: PHYSICAL MEDICINE & REHABILITATION

## 2022-11-07 RX ORDER — OXYCODONE AND ACETAMINOPHEN 10; 325 MG/1; MG/1
1 TABLET ORAL EVERY 6 HOURS PRN
Qty: 120 TABLET | Refills: 0 | Status: SHIPPED | OUTPATIENT
Start: 2022-11-07 | End: 2023-01-04 | Stop reason: SDUPTHER

## 2022-11-07 RX ORDER — OXYCODONE AND ACETAMINOPHEN 10; 325 MG/1; MG/1
1 TABLET ORAL EVERY 6 HOURS PRN
Qty: 120 TABLET | Refills: 0 | Status: SHIPPED | OUTPATIENT
Start: 2022-11-07 | End: 2023-01-04

## 2022-11-07 RX ORDER — CLINDAMYCIN HYDROCHLORIDE 300 MG/1
300 CAPSULE ORAL 3 TIMES DAILY
Qty: 30 CAPSULE | Refills: 0 | Status: SHIPPED | OUTPATIENT
Start: 2022-11-07 | End: 2022-11-17

## 2022-11-07 NOTE — TELEPHONE ENCOUNTER
Please advise. Called to see if they have tried to reach out to the dentist and they said they are trying to reach them to set up an appointment to be seen.

## 2022-11-07 NOTE — PROGRESS NOTES
Subjective   Noa Soni is a 41 y.o. female.     History of Present Illness  Chronic bilateral knee pain, also low back and right shoulder pain, nonradiating, 10/10 at worst, 8/10 at best, always present, varies, began years ago, worsening over time, aching, sharp, worse with bending and walking, interferes with sleep, exercise. X-ray b/l knees with mod OA worst in medial compartment. Saw PCP, notes reviewed, with referral to Dr. Leiva who decline steroid injections with DM2, has failed NSAIDs, Tylenol, Voltaren gel, sees Dr. Valencia on multiple medications. No FH of substance abuse. Began Percocet 5mg TID prn, then 7.5mg TID prn.       The following portions of the patient's history were reviewed and updated as appropriate: allergies, current medications, past family history, past medical history, past social history, past surgical history and problem list.    Review of Systems   Constitutional: Negative for chills, fatigue and fever.   HENT: Positive for hearing loss. Negative for trouble swallowing.    Eyes: Negative for visual disturbance.   Respiratory: Negative for shortness of breath.    Cardiovascular: Negative for chest pain.   Gastrointestinal: Positive for abdominal pain and nausea. Negative for constipation, diarrhea and vomiting.   Genitourinary: Negative for urinary incontinence.   Musculoskeletal: Positive for arthralgias and back pain. Negative for joint swelling, myalgias and neck pain.   Neurological: Positive for headache. Negative for dizziness, weakness and numbness.       Objective   Physical Exam  Constitutional:       Appearance: Normal appearance. She is well-developed.   HENT:      Head: Normocephalic and atraumatic.   Eyes:      Pupils: Pupils are equal, round, and reactive to light.   Cardiovascular:      Rate and Rhythm: Normal rate and regular rhythm.      Heart sounds: Normal heart sounds.   Pulmonary:      Effort: Pulmonary effort is normal.      Breath sounds: Normal breath  sounds.   Abdominal:      General: Bowel sounds are normal. There is no distension.      Palpations: Abdomen is soft.      Tenderness: There is no abdominal tenderness.   Musculoskeletal:      Cervical back: Normal range of motion.   Neurological:      Mental Status: She is alert and oriented to person, place, and time.      Sensory: No sensory deficit.      Deep Tendon Reflexes: Reflexes are normal and symmetric. Reflexes normal.   Psychiatric:         Mood and Affect: Mood normal.         Behavior: Behavior normal.         Thought Content: Thought content normal.         Judgment: Judgment normal.           Assessment & Plan   Diagnoses and all orders for this visit:    1. Chronic bilateral low back pain without sciatica (Primary)    2. Chronic pain of both knees    3. Chronic right shoulder pain    4. Post-traumatic osteoarthritis of both knees      UDS in order 5/24/22.  Discussed risks and benefits of opioid treatment for chonic pain with patient, including expectations related to prescription requests, alternative modalities to opioids for managing pain, her treatment plan, risks of dependency and addiction, and safe storage practices for prescribed opioids, as well as proper and improper disposal of all medications.  Treatment plan will consist of continuing current medication as long as it remains effective and is necessary, while evaluating patient at each visit and determining if the medication can be lowered or discontinued, while also using nonopioid therapies to reduce reliance on opioids.  Failed Tylenol #3 TID prn with AMS. Failed NSAIDs, Tylenol, Voltaren gel, allergic to Hydrocodone. Began Percocet 5mg TID prn, helping but not sufficient, increased to 7.5mg QID prn, insufficient, increase to 10mg QID prn. Denies side effects. Filled 10/8/22 per Inspect.  Performed b/l Monovisc injections with significant improvement, will repeat as necessary up to q6 months, cannot have steroids with DM2.  RTC in 2  months for f/u.    INSPECT REPORT     As part of the patient's treatment plan, I am prescribing controlled substances. The patient has been made aware of appropriate use of such medications, including potential risk of somnolence, limited ability to drive and/or work safely, and the potential for dependence or overdose. It has also bee made clear that these medications are for use by this patient only, without concomitant use of alcohol or other substances unless prescribed.      Patient has completed prescribing agreement detailing terms of continued prescribing of controlled substances, including monitoring INSPECT reports, urine drug screening, and pill counts if necessary. The patient is aware that inappropriate use will results in cessation of prescribing such medications.     INSPECT report has been reviewed and scanned into the patient's chart.     As the clinician, I personally reviewed the INSPECT while the patient was in the office today.     History and physical exam exhibit continued safe and appropriate use of controlled substances.

## 2022-11-07 NOTE — TELEPHONE ENCOUNTER
Please tell them I sent in a prescription for an antibiotic to [facility] pharmacy for her.  This is not a substitute for appropriate dental care and they do need to pursue formal dental evaluation.  Thanks

## 2022-11-07 NOTE — TELEPHONE ENCOUNTER
Caller: Marco A Bello    Relationship: Emergency Contact    Best call back number: [phone] (Mobile)    What medication are you requesting: ACCESSED TOOTH    What are your current symptoms: PAIN    How long have you been experiencing symptoms:      Have you had these symptoms before:    [] Yes  [] No    Have you been treated for these symptoms before:   [] Yes  [] No    If a prescription is needed, what is your preferred pharmacy and phone number:  [id number] - [place], [place] - [address] [phone] [facility] [phone]         Additional notes: PATIENT'S SPOUSE CALLED TO ADVISE THAT PATIENT HAS AN ABCESSED TOOTH AND NEEDS AN ANTIBIOTIC. MARCO A STATES THAT PATIENT IS IN A LOT OF PAIN RIGHT NOW WITH HER TOOTH.      PLEASE CONTACT MARCO A TO ADVISE.      THANKS

## 2022-11-20 DIAGNOSIS — F43.12 CHRONIC POSTTRAUMATIC STRESS DISORDER: Chronic | ICD-10-CM

## 2022-11-20 DIAGNOSIS — F33.2 SEVERE RECURRENT MAJOR DEPRESSION WITHOUT PSYCHOTIC FEATURES: ICD-10-CM

## 2022-11-20 DIAGNOSIS — I10 ESSENTIAL HYPERTENSION: ICD-10-CM

## 2022-11-20 DIAGNOSIS — K59.04 CHRONIC IDIOPATHIC CONSTIPATION: ICD-10-CM

## 2022-11-20 DIAGNOSIS — F41.1 GENERALIZED ANXIETY DISORDER: Chronic | ICD-10-CM

## 2022-11-21 RX ORDER — SITAGLIPTIN 100 MG/1
TABLET, FILM COATED ORAL
Qty: 30 TABLET | Refills: 0 | Status: SHIPPED | OUTPATIENT
Start: 2022-11-21 | End: 2023-02-10

## 2022-11-21 RX ORDER — LINACLOTIDE 145 UG/1
CAPSULE, GELATIN COATED ORAL
Qty: 30 CAPSULE | Refills: 0 | Status: SHIPPED | OUTPATIENT
Start: 2022-11-21 | End: 2023-01-09

## 2022-11-21 RX ORDER — LISINOPRIL 10 MG/1
TABLET ORAL
Qty: 90 TABLET | Refills: 0 | Status: SHIPPED | OUTPATIENT
Start: 2022-11-21

## 2022-11-21 RX ORDER — LAMOTRIGINE 100 MG/1
TABLET ORAL
Qty: 90 TABLET | Refills: 0 | Status: SHIPPED | OUTPATIENT
Start: 2022-11-21 | End: 2023-01-06

## 2022-11-21 NOTE — TELEPHONE ENCOUNTER
Rx Refill Note  Requested Prescriptions     Pending Prescriptions Disp Refills   • Vraylar 3 MG capsule capsule [Pharmacy Med Name: Vraylar 3 MG Oral Capsule] 30 capsule 0     Sig: Take 1 capsule by mouth once daily      Last office visit with prescribing clinician: 6/17/2021      Next office visit with prescribing clinician: Visit date not found   Office Visit with Nena Valencia MD (06/17/2021)       Urine Drug Screen - Urine, Clean Catch (05/25/2022)      Bree Philip MA  11/21/22, 10:19 EST

## 2022-11-22 RX ORDER — CARIPRAZINE 3 MG/1
CAPSULE, GELATIN COATED ORAL
Qty: 30 CAPSULE | Refills: 0 | Status: SHIPPED | OUTPATIENT
Start: 2022-11-22 | End: 2023-01-09

## 2022-12-05 ENCOUNTER — OFFICE VISIT (OUTPATIENT)
Dept: FAMILY MEDICINE CLINIC | Facility: CLINIC | Age: 42
End: 2022-12-05

## 2022-12-05 VITALS
BODY MASS INDEX: 51.07 KG/M2 | OXYGEN SATURATION: 98 % | DIASTOLIC BLOOD PRESSURE: 80 MMHG | SYSTOLIC BLOOD PRESSURE: 118 MMHG | HEIGHT: 63 IN | TEMPERATURE: 97.5 F | WEIGHT: 288.2 LBS | HEART RATE: 103 BPM

## 2022-12-05 DIAGNOSIS — F43.12 CHRONIC POSTTRAUMATIC STRESS DISORDER: ICD-10-CM

## 2022-12-05 DIAGNOSIS — Z23 NEED FOR INFLUENZA VACCINATION: ICD-10-CM

## 2022-12-05 DIAGNOSIS — F33.2 SEVERE RECURRENT MAJOR DEPRESSION WITHOUT PSYCHOTIC FEATURES: ICD-10-CM

## 2022-12-05 DIAGNOSIS — E11.65 TYPE 2 DIABETES MELLITUS WITH HYPERGLYCEMIA, WITHOUT LONG-TERM CURRENT USE OF INSULIN: Primary | ICD-10-CM

## 2022-12-05 DIAGNOSIS — I10 ESSENTIAL HYPERTENSION: ICD-10-CM

## 2022-12-05 DIAGNOSIS — Z87.42 HISTORY OF PCOS: ICD-10-CM

## 2022-12-05 DIAGNOSIS — M17.0 PRIMARY OSTEOARTHRITIS OF BOTH KNEES: ICD-10-CM

## 2022-12-05 DIAGNOSIS — F39 MOOD DISORDER: ICD-10-CM

## 2022-12-05 PROCEDURE — 90686 IIV4 VACC NO PRSV 0.5 ML IM: CPT | Performed by: FAMILY MEDICINE

## 2022-12-05 PROCEDURE — 90471 IMMUNIZATION ADMIN: CPT | Performed by: FAMILY MEDICINE

## 2022-12-05 PROCEDURE — T1015 CLINIC SERVICE: HCPCS | Performed by: FAMILY MEDICINE

## 2022-12-05 PROCEDURE — 99214 OFFICE O/P EST MOD 30 MIN: CPT | Performed by: FAMILY MEDICINE

## 2022-12-05 NOTE — PROGRESS NOTES
Subjective   Noa Soni is a 41 y.o. female.   Chief Complaint   Patient presents with   • Hypertension       History of Present Illness   41-year-old white female with problems listed below comes in for follow-up.  I last saw her 4 months ago.  Canceled last visit.    I am trying to treat her diabetes.  Her last A1c was 8.3% 4 months ago.  She self professes that she does not stick to a diet.  Once again, she has her boyfriend on speaker phone and he tells me that her BS are good when he checks her BS.  They cannot tell me specifically what the numbers are, though.  We have made recent changes to her medicine since our last visit.    She has chronic pain and sees Dr. Connors for this.  She has mental health issues including depression, PTSD, anxiety and follows with Dr. Valencia for this.  Told me at the last visit she did not want to see Dr. Valencia anymore.  She cites a conflict of personality.    HTN- blood pressure today is excellent at 118/80.    PCOS-she wants a new gynecologist because her gynecologist she is seeing now will talk to her about a hysterectomy which she really wants.  She has identified a gynecologist over removal who a friend of hers went to see and she wants to see her.  She tells me this is Dr. Loretta Meraz.    Patient Active Problem List    Diagnosis Date Noted   • Chronic pain of both knees 05/24/2022   • Chronic bilateral low back pain without sciatica 05/24/2022   • Chronic right shoulder pain 05/24/2022   • Morbid obesity (HCC) 04/27/2022   • Nasal congestion 11/20/2021   • Post-traumatic osteoarthritis of both knees 11/10/2021   • Severe recurrent major depression without psychotic features (HCC) 06/17/2021   • Chronic posttraumatic stress disorder 06/17/2021   • Generalized anxiety disorder 06/17/2021   • Callus of foot 10/19/2020   • Perennial allergic rhinitis 10/19/2020   • NESHA (obstructive sleep apnea) 10/19/2020   • Class 3 severe obesity due to excess calories without serious  comorbidity with body mass index (BMI) of 45.0 to 49.9 in adult (HCC) 10/12/2020   • Lymphadenopathy 10/06/2020   • Essential hypertension 07/27/2020   • Depression 07/27/2020   • Learning disability 07/27/2020     Note Last Updated: 7/27/2020     No other details     • Type 2 diabetes mellitus (HCC) 01/17/2020   • History of endometriosis 01/17/2020   • History of PCOS 01/17/2020           Past Surgical History:   Procedure Laterality Date   • EAR TUBES     • INTRAUTERINE DEVICE INSERTION  10/14/2020   • LAPAROSCOPIC TUBAL LIGATION     • TONSILLECTOMY AND ADENOIDECTOMY       Current Outpatient Medications on File Prior to Visit   Medication Sig   • acetaminophen (TYLENOL) 500 MG tablet Take 2,000 mg by mouth 2 (Two) Times a Day.   • Blood Glucose Monitoring Suppl kit Use as directed to check blood glucose   • clotrimazole (LOTRIMIN) 1 % cream Apply 1 application topically to the appropriate area as directed 2 (Two) Times a Day.   • Dulaglutide (Trulicity) 1.5 MG/0.5ML solution pen-injector Inject 1.5 mg under the skin into the appropriate area as directed Every 7 (Seven) Days.   • EPINEPHrine (EPIPEN) 0.3 MG/0.3ML solution auto-injector injection USE AS DIRECTED FOR ACUTE ALLERGIC REACTION   • ferrous sulfate 325 (65 FE) MG tablet Take 1 tablet by mouth Daily With Breakfast.   • fexofenadine (ALLEGRA) 180 MG tablet Take 1 tablet by mouth once daily   • FLUoxetine (PROzac) 40 MG capsule Take 1 capsule by mouth once daily   • glucose blood test strip Use as instructed to check blood glucose once daily   • Januvia 100 MG tablet Take 1 tablet by mouth once daily   • lamoTRIgine (LaMICtal) 100 MG tablet Take 1 tablet by mouth once daily   • Lancets misc Use once daily with meter and strips to check blood glucose   • Linzess 145 MCG capsule capsule TAKE 1 CAPSULE BY MOUTH ONCE DAILY IN THE MORNING BEFORE BREAKFAST   • lisinopril (PRINIVIL,ZESTRIL) 10 MG tablet Take 1 tablet by mouth once daily   • Melatonin 5 MG chewable  tablet Chew 5 mg every night at bedtime.   • oxyCODONE-acetaminophen (Percocet)  MG per tablet Take 1 tablet by mouth Every 6 (Six) Hours As Needed for Moderate Pain.   • oxyCODONE-acetaminophen (Percocet)  MG per tablet Take 1 tablet by mouth Every 6 (Six) Hours As Needed for Moderate Pain.   • oxyCODONE-acetaminophen (Percocet) 7.5-325 MG per tablet Take 1 tablet by mouth Every 6 (Six) Hours As Needed for Moderate Pain.   • oxyCODONE-acetaminophen (Percocet) 7.5-325 MG per tablet Take 1 tablet by mouth Every 6 (Six) Hours As Needed for Moderate Pain.   • SUMAtriptan (Imitrex) 50 MG tablet Take one tablet at onset of headache. May repeat dose one time in 2 hours if headache not relieved.   • Vraylar 3 MG capsule capsule Take 1 capsule by mouth once daily     No current facility-administered medications on file prior to visit.     Allergies   Allergen Reactions   • Abilify [Aripiprazole] Rash   • Metformin Diarrhea   • Tylenol With Codeine #3 [Acetaminophen-Codeine] Hallucinations   • Vicodin [Hydrocodone-Acetaminophen] GI Intolerance     Social History     Socioeconomic History   • Marital status: Single   • Number of children: 0   Tobacco Use   • Smoking status: Former     Packs/day: 0.25     Years: 11.00     Pack years: 2.75     Types: Cigarettes     Start date:      Quit date: 2018     Years since quittin.9   • Smokeless tobacco: Never   • Tobacco comments:     socially    Vaping Use   • Vaping Use: Never used   Substance and Sexual Activity   • Alcohol use: Never   • Drug use: Never   • Sexual activity: Not Currently     Partners: Male     Family History   Problem Relation Age of Onset   • Hypertension Mother    • Depression Mother    • Post-traumatic stress disorder Mother    • Hyperlipidemia Mother    • Hypertension Father    • Hyperlipidemia Father    • Hypertension Brother    • Depression Brother    • Parkinsonism Maternal Grandmother    • Dementia Maternal Grandmother    • Atrial  "fibrillation Maternal Grandfather    • Breast cancer Paternal Grandmother    • COPD Paternal Grandfather        Review of Systems    Objective   /80 (BP Location: Left arm, Patient Position: Sitting, Cuff Size: Large Adult)   Pulse 103   Temp 97.5 °F (36.4 °C) (Oral)   Ht 160 cm (62.99\")   Wt 131 kg (288 lb 3.2 oz)   SpO2 98%   BMI 51.07 kg/m²   Physical Exam  Constitutional:       Appearance: She is well-developed. She is obese. She is not toxic-appearing.      Comments: Wearing a face mask     HENT:      Head: Normocephalic and atraumatic.   Eyes:      Conjunctiva/sclera: Conjunctivae normal.   Cardiovascular:      Rate and Rhythm: Normal rate.   Pulmonary:      Effort: Pulmonary effort is normal.   Musculoskeletal:         General: Normal range of motion.      Cervical back: Normal range of motion.      Comments: Both knees are mildly enlarged which is consistent with some probable underlying osteoarthritis.     Skin:     General: Skin is warm and dry.      Findings: No rash.   Neurological:      Mental Status: She is alert and oriented to person, place, and time.   Psychiatric:         Behavior: Behavior normal.           No visits with results within 4 Month(s) from this visit.   Latest known visit with results is:   Office Visit on 07/29/2022   Component Date Value Ref Range Status   • Hemoglobin A1C 07/29/2022 8.3 (H)  4.8 - 5.6 % Final    Comment:          Prediabetes: 5.7 - 6.4           Diabetes: >6.4           Glycemic control for adults with diabetes: <7.0     • Glucose 07/29/2022 175 (H)  65 - 99 mg/dL Final   • BUN 07/29/2022 6  6 - 20 mg/dL Final   • Creatinine 07/29/2022 0.60  0.57 - 1.00 mg/dL Final   • EGFR Result 07/29/2022 117  >59 mL/min/1.73 Final   • BUN/Creatinine Ratio 07/29/2022 10  9 - 23 Final   • Sodium 07/29/2022 139  134 - 144 mmol/L Final   • Potassium 07/29/2022 3.9  3.5 - 5.2 mmol/L Final   • Chloride 07/29/2022 101  96 - 106 mmol/L Final   • Total CO2 07/29/2022 20  20 " - 29 mmol/L Final   • Calcium 07/29/2022 9.3  8.7 - 10.2 mg/dL Final   • Total Protein 07/29/2022 7.7  6.0 - 8.5 g/dL Final   • Albumin 07/29/2022 4.3  3.8 - 4.8 g/dL Final   • Globulin 07/29/2022 3.4  1.5 - 4.5 g/dL Final   • A/G Ratio 07/29/2022 1.3  1.2 - 2.2 Final   • Total Bilirubin 07/29/2022 1.2  0.0 - 1.2 mg/dL Final   • Alkaline Phosphatase 07/29/2022 111  44 - 121 IU/L Final   • AST (SGOT) 07/29/2022 45 (H)  0 - 40 IU/L Final   • ALT (SGPT) 07/29/2022 31  0 - 32 IU/L Final   • WBC 07/29/2022 6.8  3.4 - 10.8 x10E3/uL Final   • RBC 07/29/2022 4.35  3.77 - 5.28 x10E6/uL Final   • Hemoglobin 07/29/2022 12.6  11.1 - 15.9 g/dL Final   • Hematocrit 07/29/2022 37.2  34.0 - 46.6 % Final   • MCV 07/29/2022 86  79 - 97 fL Final   • MCH 07/29/2022 29.0  26.6 - 33.0 pg Final   • MCHC 07/29/2022 33.9  31.5 - 35.7 g/dL Final   • RDW 07/29/2022 13.1  11.7 - 15.4 % Final   • Platelets 07/29/2022 221  150 - 450 x10E3/uL Final   • Neutrophil Rel % 07/29/2022 63  Not Estab. % Final   • Lymphocyte Rel % 07/29/2022 28  Not Estab. % Final   • Monocyte Rel % 07/29/2022 7  Not Estab. % Final   • Eosinophil Rel % 07/29/2022 1  Not Estab. % Final   • Basophil Rel % 07/29/2022 0  Not Estab. % Final   • Neutrophils Absolute 07/29/2022 4.3  1.4 - 7.0 x10E3/uL Final   • Lymphocytes Absolute 07/29/2022 1.9  0.7 - 3.1 x10E3/uL Final   • Monocytes Absolute 07/29/2022 0.5  0.1 - 0.9 x10E3/uL Final   • Eosinophils Absolute 07/29/2022 0.1  0.0 - 0.4 x10E3/uL Final   • Basophils Absolute 07/29/2022 0.0  0.0 - 0.2 x10E3/uL Final   • Immature Granulocyte Rel % 07/29/2022 1  Not Estab. % Final   • Immature Grans Absolute 07/29/2022 0.0  0.0 - 0.1 x10E3/uL Final         Assessment & Plan   Diagnoses and all orders for this visit:    1. Type 2 diabetes mellitus with hyperglycemia, without long-term current use of insulin (HCC) (Primary)  -     Hemoglobin A1c  -     Comprehensive Metabolic Panel    2. Essential hypertension  -     CBC &  Differential    3. Need for influenza vaccination  -     FluLaval/Fluarix/Fluzone >6 Months    4. History of PCOS  -     Ambulatory Referral to Gynecology    5. Severe recurrent major depression without psychotic features (HCC)    6. Chronic posttraumatic stress disorder    7. Mood disorder (HCC)    8. Primary osteoarthritis of both knees    Status of multiple chronic medical problems reviewed today.  Diabetes is a chronic problem which I am not sure about.  We will get her A1c today and proceed accordingly.  Continue Januvia and Trulicity at current doses.  Her blood pressure control is good.  Blood pressure is at goal.  Continue lisinopril 10 mg/day.  Flu shot today.  I do not think it is a good decision at all not to have a mental health provider.  I told her there may be other providers in the office and she should asked to see someone different.  She is going to do this.  I will put in a referral to the gynecologist she requests to see as above.  I did caution her that I can certainly not speak for any gynecologist as to whether they would or would not do a hysterectomy.  No medication changes today.  I will follow-up with her when the results of her tests are back.        Call with any problems or concerns before next visit       Return in about 3 months (around 3/5/2023).      Much of this report is an electronic transcription of spoken language to printed text using Dragon dictation software.  As such, the subtleties and finesse of spoken language may permit erroneous, or at times, nonsensical words or phrases to be inadvertently transcribed; thus changes may be made at a later date to rectify these errors.     Bree Wan MD12/5/202218:25 EST  This note has been electronically signed

## 2022-12-06 LAB
ALBUMIN SERPL-MCNC: 4.2 G/DL (ref 3.8–4.8)
ALBUMIN/GLOB SERPL: 1.2 {RATIO} (ref 1.2–2.2)
ALP SERPL-CCNC: 113 IU/L (ref 44–121)
ALT SERPL-CCNC: 24 IU/L (ref 0–32)
AST SERPL-CCNC: 33 IU/L (ref 0–40)
BASOPHILS # BLD AUTO: 0 X10E3/UL (ref 0–0.2)
BASOPHILS NFR BLD AUTO: 0 %
BILIRUB SERPL-MCNC: 1.2 MG/DL (ref 0–1.2)
BUN SERPL-MCNC: 12 MG/DL (ref 6–20)
BUN/CREAT SERPL: 17 (ref 9–23)
CALCIUM SERPL-MCNC: 9.3 MG/DL (ref 8.7–10.2)
CHLORIDE SERPL-SCNC: 99 MMOL/L (ref 96–106)
CO2 SERPL-SCNC: 24 MMOL/L (ref 20–29)
CREAT SERPL-MCNC: 0.71 MG/DL (ref 0.57–1)
EGFRCR SERPLBLD CKD-EPI 2021: 111 ML/MIN/1.73
EOSINOPHIL # BLD AUTO: 0.1 X10E3/UL (ref 0–0.4)
EOSINOPHIL NFR BLD AUTO: 2 %
ERYTHROCYTE [DISTWIDTH] IN BLOOD BY AUTOMATED COUNT: 13 % (ref 11.7–15.4)
GLOBULIN SER CALC-MCNC: 3.5 G/DL (ref 1.5–4.5)
GLUCOSE SERPL-MCNC: 212 MG/DL (ref 70–99)
HBA1C MFR BLD: 8.2 % (ref 4.8–5.6)
HCT VFR BLD AUTO: 36.9 % (ref 34–46.6)
HGB BLD-MCNC: 12.2 G/DL (ref 11.1–15.9)
IMM GRANULOCYTES # BLD AUTO: 0.1 X10E3/UL (ref 0–0.1)
IMM GRANULOCYTES NFR BLD AUTO: 1 %
LYMPHOCYTES # BLD AUTO: 2.3 X10E3/UL (ref 0.7–3.1)
LYMPHOCYTES NFR BLD AUTO: 32 %
MCH RBC QN AUTO: 28 PG (ref 26.6–33)
MCHC RBC AUTO-ENTMCNC: 33.1 G/DL (ref 31.5–35.7)
MCV RBC AUTO: 85 FL (ref 79–97)
MONOCYTES # BLD AUTO: 0.5 X10E3/UL (ref 0.1–0.9)
MONOCYTES NFR BLD AUTO: 7 %
NEUTROPHILS # BLD AUTO: 4.2 X10E3/UL (ref 1.4–7)
NEUTROPHILS NFR BLD AUTO: 58 %
PLATELET # BLD AUTO: 226 X10E3/UL (ref 150–450)
POTASSIUM SERPL-SCNC: 4.1 MMOL/L (ref 3.5–5.2)
PROT SERPL-MCNC: 7.7 G/DL (ref 6–8.5)
RBC # BLD AUTO: 4.35 X10E6/UL (ref 3.77–5.28)
SODIUM SERPL-SCNC: 138 MMOL/L (ref 134–144)
WBC # BLD AUTO: 7.2 X10E3/UL (ref 3.4–10.8)

## 2023-01-04 ENCOUNTER — OFFICE VISIT (OUTPATIENT)
Dept: PAIN MEDICINE | Facility: CLINIC | Age: 43
End: 2023-01-04
Payer: MEDICAID

## 2023-01-04 VITALS
RESPIRATION RATE: 16 BRPM | HEART RATE: 81 BPM | SYSTOLIC BLOOD PRESSURE: 140 MMHG | DIASTOLIC BLOOD PRESSURE: 90 MMHG | OXYGEN SATURATION: 95 %

## 2023-01-04 DIAGNOSIS — M25.511 CHRONIC RIGHT SHOULDER PAIN: ICD-10-CM

## 2023-01-04 DIAGNOSIS — G89.29 CHRONIC RIGHT SHOULDER PAIN: ICD-10-CM

## 2023-01-04 DIAGNOSIS — G89.29 CHRONIC PAIN OF BOTH KNEES: ICD-10-CM

## 2023-01-04 DIAGNOSIS — M17.2 POST-TRAUMATIC OSTEOARTHRITIS OF BOTH KNEES: ICD-10-CM

## 2023-01-04 DIAGNOSIS — M54.50 CHRONIC BILATERAL LOW BACK PAIN WITHOUT SCIATICA: Primary | ICD-10-CM

## 2023-01-04 DIAGNOSIS — M25.562 CHRONIC PAIN OF BOTH KNEES: ICD-10-CM

## 2023-01-04 DIAGNOSIS — M25.561 CHRONIC PAIN OF BOTH KNEES: ICD-10-CM

## 2023-01-04 DIAGNOSIS — G89.29 CHRONIC BILATERAL LOW BACK PAIN WITHOUT SCIATICA: Primary | ICD-10-CM

## 2023-01-04 PROCEDURE — 1159F MED LIST DOCD IN RCRD: CPT | Performed by: PHYSICAL MEDICINE & REHABILITATION

## 2023-01-04 PROCEDURE — 1125F AMNT PAIN NOTED PAIN PRSNT: CPT | Performed by: PHYSICAL MEDICINE & REHABILITATION

## 2023-01-04 PROCEDURE — 1160F RVW MEDS BY RX/DR IN RCRD: CPT | Performed by: PHYSICAL MEDICINE & REHABILITATION

## 2023-01-04 PROCEDURE — 99214 OFFICE O/P EST MOD 30 MIN: CPT | Performed by: PHYSICAL MEDICINE & REHABILITATION

## 2023-01-04 PROCEDURE — G0463 HOSPITAL OUTPT CLINIC VISIT: HCPCS | Performed by: PHYSICAL MEDICINE & REHABILITATION

## 2023-01-04 RX ORDER — OXYCODONE AND ACETAMINOPHEN 10; 325 MG/1; MG/1
1 TABLET ORAL EVERY 6 HOURS PRN
Qty: 120 TABLET | Refills: 0 | Status: SHIPPED | OUTPATIENT
Start: 2023-01-04 | End: 2023-04-03 | Stop reason: SDUPTHER

## 2023-01-04 RX ORDER — OXYCODONE AND ACETAMINOPHEN 10; 325 MG/1; MG/1
1 TABLET ORAL EVERY 6 HOURS PRN
Qty: 120 TABLET | Refills: 0 | Status: SHIPPED | OUTPATIENT
Start: 2023-01-04

## 2023-01-04 NOTE — PROGRESS NOTES
Subjective   Noa Soni is a 42 y.o. female.     History of Present Illness  Chronic bilateral knee pain, also low back and right shoulder pain, nonradiating, 10/10 at worst, 8/10 at best, always present, varies, began years ago, worsening over time, aching, sharp, worse with bending and walking, interferes with sleep, exercise. X-ray b/l knees with mod OA worst in medial compartment. Saw PCP, notes reviewed, with referral to Dr. Leiva who decline steroid injections with DM2, has failed NSAIDs, Tylenol, Voltaren gel, sees Dr. Valencia on multiple medications. No FH of substance abuse. Began Percocet 5mg TID prn, then 7.5mg TID prn.       The following portions of the patient's history were reviewed and updated as appropriate: allergies, current medications, past family history, past medical history, past social history, past surgical history and problem list.    Review of Systems   Constitutional: Negative for chills, fatigue and fever.   HENT: Positive for hearing loss. Negative for trouble swallowing.    Eyes: Negative for visual disturbance.   Respiratory: Negative for shortness of breath.    Cardiovascular: Negative for chest pain.   Gastrointestinal: Positive for abdominal pain and nausea. Negative for constipation, diarrhea and vomiting.   Genitourinary: Negative for urinary incontinence.   Musculoskeletal: Positive for arthralgias and back pain. Negative for joint swelling, myalgias and neck pain.   Neurological: Positive for headache. Negative for dizziness, weakness and numbness.       Objective   Physical Exam  Constitutional:       Appearance: Normal appearance. She is well-developed.   HENT:      Head: Normocephalic and atraumatic.   Eyes:      Pupils: Pupils are equal, round, and reactive to light.   Cardiovascular:      Rate and Rhythm: Normal rate and regular rhythm.      Heart sounds: Normal heart sounds.   Pulmonary:      Effort: Pulmonary effort is normal.      Breath sounds: Normal breath  sounds.   Abdominal:      General: Bowel sounds are normal. There is no distension.      Palpations: Abdomen is soft.      Tenderness: There is no abdominal tenderness.   Musculoskeletal:      Cervical back: Normal range of motion.   Neurological:      Mental Status: She is alert and oriented to person, place, and time.      Sensory: No sensory deficit.      Deep Tendon Reflexes: Reflexes are normal and symmetric. Reflexes normal.   Psychiatric:         Mood and Affect: Mood normal.         Behavior: Behavior normal.         Thought Content: Thought content normal.         Judgment: Judgment normal.           Assessment & Plan   Diagnoses and all orders for this visit:    1. Chronic bilateral low back pain without sciatica (Primary)    2. Chronic pain of both knees    3. Chronic right shoulder pain    4. Post-traumatic osteoarthritis of both knees      UDS in order 5/24/22.  Discussed risks and benefits of opioid treatment for chonic pain with patient, including expectations related to prescription requests, alternative modalities to opioids for managing pain, her treatment plan, risks of dependency and addiction, and safe storage practices for prescribed opioids, as well as proper and improper disposal of all medications.  Treatment plan will consist of continuing current medication as long as it remains effective and is necessary, while evaluating patient at each visit and determining if the medication can be lowered or discontinued, while also using nonopioid therapies to reduce reliance on opioids.  Failed Tylenol #3 TID prn with AMS. Failed NSAIDs, Tylenol, Voltaren gel, allergic to Hydrocodone. Began Percocet 5mg TID prn, helping but not sufficient, increased to 7.5mg QID prn, insufficient, increased to 10mg QID prn, doing well now. Denies any side effects. Filled 12/8/22 per Inspect.  Performed b/l Monovisc injections with significant improvement, will repeat as necessary up to q6 months, cannot have steroids  with DM2. Schedule next visit.  RTC in 3 months for f/u, knee injections.    INSPECT REPORT     As part of the patient's treatment plan, I am prescribing controlled substances. The patient has been made aware of appropriate use of such medications, including potential risk of somnolence, limited ability to drive and/or work safely, and the potential for dependence or overdose. It has also bee made clear that these medications are for use by this patient only, without concomitant use of alcohol or other substances unless prescribed.      Patient has completed prescribing agreement detailing terms of continued prescribing of controlled substances, including monitoring INSPECT reports, urine drug screening, and pill counts if necessary. The patient is aware that inappropriate use will results in cessation of prescribing such medications.     INSPECT report has been reviewed and scanned into the patient's chart.     As the clinician, I personally reviewed the INSPECT while the patient was in the office today.     History and physical exam exhibit continued safe and appropriate use of controlled substances.

## 2023-01-05 ENCOUNTER — TELEPHONE (OUTPATIENT)
Dept: PAIN MEDICINE | Facility: HOSPITAL | Age: 43
End: 2023-01-05
Payer: MEDICAID

## 2023-01-06 DIAGNOSIS — F43.12 CHRONIC POSTTRAUMATIC STRESS DISORDER: Chronic | ICD-10-CM

## 2023-01-06 DIAGNOSIS — F33.2 SEVERE RECURRENT MAJOR DEPRESSION WITHOUT PSYCHOTIC FEATURES: ICD-10-CM

## 2023-01-06 DIAGNOSIS — F41.1 GENERALIZED ANXIETY DISORDER: Chronic | ICD-10-CM

## 2023-01-06 RX ORDER — LAMOTRIGINE 100 MG/1
TABLET ORAL
Qty: 90 TABLET | Refills: 0 | Status: SHIPPED | OUTPATIENT
Start: 2023-01-06

## 2023-01-07 DIAGNOSIS — F33.2 SEVERE RECURRENT MAJOR DEPRESSION WITHOUT PSYCHOTIC FEATURES: ICD-10-CM

## 2023-01-07 DIAGNOSIS — K59.04 CHRONIC IDIOPATHIC CONSTIPATION: ICD-10-CM

## 2023-01-09 ENCOUNTER — OFFICE VISIT (OUTPATIENT)
Dept: FAMILY MEDICINE CLINIC | Facility: CLINIC | Age: 43
End: 2023-01-09
Payer: MEDICAID

## 2023-01-09 VITALS
WEIGHT: 291.6 LBS | HEART RATE: 81 BPM | OXYGEN SATURATION: 99 % | SYSTOLIC BLOOD PRESSURE: 120 MMHG | DIASTOLIC BLOOD PRESSURE: 70 MMHG | RESPIRATION RATE: 16 BRPM | HEIGHT: 63 IN | TEMPERATURE: 96.9 F | BODY MASS INDEX: 51.67 KG/M2

## 2023-01-09 DIAGNOSIS — R10.9 FLANK PAIN: Primary | ICD-10-CM

## 2023-01-09 DIAGNOSIS — R10.9 FLANK PAIN: ICD-10-CM

## 2023-01-09 LAB
BILIRUB BLD-MCNC: NEGATIVE MG/DL
CLARITY, POC: CLEAR
COLOR UR: YELLOW
EXPIRATION DATE: NORMAL
GLUCOSE UR STRIP-MCNC: NEGATIVE MG/DL
KETONES UR QL: NEGATIVE
LEUKOCYTE EST, POC: NEGATIVE
Lab: NORMAL
NITRITE UR-MCNC: NEGATIVE MG/ML
PH UR: 5.5 [PH] (ref 5–8)
PROT UR STRIP-MCNC: NEGATIVE MG/DL
RBC # UR STRIP: NEGATIVE /UL
SP GR UR: 1.03 (ref 1–1.03)
UROBILINOGEN UR QL: NORMAL

## 2023-01-09 PROCEDURE — T1015 CLINIC SERVICE: HCPCS | Performed by: FAMILY MEDICINE

## 2023-01-09 PROCEDURE — 99214 OFFICE O/P EST MOD 30 MIN: CPT | Performed by: FAMILY MEDICINE

## 2023-01-09 PROCEDURE — 81003 URINALYSIS AUTO W/O SCOPE: CPT | Performed by: FAMILY MEDICINE

## 2023-01-09 RX ORDER — LINACLOTIDE 145 UG/1
CAPSULE, GELATIN COATED ORAL
Qty: 30 CAPSULE | Refills: 5 | Status: SHIPPED | OUTPATIENT
Start: 2023-01-09 | End: 2023-04-06 | Stop reason: SDUPTHER

## 2023-01-09 RX ORDER — CARIPRAZINE 3 MG/1
CAPSULE, GELATIN COATED ORAL
Qty: 30 CAPSULE | Refills: 0 | Status: SHIPPED | OUTPATIENT
Start: 2023-01-09 | End: 2023-04-06 | Stop reason: SDUPTHER

## 2023-01-09 NOTE — PROGRESS NOTES
Subjective   Noa Soni is a 42 y.o. female.   Chief Complaint   Patient presents with   • Flank Pain       History of Present Illness   Presents to the office today as a same-day add-on for what the schedule says is kidney pain.  Urinalysis done in the office today is negative.  She has known chronic back pain.  She had an x-ray of her lumbar spine in March 2020 which showed degenerative changes.  Interestingly, when I told them I thought her low back pain was due to her back, she and her boyfriend tell me that the pain doctor only treats her for knee pain.  His most recent visit which was from 5 days ago listed his first diagnosis is chronic low back pain.  She tells me that she did not tell him about this worsening of her back pain.    She denies any dysuria.  No blood in the urine.  Her back hurt worse going over bumps in the car on the way here.  She tells me she has had the symptoms now for 2 to 3 weeks.  The pain is in her right lower back and radiates around to the right groin.        Patient Active Problem List    Diagnosis Date Noted   • Chronic pain of both knees 05/24/2022   • Chronic bilateral low back pain without sciatica 05/24/2022   • Chronic right shoulder pain 05/24/2022   • Morbid obesity (HCC) 04/27/2022   • Nasal congestion 11/20/2021   • Post-traumatic osteoarthritis of both knees 11/10/2021   • Severe recurrent major depression without psychotic features (Former[facility]) 06/17/2021   • Chronic posttraumatic stress disorder 06/17/2021   • Generalized anxiety disorder 06/17/2021   • Callus of foot 10/19/2020   • Perennial allergic rhinitis 10/19/2020   • NESHA (obstructive sleep apnea) 10/19/2020   • Class 3 severe obesity due to excess calories without serious comorbidity with body mass index (BMI) of 45.0 to 49.9 in adult (Former[facility]) 10/12/2020   • Lymphadenopathy 10/06/2020   • Essential hypertension 07/27/2020   • Depression 07/27/2020   • Learning disability 07/27/2020     Note Last Updated: 7/27/2020      No other details     • Type 2 diabetes mellitus (HCC) 01/17/2020   • History of endometriosis 01/17/2020   • History of PCOS 01/17/2020           Past Surgical History:   Procedure Laterality Date   • EAR TUBES     • INTRAUTERINE DEVICE INSERTION  10/14/2020   • LAPAROSCOPIC TUBAL LIGATION     • TONSILLECTOMY AND ADENOIDECTOMY       Current Outpatient Medications on File Prior to Visit   Medication Sig   • acetaminophen (TYLENOL) 500 MG tablet Take 2,000 mg by mouth 2 (Two) Times a Day.   • Blood Glucose Monitoring Suppl kit Use as directed to check blood glucose   • clotrimazole (LOTRIMIN) 1 % cream Apply 1 application topically to the appropriate area as directed 2 (Two) Times a Day.   • Dulaglutide (Trulicity) 1.5 MG/0.5ML solution pen-injector Inject 1.5 mg under the skin into the appropriate area as directed Every 7 (Seven) Days.   • EPINEPHrine (EPIPEN) 0.3 MG/0.3ML solution auto-injector injection USE AS DIRECTED FOR ACUTE ALLERGIC REACTION   • ferrous sulfate 325 (65 FE) MG tablet Take 1 tablet by mouth Daily With Breakfast.   • fexofenadine (ALLEGRA) 180 MG tablet Take 1 tablet by mouth once daily   • FLUoxetine (PROzac) 40 MG capsule Take 1 capsule by mouth once daily   • glucose blood test strip Use as instructed to check blood glucose once daily   • Januvia 100 MG tablet Take 1 tablet by mouth once daily   • lamoTRIgine (LaMICtal) 100 MG tablet Take 1 tablet by mouth once daily   • Lancets misc Use once daily with meter and strips to check blood glucose   • Linzess 145 MCG capsule capsule TAKE 1 CAPSULE BY MOUTH ONCE DAILY IN THE MORNING BEFORE BREAKFAST   • lisinopril (PRINIVIL,ZESTRIL) 10 MG tablet Take 1 tablet by mouth once daily   • Melatonin 5 MG chewable tablet Chew 5 mg every night at bedtime.   • oxyCODONE-acetaminophen (Percocet)  MG per tablet Take 1 tablet by mouth Every 6 (Six) Hours As Needed for Moderate Pain.   • SUMAtriptan (Imitrex) 50 MG tablet Take one tablet at onset of  headache. May repeat dose one time in 2 hours if headache not relieved.   • Vraylar 3 MG capsule capsule Take 1 capsule by mouth once daily   • oxyCODONE-acetaminophen (Percocet)  MG per tablet Take 1 tablet by mouth Every 6 (Six) Hours As Needed for Moderate Pain.   • oxyCODONE-acetaminophen (Percocet)  MG per tablet Take 1 tablet by mouth Every 6 (Six) Hours As Needed for Moderate Pain.     No current facility-administered medications on file prior to visit.     Allergies   Allergen Reactions   • Tylenol With Codeine #3 [Acetaminophen-Codeine] Hallucinations   • Abilify [Aripiprazole] Rash   • Metformin Diarrhea   • Vicodin [Hydrocodone-Acetaminophen] GI Intolerance     Social History     Socioeconomic History   • Marital status: Single   • Number of children: 0   Tobacco Use   • Smoking status: Former     Packs/day: 1.00     Years: 1.00     Pack years: 1.00     Types: Cigarettes     Start date: 2017     Quit date: 2018     Years since quittin.0     Passive exposure: Past   • Smokeless tobacco: Never   • Tobacco comments:     socially    Vaping Use   • Vaping Use: Never used   Substance and Sexual Activity   • Alcohol use: Never   • Drug use: Never   • Sexual activity: Not Currently     Partners: Male     Family History   Problem Relation Age of Onset   • Hypertension Mother    • Depression Mother    • Post-traumatic stress disorder Mother    • Hyperlipidemia Mother    • Anxiety disorder Mother    • Hypertension Father    • Hyperlipidemia Father    • Hypertension Brother    • Depression Brother    • Parkinsonism Maternal Grandmother    • Dementia Maternal Grandmother    • Atrial fibrillation Maternal Grandfather    • Breast cancer Paternal Grandmother    • COPD Paternal Grandfather        Review of Systems    Objective   /70 (BP Location: Right arm, Patient Position: Sitting, Cuff Size: Large Adult)   Pulse 81   Temp 96.9 °F (36.1 °C) (Infrared)   Resp 16   Ht 160 cm (62.99\")    Wt 132 kg (291 lb 9.6 oz)   LMP 01/01/2023   SpO2 99%   Breastfeeding No   BMI 51.67 kg/m²   Physical Exam  Constitutional:       Appearance: She is well-developed.      Comments: Wearing a face mask     HENT:      Head: Normocephalic and atraumatic.   Eyes:      Conjunctiva/sclera: Conjunctivae normal.   Cardiovascular:      Rate and Rhythm: Normal rate.   Pulmonary:      Effort: Pulmonary effort is normal.   Abdominal:      Tenderness: There is right CVA tenderness (immediately after I lightly touched her back, she jumped). There is no left CVA tenderness.   Musculoskeletal:         General: Normal range of motion.      Cervical back: Normal range of motion.   Skin:     General: Skin is warm and dry.      Findings: No rash.   Neurological:      Mental Status: She is alert and oriented to person, place, and time.   Psychiatric:         Behavior: Behavior normal.           Office Visit on 01/09/2023   Component Date Value Ref Range Status   • Color 01/09/2023 Yellow  Yellow, Straw, Dark Yellow, Tierney Final   • Clarity, UA 01/09/2023 Clear  Clear Final   • Specific Gravity  01/09/2023 1.030  1.005 - 1.030 Final   • pH, Urine 01/09/2023 5.5  5.0 - 8.0 Final   • Leukocytes 01/09/2023 Negative  Negative Final   • Nitrite, UA 01/09/2023 Negative  Negative Final   • Protein, POC 01/09/2023 Negative  Negative mg/dL Final   • Glucose, UA 01/09/2023 Negative  Negative mg/dL Final   • Ketones, UA 01/09/2023 Negative  Negative Final   • Urobilinogen, UA 01/09/2023 Normal  Normal, 0.2 E.U./dL Final   • Bilirubin 01/09/2023 Negative  Negative Final   • Blood, UA 01/09/2023 Negative  Negative Final   • Lot Number 01/09/2023 [id number]   Final   • Expiration Date 01/09/2023 11/30/2023   Final           Assessment & Plan   Diagnoses and all orders for this visit:    1. Flank pain (Primary)  -     POCT urinalysis dipstick, automated  -     Cancel: CT Abdomen Pelvis Stone Protocol; Future  -     CT Abdomen Pelvis Stone Protocol;  Future    The CVA tenderness on exam seemed a little dyssynchronous with her other symptoms.  Will get a CT of her kidneys-renal stone protocol to rule out a stone.  Urinalysis is normal.  If its normal, then she just needs to talk to her pain specialist about back pain, which he is treating her for.  If there is a kidney stone, then we will get that addressed.  Currently there is no sign of infection.  Keep follow-up as planned for management of her diabetes.        Call with any problems or concerns before next visit       Return if symptoms worsen or fail to improve.      Much of this report is an electronic transcription of spoken language to printed text using Dragon dictation software.  As such, the subtleties and finesse of spoken language may permit erroneous, or at times, nonsensical words or phrases to be inadvertently transcribed; thus changes may be made at a later date to rectify these errors.     Bree Wan MD1/9/202310:19 EST  This note has been electronically signed

## 2023-01-16 ENCOUNTER — TELEPHONE (OUTPATIENT)
Dept: PAIN MEDICINE | Facility: CLINIC | Age: 43
End: 2023-01-16
Payer: MEDICAID

## 2023-01-16 NOTE — TELEPHONE ENCOUNTER
Caller: Noa Soni    Relationship to patient: Self    Best call back number:     Chief complaint: BILATERAL KNEE PAIN    Type of visit: MONOVISC    Requested date: AROUND 2/1/23    HUB UNABLE TO TRANSFER

## 2023-02-01 ENCOUNTER — HOSPITAL ENCOUNTER (OUTPATIENT)
Dept: PAIN MEDICINE | Facility: HOSPITAL | Age: 43
Discharge: HOME | End: 2023-02-01
Admitting: PHYSICAL MEDICINE & REHABILITATION
Payer: MEDICAID

## 2023-02-01 VITALS
DIASTOLIC BLOOD PRESSURE: 85 MMHG | HEART RATE: 82 BPM | RESPIRATION RATE: 16 BRPM | OXYGEN SATURATION: 96 % | SYSTOLIC BLOOD PRESSURE: 123 MMHG | TEMPERATURE: 97.8 F

## 2023-02-01 DIAGNOSIS — M25.561 CHRONIC PAIN OF BOTH KNEES: Primary | ICD-10-CM

## 2023-02-01 DIAGNOSIS — M17.2 POST-TRAUMATIC OSTEOARTHRITIS OF BOTH KNEES: ICD-10-CM

## 2023-02-01 DIAGNOSIS — M25.562 CHRONIC PAIN OF BOTH KNEES: Primary | ICD-10-CM

## 2023-02-01 DIAGNOSIS — G89.29 CHRONIC PAIN OF BOTH KNEES: Primary | ICD-10-CM

## 2023-02-01 PROCEDURE — 25010000002 HYALURONAN 88 MG/4ML SOLUTION PREFILLED SYRINGE: Performed by: PHYSICAL MEDICINE & REHABILITATION

## 2023-02-01 PROCEDURE — 20610 DRAIN/INJ JOINT/BURSA W/O US: CPT | Performed by: PHYSICAL MEDICINE & REHABILITATION

## 2023-02-01 RX ORDER — LIDOCAINE HYDROCHLORIDE 10 MG/ML
4 INJECTION, SOLUTION EPIDURAL; INFILTRATION; INTRACAUDAL; PERINEURAL ONCE
Status: COMPLETED | OUTPATIENT
Start: 2023-02-01 | End: 2023-02-01

## 2023-02-01 RX ADMIN — Medication 88 MG: at 13:11

## 2023-02-01 RX ADMIN — LIDOCAINE HYDROCHLORIDE 4 ML: 10 INJECTION, SOLUTION EPIDURAL; INFILTRATION; INTRACAUDAL; PERINEURAL at 13:11

## 2023-02-01 NOTE — PROCEDURES
Procedures     Chronic bilateral knee pain, also low back and right shoulder pain, nonradiating, 10/10 at worst, 8/10 at best, always present, varies, began years ago, worsening over time, aching, sharp, worse with bending and walking, interferes with sleep, exercise. X-ray b/l knees with mod OA worst in medial compartment. Saw PCP, notes reviewed, with referral to Dr. Leiva who decline steroid injections with DM2, has failed NSAIDs, Tylenol, Voltaren gel, sees Dr. Valencia on multiple medications. No FH of substance abuse. Began Percocet 5mg TID prn, then 7.5mg TID prn.    UDS in order 5/24/22.  Discussed risks and benefits of opioid treatment for chonic pain with patient, including expectations related to prescription requests, alternative modalities to opioids for managing pain, her treatment plan, risks of dependency and addiction, and safe storage practices for prescribed opioids, as well as proper and improper disposal of all medications.  Treatment plan will consist of continuing current medication as long as it remains effective and is necessary, while evaluating patient at each visit and determining if the medication can be lowered or discontinued, while also using nonopioid therapies to reduce reliance on opioids.  Failed Tylenol #3 TID prn with AMS. Failed NSAIDs, Tylenol, Voltaren gel, allergic to Hydrocodone. Began Percocet 5mg TID prn, helping but not sufficient, increased to 7.5mg QID prn, insufficient, increased to 10mg QID prn, doing well now. Denies any side effects. Filled 12/8/22 per Inspect.  Performed b/l Monovisc injections with significant improvement, will repeat as necessary up to q6 months, cannot have steroids with DM2. Schedule next visit.  RTC in 3 months for f/u, discuss LBP and hip pain.      KNEE MONOVISC INJECTION     PREOPERATIVE DIAGNOSIS:  Knee pain     POSTOPERATIVE DIAGNOSIS:  Knee pain     PROCEDURE PERFORMED:  BILATERAL MONOVISC KNEE INJECTION      The patient understands the  "risks and benefits of the procedure and wishes to proceed. The patient was seen in the preoperative area. Patient's consent was obtained and updated. Vitals were taken. Patient was then placed in a seated position for the injection. Site marked for an inferior lateral approach, and a 22 gauge 1.5\" needle was passed through skin anesthetized with 1% Lidocaine without epinephrine. The needle tip was advanced to the joint space, the syringes were exchanged, and Monovisc was injected after negative aspiration. The patient tolerated with no geetha-procedural complications.  A sterile dressing was placed over the puncture site.  "

## 2023-02-10 RX ORDER — FEXOFENADINE HCL 180 MG/1
TABLET ORAL
Qty: 90 TABLET | Refills: 0 | Status: SHIPPED | OUTPATIENT
Start: 2023-02-10

## 2023-02-10 RX ORDER — SITAGLIPTIN 100 MG/1
TABLET, FILM COATED ORAL
Qty: 30 TABLET | Refills: 0 | Status: SHIPPED | OUTPATIENT
Start: 2023-02-10 | End: 2023-04-03

## 2023-03-06 ENCOUNTER — OFFICE VISIT (OUTPATIENT)
Dept: FAMILY MEDICINE CLINIC | Facility: CLINIC | Age: 43
End: 2023-03-06
Payer: MEDICAID

## 2023-03-06 VITALS
OXYGEN SATURATION: 99 % | HEART RATE: 83 BPM | SYSTOLIC BLOOD PRESSURE: 120 MMHG | WEIGHT: 289.2 LBS | BODY MASS INDEX: 51.24 KG/M2 | HEIGHT: 63 IN | RESPIRATION RATE: 18 BRPM | DIASTOLIC BLOOD PRESSURE: 80 MMHG | TEMPERATURE: 97.3 F

## 2023-03-06 DIAGNOSIS — E11.65 TYPE 2 DIABETES MELLITUS WITH HYPERGLYCEMIA, WITHOUT LONG-TERM CURRENT USE OF INSULIN: Primary | ICD-10-CM

## 2023-03-06 DIAGNOSIS — H65.33 CHRONIC MUCOID OTITIS MEDIA, BILATERAL: ICD-10-CM

## 2023-03-06 DIAGNOSIS — F39 MOOD DISORDER: ICD-10-CM

## 2023-03-06 DIAGNOSIS — I10 ESSENTIAL HYPERTENSION: ICD-10-CM

## 2023-03-06 PROCEDURE — 1159F MED LIST DOCD IN RCRD: CPT | Performed by: FAMILY MEDICINE

## 2023-03-06 PROCEDURE — T1015 CLINIC SERVICE: HCPCS | Performed by: FAMILY MEDICINE

## 2023-03-06 PROCEDURE — 99214 OFFICE O/P EST MOD 30 MIN: CPT | Performed by: FAMILY MEDICINE

## 2023-03-06 PROCEDURE — 1160F RVW MEDS BY RX/DR IN RCRD: CPT | Performed by: FAMILY MEDICINE

## 2023-03-06 PROCEDURE — 3079F DIAST BP 80-89 MM HG: CPT | Performed by: FAMILY MEDICINE

## 2023-03-06 PROCEDURE — 3074F SYST BP LT 130 MM HG: CPT | Performed by: FAMILY MEDICINE

## 2023-03-06 PROCEDURE — 3046F HEMOGLOBIN A1C LEVEL >9.0%: CPT | Performed by: FAMILY MEDICINE

## 2023-03-06 NOTE — PROGRESS NOTES
Subjective   Noa Soni is a 42 y.o. female.   Chief Complaint   Patient presents with   • Diabetes   • Hypertension   • mood disorder   • Earache       History of Present Illness   Presents to the office today to follow-up on chronic medical problems per active problem list as below.      She had labs done in early December.  Her A1c then was 8.2%.      Diabetes type 2-does not bring any blood sugar log with her.  Tells me that she really does not follow any specific diet.  She does take her diabetes medicines fairly routinely.  Cannot tell me she never misses a dose.    HTN-blood pressure in the office today is good at 120/80.  Blood pressure medicine includes lisinopril 10 mg/day.  She coughs sometimes, but I cannot elicit a clear history of unprovoked cough unrelated to other symptoms.    Mood disorder- due to combination of anxiety, depression, PTSD, learning disability.  Inconsistent follow-up with psychiatry.  She has been seeing Dr. Valencia and apparently just recently canceled an appointment with the psychiatry nurse practitioner.    Chronic pain-multifactorial.  She has chronic low back pain, pain in both knees and her shoulders.  She follows with Dr. Connors for management of chronic pain.    No complaint today is that of an earache.  I referred her to ENT last spring due to chronic serous otitis.  She tells me today she does not remember when she last went there or if she ever went.  I do not actually have any consult notes from ENT to know for sure.        Patient Active Problem List    Diagnosis Date Noted   • Chronic pain of both knees 05/24/2022   • Chronic bilateral low back pain without sciatica 05/24/2022   • Chronic right shoulder pain 05/24/2022   • Morbid obesity (HCC) 04/27/2022   • Nasal congestion 11/20/2021   • Post-traumatic osteoarthritis of both knees 11/10/2021   • Severe recurrent major depression without psychotic features ([facility]) 06/17/2021   • Chronic posttraumatic stress disorder  06/17/2021   • Generalized anxiety disorder 06/17/2021   • Callus of foot 10/19/2020   • Perennial allergic rhinitis 10/19/2020   • NESHA (obstructive sleep apnea) 10/19/2020   • Class 3 severe obesity due to excess calories without serious comorbidity with body mass index (BMI) of 45.0 to 49.9 in adult (HCC) 10/12/2020   • Lymphadenopathy 10/06/2020   • Essential hypertension 07/27/2020   • Learning disability 07/27/2020     Note Last Updated: 7/27/2020     No other details     • Type 2 diabetes mellitus (HCC) 01/17/2020   • History of endometriosis 01/17/2020   • History of PCOS 01/17/2020           Past Surgical History:   Procedure Laterality Date   • EAR TUBES     • ENDOMETRIAL ABLATION     • INTRAUTERINE DEVICE INSERTION  10/14/2020   • LAPAROSCOPIC TUBAL LIGATION     • TONSILLECTOMY AND ADENOIDECTOMY       Current Outpatient Medications on File Prior to Visit   Medication Sig   • clotrimazole (LOTRIMIN) 1 % cream Apply 1 application topically to the appropriate area as directed 2 (Two) Times a Day.   • Dulaglutide (Trulicity) 1.5 MG/0.5ML solution pen-injector Inject 1.5 mg under the skin into the appropriate area as directed Every 7 (Seven) Days.   • ferrous sulfate 325 (65 FE) MG tablet Take 1 tablet by mouth Daily With Breakfast.   • fexofenadine (ALLEGRA) 180 MG tablet Take 1 tablet by mouth once daily   • FLUoxetine (PROzac) 40 MG capsule Take 1 capsule by mouth once daily   • Januvia 100 MG tablet Take 1 tablet by mouth once daily   • lamoTRIgine (LaMICtal) 100 MG tablet Take 1 tablet by mouth once daily   • Linzess 145 MCG capsule capsule TAKE 1 CAPSULE BY MOUTH ONCE DAILY IN THE MORNING BEFORE BREAKFAST   • lisinopril (PRINIVIL,ZESTRIL) 10 MG tablet Take 1 tablet by mouth once daily   • Melatonin 5 MG chewable tablet Chew 5 mg every night at bedtime.   • oxyCODONE-acetaminophen (Percocet)  MG per tablet Take 1 tablet by mouth Every 6 (Six) Hours As Needed for Moderate Pain.   • SUMAtriptan  (Imitrex) 50 MG tablet Take one tablet at onset of headache. May repeat dose one time in 2 hours if headache not relieved.   • Vraylar 3 MG capsule capsule Take 1 capsule by mouth once daily   • Blood Glucose Monitoring Suppl kit Use as directed to check blood glucose   • EPINEPHrine (EPIPEN) 0.3 MG/0.3ML solution auto-injector injection USE AS DIRECTED FOR ACUTE ALLERGIC REACTION   • glucose blood test strip Use as instructed to check blood glucose once daily   • Lancets misc Use once daily with meter and strips to check blood glucose   • oxyCODONE-acetaminophen (Percocet)  MG per tablet Take 1 tablet by mouth Every 6 (Six) Hours As Needed for Moderate Pain.   • oxyCODONE-acetaminophen (Percocet)  MG per tablet Take 1 tablet by mouth Every 6 (Six) Hours As Needed for Moderate Pain.     No current facility-administered medications on file prior to visit.     Allergies   Allergen Reactions   • Tylenol With Codeine #3 [Acetaminophen-Codeine] Hallucinations   • Abilify [Aripiprazole] Rash   • Metformin Diarrhea   • Vicodin [Hydrocodone-Acetaminophen] GI Intolerance     Social History     Socioeconomic History   • Marital status: Single   • Number of children: 0   Tobacco Use   • Smoking status: Former     Packs/day: 1.00     Years: 1.00     Pack years: 1.00     Types: Cigarettes     Start date: 2017     Quit date: 2018     Years since quittin.1     Passive exposure: Past   • Smokeless tobacco: Never   • Tobacco comments:     socially    Vaping Use   • Vaping Use: Never used   Substance and Sexual Activity   • Alcohol use: Never   • Drug use: Never   • Sexual activity: Not Currently     Partners: Male     Family History   Problem Relation Age of Onset   • Hypertension Mother    • Depression Mother    • Post-traumatic stress disorder Mother    • Hyperlipidemia Mother    • Anxiety disorder Mother    • Hypertension Father    • Hyperlipidemia Father    • Hypertension Brother    • Depression Brother   "  • Parkinsonism Maternal Grandmother    • Dementia Maternal Grandmother    • Atrial fibrillation Maternal Grandfather    • Breast cancer Paternal Grandmother    • COPD Paternal Grandfather        Review of Systems    Objective   /80 (BP Location: Right arm, Patient Position: Sitting, Cuff Size: Large Adult)   Pulse 83   Temp 97.3 °F (36.3 °C) (Infrared)   Resp 18   Ht 160 cm (62.99\")   Wt 131 kg (289 lb 3.2 oz)   LMP  (LMP Unknown)   SpO2 99%   Breastfeeding No   BMI 51.25 kg/m²   Physical Exam  Constitutional:       Appearance: She is well-developed. She is obese. She is not toxic-appearing.      Comments: Wearing a face mask     HENT:      Head: Normocephalic and atraumatic.      Ears:      Comments: Both tympanic membranes show air-fluid levels.  Clear fluid behind both TMs.  Eyes:      Conjunctiva/sclera: Conjunctivae normal.   Cardiovascular:      Rate and Rhythm: Normal rate.   Pulmonary:      Effort: Pulmonary effort is normal.   Musculoskeletal:         General: Normal range of motion.      Cervical back: Normal range of motion.   Skin:     General: Skin is warm and dry.      Findings: No rash.   Neurological:      Mental Status: She is alert and oriented to person, place, and time.   Psychiatric:         Behavior: Behavior normal.           Office Visit on 01/09/2023   Component Date Value Ref Range Status   • Color 01/09/2023 Yellow  Yellow, Straw, Dark Yellow, Tierney Final   • Clarity, UA 01/09/2023 Clear  Clear Final   • Specific Gravity  01/09/2023 1.030  1.005 - 1.030 Final   • pH, Urine 01/09/2023 5.5  5.0 - 8.0 Final   • Leukocytes 01/09/2023 Negative  Negative Final   • Nitrite, UA 01/09/2023 Negative  Negative Final   • Protein, POC 01/09/2023 Negative  Negative mg/dL Final   • Glucose, UA 01/09/2023 Negative  Negative mg/dL Final   • Ketones, UA 01/09/2023 Negative  Negative Final   • Urobilinogen, UA 01/09/2023 Normal  Normal, 0.2 E.U./dL Final   • Bilirubin 01/09/2023 Negative  " Negative Final   • Blood, UA 01/09/2023 Negative  Negative Final   • Lot Number 01/09/2023 [id number]   Final   • Expiration Date 01/09/2023 11/30/2023   Final   Office Visit on 12/05/2022   Component Date Value Ref Range Status   • Hemoglobin A1C 12/05/2022 8.2 (H)  4.8 - 5.6 % Final    Comment:          Prediabetes: 5.7 - 6.4           Diabetes: >6.4           Glycemic control for adults with diabetes: <7.0     • WBC 12/05/2022 7.2  3.4 - 10.8 x10E3/uL Final   • RBC 12/05/2022 4.35  3.77 - 5.28 x10E6/uL Final   • Hemoglobin 12/05/2022 12.2  11.1 - 15.9 g/dL Final   • Hematocrit 12/05/2022 36.9  34.0 - 46.6 % Final   • MCV 12/05/2022 85  79 - 97 fL Final   • MCH 12/05/2022 28.0  26.6 - 33.0 pg Final   • MCHC 12/05/2022 33.1  31.5 - 35.7 g/dL Final   • RDW 12/05/2022 13.0  11.7 - 15.4 % Final   • Platelets 12/05/2022 226  150 - 450 x10E3/uL Final   • Neutrophil Rel % 12/05/2022 58  Not Estab. % Final   • Lymphocyte Rel % 12/05/2022 32  Not Estab. % Final   • Monocyte Rel % 12/05/2022 7  Not Estab. % Final   • Eosinophil Rel % 12/05/2022 2  Not Estab. % Final   • Basophil Rel % 12/05/2022 0  Not Estab. % Final   • Neutrophils Absolute 12/05/2022 4.2  1.4 - 7.0 x10E3/uL Final   • Lymphocytes Absolute 12/05/2022 2.3  0.7 - 3.1 x10E3/uL Final   • Monocytes Absolute 12/05/2022 0.5  0.1 - 0.9 x10E3/uL Final   • Eosinophils Absolute 12/05/2022 0.1  0.0 - 0.4 x10E3/uL Final   • Basophils Absolute 12/05/2022 0.0  0.0 - 0.2 x10E3/uL Final   • Immature Granulocyte Rel % 12/05/2022 1  Not Estab. % Final   • Immature Grans Absolute 12/05/2022 0.1  0.0 - 0.1 x10E3/uL Final   • Glucose 12/05/2022 212 (H)  70 - 99 mg/dL Final   • BUN 12/05/2022 12  6 - 20 mg/dL Final   • Creatinine 12/05/2022 0.71  0.57 - 1.00 mg/dL Final   • EGFR Result 12/05/2022 111  >59 mL/min/1.73 Final   • BUN/Creatinine Ratio 12/05/2022 17  9 - 23 Final   • Sodium 12/05/2022 138  134 - 144 mmol/L Final   • Potassium 12/05/2022 4.1  3.5 - 5.2 mmol/L Final   •  Chloride 12/05/2022 99  96 - 106 mmol/L Final   • Total CO2 12/05/2022 24  20 - 29 mmol/L Final   • Calcium 12/05/2022 9.3  8.7 - 10.2 mg/dL Final   • Total Protein 12/05/2022 7.7  6.0 - 8.5 g/dL Final   • Albumin 12/05/2022 4.2  3.8 - 4.8 g/dL Final   • Globulin 12/05/2022 3.5  1.5 - 4.5 g/dL Final   • A/G Ratio 12/05/2022 1.2  1.2 - 2.2 Final   • Total Bilirubin 12/05/2022 1.2  0.0 - 1.2 mg/dL Final   • Alkaline Phosphatase 12/05/2022 113  44 - 121 IU/L Final   • AST (SGOT) 12/05/2022 33  0 - 40 IU/L Final   • ALT (SGPT) 12/05/2022 24  0 - 32 IU/L Final         Assessment & Plan   Diagnoses and all orders for this visit:    1. Type 2 diabetes mellitus with hyperglycemia, without long-term current use of insulin (HCC) (Primary)  -     Hemoglobin A1c  -     Comprehensive Metabolic Panel  -     TSH  -     CBC & Differential    2. Essential hypertension    3. Mood disorder (HCC)    4. Chronic mucoid otitis media, bilateral    Status of multiple chronic medical problems reviewed today.  Diabetes control is unknown.  Get labs as above.  I will follow-up with her when the results are back.  I reiterated to her the importance of taking her medications consistently.  Continue Trulicity 1.5 mg/week until she hears from [place] with results of her labs.  Continue Januvia 100 mg/day.  Blood pressure control today is good.  Continue lisinopril 10 mg/day.  I reminded her it is important to have consistent follow-up with her psychiatrist.  She should continue her fluoxetine, Lamictal, Vraylar all at current doses and call to reschedule the appointment that she recently canceled.  She is not taking allergy medicines routinely.  I think she should restart the Allegra 180 mg/day and begin using Flonase 2 sprays each nostril daily.  I am happy to make another referral back to ENT if she will commit to going.  At this point she wants to try taking these medications and seeing how she does.  I will follow-up with her when the results of  these tests are back.  I will see her again in 3 months or sooner if needed.        Call with any problems or concerns before next visit       Return in about 3 months (around 6/6/2023).      Much of this report is an electronic transcription of spoken language to printed text using Dragon dictation software.  As such, the subtleties and finesse of spoken language may permit erroneous, or at times, nonsensical words or phrases to be inadvertently transcribed; thus changes may be made at a later date to rectify these errors.     Bree Wan [id number]/12/763039:30 EDT  This note has been electronically signed

## 2023-03-07 LAB
ALBUMIN SERPL-MCNC: 4 G/DL (ref 3.8–4.8)
ALBUMIN/GLOB SERPL: 1.3 {RATIO} (ref 1.2–2.2)
ALP SERPL-CCNC: 146 IU/L (ref 44–121)
ALT SERPL-CCNC: 20 IU/L (ref 0–32)
AST SERPL-CCNC: 26 IU/L (ref 0–40)
BASOPHILS # BLD AUTO: 0 X10E3/UL (ref 0–0.2)
BASOPHILS NFR BLD AUTO: 0 %
BILIRUB SERPL-MCNC: 0.7 MG/DL (ref 0–1.2)
BUN SERPL-MCNC: 10 MG/DL (ref 6–24)
BUN/CREAT SERPL: 14 (ref 9–23)
CALCIUM SERPL-MCNC: 9.2 MG/DL (ref 8.7–10.2)
CHLORIDE SERPL-SCNC: 100 MMOL/L (ref 96–106)
CO2 SERPL-SCNC: 23 MMOL/L (ref 20–29)
CREAT SERPL-MCNC: 0.7 MG/DL (ref 0.57–1)
EGFRCR SERPLBLD CKD-EPI 2021: 112 ML/MIN/1.73
EOSINOPHIL # BLD AUTO: 0.1 X10E3/UL (ref 0–0.4)
EOSINOPHIL NFR BLD AUTO: 1 %
ERYTHROCYTE [DISTWIDTH] IN BLOOD BY AUTOMATED COUNT: 13.3 % (ref 11.7–15.4)
GLOBULIN SER CALC-MCNC: 3 G/DL (ref 1.5–4.5)
GLUCOSE SERPL-MCNC: 395 MG/DL (ref 70–99)
HBA1C MFR BLD: 9.4 % (ref 4.8–5.6)
HCT VFR BLD AUTO: 36.8 % (ref 34–46.6)
HGB BLD-MCNC: 11.8 G/DL (ref 11.1–15.9)
IMM GRANULOCYTES # BLD AUTO: 0 X10E3/UL (ref 0–0.1)
IMM GRANULOCYTES NFR BLD AUTO: 1 %
LYMPHOCYTES # BLD AUTO: 2 X10E3/UL (ref 0.7–3.1)
LYMPHOCYTES NFR BLD AUTO: 31 %
MCH RBC QN AUTO: 27.6 PG (ref 26.6–33)
MCHC RBC AUTO-ENTMCNC: 32.1 G/DL (ref 31.5–35.7)
MCV RBC AUTO: 86 FL (ref 79–97)
MONOCYTES # BLD AUTO: 0.5 X10E3/UL (ref 0.1–0.9)
MONOCYTES NFR BLD AUTO: 7 %
NEUTROPHILS # BLD AUTO: 3.8 X10E3/UL (ref 1.4–7)
NEUTROPHILS NFR BLD AUTO: 60 %
PLATELET # BLD AUTO: 247 X10E3/UL (ref 150–450)
POTASSIUM SERPL-SCNC: 4.1 MMOL/L (ref 3.5–5.2)
PROT SERPL-MCNC: 7 G/DL (ref 6–8.5)
RBC # BLD AUTO: 4.27 X10E6/UL (ref 3.77–5.28)
SODIUM SERPL-SCNC: 137 MMOL/L (ref 134–144)
TSH SERPL DL<=0.005 MIU/L-ACNC: 1.53 UIU/ML (ref 0.45–4.5)
WBC # BLD AUTO: 6.4 X10E3/UL (ref 3.4–10.8)

## 2023-03-12 DIAGNOSIS — E11.65 TYPE 2 DIABETES MELLITUS WITH HYPERGLYCEMIA, WITHOUT LONG-TERM CURRENT USE OF INSULIN: Primary | ICD-10-CM

## 2023-03-12 DIAGNOSIS — I10 ESSENTIAL HYPERTENSION: ICD-10-CM

## 2023-03-12 PROBLEM — F32.A DEPRESSION: Status: RESOLVED | Noted: 2020-07-27 | Resolved: 2023-03-12

## 2023-03-12 RX ORDER — DULAGLUTIDE 3 MG/.5ML
3 INJECTION, SOLUTION SUBCUTANEOUS
Qty: 6 ML | Refills: 1 | Status: SHIPPED | OUTPATIENT
Start: 2023-03-12

## 2023-04-03 ENCOUNTER — OFFICE VISIT (OUTPATIENT)
Dept: PAIN MEDICINE | Facility: CLINIC | Age: 43
End: 2023-04-03
Payer: MEDICAID

## 2023-04-03 VITALS
HEART RATE: 73 BPM | DIASTOLIC BLOOD PRESSURE: 86 MMHG | OXYGEN SATURATION: 97 % | RESPIRATION RATE: 16 BRPM | SYSTOLIC BLOOD PRESSURE: 145 MMHG

## 2023-04-03 DIAGNOSIS — M25.561 CHRONIC PAIN OF BOTH KNEES: ICD-10-CM

## 2023-04-03 DIAGNOSIS — M12.811 ROTATOR CUFF ARTHROPATHY OF RIGHT SHOULDER: ICD-10-CM

## 2023-04-03 DIAGNOSIS — G89.29 CHRONIC PAIN OF BOTH KNEES: ICD-10-CM

## 2023-04-03 DIAGNOSIS — M54.50 CHRONIC BILATERAL LOW BACK PAIN WITHOUT SCIATICA: Primary | ICD-10-CM

## 2023-04-03 DIAGNOSIS — Z79.899 ENCOUNTER FOR LONG-TERM (CURRENT) USE OF MEDICATIONS: ICD-10-CM

## 2023-04-03 DIAGNOSIS — M25.562 CHRONIC PAIN OF BOTH KNEES: ICD-10-CM

## 2023-04-03 DIAGNOSIS — G89.29 CHRONIC BILATERAL LOW BACK PAIN WITHOUT SCIATICA: Primary | ICD-10-CM

## 2023-04-03 DIAGNOSIS — F33.2 SEVERE RECURRENT MAJOR DEPRESSION WITHOUT PSYCHOTIC FEATURES: ICD-10-CM

## 2023-04-03 DIAGNOSIS — M25.511 CHRONIC RIGHT SHOULDER PAIN: ICD-10-CM

## 2023-04-03 DIAGNOSIS — M17.2 POST-TRAUMATIC OSTEOARTHRITIS OF BOTH KNEES: ICD-10-CM

## 2023-04-03 DIAGNOSIS — G89.29 CHRONIC RIGHT SHOULDER PAIN: ICD-10-CM

## 2023-04-03 PROCEDURE — G0463 HOSPITAL OUTPT CLINIC VISIT: HCPCS | Performed by: PHYSICAL MEDICINE & REHABILITATION

## 2023-04-03 PROCEDURE — 99214 OFFICE O/P EST MOD 30 MIN: CPT | Performed by: PHYSICAL MEDICINE & REHABILITATION

## 2023-04-03 PROCEDURE — 3079F DIAST BP 80-89 MM HG: CPT | Performed by: PHYSICAL MEDICINE & REHABILITATION

## 2023-04-03 PROCEDURE — 3077F SYST BP >= 140 MM HG: CPT | Performed by: PHYSICAL MEDICINE & REHABILITATION

## 2023-04-03 PROCEDURE — 1125F AMNT PAIN NOTED PAIN PRSNT: CPT | Performed by: PHYSICAL MEDICINE & REHABILITATION

## 2023-04-03 RX ORDER — SITAGLIPTIN 100 MG/1
TABLET, FILM COATED ORAL
Qty: 90 TABLET | Refills: 2 | Status: SHIPPED | OUTPATIENT
Start: 2023-04-03

## 2023-04-03 RX ORDER — OXYCODONE AND ACETAMINOPHEN 10; 325 MG/1; MG/1
1 TABLET ORAL EVERY 6 HOURS PRN
Qty: 120 TABLET | Refills: 0 | Status: SHIPPED | OUTPATIENT
Start: 2023-04-03

## 2023-04-03 NOTE — PROGRESS NOTES
Subjective   Noa Soni is a 42 y.o. female.     History of Present Illness  Chronic bilateral knee pain, also low back and right shoulder pain, nonradiating, 10/10 at worst, 8/10 at best, 10/10 today, always present, varies, began years ago, worsening over time, aching, sharp, worse with bending and walking, interferes with sleep, exercise. X-ray b/l knees with mod OA worst in medial compartment. Saw PCP, notes reviewed, with referral to Dr. Leiva who decline steroid injections with DM2, has failed NSAIDs, Tylenol, Voltaren gel, sees Dr. Valencia on multiple medications. No FH of substance abuse. Began Percocet 5mg TID prn, then 7.5mg TID prn. Worsening LBP, R shoulder pain.       The following portions of the patient's history were reviewed and updated as appropriate: allergies, current medications, past family history, past medical history, past social history, past surgical history and problem list.    Review of Systems   Constitutional: Negative for chills, fatigue and fever.   HENT: Positive for hearing loss. Negative for trouble swallowing.    Eyes: Negative for visual disturbance.   Respiratory: Negative for shortness of breath.    Cardiovascular: Negative for chest pain.   Gastrointestinal: Positive for abdominal pain and nausea. Negative for constipation, diarrhea and vomiting.   Genitourinary: Negative for urinary incontinence.   Musculoskeletal: Positive for arthralgias and back pain. Negative for joint swelling, myalgias and neck pain.   Neurological: Positive for headache. Negative for dizziness, weakness and numbness.       Objective   Physical Exam  Constitutional:       Appearance: Normal appearance. She is well-developed.   HENT:      Head: Normocephalic and atraumatic.   Eyes:      Pupils: Pupils are equal, round, and reactive to light.   Cardiovascular:      Rate and Rhythm: Normal rate and regular rhythm.      Heart sounds: Normal heart sounds.   Pulmonary:      Effort: Pulmonary effort is  normal.      Breath sounds: Normal breath sounds.   Abdominal:      General: Bowel sounds are normal. There is no distension.      Palpations: Abdomen is soft.      Tenderness: There is no abdominal tenderness.   Musculoskeletal:      Cervical back: Normal range of motion.      Comments: R shoulder: (+) empty can test     Neurological:      Mental Status: She is alert and oriented to person, place, and time.      Sensory: No sensory deficit.      Deep Tendon Reflexes: Reflexes are normal and symmetric. Reflexes normal.   Psychiatric:         Mood and Affect: Mood normal.         Behavior: Behavior normal.         Thought Content: Thought content normal.         Judgment: Judgment normal.           Assessment & Plan   Diagnoses and all orders for this visit:    1. Chronic bilateral low back pain without sciatica (Primary)    2. Chronic pain of both knees    3. Chronic right shoulder pain    4. Post-traumatic osteoarthritis of both knees      UDS in order 5/24/22.  Discussed risks and benefits of opioid treatment for chonic pain with patient, including expectations related to prescription requests, alternative modalities to opioids for managing pain, her treatment plan, risks of dependency and addiction, and safe storage practices for prescribed opioids, as well as proper and improper disposal of all medications.  Treatment plan will consist of continuing current medication as long as it remains effective and is necessary, while evaluating patient at each visit and determining if the medication can be lowered or discontinued, while also using nonopioid therapies to reduce reliance on opioids.  Failed Tylenol #3 TID prn with AMS. Failed NSAIDs, Tylenol, Voltaren gel, allergic to Hydrocodone. Began Percocet 5mg TID prn, helping but not sufficient, increased to 7.5mg QID prn, insufficient, increased to 10mg QID prn, doing well now. Denies any side effects. Filled 3/16/23 per Inspect.  Performed b/l Monovisc injections  with significant improvement, will repeat as necessary up to q6 months, cannot have steroids with DM2. Repeated.  Order MRI L-spine  Schedule R shoulder injection.  RTC for R shoulder then in 1 month to review MRI L-spine, discuss options.    INSPECT REPORT     As part of the patient's treatment plan, I am prescribing controlled substances. The patient has been made aware of appropriate use of such medications, including potential risk of somnolence, limited ability to drive and/or work safely, and the potential for dependence or overdose. It has also bee made clear that these medications are for use by this patient only, without concomitant use of alcohol or other substances unless prescribed.      Patient has completed prescribing agreement detailing terms of continued prescribing of controlled substances, including monitoring INSPECT reports, urine drug screening, and pill counts if necessary. The patient is aware that inappropriate use will results in cessation of prescribing such medications.     INSPECT report has been reviewed and scanned into the patient's chart.     As the clinician, I personally reviewed the INSPECT while the patient was in the office today.     History and physical exam exhibit continued safe and appropriate use of controlled substances.

## 2023-04-06 DIAGNOSIS — F33.2 SEVERE RECURRENT MAJOR DEPRESSION WITHOUT PSYCHOTIC FEATURES: ICD-10-CM

## 2023-04-06 DIAGNOSIS — K59.04 CHRONIC IDIOPATHIC CONSTIPATION: ICD-10-CM

## 2023-04-06 NOTE — TELEPHONE ENCOUNTER
Caller: Ruiz Bello    Relationship: Emergency Contact    Best call back number: [phone]    Requested Prescriptions:   Requested Prescriptions     Pending Prescriptions Disp Refills   • Cariprazine HCl (Vraylar) 3 MG capsule capsule 30 capsule 0     Sig: Take 1 capsule by mouth Daily.   • linaclotide (Linzess) 145 MCG capsule capsule 30 capsule 5     Sig: Take 1 capsule by mouth Every Morning Before Breakfast.        Pharmacy where request should be sent: [facility] [id number] [place], [place] [id number] FX     Last office visit with prescribing clinician: 3/6/2023   Last telemedicine visit with prescribing clinician: 6/6/2023   Next office visit with prescribing clinician: 6/6/2023     Additional details provided by patient: PATIENT IS OUT. PATIENT HAS REQUESTING THIS MEDICATION BEFORE AND STILL HAS NOT RECEIVED ANYTHING     Does the patient have less than a 3 day supply:  [x] Yes  [] No    Would you like a call back once the refill request has been completed: [x] Yes [] No    If the office needs to give you a call back, can they leave a voicemail: [x] Yes [] No    Hior Griffin Rep   04/06/23 14:49 EDT

## 2023-04-07 NOTE — TELEPHONE ENCOUNTER
Please call them back.  I have refilled the Linzess because I prescribed it for her.  The refill request on Vraylar went, as it should have, to the psychiatrist to prescribe it.  They have not refilled it because she has not been there since 2021 and does not have a follow-up scheduled.  I have refilled the Vraylar for 1 month.  She needs to call Dr. Valencia's office and schedule a follow-up! Thanks

## 2023-04-12 ENCOUNTER — HOSPITAL ENCOUNTER (OUTPATIENT)
Dept: PAIN MEDICINE | Facility: HOSPITAL | Age: 43
Discharge: HOME | End: 2023-04-12
Admitting: PHYSICAL MEDICINE & REHABILITATION
Payer: MEDICAID

## 2023-04-12 VITALS
RESPIRATION RATE: 16 BRPM | HEART RATE: 77 BPM | TEMPERATURE: 97.1 F | SYSTOLIC BLOOD PRESSURE: 131 MMHG | OXYGEN SATURATION: 100 % | DIASTOLIC BLOOD PRESSURE: 77 MMHG

## 2023-04-12 DIAGNOSIS — M12.811 ROTATOR CUFF ARTHROPATHY OF RIGHT SHOULDER: ICD-10-CM

## 2023-04-12 DIAGNOSIS — M25.511 CHRONIC RIGHT SHOULDER PAIN: Primary | ICD-10-CM

## 2023-04-12 DIAGNOSIS — G89.29 CHRONIC RIGHT SHOULDER PAIN: Primary | ICD-10-CM

## 2023-04-12 PROCEDURE — 25010000002 METHYLPREDNISOLONE PER 40 MG: Performed by: PHYSICAL MEDICINE & REHABILITATION

## 2023-04-12 PROCEDURE — 20610 DRAIN/INJ JOINT/BURSA W/O US: CPT | Performed by: PHYSICAL MEDICINE & REHABILITATION

## 2023-04-12 RX ORDER — BUPIVACAINE HYDROCHLORIDE 2.5 MG/ML
5 INJECTION, SOLUTION EPIDURAL; INFILTRATION; INTRACAUDAL ONCE
Status: COMPLETED | OUTPATIENT
Start: 2023-04-12 | End: 2023-04-12

## 2023-04-12 RX ORDER — METHYLPREDNISOLONE ACETATE 40 MG/ML
40 INJECTION, SUSPENSION INTRA-ARTICULAR; INTRALESIONAL; INTRAMUSCULAR; SOFT TISSUE ONCE
Status: COMPLETED | OUTPATIENT
Start: 2023-04-12 | End: 2023-04-12

## 2023-04-12 RX ADMIN — BUPIVACAINE HYDROCHLORIDE 5 ML: 2.5 INJECTION, SOLUTION EPIDURAL; INFILTRATION; INTRACAUDAL; PERINEURAL at 11:50

## 2023-04-12 RX ADMIN — METHYLPREDNISOLONE ACETATE 40 MG: 40 INJECTION, SUSPENSION INTRA-ARTICULAR; INTRALESIONAL; INTRAMUSCULAR; SOFT TISSUE at 11:50

## 2023-04-12 NOTE — PROCEDURES
Procedures     Chronic bilateral knee pain, also low back and right shoulder pain, nonradiating, 10/10 at worst, 8/10 at best, 10/10 today, always present, varies, began years ago, worsening over time, aching, sharp, worse with bending and walking, interferes with sleep, exercise. X-ray b/l knees with mod OA worst in medial compartment. Saw PCP, notes reviewed, with referral to Dr. Leiva who decline steroid injections with DM2, has failed NSAIDs, Tylenol, Voltaren gel, sees Dr. Valencia on multiple medications. No FH of substance abuse. Began Percocet 5mg TID prn, then 7.5mg TID prn. Worsening LBP, R shoulder pain.    UDS in order 5/24/22.  Discussed risks and benefits of opioid treatment for chonic pain with patient, including expectations related to prescription requests, alternative modalities to opioids for managing pain, her treatment plan, risks of dependency and addiction, and safe storage practices for prescribed opioids, as well as proper and improper disposal of all medications.  Treatment plan will consist of continuing current medication as long as it remains effective and is necessary, while evaluating patient at each visit and determining if the medication can be lowered or discontinued, while also using nonopioid therapies to reduce reliance on opioids.  Failed Tylenol #3 TID prn with AMS. Failed NSAIDs, Tylenol, Voltaren gel, allergic to Hydrocodone. Began Percocet 5mg TID prn, helping but not sufficient, increased to 7.5mg QID prn, insufficient, increased to 10mg QID prn, doing well now. Denies any side effects. Filled 3/16/23 per [facility].  Performed b/l Monovisc injections with significant improvement, will repeat as necessary up to q6 months, cannot have steroids with DM2. Repeated.  Ordered MRI L-spine  Perform R shoulder injection.  RTC for R shoulder then in 1 month to review MRI L-spine, discuss options.      Shoulder Subacromial Injection    PREOPERATIVE DIAGNOSIS: Shoulder  "pain    POSTOPERATIVE DIAGNOSIS: Shoulder pain    PROCEDURE PERFORMED: RIGHT SUBACROMIAL SHOULDER INJECTION    The patient understands the risks and benefits of the procedure and wishes to proceed. The patient was seen in the preoperative area. Patient's consent was obtained and updated. Vitals were taken. Patient was then placed in a seated position for the injection. Site marked for a lateral approach and prepped with alcohol swabs x 4. A 25 gauge 1.5\"  needle was introduced into the joint space and 3mL of steroid solution containing 2mL 0.25% bupivacaine, and 1mL 40mg Depomedrol was injected after negative aspiration without resistance. The patient tolerated with no geetha-procedural complications.  A sterile dressing was placed over the puncture site.    "

## 2023-04-23 DIAGNOSIS — I10 ESSENTIAL HYPERTENSION: ICD-10-CM

## 2023-04-24 RX ORDER — LISINOPRIL 10 MG/1
TABLET ORAL
Qty: 90 TABLET | Refills: 0 | Status: SHIPPED | OUTPATIENT
Start: 2023-04-24

## 2023-05-01 ENCOUNTER — OFFICE VISIT (OUTPATIENT)
Dept: PAIN MEDICINE | Facility: CLINIC | Age: 43
End: 2023-05-01
Payer: MEDICAID

## 2023-05-01 VITALS
RESPIRATION RATE: 16 BRPM | OXYGEN SATURATION: 96 % | SYSTOLIC BLOOD PRESSURE: 128 MMHG | HEART RATE: 67 BPM | DIASTOLIC BLOOD PRESSURE: 78 MMHG

## 2023-05-01 DIAGNOSIS — G89.29 CHRONIC PAIN OF BOTH KNEES: ICD-10-CM

## 2023-05-01 DIAGNOSIS — M25.562 CHRONIC PAIN OF BOTH KNEES: ICD-10-CM

## 2023-05-01 DIAGNOSIS — M17.2 POST-TRAUMATIC OSTEOARTHRITIS OF BOTH KNEES: ICD-10-CM

## 2023-05-01 DIAGNOSIS — G89.29 CHRONIC BILATERAL LOW BACK PAIN WITHOUT SCIATICA: Primary | ICD-10-CM

## 2023-05-01 DIAGNOSIS — M25.511 CHRONIC RIGHT SHOULDER PAIN: ICD-10-CM

## 2023-05-01 DIAGNOSIS — M25.561 CHRONIC PAIN OF BOTH KNEES: ICD-10-CM

## 2023-05-01 DIAGNOSIS — G89.29 CHRONIC RIGHT SHOULDER PAIN: ICD-10-CM

## 2023-05-01 DIAGNOSIS — M12.811 ROTATOR CUFF ARTHROPATHY OF RIGHT SHOULDER: ICD-10-CM

## 2023-05-01 DIAGNOSIS — M54.50 CHRONIC BILATERAL LOW BACK PAIN WITHOUT SCIATICA: Primary | ICD-10-CM

## 2023-05-01 PROCEDURE — G0463 HOSPITAL OUTPT CLINIC VISIT: HCPCS | Performed by: PHYSICAL MEDICINE & REHABILITATION

## 2023-05-01 RX ORDER — DICLOFENAC EPOLAMINE 0.01 G/1
1 SYSTEM TOPICAL DAILY PRN
Qty: 30 PATCH | Refills: 0 | Status: SHIPPED | OUTPATIENT
Start: 2023-05-01

## 2023-05-01 RX ORDER — OXYCODONE AND ACETAMINOPHEN 10; 325 MG/1; MG/1
1 TABLET ORAL EVERY 6 HOURS PRN
Qty: 120 TABLET | Refills: 0 | Status: SHIPPED | OUTPATIENT
Start: 2023-05-01

## 2023-05-01 NOTE — PROGRESS NOTES
Subjective   Noa Soni is a 42 y.o. female.     History of Present Illness  Chronic bilateral knee pain, also low back and right shoulder pain, nonradiating, 10/10 at worst, 8/10 at best, always present, varies, began years ago, worsening over time, aching, sharp, worse with bending and walking, interferes with sleep, exercise. X-ray b/l knees with mod OA worst in medial compartment. Saw PCP, notes reviewed, with referral to Dr. Leiva who decline steroid injections with DM2, has failed NSAIDs, Tylenol, Voltaren gel, sees Dr. Valencia on multiple medications. No FH of substance abuse. Began Percocet 5mg TID prn, then 7.5mg TID prn. Worsening LBP, R shoulder pain.       The following portions of the patient's history were reviewed and updated as appropriate: allergies, current medications, past family history, past medical history, past social history, past surgical history and problem list.    Review of Systems   Constitutional: Negative for chills, fatigue and fever.   HENT: Positive for hearing loss. Negative for trouble swallowing.    Eyes: Negative for visual disturbance.   Respiratory: Negative for shortness of breath.    Cardiovascular: Negative for chest pain.   Gastrointestinal: Positive for abdominal pain and nausea. Negative for constipation, diarrhea and vomiting.   Genitourinary: Negative for urinary incontinence.   Musculoskeletal: Positive for arthralgias and back pain. Negative for joint swelling, myalgias and neck pain.   Neurological: Positive for headache. Negative for dizziness, weakness and numbness.       Objective   Physical Exam  Constitutional:       Appearance: Normal appearance. She is well-developed.   HENT:      Head: Normocephalic and atraumatic.   Eyes:      Pupils: Pupils are equal, round, and reactive to light.   Cardiovascular:      Rate and Rhythm: Normal rate and regular rhythm.      Heart sounds: Normal heart sounds.   Pulmonary:      Effort: Pulmonary effort is normal.       Breath sounds: Normal breath sounds.   Abdominal:      General: Bowel sounds are normal. There is no distension.      Palpations: Abdomen is soft.      Tenderness: There is no abdominal tenderness.   Musculoskeletal:      Cervical back: Normal range of motion.      Comments: R shoulder: (+) empty can test     Neurological:      Mental Status: She is alert and oriented to person, place, and time.      Sensory: No sensory deficit.      Deep Tendon Reflexes: Reflexes are normal and symmetric. Reflexes normal.   Psychiatric:         Mood and Affect: Mood normal.         Behavior: Behavior normal.         Thought Content: Thought content normal.         Judgment: Judgment normal.           Assessment & Plan   Diagnoses and all orders for this visit:    1. Chronic bilateral low back pain without sciatica (Primary)    2. Chronic pain of both knees    3. Chronic right shoulder pain    4. Post-traumatic osteoarthritis of both knees    5. Rotator cuff arthropathy of right shoulder      UDS in order 5/24/22.  Discussed risks and benefits of opioid treatment for chonic pain with patient, including expectations related to prescription requests, alternative modalities to opioids for managing pain, her treatment plan, risks of dependency and addiction, and safe storage practices for prescribed opioids, as well as proper and improper disposal of all medications.  Treatment plan will consist of continuing current medication as long as it remains effective and is necessary, while evaluating patient at each visit and determining if the medication can be lowered or discontinued, while also using nonopioid therapies to reduce reliance on opioids.  Failed Tylenol #3 TID prn with AMS. Failed NSAIDs, Tylenol, Voltaren gel, allergic to Hydrocodone. Began Percocet 5mg TID prn, helping but not sufficient, increased to 7.5mg QID prn, insufficient, increased to 10mg QID prn. Denies any side effects. Filled 3/16/23 per Inspect.  Begin Licart  qdaily prn, denies being told not to take NSAIDs.  Performed b/l Monovisc injections with significant improvement, will repeat as necessary up to q6 months, cannot have steroids with DM2. Repeated 2/1/23.  Ordered MRI L-spine, scheduled for 5/5/23.  Performed R shoulder injection.  RTC in 1 month to review MRI L-spine, discuss options.    INSPECT REPORT     As part of the patient's treatment plan, I am prescribing controlled substances. The patient has been made aware of appropriate use of such medications, including potential risk of somnolence, limited ability to drive and/or work safely, and the potential for dependence or overdose. It has also bee made clear that these medications are for use by this patient only, without concomitant use of alcohol or other substances unless prescribed.      Patient has completed prescribing agreement detailing terms of continued prescribing of controlled substances, including monitoring INSPECT reports, urine drug screening, and pill counts if necessary. The patient is aware that inappropriate use will results in cessation of prescribing such medications.     INSPECT report has been reviewed and scanned into the patient's chart.     As the clinician, I personally reviewed the INSPECT while the patient was in the office today.     History and physical exam exhibit continued safe and appropriate use of controlled substances.

## 2023-05-04 ENCOUNTER — HOSPITAL ENCOUNTER (OUTPATIENT)
Dept: MRI IMAGING | Facility: HOSPITAL | Age: 43
Discharge: HOME | End: 2023-05-04
Payer: MEDICAID

## 2023-05-04 DIAGNOSIS — G89.29 CHRONIC BILATERAL LOW BACK PAIN WITHOUT SCIATICA: ICD-10-CM

## 2023-05-04 DIAGNOSIS — M54.50 CHRONIC BILATERAL LOW BACK PAIN WITHOUT SCIATICA: ICD-10-CM

## 2023-05-04 PROCEDURE — 72148 MRI LUMBAR SPINE W/O DYE: CPT

## 2023-05-08 ENCOUNTER — TELEPHONE (OUTPATIENT)
Dept: PAIN MEDICINE | Facility: CLINIC | Age: 43
End: 2023-05-08
Payer: MEDICAID

## 2023-05-08 NOTE — TELEPHONE ENCOUNTER
Caller: Ruiz Bello    Relationship to patient: Emergency Contact    Best call back number: [phone]  Patient is needing: CALL TO GO OVER MRI RESULTS

## 2023-05-09 NOTE — TELEPHONE ENCOUNTER
It was pretty unremarkable. It showed mild arthritis and mild narrowing where the nerves come out of the spine, otherwise it looked pretty good. Thanks.

## 2023-05-11 DIAGNOSIS — I10 ESSENTIAL HYPERTENSION: ICD-10-CM

## 2023-05-11 DIAGNOSIS — F43.12 CHRONIC POSTTRAUMATIC STRESS DISORDER: Chronic | ICD-10-CM

## 2023-05-11 DIAGNOSIS — F41.1 GENERALIZED ANXIETY DISORDER: Chronic | ICD-10-CM

## 2023-05-11 DIAGNOSIS — F33.2 SEVERE RECURRENT MAJOR DEPRESSION WITHOUT PSYCHOTIC FEATURES: ICD-10-CM

## 2023-05-11 RX ORDER — FLUOXETINE HYDROCHLORIDE 40 MG/1
40 CAPSULE ORAL DAILY
Qty: 90 CAPSULE | Refills: 1 | Status: SHIPPED | OUTPATIENT
Start: 2023-05-11

## 2023-05-11 RX ORDER — LISINOPRIL 10 MG/1
TABLET ORAL
Qty: 90 TABLET | Refills: 1 | Status: SHIPPED | OUTPATIENT
Start: 2023-05-11

## 2023-05-30 DIAGNOSIS — F33.2 SEVERE RECURRENT MAJOR DEPRESSION WITHOUT PSYCHOTIC FEATURES: ICD-10-CM

## 2023-05-30 RX ORDER — CARIPRAZINE 3 MG/1
3 CAPSULE, GELATIN COATED ORAL DAILY
Qty: 30 CAPSULE | Refills: 0 | Status: SHIPPED | OUTPATIENT
Start: 2023-05-30

## 2023-05-31 ENCOUNTER — OFFICE VISIT (OUTPATIENT)
Dept: PAIN MEDICINE | Facility: CLINIC | Age: 43
End: 2023-05-31

## 2023-05-31 VITALS
TEMPERATURE: 97.8 F | RESPIRATION RATE: 16 BRPM | HEART RATE: 80 BPM | BODY MASS INDEX: 48.32 KG/M2 | SYSTOLIC BLOOD PRESSURE: 145 MMHG | HEIGHT: 64 IN | DIASTOLIC BLOOD PRESSURE: 80 MMHG | WEIGHT: 283 LBS | OXYGEN SATURATION: 96 %

## 2023-05-31 DIAGNOSIS — M17.2 POST-TRAUMATIC OSTEOARTHRITIS OF BOTH KNEES: ICD-10-CM

## 2023-05-31 DIAGNOSIS — G89.29 CHRONIC BILATERAL LOW BACK PAIN WITHOUT SCIATICA: ICD-10-CM

## 2023-05-31 DIAGNOSIS — M25.562 CHRONIC PAIN OF BOTH KNEES: Primary | ICD-10-CM

## 2023-05-31 DIAGNOSIS — M12.811 ROTATOR CUFF ARTHROPATHY OF RIGHT SHOULDER: ICD-10-CM

## 2023-05-31 DIAGNOSIS — G89.29 CHRONIC RIGHT SHOULDER PAIN: ICD-10-CM

## 2023-05-31 DIAGNOSIS — M25.561 CHRONIC PAIN OF BOTH KNEES: Primary | ICD-10-CM

## 2023-05-31 DIAGNOSIS — M54.50 CHRONIC BILATERAL LOW BACK PAIN WITHOUT SCIATICA: ICD-10-CM

## 2023-05-31 DIAGNOSIS — M25.511 CHRONIC RIGHT SHOULDER PAIN: ICD-10-CM

## 2023-05-31 DIAGNOSIS — G89.29 CHRONIC PAIN OF BOTH KNEES: Primary | ICD-10-CM

## 2023-05-31 PROCEDURE — G0463 HOSPITAL OUTPT CLINIC VISIT: HCPCS | Performed by: PHYSICAL MEDICINE & REHABILITATION

## 2023-05-31 RX ORDER — OXYCODONE AND ACETAMINOPHEN 10; 325 MG/1; MG/1
1 TABLET ORAL EVERY 6 HOURS PRN
Qty: 120 TABLET | Refills: 0 | Status: SHIPPED | OUTPATIENT
Start: 2023-05-31

## 2023-05-31 NOTE — PROGRESS NOTES
Subjective   Noa Soni is a 42 y.o. female.     History of Present Illness  Chronic bilateral knee pain, also low back and right shoulder pain, nonradiating, 10/10 at worst, 8/10 at best, always present, varies, began years ago, worsening over time, aching, sharp, worse with bending and walking, interferes with sleep, exercise. X-ray b/l knees with mod OA worst in medial compartment. Saw PCP, notes reviewed, with referral to Dr. Leiva who decline steroid injections with DM2, has failed NSAIDs, Tylenol, Voltaren gel, sees Dr. Valencia on multiple medications. No FH of substance abuse. Began Percocet 5mg TID prn, then 7.5mg TID prn. Worsening LBP, R shoulder pain.       The following portions of the patient's history were reviewed and updated as appropriate: allergies, current medications, past family history, past medical history, past social history, past surgical history and problem list.    Review of Systems   Constitutional: Negative for chills, fatigue and fever.   HENT: Positive for hearing loss. Negative for trouble swallowing.    Eyes: Negative for visual disturbance.   Respiratory: Negative for shortness of breath.    Cardiovascular: Negative for chest pain.   Gastrointestinal: Positive for abdominal pain and nausea. Negative for constipation, diarrhea and vomiting.   Genitourinary: Negative for urinary incontinence.   Musculoskeletal: Positive for arthralgias and back pain. Negative for joint swelling, myalgias and neck pain.   Neurological: Positive for headache. Negative for dizziness, weakness and numbness.       Objective   Physical Exam  Constitutional:       Appearance: Normal appearance. She is well-developed.   HENT:      Head: Normocephalic and atraumatic.   Eyes:      Pupils: Pupils are equal, round, and reactive to light.   Cardiovascular:      Rate and Rhythm: Normal rate and regular rhythm.      Heart sounds: Normal heart sounds.   Pulmonary:      Effort: Pulmonary effort is normal.       Breath sounds: Normal breath sounds.   Abdominal:      General: Bowel sounds are normal. There is no distension.      Palpations: Abdomen is soft.      Tenderness: There is no abdominal tenderness.   Musculoskeletal:      Cervical back: Normal range of motion.      Comments: R shoulder: (+) empty can test     Neurological:      Mental Status: She is alert and oriented to person, place, and time.      Sensory: No sensory deficit.      Deep Tendon Reflexes: Reflexes are normal and symmetric. Reflexes normal.   Psychiatric:         Mood and Affect: Mood normal.         Behavior: Behavior normal.         Thought Content: Thought content normal.         Judgment: Judgment normal.           Assessment & Plan   Diagnoses and all orders for this visit:    1. Chronic pain of both knees (Primary)    2. Chronic bilateral low back pain without sciatica    3. Chronic right shoulder pain    4. Post-traumatic osteoarthritis of both knees    5. Rotator cuff arthropathy of right shoulder      UDS in order 4/3/23.  Discussed risks and benefits of opioid treatment for chonic pain with patient, including expectations related to prescription requests, alternative modalities to opioids for managing pain, her treatment plan, risks of dependency and addiction, and safe storage practices for prescribed opioids, as well as proper and improper disposal of all medications.  Treatment plan will consist of continuing current medication as long as it remains effective and is necessary, while evaluating patient at each visit and determining if the medication can be lowered or discontinued, while also using nonopioid therapies to reduce reliance on opioids.  Failed Tylenol #3 TID prn with AMS. Failed NSAIDs, Tylenol, Voltaren gel, allergic to Hydrocodone. Began Percocet 5mg TID prn, helping but not sufficient, increased to 7.5mg QID prn, insufficient, increased to 10mg QID prn. Denies any side effects.   Began Licart qdaily prn, denies being told  not to take NSAIDs.  Performed b/l Monovisc injections with significant improvement, will repeat as necessary up to q6 months, cannot have steroids with DM2. Repeated 2/1/23.  Ordered MRI L-spine, with mild DDD and DJD only, worst pain appears to be facetogenic on history and exam, schedule b/l L4-S1 facet injections.  Performed R shoulder injection.  RTC for facet injections.    INSPECT REPORT     As part of the patient's treatment plan, I am prescribing controlled substances. The patient has been made aware of appropriate use of such medications, including potential risk of somnolence, limited ability to drive and/or work safely, and the potential for dependence or overdose. It has also bee made clear that these medications are for use by this patient only, without concomitant use of alcohol or other substances unless prescribed.      Patient has completed prescribing agreement detailing terms of continued prescribing of controlled substances, including monitoring INSPECT reports, urine drug screening, and pill counts if necessary. The patient is aware that inappropriate use will results in cessation of prescribing such medications.     INSPECT report has been reviewed and scanned into the patient's chart.     As the clinician, I personally reviewed the INSPECT while the patient was in the office today.     History and physical exam exhibit continued safe and appropriate use of controlled substances.

## 2023-06-01 ENCOUNTER — TELEPHONE (OUTPATIENT)
Dept: PAIN MEDICINE | Facility: HOSPITAL | Age: 43
End: 2023-06-01

## 2023-06-01 RX ORDER — PREGABALIN 75 MG/1
75 CAPSULE ORAL 2 TIMES DAILY
Qty: 60 CAPSULE | Refills: 0 | Status: SHIPPED | OUTPATIENT
Start: 2023-06-01

## 2023-06-01 NOTE — TELEPHONE ENCOUNTER
Pt deidra called this morning to let know that pt was up all night crying and screaming out in pain due to pain in her feet and legs.  They would like to know if there's anything they can do help with the pain.  Pt was just seen in the office yesterday for a visit

## 2023-06-06 ENCOUNTER — OFFICE VISIT (OUTPATIENT)
Dept: FAMILY MEDICINE CLINIC | Facility: CLINIC | Age: 43
End: 2023-06-06
Payer: MEDICAID

## 2023-06-06 VITALS
RESPIRATION RATE: 18 BRPM | DIASTOLIC BLOOD PRESSURE: 81 MMHG | WEIGHT: 281 LBS | HEART RATE: 89 BPM | SYSTOLIC BLOOD PRESSURE: 133 MMHG | TEMPERATURE: 98.2 F | BODY MASS INDEX: 47.97 KG/M2 | HEIGHT: 64 IN | OXYGEN SATURATION: 96 %

## 2023-06-06 DIAGNOSIS — G89.29 CHRONIC BILATERAL LOW BACK PAIN WITHOUT SCIATICA: ICD-10-CM

## 2023-06-06 DIAGNOSIS — E66.01 CLASS 3 SEVERE OBESITY DUE TO EXCESS CALORIES WITHOUT SERIOUS COMORBIDITY WITH BODY MASS INDEX (BMI) OF 45.0 TO 49.9 IN ADULT: ICD-10-CM

## 2023-06-06 DIAGNOSIS — E11.65 TYPE 2 DIABETES MELLITUS WITH HYPERGLYCEMIA, WITHOUT LONG-TERM CURRENT USE OF INSULIN: Primary | ICD-10-CM

## 2023-06-06 DIAGNOSIS — M54.50 CHRONIC BILATERAL LOW BACK PAIN WITHOUT SCIATICA: ICD-10-CM

## 2023-06-06 DIAGNOSIS — F81.9 LEARNING DISABILITY: ICD-10-CM

## 2023-06-06 DIAGNOSIS — F33.2 SEVERE RECURRENT MAJOR DEPRESSION WITHOUT PSYCHOTIC FEATURES: ICD-10-CM

## 2023-06-06 DIAGNOSIS — I10 ESSENTIAL HYPERTENSION: ICD-10-CM

## 2023-06-06 NOTE — PROGRESS NOTES
Subjective   Noa Soni is a 42 y.o. female.   Chief Complaint   Patient presents with    Diabetes    Hypertension    Anxiety       History of Present Illness   Presents to the office today to follow-up on chronic medical problems per active problem list as below.  I last saw her around 3 months ago.  I ordered labs to be done before this visit, which would include a TSH, lipid panel, lipase, amylase, CMP, CBC, A1c.  She did not do those.     Diabetes type 2-does not bring any blood sugar log with her.  Her last A1c was in December and it was 8.2%.  I increased her GLP-1 at that time.  Tells me that she really does not follow any specific diet.    She is feeling bad today because she had a hysterectomy on Friday-about 5 days ago.    HTN-blood pressure in the office today is good at 130/81.  Blood pressure medicine includes lisinopril 10 mg/day.  She cannot say whether she is having any side effects of any of her blood pressure medicines.     Mood disorder- due to combination of anxiety, depression, PTSD, learning disability.  Inconsistent follow-up with psychiatry.  I do not think she has seen one since before her last visit here, and she cannot tell me, either.     Chronic pain-multifactorial.  She has chronic low back pain, pain in both knees and her shoulders.  She follows with Dr. Connors for management of chronic pain.           Patient Active Problem List    Diagnosis Date Noted    Rotator cuff arthropathy of right shoulder 04/03/2023    Chronic pain of both knees 05/24/2022    Chronic bilateral low back pain without sciatica 05/24/2022    Chronic right shoulder pain 05/24/2022    Morbid obesity 04/27/2022    Nasal congestion 11/20/2021    Post-traumatic osteoarthritis of both knees 11/10/2021    Severe recurrent major depression without psychotic features 06/17/2021    Chronic posttraumatic stress disorder 06/17/2021    Generalized anxiety disorder 06/17/2021    Callus of foot 10/19/2020    Perennial  allergic rhinitis 10/19/2020    NESHA (obstructive sleep apnea) 10/19/2020    Class 3 severe obesity due to excess calories without serious comorbidity with body mass index (BMI) of 45.0 to 49.9 in adult 10/12/2020    Lymphadenopathy 10/06/2020    Essential hypertension 07/27/2020    Learning disability 07/27/2020     Note Last Updated: 7/27/2020     No other details      Type 2 diabetes mellitus 01/17/2020    History of endometriosis 01/17/2020    History of PCOS 01/17/2020           Past Surgical History:   Procedure Laterality Date    EAR TUBES      ENDOMETRIAL ABLATION      INTRAUTERINE DEVICE INSERTION  10/14/2020    LAPAROSCOPIC TUBAL LIGATION      TONSILLECTOMY AND ADENOIDECTOMY      TOTAL LAPAROSCOPIC HYSTERECTOMY WITH DAVINCI ROBOT  06/02/2023    [facility]     Current Outpatient Medications on File Prior to Visit   Medication Sig    Blood Glucose Monitoring Suppl kit Use as directed to check blood glucose    clotrimazole (LOTRIMIN) 1 % cream Apply 1 application topically to the appropriate area as directed 2 (Two) Times a Day.    Diclofenac Epolamine (Licart) 1.3 % patch 24 hour Apply 1 patch topically Daily As Needed (back pain).    Dulaglutide (Trulicity) 3 MG/0.5ML solution pen-injector Inject 0.5 mL under the skin into the appropriate area as directed Every 7 (Seven) Days.    ferrous sulfate 325 (65 FE) MG tablet Take 1 tablet by mouth Daily With Breakfast.    fexofenadine (ALLEGRA) 180 MG tablet Take 1 tablet by mouth once daily    FLUoxetine (PROzac) 40 MG capsule Take 1 capsule by mouth once daily    glucose blood test strip Use as instructed to check blood glucose once daily    Januvia 100 MG tablet Take 1 tablet by mouth once daily    lamoTRIgine (LaMICtal) 100 MG tablet Take 1 tablet by mouth once daily    Lancets misc Use once daily with meter and strips to check blood glucose    linaclotide (Linzess) 145 MCG capsule capsule Take 1 capsule by mouth Every Morning Before Breakfast.     lisinopril (PRINIVIL,ZESTRIL) 10 MG tablet Take 1 tablet by mouth once daily    Melatonin 5 MG chewable tablet Chew 5 mg every night at bedtime.    oxyCODONE-acetaminophen (Percocet)  MG per tablet Take 1 tablet by mouth Every 6 (Six) Hours As Needed for Moderate Pain.    oxyCODONE-acetaminophen (Percocet)  MG per tablet Take 1 tablet by mouth Every 6 (Six) Hours As Needed for Moderate Pain.    oxyCODONE-acetaminophen (Percocet)  MG per tablet Take 1 tablet by mouth Every 6 (Six) Hours As Needed for Moderate Pain.    pregabalin (LYRICA) 75 MG capsule Take 1 capsule by mouth 2 (Two) Times a Day.    SUMAtriptan (Imitrex) 50 MG tablet Take one tablet at onset of headache. May repeat dose one time in 2 hours if headache not relieved.    Vraylar 3 MG capsule capsule Take 1 capsule by mouth once daily    EPINEPHrine (EPIPEN) 0.3 MG/0.3ML solution auto-injector injection USE AS DIRECTED FOR ACUTE ALLERGIC REACTION     No current facility-administered medications on file prior to visit.     Allergies   Allergen Reactions    Tylenol With Codeine #3 [Acetaminophen-Codeine] Hallucinations    Abilify [Aripiprazole] Rash    Metformin Diarrhea    Vicodin [Hydrocodone-Acetaminophen] GI Intolerance     Social History     Socioeconomic History    Marital status: Single    Number of children: 0   Tobacco Use    Smoking status: Former     Packs/day: 1.00     Years: 1.00     Pack years: 1.00     Types: Cigarettes     Start date: 2017     Quit date: 2018     Years since quittin.4     Passive exposure: Past    Smokeless tobacco: Never    Tobacco comments:     socially    Vaping Use    Vaping Use: Never used   Substance and Sexual Activity    Alcohol use: Never    Drug use: Never    Sexual activity: Not Currently     Partners: Male     Birth control/protection: Hysterectomy     Family History   Problem Relation Age of Onset    Hypertension Mother     Depression Mother     Post-traumatic stress disorder Mother  "    Hyperlipidemia Mother     Anxiety disorder Mother     Hypertension Father     Hyperlipidemia Father     Hypertension Brother     Depression Brother     Parkinsonism Maternal Grandmother     Dementia Maternal Grandmother     Atrial fibrillation Maternal Grandfather     Breast cancer Paternal Grandmother     COPD Paternal Grandfather        Review of Systems    Objective   /81 (BP Location: Left arm, Patient Position: Sitting, Cuff Size: Large Adult)   Pulse 89   Temp 98.2 °F (36.8 °C) (Infrared)   Resp 18   Ht 162.6 cm (64\")   Wt 127 kg (281 lb)   LMP 06/02/2023 (Exact Date)   SpO2 96%   Breastfeeding No   BMI 48.23 kg/m²   Physical Exam  Constitutional:       Appearance: She is well-developed. She is obese.      Comments:      HENT:      Head: Normocephalic and atraumatic.   Eyes:      Conjunctiva/sclera: Conjunctivae normal.   Cardiovascular:      Rate and Rhythm: Normal rate.   Pulmonary:      Effort: Pulmonary effort is normal.   Musculoskeletal:         General: Normal range of motion.      Cervical back: Normal range of motion.   Skin:     General: Skin is warm and dry.      Findings: No rash.   Neurological:      Mental Status: She is alert and oriented to person, place, and time.   Psychiatric:         Mood and Affect: Affect is flat.         Behavior: Behavior normal.      Comments: Does not speak much during the visit.         Office Visit on 03/06/2023   Component Date Value Ref Range Status    Hemoglobin A1C 03/06/2023 9.4 (H)  4.8 - 5.6 % Final    Comment:          Prediabetes: 5.7 - 6.4           Diabetes: >6.4           Glycemic control for adults with diabetes: <7.0      Glucose 03/06/2023 395 (H)  70 - 99 mg/dL Final    BUN 03/06/2023 10  6 - 24 mg/dL Final    Creatinine 03/06/2023 0.70  0.57 - 1.00 mg/dL Final    EGFR Result 03/06/2023 112  >59 mL/min/1.73 Final    BUN/Creatinine Ratio 03/06/2023 14  9 - 23 Final    Sodium 03/06/2023 137  134 - 144 mmol/L Final    Potassium " 03/06/2023 4.1  3.5 - 5.2 mmol/L Final    Chloride 03/06/2023 100  96 - 106 mmol/L Final    Total CO2 03/06/2023 23  20 - 29 mmol/L Final    Calcium 03/06/2023 9.2  8.7 - 10.2 mg/dL Final    Total Protein 03/06/2023 7.0  6.0 - 8.5 g/dL Final    Albumin 03/06/2023 4.0  3.8 - 4.8 g/dL Final    Globulin 03/06/2023 3.0  1.5 - 4.5 g/dL Final    A/G Ratio 03/06/2023 1.3  1.2 - 2.2 Final    Total Bilirubin 03/06/2023 0.7  0.0 - 1.2 mg/dL Final    Alkaline Phosphatase 03/06/2023 146 (H)  44 - 121 IU/L Final    AST (SGOT) 03/06/2023 26  0 - 40 IU/L Final    ALT (SGPT) 03/06/2023 20  0 - 32 IU/L Final    TSH 03/06/2023 1.530  0.450 - 4.500 uIU/mL Final    WBC 03/06/2023 6.4  3.4 - 10.8 x10E3/uL Final    RBC 03/06/2023 4.27  3.77 - 5.28 x10E6/uL Final    Hemoglobin 03/06/2023 11.8  11.1 - 15.9 g/dL Final    Hematocrit 03/06/2023 36.8  34.0 - 46.6 % Final    MCV 03/06/2023 86  79 - 97 fL Final    MCH 03/06/2023 27.6  26.6 - 33.0 pg Final    MCHC 03/06/2023 32.1  31.5 - 35.7 g/dL Final    RDW 03/06/2023 13.3  11.7 - 15.4 % Final    Platelets 03/06/2023 247  150 - 450 x10E3/uL Final    Neutrophil Rel % 03/06/2023 60  Not Estab. % Final    Lymphocyte Rel % 03/06/2023 31  Not Estab. % Final    Monocyte Rel % 03/06/2023 7  Not Estab. % Final    Eosinophil Rel % 03/06/2023 1  Not Estab. % Final    Basophil Rel % 03/06/2023 0  Not Estab. % Final    Neutrophils Absolute 03/06/2023 3.8  1.4 - 7.0 x10E3/uL Final    Lymphocytes Absolute 03/06/2023 2.0  0.7 - 3.1 x10E3/uL Final    Monocytes Absolute 03/06/2023 0.5  0.1 - 0.9 x10E3/uL Final    Eosinophils Absolute 03/06/2023 0.1  0.0 - 0.4 x10E3/uL Final    Basophils Absolute 03/06/2023 0.0  0.0 - 0.2 x10E3/uL Final    Immature Granulocyte Rel % 03/06/2023 1  Not Estab. % Final    Immature Grans Absolute 03/06/2023 0.0  0.0 - 0.1 x10E3/uL Final         Assessment & Plan   Diagnoses and all orders for this visit:    1. Type 2 diabetes mellitus with hyperglycemia, without long-term current  use of insulin (Primary)  -     Ambulatory Referral to [facility]    2. Class 3 severe obesity due to excess calories without serious comorbidity with body mass index (BMI) of 45.0 to 49.9 in adult  -     Ambulatory Referral to Bariatric Surgery    3. Essential hypertension    4. Severe recurrent major depression without psychotic features    5. Chronic bilateral low back pain without sciatica    6. Learning disability    I have reordered the 7 blood test I planned on doing before this visit.  This includes a TSH, lipid panel, lipase, amylase, CMP, CBC, A1c.  I will follow-up with her when these tests are back.  It is clear to me she is not invested in managing her diabetes.  If that is because of lack of knowledge, we are going to try to facilitate that by getting her in with the [facility] for education about her diabetes.  Blood pressure control is good.  Continue current antihypertensive medications.  Strongly encouraged her to reschedule her follow-up with psychiatry.  Keep follow-up with pain management per their recommendations, which does seem to happen regularly.  Finally, she asks if she is a candidate for bariatric surgery.  She does have medical problems including hypertension and diabetes and back pain that could improve with weight loss.  I will refer her to Dr. Rios for evaluation.  Follow-up here again in 3 months for reevaluation of these chronic problems.          Call with any problems or concerns before next visit       Return in about 3 months (around 9/6/2023).      Much of this report is an electronic transcription of spoken language to printed text using Dragon dictation software.  As such, the subtleties and finesse of spoken language may permit erroneous, or at times, nonsensical words or phrases to be inadvertently transcribed; thus changes may be made at a later date to rectify these errors.     Bree Wan MD6/6/202318:55 EDT  This note has been  electronically signed

## 2023-07-07 ENCOUNTER — TELEPHONE (OUTPATIENT)
Dept: PAIN MEDICINE | Facility: CLINIC | Age: 43
End: 2023-07-07

## 2023-07-07 DIAGNOSIS — G89.29 CHRONIC BILATERAL LOW BACK PAIN WITHOUT SCIATICA: ICD-10-CM

## 2023-07-07 DIAGNOSIS — M54.50 CHRONIC BILATERAL LOW BACK PAIN WITHOUT SCIATICA: ICD-10-CM

## 2023-07-07 RX ORDER — OXYCODONE AND ACETAMINOPHEN 10; 325 MG/1; MG/1
1 TABLET ORAL EVERY 6 HOURS PRN
Qty: 120 TABLET | Refills: 0 | Status: SHIPPED | OUTPATIENT
Start: 2023-07-07 | End: 2023-07-12 | Stop reason: SDUPTHER

## 2023-07-07 NOTE — TELEPHONE ENCOUNTER
Caller: PATIENT    Relationship: SELF     Best call back number: [phone]    Requested Prescriptions     Pending Prescriptions Disp Refills    oxyCODONE-acetaminophen (Percocet)  MG per tablet 120 tablet 0     Sig: Take 1 tablet by mouth Every 6 (Six) Hours As Needed for Moderate Pain.        Pharmacy where request should be sent:  [facility] IN [place], [place]     Last office visit with prescribing clinician: 5/31/2023   Last telemedicine visit with prescribing clinician: Visit date not found   Next office visit with prescribing clinician: 7/12/2023     Additional details provided by patient: PATIENT WAS CALLING TO REQUEST HER PRESCRIPTION FOR OXYCODONE-ACETAMINOPHEN 10 MG TO BE SENT TO THE [facility] IN [place], [place] DUE TO HER NORMAL PHARMACY BEING OUT OF THIS MEDICATION. PATIENT WOULD LIKE TO REMOVE [facility] AS HER PREFERRED PHARMACY AND UPDATE HER PHARMACY TO [facility] IN [place], [place]. PATIENT JUST HUNG UP WITH [facility] AND THEY DO HAVE THIS MEDICATION IN STOCK. PATIENT STATED SHE IS COMPLETELY OUT OF HER MEDICATION NOW. PATIENT WOULD LIKE A CALL WHEN THIS HAS BEEN TAKEN CARE OF DUE TO IT BEING FRIDAY AND A NEW PHARMACY.    Does the patient have less than a 3 day supply:  [x] Yes  [] No    Would you like a call back once the refill request has been completed: [] Yes [x] No    If the office needs to give you a call back, can they leave a voicemail: [] Yes [x] No

## 2023-07-12 PROBLEM — M54.42 CHRONIC BILATERAL LOW BACK PAIN WITH BILATERAL SCIATICA: Status: ACTIVE | Noted: 2022-05-24

## 2023-07-12 PROBLEM — M54.41 CHRONIC BILATERAL LOW BACK PAIN WITH BILATERAL SCIATICA: Status: ACTIVE | Noted: 2022-05-24

## 2023-07-12 PROBLEM — M54.16 LUMBAR RADICULOPATHY: Status: ACTIVE | Noted: 2023-07-12

## 2023-07-26 RX ORDER — CARIPRAZINE 3 MG/1
CAPSULE, GELATIN COATED ORAL
Qty: 30 CAPSULE | Refills: 0 | OUTPATIENT
Start: 2023-07-26

## 2023-08-21 ENCOUNTER — OFFICE VISIT (OUTPATIENT)
Dept: FAMILY MEDICINE CLINIC | Facility: CLINIC | Age: 43
End: 2023-08-21
Payer: MEDICAID

## 2023-08-21 VITALS
HEIGHT: 64 IN | HEART RATE: 83 BPM | SYSTOLIC BLOOD PRESSURE: 137 MMHG | OXYGEN SATURATION: 92 % | BODY MASS INDEX: 47.05 KG/M2 | WEIGHT: 275.6 LBS | DIASTOLIC BLOOD PRESSURE: 85 MMHG | RESPIRATION RATE: 18 BRPM

## 2023-08-21 DIAGNOSIS — Z90.710 HISTORY OF ROBOT-ASSISTED LAPAROSCOPIC HYSTERECTOMY: ICD-10-CM

## 2023-08-21 DIAGNOSIS — F41.1 GENERALIZED ANXIETY DISORDER: Chronic | ICD-10-CM

## 2023-08-21 DIAGNOSIS — F33.2 SEVERE RECURRENT MAJOR DEPRESSION WITHOUT PSYCHOTIC FEATURES: ICD-10-CM

## 2023-08-21 DIAGNOSIS — N80.03 ADENOMYOSIS OF THE UTERUS: Primary | ICD-10-CM

## 2023-08-21 DIAGNOSIS — R20.2 NUMBNESS AND TINGLING IN RIGHT HAND: ICD-10-CM

## 2023-08-21 DIAGNOSIS — R05.1 ACUTE COUGH: ICD-10-CM

## 2023-08-21 DIAGNOSIS — R20.0 NUMBNESS AND TINGLING IN RIGHT HAND: ICD-10-CM

## 2023-08-21 PROCEDURE — T1015 CLINIC SERVICE: HCPCS | Performed by: FAMILY MEDICINE

## 2023-08-21 PROCEDURE — 3075F SYST BP GE 130 - 139MM HG: CPT | Performed by: FAMILY MEDICINE

## 2023-08-21 PROCEDURE — 99214 OFFICE O/P EST MOD 30 MIN: CPT | Performed by: FAMILY MEDICINE

## 2023-08-21 PROCEDURE — 3079F DIAST BP 80-89 MM HG: CPT | Performed by: FAMILY MEDICINE

## 2023-08-21 PROCEDURE — 3052F HG A1C>EQUAL 8.0%<EQUAL 9.0%: CPT | Performed by: FAMILY MEDICINE

## 2023-08-21 PROCEDURE — 1160F RVW MEDS BY RX/DR IN RCRD: CPT | Performed by: FAMILY MEDICINE

## 2023-08-21 PROCEDURE — 1159F MED LIST DOCD IN RCRD: CPT | Performed by: FAMILY MEDICINE

## 2023-08-21 RX ORDER — CLARITHROMYCIN 500 MG/1
500 TABLET, COATED ORAL 2 TIMES DAILY
Qty: 14 TABLET | Refills: 7 | Status: SHIPPED | OUTPATIENT
Start: 2023-08-21

## 2023-08-21 NOTE — PROGRESS NOTES
"Subjective   Noa Soni is a 42 y.o. female.   Chief Complaint   Patient presents with    Hot Flashes    Anxiety    Numbness    Tingling    Cough       History of Present Illness   Presents to the office today for what schedule says is \"hot flashes\", mood.  It was my understanding that she was following up with psychiatry regarding her mental health issues.    In reviewing her chart she had a da Fozia robotic total hysterectomy, bilateral salpingectomy on June 2, 2023 by Dr. Meraz at [facility].  According to what I see they left her ovaries.  Apparently there was no uterine cancer.  She did have adenomyosis.    Not taking any of her pills.    Accompanied by her boyfriend.  She coughed once Saturday night.  Vomited once.  Has eaten a bunch of junk food since then.  No more vomiting.  Occasional cough.  No shortness of breath.    She reports about a week ago her ring finger, middle finger and pinky have been numb.  Sometimes they get better.  No injuries.  Not dropping anything.    Mood is very labile.        Patient Active Problem List    Diagnosis Date Noted    History of robot-assisted laparoscopic hysterectomy 08/21/2023     Note Last Updated: 8/21/2023 6/2/23 - [facility].  Dr. Meraz.  Ovaries left!      Lumbar radiculopathy 07/12/2023    Rotator cuff arthropathy of right shoulder 04/03/2023    Chronic pain of both knees 05/24/2022    Chronic bilateral low back pain with bilateral sciatica 05/24/2022    Chronic right shoulder pain 05/24/2022    Morbid obesity 04/27/2022    Nasal congestion 11/20/2021    Post-traumatic osteoarthritis of both knees 11/10/2021    Severe recurrent major depression without psychotic features 06/17/2021    Chronic posttraumatic stress disorder 06/17/2021    Generalized anxiety disorder 06/17/2021    Callus of foot 10/19/2020    Perennial allergic rhinitis 10/19/2020    NESHA (obstructive sleep apnea) 10/19/2020    Class 3 severe obesity due to excess calories without serious " comorbidity with body mass index (BMI) of 45.0 to 49.9 in adult 10/12/2020    Lymphadenopathy 10/06/2020    Essential hypertension 07/27/2020    Learning disability 07/27/2020     Note Last Updated: 7/27/2020     No other details      Type 2 diabetes mellitus 01/17/2020    History of endometriosis 01/17/2020    History of PCOS 01/17/2020           Past Surgical History:   Procedure Laterality Date    EAR TUBES      ENDOMETRIAL ABLATION      INTRAUTERINE DEVICE INSERTION  10/14/2020    LAPAROSCOPIC TUBAL LIGATION      TONSILLECTOMY AND ADENOIDECTOMY      TOTAL LAPAROSCOPIC HYSTERECTOMY WITH DAVINCI ROBOT  06/02/2023    [facility]     Current Outpatient Medications on File Prior to Visit   Medication Sig    Blood Glucose Monitoring Suppl kit Use as directed to check blood glucose    Cariprazine HCl (Vraylar) 3 MG capsule capsule Take 1 capsule by mouth once daily    clotrimazole (LOTRIMIN) 1 % cream Apply 1 application topically to the appropriate area as directed 2 (Two) Times a Day.    Diclofenac Epolamine (Licart) 1.3 % patch 24 hour Apply 1 patch topically Daily As Needed (back pain).    Dulaglutide (Trulicity) 3 MG/0.5ML solution pen-injector Inject 0.5 mL under the skin into the appropriate area as directed Every 7 (Seven) Days.    EPINEPHrine (EPIPEN) 0.3 MG/0.3ML solution auto-injector injection USE AS DIRECTED FOR ACUTE ALLERGIC REACTION    ferrous sulfate 325 (65 FE) MG tablet Take 1 tablet by mouth Daily With Breakfast.    fexofenadine (ALLEGRA) 180 MG tablet Take 1 tablet by mouth once daily    FLUoxetine (PROzac) 40 MG capsule Take 1 capsule by mouth once daily    glucose blood test strip Use as instructed to check blood glucose once daily    Januvia 100 MG tablet Take 1 tablet by mouth once daily    lamoTRIgine (LaMICtal) 100 MG tablet Take 1 tablet by mouth once daily    Lancets misc Use once daily with meter and strips to check blood glucose    linaclotide (Linzess) 145 MCG capsule capsule Take 1  capsule by mouth Every Morning Before Breakfast.    lisinopril (PRINIVIL,ZESTRIL) 10 MG tablet Take 1 tablet by mouth once daily    Melatonin 5 MG chewable tablet Chew 5 mg every night at bedtime.    oxyCODONE-acetaminophen (Percocet)  MG per tablet Take 1 tablet by mouth Every 6 (Six) Hours As Needed for Moderate Pain.    oxyCODONE-acetaminophen (Percocet)  MG per tablet Take 1 tablet by mouth Every 6 (Six) Hours As Needed for Moderate Pain.    oxyCODONE-acetaminophen (Percocet)  MG per tablet Take 1 tablet by mouth Every 6 (Six) Hours As Needed for Moderate Pain.    pregabalin (LYRICA) 75 MG capsule Take 1 capsule by mouth 2 (Two) Times a Day.    SUMAtriptan (Imitrex) 50 MG tablet Take one tablet at onset of headache. May repeat dose one time in 2 hours if headache not relieved.     No current facility-administered medications on file prior to visit.     Allergies   Allergen Reactions    Tylenol With Codeine #3 [Acetaminophen-Codeine] Hallucinations    Abilify [Aripiprazole] Rash    Metformin Diarrhea    Vicodin [Hydrocodone-Acetaminophen] GI Intolerance     Social History     Socioeconomic History    Marital status: Single    Number of children: 0   Tobacco Use    Smoking status: Former     Packs/day: 1.00     Years: 1.00     Pack years: 1.00     Types: Cigarettes     Start date: 2017     Quit date: 2018     Years since quittin.6     Passive exposure: Past    Smokeless tobacco: Never    Tobacco comments:     socially    Vaping Use    Vaping Use: Never used   Substance and Sexual Activity    Alcohol use: Never    Drug use: Never    Sexual activity: Not Currently     Partners: Male     Birth control/protection: Hysterectomy     Family History   Problem Relation Age of Onset    Hypertension Mother     Depression Mother     Post-traumatic stress disorder Mother     Hyperlipidemia Mother     Anxiety disorder Mother     Hypertension Father     Hyperlipidemia Father     Hypertension Brother  "    Depression Brother     Parkinsonism Maternal Grandmother     Dementia Maternal Grandmother     Atrial fibrillation Maternal Grandfather     Breast cancer Paternal Grandmother     COPD Paternal Grandfather        Review of Systems    Objective   /85 (BP Location: Left arm, Patient Position: Sitting, Cuff Size: Large Adult)   Pulse 83   Resp 18   Ht 162.6 cm (64\")   Wt 125 kg (275 lb 9.6 oz)   LMP 06/02/2023 (Exact Date)   SpO2 92%   Breastfeeding No   BMI 47.31 kg/mý   Physical Exam  Constitutional:       Appearance: She is well-developed. She is obese.      Comments:      HENT:      Head: Normocephalic and atraumatic.   Eyes:      Conjunctiva/sclera: Conjunctivae normal.   Cardiovascular:      Rate and Rhythm: Normal rate.   Pulmonary:      Effort: Pulmonary effort is normal.      Breath sounds: Rhonchi (scattered) present.   Musculoskeletal:         General: Normal range of motion.      Cervical back: Normal range of motion.   Skin:     General: Skin is warm and dry.      Findings: No rash.   Neurological:      Mental Status: She is alert and oriented to person, place, and time.   Psychiatric:         Mood and Affect: Affect is flat.         Behavior: Behavior normal.      Comments: Does not speak much during the visit.         Results Encounter on 05/30/2023   Component Date Value Ref Range Status    Hemoglobin A1C 06/06/2023 8.2 (H)  4.8 - 5.6 % Final    Comment:          Prediabetes: 5.7 - 6.4           Diabetes: >6.4           Glycemic control for adults with diabetes: <7.0      WBC 06/06/2023 9.8  3.4 - 10.8 x10E3/uL Final    RBC 06/06/2023 4.47  3.77 - 5.28 x10E6/uL Final    Hemoglobin 06/06/2023 12.2  11.1 - 15.9 g/dL Final    Hematocrit 06/06/2023 37.6  34.0 - 46.6 % Final    MCV 06/06/2023 84  79 - 97 fL Final    MCH 06/06/2023 27.3  26.6 - 33.0 pg Final    MCHC 06/06/2023 32.4  31.5 - 35.7 g/dL Final    RDW 06/06/2023 13.0  11.7 - 15.4 % Final    Platelets 06/06/2023 264  150 - 450 " x10E3/uL Final    Neutrophil Rel % 06/06/2023 64  Not Estab. % Final    Lymphocyte Rel % 06/06/2023 26  Not Estab. % Final    Monocyte Rel % 06/06/2023 8  Not Estab. % Final    Eosinophil Rel % 06/06/2023 1  Not Estab. % Final    Basophil Rel % 06/06/2023 0  Not Estab. % Final    Neutrophils Absolute 06/06/2023 6.3  1.4 - 7.0 x10E3/uL Final    Lymphocytes Absolute 06/06/2023 2.6  0.7 - 3.1 x10E3/uL Final    Monocytes Absolute 06/06/2023 0.7  0.1 - 0.9 x10E3/uL Final    Eosinophils Absolute 06/06/2023 0.1  0.0 - 0.4 x10E3/uL Final    Basophils Absolute 06/06/2023 0.0  0.0 - 0.2 x10E3/uL Final    Immature Granulocyte Rel % 06/06/2023 1  Not Estab. % Final    Immature Grans Absolute 06/06/2023 0.1  0.0 - 0.1 x10E3/uL Final    Glucose 06/06/2023 226 (H)  70 - 99 mg/dL Final    BUN 06/06/2023 6  6 - 24 mg/dL Final    Creatinine 06/06/2023 0.58  0.57 - 1.00 mg/dL Final    EGFR Result 06/06/2023 117  >59 mL/min/1.73 Final    BUN/Creatinine Ratio 06/06/2023 10  9 - 23 Final    Sodium 06/06/2023 137  134 - 144 mmol/L Final    Potassium 06/06/2023 4.1  3.5 - 5.2 mmol/L Final    Chloride 06/06/2023 98  96 - 106 mmol/L Final    Total CO2 06/06/2023 22  20 - 29 mmol/L Final    Calcium 06/06/2023 9.7  8.7 - 10.2 mg/dL Final    Total Protein 06/06/2023 7.5  6.0 - 8.5 g/dL Final    Albumin 06/06/2023 4.3  3.8 - 4.8 g/dL Final    Globulin 06/06/2023 3.2  1.5 - 4.5 g/dL Final    A/G Ratio 06/06/2023 1.3  1.2 - 2.2 Final    Total Bilirubin 06/06/2023 1.0  0.0 - 1.2 mg/dL Final    Alkaline Phosphatase 06/06/2023 109  44 - 121 IU/L Final    AST (SGOT) 06/06/2023 21  0 - 40 IU/L Final    ALT (SGPT) 06/06/2023 16  0 - 32 IU/L Final    Amylase 06/06/2023 52  31 - 110 U/L Final    Lipase 06/06/2023 23  14 - 72 U/L Final    Total Cholesterol 06/06/2023 177  100 - 199 mg/dL Final    Triglycerides 06/06/2023 177 (H)  0 - 149 mg/dL Final    HDL Cholesterol 06/06/2023 35 (L)  >39 mg/dL Final    VLDL Cholesterol Erich 06/06/2023 31  5 - 40 mg/dL  Final    LDL Chol Calc ([facility]) 06/06/2023 111 (H)  0 - 99 mg/dL Final    TSH 06/06/2023 1.430  0.450 - 4.500 uIU/mL Final         Assessment & Plan   Diagnoses and all orders for this visit:    1. Adenomyosis of the uterus (Primary)    2. History of robot-assisted laparoscopic hysterectomy    3. Acute cough  -     XR Chest PA & Lateral    4. Numbness and tingling in right hand  -     EMG & Nerve Conduction Test; Future    5. Severe recurrent major depression without psychotic features    6. Generalized anxiety disorder    I do not have any idea what is going on with his reported hot flashes.  Her ovaries were not removed.  She tells me she has had hot flashes since her hysterectomy.  She has not been taking her medications.  I actually favor this being a feature of one of her mental illnesses.  Apparently she is not following up with psychiatry.  We will do a chest x-ray because of cough.  Await official radiology interpretation of x-ray, but I am going to start her on Biaxin as I think there might be an infiltrate at the right base.    Nerve conduction tests to sort out this numbness in her hand.  I explained to her the importance of staying on her mental health medicines.  If she is having hot flashes, it may be because she discontinued multiple medicines.      When I last saw her in early June and I wanted to see her back in early to mid September.  That appointment apparently got canceled, or was never made.  Follow-up here in 1 month.  We will be time then to repeat her blood work for her diabetes.    Call with any problems or concerns before next visit       Return in about 4 weeks (around 9/18/2023), or for routine follow up on diabetes.      Much of this report is an electronic transcription of spoken language to printed text using Dragon dictation software.  As such, the subtleties and finesse of spoken language may permit erroneous, or at times, nonsensical words or phrases to be inadvertently transcribed;  thus changes may be made at a later date to rectify these errors.     Bree Wan MD8/21/202313:39 EDT  This note has been electronically signed   no

## 2023-09-07 ENCOUNTER — APPOINTMENT (OUTPATIENT)
Dept: GENERAL RADIOLOGY | Facility: HOSPITAL | Age: 43
End: 2023-09-07
Payer: MEDICAID

## 2023-09-07 ENCOUNTER — HOSPITAL ENCOUNTER (EMERGENCY)
Facility: HOSPITAL | Age: 43
Discharge: HOME | End: 2023-09-07
Attending: EMERGENCY MEDICINE
Payer: MEDICAID

## 2023-09-07 VITALS
RESPIRATION RATE: 18 BRPM | HEIGHT: 64 IN | HEART RATE: 99 BPM | WEIGHT: 274.25 LBS | BODY MASS INDEX: 46.82 KG/M2 | DIASTOLIC BLOOD PRESSURE: 67 MMHG | TEMPERATURE: 99.3 F | SYSTOLIC BLOOD PRESSURE: 101 MMHG | OXYGEN SATURATION: 98 %

## 2023-09-07 DIAGNOSIS — M25.552 BILATERAL HIP PAIN: ICD-10-CM

## 2023-09-07 DIAGNOSIS — R07.89 CHEST WALL PAIN: Primary | ICD-10-CM

## 2023-09-07 DIAGNOSIS — N39.0 URINARY TRACT INFECTION WITHOUT HEMATURIA, SITE UNSPECIFIED: ICD-10-CM

## 2023-09-07 DIAGNOSIS — M25.551 BILATERAL HIP PAIN: ICD-10-CM

## 2023-09-07 LAB
ALBUMIN SERPL-MCNC: 3.7 G/DL (ref 3.5–5.2)
ALBUMIN/GLOB SERPL: 1 G/DL
ALP SERPL-CCNC: 110 U/L (ref 39–117)
ALT SERPL W P-5'-P-CCNC: 20 U/L (ref 1–33)
ANION GAP SERPL CALCULATED.3IONS-SCNC: 14 MMOL/L (ref 5–15)
AST SERPL-CCNC: 19 U/L (ref 1–32)
BACTERIA UR QL AUTO: ABNORMAL /HPF
BASOPHILS # BLD AUTO: 0 10*3/MM3 (ref 0–0.2)
BASOPHILS NFR BLD AUTO: 0.2 % (ref 0–1.5)
BILIRUB SERPL-MCNC: 3.4 MG/DL (ref 0–1.2)
BILIRUB UR QL STRIP: NEGATIVE
BUN SERPL-MCNC: 6 MG/DL (ref 6–20)
BUN/CREAT SERPL: 10.9 (ref 7–25)
CALCIUM SPEC-SCNC: 9.1 MG/DL (ref 8.6–10.5)
CHLORIDE SERPL-SCNC: 93 MMOL/L (ref 98–107)
CLARITY UR: CLEAR
CO2 SERPL-SCNC: 22 MMOL/L (ref 22–29)
COLOR UR: YELLOW
CREAT SERPL-MCNC: 0.55 MG/DL (ref 0.57–1)
D DIMER PPP FEU-MCNC: 0.33 MG/L (FEU) (ref 0–0.5)
DEPRECATED RDW RBC AUTO: 49.9 FL (ref 37–54)
EGFRCR SERPLBLD CKD-EPI 2021: 119 ML/MIN/1.73
EOSINOPHIL # BLD AUTO: 0 10*3/MM3 (ref 0–0.4)
EOSINOPHIL NFR BLD AUTO: 0 % (ref 0.3–6.2)
ERYTHROCYTE [DISTWIDTH] IN BLOOD BY AUTOMATED COUNT: 16.3 % (ref 12.3–15.4)
GLOBULIN UR ELPH-MCNC: 3.6 GM/DL
GLUCOSE SERPL-MCNC: 330 MG/DL (ref 65–99)
GLUCOSE UR STRIP-MCNC: ABNORMAL MG/DL
HBA1C MFR BLD: 9.4 % (ref 4.8–5.6)
HCT VFR BLD AUTO: 34.5 % (ref 34–46.6)
HGB BLD-MCNC: 11.4 G/DL (ref 12–15.9)
HGB UR QL STRIP.AUTO: ABNORMAL
HOLD SPECIMEN: NORMAL
HOLD SPECIMEN: NORMAL
HYALINE CASTS UR QL AUTO: ABNORMAL /LPF
KETONES UR QL STRIP: ABNORMAL
LEUKOCYTE ESTERASE UR QL STRIP.AUTO: ABNORMAL
LIPASE SERPL-CCNC: 14 U/L (ref 13–60)
LYMPHOCYTES # BLD AUTO: 1.9 10*3/MM3 (ref 0.7–3.1)
LYMPHOCYTES NFR BLD AUTO: 12.2 % (ref 19.6–45.3)
MCH RBC QN AUTO: 27.4 PG (ref 26.6–33)
MCHC RBC AUTO-ENTMCNC: 33.1 G/DL (ref 31.5–35.7)
MCV RBC AUTO: 82.9 FL (ref 79–97)
MONOCYTES # BLD AUTO: 1.6 10*3/MM3 (ref 0.1–0.9)
MONOCYTES NFR BLD AUTO: 10.7 % (ref 5–12)
MUCOUS THREADS URNS QL MICRO: ABNORMAL /HPF
NEUTROPHILS NFR BLD AUTO: 11.7 10*3/MM3 (ref 1.7–7)
NEUTROPHILS NFR BLD AUTO: 76.9 % (ref 42.7–76)
NITRITE UR QL STRIP: POSITIVE
NRBC BLD AUTO-RTO: 0 /100 WBC (ref 0–0.2)
PH UR STRIP.AUTO: 5.5 [PH] (ref 5–8)
PLATELET # BLD AUTO: 213 10*3/MM3 (ref 140–450)
PMV BLD AUTO: 8.3 FL (ref 6–12)
POTASSIUM SERPL-SCNC: 4.1 MMOL/L (ref 3.5–5.2)
PROT SERPL-MCNC: 7.3 G/DL (ref 6–8.5)
PROT UR QL STRIP: ABNORMAL
RBC # BLD AUTO: 4.16 10*6/MM3 (ref 3.77–5.28)
RBC # UR STRIP: ABNORMAL /HPF
REF LAB TEST METHOD: ABNORMAL
SODIUM SERPL-SCNC: 129 MMOL/L (ref 136–145)
SP GR UR STRIP: 1.02 (ref 1–1.03)
SQUAMOUS #/AREA URNS HPF: ABNORMAL /HPF
TROPONIN T SERPL HS-MCNC: <6 NG/L
UROBILINOGEN UR QL STRIP: ABNORMAL
WBC # UR STRIP: ABNORMAL /HPF
WBC NRBC COR # BLD: 15.2 10*3/MM3 (ref 3.4–10.8)
WHOLE BLOOD HOLD COAG: NORMAL
WHOLE BLOOD HOLD SPECIMEN: NORMAL
YEAST URNS QL MICRO: ABNORMAL /HPF

## 2023-09-07 PROCEDURE — 71045 X-RAY EXAM CHEST 1 VIEW: CPT

## 2023-09-07 PROCEDURE — 25010000002 KETOROLAC TROMETHAMINE PER 15 MG: Performed by: EMERGENCY MEDICINE

## 2023-09-07 PROCEDURE — 83036 HEMOGLOBIN GLYCOSYLATED A1C: CPT | Performed by: PHYSICIAN ASSISTANT

## 2023-09-07 PROCEDURE — 80053 COMPREHEN METABOLIC PANEL: CPT | Performed by: PHYSICIAN ASSISTANT

## 2023-09-07 PROCEDURE — 87086 URINE CULTURE/COLONY COUNT: CPT | Performed by: PHYSICIAN ASSISTANT

## 2023-09-07 PROCEDURE — 81001 URINALYSIS AUTO W/SCOPE: CPT | Performed by: PHYSICIAN ASSISTANT

## 2023-09-07 PROCEDURE — 84484 ASSAY OF TROPONIN QUANT: CPT | Performed by: EMERGENCY MEDICINE

## 2023-09-07 PROCEDURE — 96374 THER/PROPH/DIAG INJ IV PUSH: CPT

## 2023-09-07 PROCEDURE — 85025 COMPLETE CBC W/AUTO DIFF WBC: CPT | Performed by: PHYSICIAN ASSISTANT

## 2023-09-07 PROCEDURE — 83690 ASSAY OF LIPASE: CPT | Performed by: PHYSICIAN ASSISTANT

## 2023-09-07 PROCEDURE — 93005 ELECTROCARDIOGRAM TRACING: CPT | Performed by: EMERGENCY MEDICINE

## 2023-09-07 PROCEDURE — 85379 FIBRIN DEGRADATION QUANT: CPT | Performed by: EMERGENCY MEDICINE

## 2023-09-07 PROCEDURE — 99284 EMERGENCY DEPT VISIT MOD MDM: CPT

## 2023-09-07 RX ORDER — NITROFURANTOIN 25; 75 MG/1; MG/1
100 CAPSULE ORAL 2 TIMES DAILY
Qty: 10 CAPSULE | Refills: 0 | Status: SHIPPED | OUTPATIENT
Start: 2023-09-07

## 2023-09-07 RX ORDER — SODIUM CHLORIDE 0.9 % (FLUSH) 0.9 %
10 SYRINGE (ML) INJECTION AS NEEDED
Status: DISCONTINUED | OUTPATIENT
Start: 2023-09-07 | End: 2023-09-07 | Stop reason: HOSPADM

## 2023-09-07 RX ORDER — KETOROLAC TROMETHAMINE 15 MG/ML
15 INJECTION, SOLUTION INTRAMUSCULAR; INTRAVENOUS ONCE
Status: COMPLETED | OUTPATIENT
Start: 2023-09-07 | End: 2023-09-07

## 2023-09-07 RX ADMIN — KETOROLAC TROMETHAMINE 15 MG: 15 INJECTION, SOLUTION INTRAMUSCULAR; INTRAVENOUS at 18:36

## 2023-09-07 NOTE — ED NOTES
Pt states today she started to have bilateral hip pain, bilateral lower rib pain, and bilateral foot pain; pt describes pain as a sharp/tingling pain rated at 9/10

## 2023-09-07 NOTE — ED PROVIDER NOTES
Subjective Due to significant overcrowding in the emergency department patient was initially seen and evaluated in triage.  Provider in triage recommended patient placement in the treatment area to initiate therapy and movement to an ER bed as soon as possible.    Provider in Triage Note  Patient is a 42-year-old female who presents with 1 day of left hip pain radiating to her left groin.  She denies any nausea vomiting back pain diarrhea constipation.  Endorses some dizziness worse when she is up and moving.  She also reports for the last 2 to 3 weeks she has had bilateral foot pain it feels sharp and tingly.  It is worse when she is up and moving and better when she sits and rest.    History of Present Illness  Patient reports she has pain in both ribs.  She complains of pain in her hips and feet.  She reports she is having no abdominal pain no complaints of new back pain.  She reports woke up with symptoms this morning.  She has had no fever.  She was reported that she had had pneumonia recently was concerned that it might not have improved.  She has had no fever.  No vomiting no diarrhea no dysuria.  Review of Systems    Past Medical History:   Diagnosis Date    Allergic     Anger     Anxiety     Depression     Diabetes mellitus     Headache     HTN (hypertension)     Knee pain, bilateral     Obesity     PTSD (post-traumatic stress disorder)     Type 2 diabetes mellitus        Allergies   Allergen Reactions    Tylenol With Codeine #3 [Acetaminophen-Codeine] Hallucinations    Abilify [Aripiprazole] Rash    Metformin Diarrhea    Vicodin [Hydrocodone-Acetaminophen] GI Intolerance       Past Surgical History:   Procedure Laterality Date    EAR TUBES      ENDOMETRIAL ABLATION      INTRAUTERINE DEVICE INSERTION  10/14/2020    LAPAROSCOPIC TUBAL LIGATION      TONSILLECTOMY AND ADENOIDECTOMY      TOTAL LAPAROSCOPIC HYSTERECTOMY WITH DAVINCI ROBOT  06/02/2023    [facility]       Family History   Problem Relation Age  of Onset    Hypertension Mother     Depression Mother     Post-traumatic stress disorder Mother     Hyperlipidemia Mother     Anxiety disorder Mother     Hypertension Father     Hyperlipidemia Father     Hypertension Brother     Depression Brother     Parkinsonism Maternal Grandmother     Dementia Maternal Grandmother     Atrial fibrillation Maternal Grandfather     Breast cancer Paternal Grandmother     COPD Paternal Grandfather        Social History     Socioeconomic History    Marital status: Single    Number of children: 0   Tobacco Use    Smoking status: Former     Packs/day: 1.00     Years: 1.00     Pack years: 1.00     Types: Cigarettes     Start date: 2017     Quit date: 2018     Years since quittin.6     Passive exposure: Past    Smokeless tobacco: Never    Tobacco comments:     socially    Vaping Use    Vaping Use: Never used   Substance and Sexual Activity    Alcohol use: Never    Drug use: Never    Sexual activity: Not Currently     Partners: Male     Birth control/protection: Hysterectomy     Prior to Admission medications    Medication Sig Start Date End Date Taking? Authorizing Provider   Blood Glucose Monitoring Suppl kit Use as directed to check blood glucose 22   Bree Wan MD   Cariprazine HCl (Vraylar) 3 MG capsule capsule Take 1 capsule by mouth once daily 23   Bree Wan MD   clarithromycin (BIAXIN) 500 MG tablet Take 1 tablet by mouth 2 (Two) Times a Day. 23   Bree Wan MD   clotrimazole (LOTRIMIN) 1 % cream Apply 1 application topically to the appropriate area as directed 2 (Two) Times a Day. 22   Bree Wan MD   Diclofenac Epolamine (Licart) 1.3 % patch 24 hour Apply 1 patch topically Daily As Needed (back pain). 23   Perez Connors MD   Dulaglutide (Trulicity) 3 MG/0.5ML solution pen-injector Inject 0.5 mL under the skin into the appropriate area as directed Every 7 (Seven) Days. 3/12/23   Savage  Bree Weir MD   EPINEPHrine (EPIPEN) 0.3 MG/0.3ML solution auto-injector injection USE AS DIRECTED FOR ACUTE ALLERGIC REACTION 10/23/20   ProviderGlen MD   ferrous sulfate 325 (65 FE) MG tablet Take 1 tablet by mouth Daily With Breakfast. 4/27/22   Bree Wan MD   fexofenadine (ALLEGRA) 180 MG tablet Take 1 tablet by mouth once daily 2/10/23   Bree Wan MD   FLUoxetine (PROzac) 40 MG capsule Take 1 capsule by mouth once daily 5/11/23   Bree Wan MD   glucose blood test strip Use as instructed to check blood glucose once daily 7/29/22   Bree Wan MD   Januvia 100 MG tablet Take 1 tablet by mouth once daily 4/3/23   Bree Wan MD   lamoTRIgine (LaMICtal) 100 MG tablet Take 1 tablet by mouth once daily 1/6/23   Bree Wan MD   Lancets misc Use once daily with meter and strips to check blood glucose 7/29/22   Bree Wan MD   linaclotide (Linzess) 145 MCG capsule capsule Take 1 capsule by mouth Every Morning Before Breakfast. 4/7/23   Bree Wan MD   lisinopril (PRINIVIL,ZESTRIL) 10 MG tablet Take 1 tablet by mouth once daily 5/11/23   Bree Wan MD   Melatonin 5 MG chewable tablet Chew 5 mg every night at bedtime. 4/13/21   Bree Wan MD   nitrofurantoin, macrocrystal-monohydrate, (MACROBID) 100 MG capsule Take 1 capsule by mouth 2 (Two) Times a Day. 9/7/23   Victorino Link MD   oxyCODONE-acetaminophen (Percocet)  MG per tablet Take 1 tablet by mouth Every 6 (Six) Hours As Needed for Moderate Pain. 7/12/23   Perez Connors MD   oxyCODONE-acetaminophen (Percocet)  MG per tablet Take 1 tablet by mouth Every 6 (Six) Hours As Needed for Moderate Pain. 7/12/23   Perez Connors MD   oxyCODONE-acetaminophen (Percocet)  MG per tablet Take 1 tablet by mouth Every 6 (Six) Hours As Needed for Moderate Pain. 7/12/23   Perez Connors MD   pregabalin (LYRICA) 75  MG capsule Take 1 capsule by mouth 2 (Two) Times a Day. 7/12/23   Perez Connors MD   SUMAtriptan (Imitrex) 50 MG tablet Take one tablet at onset of headache. May repeat dose one time in 2 hours if headache not relieved. 4/27/22   Bree Wan MD           Objective   Physical Exam  42-year-old female awake alert.  Generally obese.  Pupils equal round react light.  Neck supple chest clear equal breath sounds cardiovascular rate and rhythm with complaints of pain in wrist bilaterally.  Abdomen is soft she reports no tenderness.  She complains of some pain in her hips but does have intact range of motion.  Legs are neuro vas intact distally without deficit.  Neurologic exam no focal findings are noted.  Procedures           ED Course      Results for orders placed or performed during the hospital encounter of 09/07/23   Comprehensive Metabolic Panel    Specimen: Blood   Result Value Ref Range    Glucose 330 (H) 65 - 99 mg/dL    BUN 6 6 - 20 mg/dL    Creatinine 0.55 (L) 0.57 - 1.00 mg/dL    Sodium 129 (L) 136 - 145 mmol/L    Potassium 4.1 3.5 - 5.2 mmol/L    Chloride 93 (L) 98 - 107 mmol/L    CO2 22.0 22.0 - 29.0 mmol/L    Calcium 9.1 8.6 - 10.5 mg/dL    Total Protein 7.3 6.0 - 8.5 g/dL    Albumin 3.7 3.5 - 5.2 g/dL    ALT (SGPT) 20 1 - 33 U/L    AST (SGOT) 19 1 - 32 U/L    Alkaline Phosphatase 110 39 - 117 U/L    Total Bilirubin 3.4 (H) 0.0 - 1.2 mg/dL    Globulin 3.6 gm/dL    A/G Ratio 1.0 g/dL    BUN/Creatinine Ratio 10.9 7.0 - 25.0    Anion Gap 14.0 5.0 - 15.0 mmol/L    eGFR 119.0 >60.0 mL/min/1.73   Lipase    Specimen: Blood   Result Value Ref Range    Lipase 14 13 - 60 U/L   Urinalysis With Culture If Indicated - Urine, Clean Catch    Specimen: Urine, Clean Catch   Result Value Ref Range    Color, UA Yellow Yellow, Straw    Appearance, UA Clear Clear    pH, UA 5.5 5.0 - 8.0    Specific Gravity, UA 1.025 1.005 - 1.030    Glucose,  mg/dL (2+) (A) Negative    Ketones, UA Trace (A) Negative     Bilirubin, UA Negative Negative    Blood, UA Trace (A) Negative    Protein, UA 30 mg/dL (1+) (A) Negative    Leuk Esterase, UA Trace (A) Negative    Nitrite, UA Positive (A) Negative    Urobilinogen, UA 1.0 E.U./dL 0.2 - 1.0 E.U./dL   CBC Auto Differential    Specimen: Blood   Result Value Ref Range    WBC 15.20 (H) 3.40 - 10.80 10*3/mm3    RBC 4.16 3.77 - 5.28 10*6/mm3    Hemoglobin 11.4 (L) 12.0 - 15.9 g/dL    Hematocrit 34.5 34.0 - 46.6 %    MCV 82.9 79.0 - 97.0 fL    MCH 27.4 26.6 - 33.0 pg    MCHC 33.1 31.5 - 35.7 g/dL    RDW 16.3 (H) 12.3 - 15.4 %    RDW-SD 49.9 37.0 - 54.0 fl    MPV 8.3 6.0 - 12.0 fL    Platelets 213 140 - 450 10*3/mm3    Neutrophil % 76.9 (H) 42.7 - 76.0 %    Lymphocyte % 12.2 (L) 19.6 - 45.3 %    Monocyte % 10.7 5.0 - 12.0 %    Eosinophil % 0.0 (L) 0.3 - 6.2 %    Basophil % 0.2 0.0 - 1.5 %    Neutrophils, Absolute 11.70 (H) 1.70 - 7.00 10*3/mm3    Lymphocytes, Absolute 1.90 0.70 - 3.10 10*3/mm3    Monocytes, Absolute 1.60 (H) 0.10 - 0.90 10*3/mm3    Eosinophils, Absolute 0.00 0.00 - 0.40 10*3/mm3    Basophils, Absolute 0.00 0.00 - 0.20 10*3/mm3    nRBC 0.0 0.0 - 0.2 /100 WBC   Hemoglobin A1c    Specimen: Blood   Result Value Ref Range    Hemoglobin A1C 9.40 (H) 4.80 - 5.60 %   D-dimer, Quantitative    Specimen: Blood   Result Value Ref Range    D-Dimer, Quantitative 0.33 0.00 - 0.50 mg/L (FEU)   Single High Sensitivity Troponin T    Specimen: Blood   Result Value Ref Range    HS Troponin T <6 <10 ng/L   Urinalysis, Microscopic Only - Urine, Clean Catch    Specimen: Urine, Clean Catch   Result Value Ref Range    RBC, UA None Seen None Seen /HPF    WBC, UA 13-20 (A) None Seen /HPF    Bacteria, UA None Seen None Seen /HPF    Squamous Epithelial Cells, UA 3-6 (A) None Seen, 0-2 /HPF    Yeast, UA Small/1+ Yeast None Seen /HPF    Hyaline Casts, UA None Seen None Seen /LPF    Mucus, UA Small/1+ (A) None Seen, Trace /HPF    Methodology Manual Light Microscopy    ECG 12 Lead Chest Pain   Result  "Value Ref Range    QT Interval 331 ms    QTC Interval 419 ms   Green Top (Gel)   Result Value Ref Range    Extra Tube Hold for add-ons.    Lavender Top   Result Value Ref Range    Extra Tube hold for add-on    Gold Top - SST   Result Value Ref Range    Extra Tube Hold for add-ons.    Light Blue Top   Result Value Ref Range    Extra Tube Hold for add-ons.      XR Chest 1 View    Result Date: 9/7/2023  Impression: No acute pulmonary disease. Electronically Signed: Vance Mirza MD  9/7/2023 7:04 PM EDT  Workstation ID: [id number]   Medications   ketorolac (TORADOL) injection 15 mg (15 mg Intravenous Given 9/7/23 1836)     /67 (BP Location: Left arm, Patient Position: Lying)   Pulse 99   Temp 99.3 °F (37.4 °C)   Resp 18   Ht 162.6 cm (64\")   Wt 124 kg (274 lb 4 oz)   LMP 06/02/2023 (Exact Date)   SpO2 98%   BMI 47.08 kg/m²                                        Medical Decision Making  Problems Addressed:  Bilateral hip pain: complicated acute illness or injury  Chest wall pain: complicated acute illness or injury  Urinary tract infection without hematuria, site unspecified: complicated acute illness or injury    Amount and/or Complexity of Data Reviewed  Labs: ordered.  Radiology: ordered.  ECG/medicine tests: ordered.    Risk  Prescription drug management.    Chart review: Patient had primary care evaluation on the 21st of last month for generalized anxiety disorder severe depression.  She was complaining of hot flashes although ovaries were not removed moved when she had hysterectomy.  She apparently had not been taking her medications and was thought that some of this may be related to her mental illness and she had not been following up with psychiatry.  She had appoint with pain management the month prior.  Comorbidity: As per past history   Differential: Musculoskeletal pain, pneumonia, pulmonary embolus, UTI,  My EKG interpretation: Sinus rhythm rate of 96.  No ST-T wave abnormality noted.  No " previous for comparison  Lab: Urinalysis 13-20 white cells nitrite positive this was sent for C&S.,  Troponin normal at lipase normal D-dimer normal hemoglobin A1c elevated 9.4 CBC white count 15.2 with heme 11.4 platelet count of 213 76 segs no bands comprehensive metabolic panel glucose 330 with sodium of 129 bilirubin of 3.4 with normal AST ALT alkaline phosphatase.  My Radiology review and interpretation: Chest x-ray reveals clear lungs with no evidence of pneumonia effusion or pneumothorax  Discussion/treatment: Patient had IV placed.  Was given ketorolac.  Findings were discussed with her.  She was discharged placed on Macrobid.  Advised to follow-up with her prior provider and pain management.  Return new or worsening symptoms  Patient was evaluated using appropriate PPE      Final diagnoses:   Chest wall pain   Urinary tract infection without hematuria, site unspecified   Bilateral hip pain       ED Disposition  ED Disposition       ED Disposition   Discharge    Condition   Stable    Comment   --               Bree Wan MD  [address]  [address]  [place] [id number] [phone]          Perez Connors MD  [facility] [address] [facility] [address]  [place] [facility]  [phone]               Medication List        New Prescriptions      nitrofurantoin (macrocrystal-monohydrate) 100 MG capsule  Commonly known as: MACROBID  Take 1 capsule by mouth 2 (Two) Times a Day.               Where to Get Your Medications        These medications were sent to [facility]/pharmacy #[id number] - [place], [place] - [address] - [phone]  - [phone] FX  [address] [place] [address]      Phone: [phone]   nitrofurantoin (macrocrystal-monohydrate) 100 MG capsule            Victorino Link MD  09/07/23 1848

## 2023-09-08 LAB
BACTERIA SPEC AEROBE CULT: NORMAL
QT INTERVAL: 331 MS
QTC INTERVAL: 419 MS

## 2023-09-12 DIAGNOSIS — F33.2 SEVERE RECURRENT MAJOR DEPRESSION WITHOUT PSYCHOTIC FEATURES: ICD-10-CM

## 2023-09-12 DIAGNOSIS — F41.1 GENERALIZED ANXIETY DISORDER: Chronic | ICD-10-CM

## 2023-09-12 DIAGNOSIS — F43.12 CHRONIC POSTTRAUMATIC STRESS DISORDER: Chronic | ICD-10-CM

## 2023-09-12 DIAGNOSIS — G43.009 MIGRAINE WITHOUT AURA AND WITHOUT STATUS MIGRAINOSUS, NOT INTRACTABLE: ICD-10-CM

## 2023-09-12 RX ORDER — FEXOFENADINE HCL 180 MG/1
TABLET ORAL
Qty: 90 TABLET | Refills: 0 | Status: SHIPPED | OUTPATIENT
Start: 2023-09-12

## 2023-09-12 RX ORDER — LAMOTRIGINE 100 MG/1
TABLET ORAL
Qty: 90 TABLET | Refills: 0 | Status: SHIPPED | OUTPATIENT
Start: 2023-09-12

## 2023-09-12 RX ORDER — SUMATRIPTAN 50 MG/1
TABLET, FILM COATED ORAL
Qty: 6 TABLET | Refills: 0 | Status: SHIPPED | OUTPATIENT
Start: 2023-09-12

## 2023-09-12 RX ORDER — FLUOXETINE HYDROCHLORIDE 40 MG/1
40 CAPSULE ORAL DAILY
Qty: 90 CAPSULE | Refills: 0 | Status: SHIPPED | OUTPATIENT
Start: 2023-09-12

## 2023-09-15 ENCOUNTER — TELEPHONE (OUTPATIENT)
Dept: FAMILY MEDICINE CLINIC | Facility: CLINIC | Age: 43
End: 2023-09-15
Payer: MEDICAID

## 2023-09-15 NOTE — TELEPHONE ENCOUNTER
Called patient and advised she has to be seen to get the referral and the soonest we could get her in is going to be the appointment for Tuesday. She does not want to see any other provider. Advised if she was that bad she needs to go to the urgent care or the er voiced she would wait until the appointment on Tuesday

## 2023-09-15 NOTE — TELEPHONE ENCOUNTER
Caller: Noa Soni    Relationship: Self    Best call back number: [phone]    What was the call regarding: PROBLEM WITH LEFT FOOT NEEDS REFERRAL TO A PODIATRIST.  RED, SDWOLLEN, HARD TO TALK      I

## 2023-09-18 DIAGNOSIS — F33.2 SEVERE RECURRENT MAJOR DEPRESSION WITHOUT PSYCHOTIC FEATURES: ICD-10-CM

## 2023-09-18 DIAGNOSIS — F43.12 CHRONIC POSTTRAUMATIC STRESS DISORDER: Chronic | ICD-10-CM

## 2023-09-18 DIAGNOSIS — I10 ESSENTIAL HYPERTENSION: ICD-10-CM

## 2023-09-18 DIAGNOSIS — F41.1 GENERALIZED ANXIETY DISORDER: Chronic | ICD-10-CM

## 2023-09-18 DIAGNOSIS — M54.16 LUMBAR RADICULOPATHY: ICD-10-CM

## 2023-09-18 RX ORDER — FEXOFENADINE HCL 180 MG/1
180 TABLET ORAL DAILY
Qty: 90 TABLET | Refills: 0 | Status: CANCELLED | OUTPATIENT
Start: 2023-09-18

## 2023-09-18 RX ORDER — LISINOPRIL 10 MG/1
10 TABLET ORAL DAILY
Qty: 90 TABLET | Refills: 1 | Status: CANCELLED | OUTPATIENT
Start: 2023-09-18

## 2023-09-18 RX ORDER — FLUOXETINE HYDROCHLORIDE 40 MG/1
40 CAPSULE ORAL DAILY
Qty: 90 CAPSULE | Refills: 0 | Status: CANCELLED | OUTPATIENT
Start: 2023-09-18

## 2023-09-19 ENCOUNTER — OFFICE VISIT (OUTPATIENT)
Dept: FAMILY MEDICINE CLINIC | Facility: CLINIC | Age: 43
End: 2023-09-19
Payer: MEDICAID

## 2023-09-19 VITALS
HEART RATE: 80 BPM | OXYGEN SATURATION: 93 % | DIASTOLIC BLOOD PRESSURE: 73 MMHG | WEIGHT: 277 LBS | BODY MASS INDEX: 47.29 KG/M2 | HEIGHT: 64 IN | SYSTOLIC BLOOD PRESSURE: 110 MMHG | RESPIRATION RATE: 18 BRPM | TEMPERATURE: 97.8 F

## 2023-09-19 DIAGNOSIS — I10 ESSENTIAL HYPERTENSION: ICD-10-CM

## 2023-09-19 DIAGNOSIS — F33.2 SEVERE RECURRENT MAJOR DEPRESSION WITHOUT PSYCHOTIC FEATURES: ICD-10-CM

## 2023-09-19 DIAGNOSIS — I10 ESSENTIAL HYPERTENSION: Primary | ICD-10-CM

## 2023-09-19 DIAGNOSIS — R20.2 NUMBNESS AND TINGLING IN RIGHT HAND: ICD-10-CM

## 2023-09-19 DIAGNOSIS — F43.12 CHRONIC POSTTRAUMATIC STRESS DISORDER: Chronic | ICD-10-CM

## 2023-09-19 DIAGNOSIS — R05.1 ACUTE COUGH: ICD-10-CM

## 2023-09-19 DIAGNOSIS — R20.0 NUMBNESS AND TINGLING IN RIGHT HAND: ICD-10-CM

## 2023-09-19 DIAGNOSIS — E11.65 TYPE 2 DIABETES MELLITUS WITH HYPERGLYCEMIA, WITHOUT LONG-TERM CURRENT USE OF INSULIN: ICD-10-CM

## 2023-09-19 DIAGNOSIS — F41.1 GENERALIZED ANXIETY DISORDER: Chronic | ICD-10-CM

## 2023-09-19 DIAGNOSIS — Z90.710 HISTORY OF ROBOT-ASSISTED LAPAROSCOPIC HYSTERECTOMY: ICD-10-CM

## 2023-09-19 PROCEDURE — 3046F HEMOGLOBIN A1C LEVEL >9.0%: CPT | Performed by: FAMILY MEDICINE

## 2023-09-19 PROCEDURE — T1015 CLINIC SERVICE: HCPCS | Performed by: FAMILY MEDICINE

## 2023-09-19 PROCEDURE — 99214 OFFICE O/P EST MOD 30 MIN: CPT | Performed by: FAMILY MEDICINE

## 2023-09-19 PROCEDURE — 3078F DIAST BP <80 MM HG: CPT | Performed by: FAMILY MEDICINE

## 2023-09-19 PROCEDURE — 1159F MED LIST DOCD IN RCRD: CPT | Performed by: FAMILY MEDICINE

## 2023-09-19 PROCEDURE — 3074F SYST BP LT 130 MM HG: CPT | Performed by: FAMILY MEDICINE

## 2023-09-19 PROCEDURE — 1160F RVW MEDS BY RX/DR IN RCRD: CPT | Performed by: FAMILY MEDICINE

## 2023-09-19 RX ORDER — LISINOPRIL 10 MG/1
10 TABLET ORAL DAILY
Qty: 90 TABLET | Refills: 3 | Status: SHIPPED | OUTPATIENT
Start: 2023-09-19

## 2023-09-19 RX ORDER — FLUOXETINE HYDROCHLORIDE 40 MG/1
40 CAPSULE ORAL DAILY
Qty: 90 CAPSULE | Refills: 3 | Status: SHIPPED | OUTPATIENT
Start: 2023-09-19

## 2023-09-19 RX ORDER — FEXOFENADINE HCL 180 MG/1
180 TABLET ORAL DAILY
Qty: 90 TABLET | Refills: 3 | Status: SHIPPED | OUTPATIENT
Start: 2023-09-19

## 2023-09-19 RX ORDER — PREGABALIN 75 MG/1
75 CAPSULE ORAL 2 TIMES DAILY
Qty: 60 CAPSULE | Refills: 0 | Status: SHIPPED | OUTPATIENT
Start: 2023-09-19

## 2023-09-19 RX ORDER — CEPHALEXIN 500 MG/1
500 CAPSULE ORAL 4 TIMES DAILY
COMMUNITY
Start: 2023-09-16

## 2023-09-19 RX ORDER — DULAGLUTIDE 3 MG/.5ML
3 INJECTION, SOLUTION SUBCUTANEOUS
Qty: 6 ML | Refills: 1 | Status: SHIPPED | OUTPATIENT
Start: 2023-09-19

## 2023-09-19 NOTE — PROGRESS NOTES
Subjective   Noa Soni is a 42 y.o. female.   Chief Complaint   Patient presents with    Diabetes    Hypertension    Foot Injury       History of Present Illness   Presents to the office today for general follow-up.  I saw her not quite a month ago.  She had a complaint of hot flashes and mood changes.  She had a da Fozia robotic total hysterectomy and bilateral salpingectomy in June at [facility].  They did not take her ovaries.    She told me she has stopped all of her pills at that time.  She was coughing.  I ordered a chest x-ray.  May be lower lobe pneumonia.  We treated her with antibiotics.  She went to the ER 12 days ago with chest wall pain.  Chest x-ray at that time was normal.    She had numbness and tingling in her right hand.  I ordered nerve conduction tests.  Those have not been done yet.      She agreed to get back on her medications.  She has not been following with psychiatry, and her mental health medications have been coming to me to continue to fill.    I got refill request on multiple medicines today including her Vraylar and fluoxetine.  Tells me she needs refills on all of her medicines.      Her boyfriend tells me that she really has only been back on her medications now for about 3 weeks.    New complaint is left foot pain.  She tells me no injury.  On the front of the plantar aspect of her heel off to the side it is a little swollen and painful.        Patient Active Problem List    Diagnosis Date Noted    History of robot-assisted laparoscopic hysterectomy 08/21/2023     Note Last Updated: 8/21/2023 6/2/23 - [facility].  Dr. Meraz.  Ovaries left!      Lumbar radiculopathy 07/12/2023    Rotator cuff arthropathy of right shoulder 04/03/2023    Chronic pain of both knees 05/24/2022    Chronic bilateral low back pain with bilateral sciatica 05/24/2022    Chronic right shoulder pain 05/24/2022    Morbid obesity 04/27/2022    Nasal congestion 11/20/2021    Post-traumatic osteoarthritis of  both knees 11/10/2021    Severe recurrent major depression without psychotic features 06/17/2021    Chronic posttraumatic stress disorder 06/17/2021    Generalized anxiety disorder 06/17/2021    Callus of foot 10/19/2020    Perennial allergic rhinitis 10/19/2020    NESHA (obstructive sleep apnea) 10/19/2020    Class 3 severe obesity due to excess calories without serious comorbidity with body mass index (BMI) of 45.0 to 49.9 in adult 10/12/2020    Lymphadenopathy 10/06/2020    Essential hypertension 07/27/2020    Learning disability 07/27/2020     Note Last Updated: 7/27/2020     No other details      Type 2 diabetes mellitus 01/17/2020    History of endometriosis 01/17/2020    History of PCOS 01/17/2020           Past Surgical History:   Procedure Laterality Date    EAR TUBES      ENDOMETRIAL ABLATION      INTRAUTERINE DEVICE INSERTION  10/14/2020    LAPAROSCOPIC TUBAL LIGATION      TONSILLECTOMY AND ADENOIDECTOMY      TOTAL LAPAROSCOPIC HYSTERECTOMY WITH DAVINCI ROBOT  06/02/2023    [facility]     Current Outpatient Medications on File Prior to Visit   Medication Sig    Blood Glucose Monitoring Suppl kit Use as directed to check blood glucose    cephalexin (KEFLEX) 500 MG capsule Take 1 capsule by mouth 4 (Four) Times a Day.    clotrimazole (LOTRIMIN) 1 % cream Apply 1 application topically to the appropriate area as directed 2 (Two) Times a Day.    Diclofenac Epolamine (Licart) 1.3 % patch 24 hour Apply 1 patch topically Daily As Needed (back pain).    EPINEPHrine (EPIPEN) 0.3 MG/0.3ML solution auto-injector injection USE AS DIRECTED FOR ACUTE ALLERGIC REACTION    ferrous sulfate 325 (65 FE) MG tablet Take 1 tablet by mouth Daily With Breakfast.    glucose blood test strip Use as instructed to check blood glucose once daily    lamoTRIgine (LaMICtal) 100 MG tablet Take 1 tablet by mouth once daily    Lancets misc Use once daily with meter and strips to check blood glucose    linaclotide (Linzess) 145 MCG  capsule capsule Take 1 capsule by mouth Every Morning Before Breakfast.    Melatonin 5 MG chewable tablet Chew 5 mg every night at bedtime.    nitrofurantoin, macrocrystal-monohydrate, (MACROBID) 100 MG capsule Take 1 capsule by mouth 2 (Two) Times a Day.    oxyCODONE-acetaminophen (Percocet)  MG per tablet Take 1 tablet by mouth Every 6 (Six) Hours As Needed for Moderate Pain.    oxyCODONE-acetaminophen (Percocet)  MG per tablet Take 1 tablet by mouth Every 6 (Six) Hours As Needed for Moderate Pain.    oxyCODONE-acetaminophen (Percocet)  MG per tablet Take 1 tablet by mouth Every 6 (Six) Hours As Needed for Moderate Pain.    SUMAtriptan (IMITREX) 50 MG tablet TAKE 1 TABLET BY MOUTH AT ONSET OF HEADACHE. MAY REPEAT DOSE ONE TIME IN 2 HOURS IF HEADACHE NOT RELIEVED.    [DISCONTINUED] Cariprazine HCl (Vraylar) 3 MG capsule capsule Take 1 capsule by mouth once daily    [DISCONTINUED] clarithromycin (BIAXIN) 500 MG tablet Take 1 tablet by mouth 2 (Two) Times a Day.    [DISCONTINUED] Dulaglutide (Trulicity) 3 MG/0.5ML solution pen-injector Inject 0.5 mL under the skin into the appropriate area as directed Every 7 (Seven) Days.    [DISCONTINUED] fexofenadine (ALLEGRA) 180 MG tablet Take 1 tablet by mouth once daily    [DISCONTINUED] FLUoxetine (PROzac) 40 MG capsule Take 1 capsule by mouth once daily    [DISCONTINUED] Januvia 100 MG tablet Take 1 tablet by mouth once daily    [DISCONTINUED] lisinopril (PRINIVIL,ZESTRIL) 10 MG tablet Take 1 tablet by mouth once daily    pregabalin (LYRICA) 75 MG capsule Take 1 capsule by mouth 2 (Two) Times a Day.     No current facility-administered medications on file prior to visit.     Allergies   Allergen Reactions    Tylenol With Codeine #3 [Acetaminophen-Codeine] Hallucinations    Abilify [Aripiprazole] Rash    Metformin Diarrhea    Vicodin [Hydrocodone-Acetaminophen] GI Intolerance     Social History     Socioeconomic History    Marital status: Single    Number of  "children: 0   Tobacco Use    Smoking status: Former     Packs/day: 1.00     Years: 1.00     Pack years: 1.00     Types: Cigarettes     Start date: 2017     Quit date: 2018     Years since quittin.7     Passive exposure: Past    Smokeless tobacco: Never    Tobacco comments:     socially    Vaping Use    Vaping Use: Never used   Substance and Sexual Activity    Alcohol use: Never    Drug use: Never    Sexual activity: Not Currently     Partners: Male     Birth control/protection: Hysterectomy     Family History   Problem Relation Age of Onset    Hypertension Mother     Depression Mother     Post-traumatic stress disorder Mother     Hyperlipidemia Mother     Anxiety disorder Mother     Hypertension Father     Hyperlipidemia Father     Hypertension Brother     Depression Brother     Parkinsonism Maternal Grandmother     Dementia Maternal Grandmother     Atrial fibrillation Maternal Grandfather     Breast cancer Paternal Grandmother     COPD Paternal Grandfather        Review of Systems    Objective   /73 (BP Location: Right arm, Patient Position: Sitting, Cuff Size: Large Adult)   Pulse 80   Temp 97.8 °F (36.6 °C) (Infrared)   Resp 18   Ht 162.6 cm (64\")   Wt 126 kg (277 lb)   LMP 2023 (Exact Date)   SpO2 93%   Breastfeeding No   BMI 47.55 kg/m²   Physical Exam  Constitutional:       Appearance: She is well-developed.      Comments:      HENT:      Head: Normocephalic and atraumatic.   Eyes:      Conjunctiva/sclera: Conjunctivae normal.   Cardiovascular:      Rate and Rhythm: Normal rate.   Pulmonary:      Effort: Pulmonary effort is normal.   Musculoskeletal:         General: Normal range of motion.      Cervical back: Normal range of motion.   Feet:      Comments: She has a little swelling to the medial aspect of her calcaneus.  She has point tenderness to palpation to the anterior plantar aspect of the calcaneus.    Skin:     General: Skin is warm and dry.      Findings: No rash. "   Neurological:      Mental Status: She is alert and oriented to person, place, and time.   Psychiatric:         Mood and Affect: Affect is flat.         Behavior: Behavior normal.         Judgment: Judgment is impulsive.         Admission on 09/07/2023, Discharged on 09/07/2023   Component Date Value Ref Range Status    Glucose 09/07/2023 330 (H)  65 - 99 mg/dL Final    BUN 09/07/2023 6  6 - 20 mg/dL Final    Creatinine 09/07/2023 0.55 (L)  0.57 - 1.00 mg/dL Final    Sodium 09/07/2023 129 (L)  136 - 145 mmol/L Final    Potassium 09/07/2023 4.1  3.5 - 5.2 mmol/L Final    Slight hemolysis detected by analyzer. Results may be affected.    Chloride 09/07/2023 93 (L)  98 - 107 mmol/L Final    CO2 09/07/2023 22.0  22.0 - 29.0 mmol/L Final    Calcium 09/07/2023 9.1  8.6 - 10.5 mg/dL Final    Total Protein 09/07/2023 7.3  6.0 - 8.5 g/dL Final    Albumin 09/07/2023 3.7  3.5 - 5.2 g/dL Final    ALT (SGPT) 09/07/2023 20  1 - 33 U/L Final    AST (SGOT) 09/07/2023 19  1 - 32 U/L Final    Slight hemolysis detected by analyzer. Results may be affected.    Alkaline Phosphatase 09/07/2023 110  39 - 117 U/L Final    Total Bilirubin 09/07/2023 3.4 (H)  0.0 - 1.2 mg/dL Final    Globulin 09/07/2023 3.6  gm/dL Final    A/G Ratio 09/07/2023 1.0  g/dL Final    BUN/Creatinine Ratio 09/07/2023 10.9  7.0 - 25.0 Final    Anion Gap 09/07/2023 14.0  5.0 - 15.0 mmol/L Final    eGFR 09/07/2023 119.0  >60.0 mL/min/1.73 Final    Lipase 09/07/2023 14  13 - 60 U/L Final    Color, UA 09/07/2023 Yellow  Yellow, Straw Final    Appearance, UA 09/07/2023 Clear  Clear Final    pH, UA 09/07/2023 5.5  5.0 - 8.0 Final    Specific Gravity, UA 09/07/2023 1.025  1.005 - 1.030 Final    Glucose, UA 09/07/2023 500 mg/dL (2+) (A)  Negative Final    Ketones, UA 09/07/2023 Trace (A)  Negative Final    Bilirubin, UA 09/07/2023 Negative  Negative Final    Blood, UA 09/07/2023 Trace (A)  Negative Final    Protein, UA 09/07/2023 30 mg/dL (1+) (A)  Negative Final    Leuk  Esterase, UA 09/07/2023 Trace (A)  Negative Final    Nitrite, UA 09/07/2023 Positive (A)  Negative Final    Urobilinogen, UA 09/07/2023 1.0 E.U./dL  0.2 - 1.0 E.U./dL Final    Extra Tube 09/07/2023 Hold for add-ons.   Final    Auto resulted.    Extra Tube 09/07/2023 hold for add-on   Final    Auto resulted    Extra Tube 09/07/2023 Hold for add-ons.   Final    Auto resulted.    Extra Tube 09/07/2023 Hold for add-ons.   Final    Auto resulted    WBC 09/07/2023 15.20 (H)  3.40 - 10.80 10*3/mm3 Final    RBC 09/07/2023 4.16  3.77 - 5.28 10*6/mm3 Final    Hemoglobin 09/07/2023 11.4 (L)  12.0 - 15.9 g/dL Final    Hematocrit 09/07/2023 34.5  34.0 - 46.6 % Final    MCV 09/07/2023 82.9  79.0 - 97.0 fL Final    MCH 09/07/2023 27.4  26.6 - 33.0 pg Final    MCHC 09/07/2023 33.1  31.5 - 35.7 g/dL Final    RDW 09/07/2023 16.3 (H)  12.3 - 15.4 % Final    RDW-SD 09/07/2023 49.9  37.0 - 54.0 fl Final    MPV 09/07/2023 8.3  6.0 - 12.0 fL Final    Platelets 09/07/2023 213  140 - 450 10*3/mm3 Final    Neutrophil % 09/07/2023 76.9 (H)  42.7 - 76.0 % Final    Lymphocyte % 09/07/2023 12.2 (L)  19.6 - 45.3 % Final    Monocyte % 09/07/2023 10.7  5.0 - 12.0 % Final    Eosinophil % 09/07/2023 0.0 (L)  0.3 - 6.2 % Final    Basophil % 09/07/2023 0.2  0.0 - 1.5 % Final    Neutrophils, Absolute 09/07/2023 11.70 (H)  1.70 - 7.00 10*3/mm3 Final    Lymphocytes, Absolute 09/07/2023 1.90  0.70 - 3.10 10*3/mm3 Final    Monocytes, Absolute 09/07/2023 1.60 (H)  0.10 - 0.90 10*3/mm3 Final    Eosinophils, Absolute 09/07/2023 0.00  0.00 - 0.40 10*3/mm3 Final    Basophils, Absolute 09/07/2023 0.00  0.00 - 0.20 10*3/mm3 Final    nRBC 09/07/2023 0.0  0.0 - 0.2 /100 WBC Final    Hemoglobin A1C 09/07/2023 9.40 (H)  4.80 - 5.60 % Final    D-Dimer, Quantitative 09/07/2023 0.33  0.00 - 0.50 mg/L (FEU) Final    HS Troponin T 09/07/2023 <6  <10 ng/L Final    RBC, UA 09/07/2023 None Seen  None Seen /HPF Final    WBC, UA 09/07/2023 13-20 (A)  None Seen /HPF Final     Bacteria, UA 09/07/2023 None Seen  None Seen /HPF Final    Squamous Epithelial Cells, UA 09/07/2023 3-6 (A)  None Seen, 0-2 /HPF Final    Yeast, UA 09/07/2023 Small/1+ Yeast  None Seen /HPF Final    Hyaline Casts, UA 09/07/2023 None Seen  None Seen /LPF Final    Mucus, UA 09/07/2023 Small/1+ (A)  None Seen, Trace /HPF Final    Methodology 09/07/2023 Manual Light Microscopy   Final    Urine Culture 09/07/2023 50,000 CFU/mL Mixed Kathleen Isolated   Final    QT Interval 09/07/2023 331  ms Final    QTC Interval 09/07/2023 419  ms Final   Results Encounter on 05/30/2023   Component Date Value Ref Range Status    Hemoglobin A1C 06/06/2023 8.2 (H)  4.8 - 5.6 % Final    Comment:          Prediabetes: 5.7 - 6.4           Diabetes: >6.4           Glycemic control for adults with diabetes: <7.0      WBC 06/06/2023 9.8  3.4 - 10.8 x10E3/uL Final    RBC 06/06/2023 4.47  3.77 - 5.28 x10E6/uL Final    Hemoglobin 06/06/2023 12.2  11.1 - 15.9 g/dL Final    Hematocrit 06/06/2023 37.6  34.0 - 46.6 % Final    MCV 06/06/2023 84  79 - 97 fL Final    MCH 06/06/2023 27.3  26.6 - 33.0 pg Final    MCHC 06/06/2023 32.4  31.5 - 35.7 g/dL Final    RDW 06/06/2023 13.0  11.7 - 15.4 % Final    Platelets 06/06/2023 264  150 - 450 x10E3/uL Final    Neutrophil Rel % 06/06/2023 64  Not Estab. % Final    Lymphocyte Rel % 06/06/2023 26  Not Estab. % Final    Monocyte Rel % 06/06/2023 8  Not Estab. % Final    Eosinophil Rel % 06/06/2023 1  Not Estab. % Final    Basophil Rel % 06/06/2023 0  Not Estab. % Final    Neutrophils Absolute 06/06/2023 6.3  1.4 - 7.0 x10E3/uL Final    Lymphocytes Absolute 06/06/2023 2.6  0.7 - 3.1 x10E3/uL Final    Monocytes Absolute 06/06/2023 0.7  0.1 - 0.9 x10E3/uL Final    Eosinophils Absolute 06/06/2023 0.1  0.0 - 0.4 x10E3/uL Final    Basophils Absolute 06/06/2023 0.0  0.0 - 0.2 x10E3/uL Final    Immature Granulocyte Rel % 06/06/2023 1  Not Estab. % Final    Immature Grans Absolute 06/06/2023 0.1  0.0 - 0.1 x10E3/uL Final     Glucose 06/06/2023 226 (H)  70 - 99 mg/dL Final    BUN 06/06/2023 6  6 - 24 mg/dL Final    Creatinine 06/06/2023 0.58  0.57 - 1.00 mg/dL Final    EGFR Result 06/06/2023 117  >59 mL/min/1.73 Final    BUN/Creatinine Ratio 06/06/2023 10  9 - 23 Final    Sodium 06/06/2023 137  134 - 144 mmol/L Final    Potassium 06/06/2023 4.1  3.5 - 5.2 mmol/L Final    Chloride 06/06/2023 98  96 - 106 mmol/L Final    Total CO2 06/06/2023 22  20 - 29 mmol/L Final    Calcium 06/06/2023 9.7  8.7 - 10.2 mg/dL Final    Total Protein 06/06/2023 7.5  6.0 - 8.5 g/dL Final    Albumin 06/06/2023 4.3  3.8 - 4.8 g/dL Final    Globulin 06/06/2023 3.2  1.5 - 4.5 g/dL Final    A/G Ratio 06/06/2023 1.3  1.2 - 2.2 Final    Total Bilirubin 06/06/2023 1.0  0.0 - 1.2 mg/dL Final    Alkaline Phosphatase 06/06/2023 109  44 - 121 IU/L Final    AST (SGOT) 06/06/2023 21  0 - 40 IU/L Final    ALT (SGPT) 06/06/2023 16  0 - 32 IU/L Final    Amylase 06/06/2023 52  31 - 110 U/L Final    Lipase 06/06/2023 23  14 - 72 U/L Final    Total Cholesterol 06/06/2023 177  100 - 199 mg/dL Final    Triglycerides 06/06/2023 177 (H)  0 - 149 mg/dL Final    HDL Cholesterol 06/06/2023 35 (L)  >39 mg/dL Final    VLDL Cholesterol Erich 06/06/2023 31  5 - 40 mg/dL Final    LDL Chol Calc (NIH) 06/06/2023 111 (H)  0 - 99 mg/dL Final    TSH 06/06/2023 1.430  0.450 - 4.500 uIU/mL Final         Assessment & Plan   Diagnoses and all orders for this visit:    1. Essential hypertension (Primary)  -     lisinopril (PRINIVIL,ZESTRIL) 10 MG tablet; Take 1 tablet by mouth Daily.  Dispense: 90 tablet; Refill: 3    2. Type 2 diabetes mellitus with hyperglycemia, without long-term current use of insulin  -     Dulaglutide (Trulicity) 3 MG/0.5ML solution pen-injector; Inject 0.5 mL under the skin into the appropriate area as directed Every 7 (Seven) Days.  Dispense: 6 mL; Refill: 1  -     SITagliptin (Januvia) 100 MG tablet; Take 1 tablet by mouth Daily.  Dispense: 90 tablet; Refill: 3  -      Hemoglobin A1c; Future  -     Comprehensive Metabolic Panel; Future  -     Magnesium; Future  -     CBC & Differential; Future  -     MicroAlbumin, Urine, Random - Urine, Clean Catch; Future    3. Severe recurrent major depression without psychotic features  -     Cariprazine HCl (Vraylar) 3 MG capsule capsule; Take 1 capsule by mouth Daily.  Dispense: 90 capsule; Refill: 3  -     FLUoxetine (PROzac) 40 MG capsule; Take 1 capsule by mouth Daily.  Dispense: 90 capsule; Refill: 3    4. Generalized anxiety disorder  -     FLUoxetine (PROzac) 40 MG capsule; Take 1 capsule by mouth Daily.  Dispense: 90 capsule; Refill: 3    5. History of robot-assisted laparoscopic hysterectomy    6. Acute cough    7. Numbness and tingling in right hand    8. Chronic posttraumatic stress disorder  -     FLUoxetine (PROzac) 40 MG capsule; Take 1 capsule by mouth Daily.  Dispense: 90 capsule; Refill: 3    9. Essential hypertension  Comments:  Repeat Bp 150/100. Start lisinopril.   Orders:  -     lisinopril (PRINIVIL,ZESTRIL) 10 MG tablet; Take 1 tablet by mouth Daily.  Dispense: 90 tablet; Refill: 3    Other orders  -     fexofenadine (ALLEGRA) 180 MG tablet; Take 1 tablet by mouth Daily.  Dispense: 90 tablet; Refill: 3    I had planned to do lab work today as part of trying to manage these medical problems.  She had labs done when she was in the ER on the seventh-12 days ago.  Her A1c was 9.4.  Lipase was normal.  CMP showed a glucose of 330, GFR was 119.  CBC showed a white count of 15.2.  Hemoglobin was 11.4.  11.7 neutrophils.  D-dimer was negative.  Troponin was negative.  Urinalysis showed 50,000 CFU's per mL of mixed genoveva.    Diabetes remains out of control.  She has only been back on her medications now for a few weeks.  We will see her back in 2 months and recheck her lab work as above.  I refilled all the medicines that I think she needs refills on.  Blood pressure control looks good at 110/73.  Continue lisinopril at 10  mg/day.  Refill Prozac and Vraylar at current doses.  Acute cough has resolved.  Will await nerve conduction test regarding the numbness and tingling in her right hand.  Finally, I think she probably has Planter fasciitis of the left foot.  She then tells me that the ER at [facility] told her a few days ago it was an infection.  They did not tell me she had already sought care for this problem somewhere.  I recommended she get an arch support.  I do not see any signs of infection.  Her boyfriend then tells me they told her it was because her shoe was too tight.  If this worsens, she may need to adjust her footwear size.            Call with any problems or concerns before next visit       Return in about 2 months (around 11/19/2023) for with labs a few days before.      Much of this report is an electronic transcription of spoken language to printed text using Dragon dictation software.  As such, the subtleties and finesse of spoken language may permit erroneous, or at times, nonsensical words or phrases to be inadvertently transcribed; thus changes may be made at a later date to rectify these errors.     Bree Wan MD9/19/202317:19 EDT  This note has been electronically signed

## 2023-10-02 ENCOUNTER — OFFICE VISIT (OUTPATIENT)
Dept: PAIN MEDICINE | Facility: CLINIC | Age: 43
End: 2023-10-02
Payer: MEDICAID

## 2023-10-02 VITALS
DIASTOLIC BLOOD PRESSURE: 58 MMHG | SYSTOLIC BLOOD PRESSURE: 114 MMHG | OXYGEN SATURATION: 96 % | RESPIRATION RATE: 16 BRPM | HEART RATE: 78 BPM

## 2023-10-02 DIAGNOSIS — M54.42 CHRONIC BILATERAL LOW BACK PAIN WITH BILATERAL SCIATICA: Primary | ICD-10-CM

## 2023-10-02 DIAGNOSIS — G89.29 CHRONIC PAIN OF BOTH KNEES: ICD-10-CM

## 2023-10-02 DIAGNOSIS — M12.811 ROTATOR CUFF ARTHROPATHY OF RIGHT SHOULDER: ICD-10-CM

## 2023-10-02 DIAGNOSIS — M25.562 CHRONIC PAIN OF BOTH KNEES: ICD-10-CM

## 2023-10-02 DIAGNOSIS — M25.511 CHRONIC RIGHT SHOULDER PAIN: ICD-10-CM

## 2023-10-02 DIAGNOSIS — M54.41 CHRONIC BILATERAL LOW BACK PAIN WITH BILATERAL SCIATICA: Primary | ICD-10-CM

## 2023-10-02 DIAGNOSIS — G89.29 CHRONIC RIGHT SHOULDER PAIN: ICD-10-CM

## 2023-10-02 DIAGNOSIS — M25.561 CHRONIC PAIN OF BOTH KNEES: ICD-10-CM

## 2023-10-02 DIAGNOSIS — M17.2 POST-TRAUMATIC OSTEOARTHRITIS OF BOTH KNEES: ICD-10-CM

## 2023-10-02 DIAGNOSIS — G89.29 CHRONIC BILATERAL LOW BACK PAIN WITH BILATERAL SCIATICA: Primary | ICD-10-CM

## 2023-10-02 DIAGNOSIS — M54.16 LUMBAR RADICULOPATHY: ICD-10-CM

## 2023-10-02 PROCEDURE — G0463 HOSPITAL OUTPT CLINIC VISIT: HCPCS | Performed by: PHYSICAL MEDICINE & REHABILITATION

## 2023-10-02 PROCEDURE — 1160F RVW MEDS BY RX/DR IN RCRD: CPT | Performed by: PHYSICAL MEDICINE & REHABILITATION

## 2023-10-02 PROCEDURE — 99213 OFFICE O/P EST LOW 20 MIN: CPT | Performed by: PHYSICAL MEDICINE & REHABILITATION

## 2023-10-02 PROCEDURE — 3078F DIAST BP <80 MM HG: CPT | Performed by: PHYSICAL MEDICINE & REHABILITATION

## 2023-10-02 PROCEDURE — 1159F MED LIST DOCD IN RCRD: CPT | Performed by: PHYSICAL MEDICINE & REHABILITATION

## 2023-10-02 PROCEDURE — 3074F SYST BP LT 130 MM HG: CPT | Performed by: PHYSICAL MEDICINE & REHABILITATION

## 2023-10-02 PROCEDURE — 1125F AMNT PAIN NOTED PAIN PRSNT: CPT | Performed by: PHYSICAL MEDICINE & REHABILITATION

## 2023-10-02 RX ORDER — OXYCODONE AND ACETAMINOPHEN 10; 325 MG/1; MG/1
1 TABLET ORAL EVERY 6 HOURS PRN
Qty: 120 TABLET | Refills: 0 | Status: SHIPPED | OUTPATIENT
Start: 2023-10-02

## 2023-10-02 RX ORDER — PREGABALIN 75 MG/1
75 CAPSULE ORAL 2 TIMES DAILY
Qty: 60 CAPSULE | Refills: 2 | Status: SHIPPED | OUTPATIENT
Start: 2023-10-02

## 2023-10-02 NOTE — PROGRESS NOTES
Subjective   Noa Soni is a 42 y.o. female.     History of Present Illness  Chronic bilateral knee pain, also low back and right shoulder pain, nonradiating, 10/10 at worst, 8/10 at best, always present, varies, began years ago, worsening over time, aching, sharp, worse with bending and walking, interferes with sleep, exercise. X-ray b/l knees with mod OA worst in medial compartment. Saw PCP, notes reviewed, with referral to Dr. Leiva who decline steroid injections with DM2, has failed NSAIDs, Tylenol, Voltaren gel, sees Dr. Valencia on multiple medications. No FH of substance abuse. Began Percocet 5mg TID prn, then 7.5mg TID prn. Worsening LBP, R shoulder pain.     The following portions of the patient's history were reviewed and updated as appropriate: allergies, current medications, past family history, past medical history, past social history, past surgical history and problem list.    Review of Systems   Constitutional:  Negative for chills, fatigue and fever.   HENT:  Positive for hearing loss. Negative for trouble swallowing.    Eyes:  Negative for visual disturbance.   Respiratory:  Negative for shortness of breath.    Cardiovascular:  Negative for chest pain.   Gastrointestinal:  Positive for abdominal pain and nausea. Negative for constipation, diarrhea and vomiting.   Genitourinary:  Negative for urinary incontinence.   Musculoskeletal:  Positive for arthralgias and back pain. Negative for joint swelling, myalgias and neck pain.   Neurological:  Positive for headache. Negative for dizziness, weakness and numbness.     Objective   Physical Exam  Constitutional:       Appearance: Normal appearance. She is well-developed.   HENT:      Head: Normocephalic and atraumatic.   Eyes:      Pupils: Pupils are equal, round, and reactive to light.   Cardiovascular:      Rate and Rhythm: Normal rate and regular rhythm.      Heart sounds: Normal heart sounds.   Pulmonary:      Effort: Pulmonary effort is normal.       Breath sounds: Normal breath sounds.   Abdominal:      General: Bowel sounds are normal. There is no distension.      Palpations: Abdomen is soft.      Tenderness: There is no abdominal tenderness.   Musculoskeletal:      Cervical back: Normal range of motion.      Comments: R shoulder: (+) empty can test     Neurological:      Mental Status: She is alert and oriented to person, place, and time.      Sensory: No sensory deficit.      Deep Tendon Reflexes: Reflexes are normal and symmetric. Reflexes normal.   Psychiatric:         Mood and Affect: Mood normal.         Behavior: Behavior normal.         Thought Content: Thought content normal.         Judgment: Judgment normal.         Assessment & Plan   Diagnoses and all orders for this visit:    1. Chronic bilateral low back pain with bilateral sciatica (Primary)    2. Chronic pain of both knees    3. Chronic right shoulder pain    4. Lumbar radiculopathy    5. Post-traumatic osteoarthritis of both knees    6. Rotator cuff arthropathy of right shoulder      UDS in order 4/3/23.  Discussed risks and benefits of opioid treatment for chonic pain with patient, including expectations related to prescription requests, alternative modalities to opioids for managing pain, her treatment plan, risks of dependency and addiction, and safe storage practices for prescribed opioids, as well as proper and improper disposal of all medications.  Treatment plan will consist of continuing current medication as long as it remains effective and is necessary, while evaluating patient at each visit and determining if the medication can be lowered or discontinued, while also using nonopioid therapies to reduce reliance on opioids.  Failed Tylenol #3 TID prn with AMS. Failed NSAIDs, Tylenol, Voltaren gel, allergic to Hydrocodone. Began Percocet 5mg TID prn, helping but not sufficient, increased to 7.5mg QID prn, insufficient, increased to 10mg QID prn. Denies any side effects. Filled  9/5/23.   Trialed Lyrica 75mg BID, very helpful. Began Lyrica 75mg BID, working well.  Began Licart qdaily prn, denies being told not to take NSAIDs.  Performed b/l Monovisc injections with significant improvement, will repeat as necessary up to q6 months, cannot have steroids with DM2. Repeated 2/1/23.  Ordered MRI L-spine, with mild DDD and DJD only, worst pain appears to be facetogenic on history and exam, discussed b/l L4-S1 facet injections. Ordered LSO for now to improve truncal stability.  Performed R shoulder injection.  RTC in 3 months for f/u.    INSPECT REPORT     As part of the patient's treatment plan, I am prescribing controlled substances. The patient has been made aware of appropriate use of such medications, including potential risk of somnolence, limited ability to drive and/or work safely, and the potential for dependence or overdose. It has also bee made clear that these medications are for use by this patient only, without concomitant use of alcohol or other substances unless prescribed.      Patient has completed prescribing agreement detailing terms of continued prescribing of controlled substances, including monitoring INSPECT reports, urine drug screening, and pill counts if necessary. The patient is aware that inappropriate use will results in cessation of prescribing such medications.     INSPECT report has been reviewed and scanned into the patient's chart.     As the clinician, I personally reviewed the INSPECT while the patient was in the office today.     History and physical exam exhibit continued safe and appropriate use of controlled substances.

## 2023-10-10 ENCOUNTER — TELEPHONE (OUTPATIENT)
Dept: FAMILY MEDICINE CLINIC | Facility: CLINIC | Age: 43
End: 2023-10-10
Payer: MEDICAID

## 2023-10-10 NOTE — TELEPHONE ENCOUNTER
Brigido Bello called for Noa Soni . He said that she has never received a phone call from the ENT or the Neurologist. He said it was a few months ago. Thought he should give us a call and see what's going on ....

## 2023-10-10 NOTE — TELEPHONE ENCOUNTER
Patient is wanting another referral to ent she states she never got called last year when it was place. I told her the neurologist has already scheduled the test with her. Voiced she understood.

## 2023-10-12 ENCOUNTER — HOSPITAL ENCOUNTER (EMERGENCY)
Facility: HOSPITAL | Age: 43
Discharge: HOME | End: 2023-10-12
Attending: EMERGENCY MEDICINE
Payer: MEDICAID

## 2023-10-12 VITALS
HEIGHT: 64 IN | DIASTOLIC BLOOD PRESSURE: 79 MMHG | OXYGEN SATURATION: 98 % | HEART RATE: 90 BPM | SYSTOLIC BLOOD PRESSURE: 135 MMHG | TEMPERATURE: 99 F | WEIGHT: 268.96 LBS | RESPIRATION RATE: 20 BRPM | BODY MASS INDEX: 45.92 KG/M2

## 2023-10-12 DIAGNOSIS — U07.1 COVID: Primary | ICD-10-CM

## 2023-10-12 DIAGNOSIS — J34.89 RHINORRHEA: ICD-10-CM

## 2023-10-12 LAB
B PARAPERT DNA SPEC QL NAA+PROBE: NOT DETECTED
B PERT DNA SPEC QL NAA+PROBE: NOT DETECTED
C PNEUM DNA NPH QL NAA+NON-PROBE: NOT DETECTED
FLUAV SUBTYP SPEC NAA+PROBE: NOT DETECTED
FLUBV RNA ISLT QL NAA+PROBE: NOT DETECTED
HADV DNA SPEC NAA+PROBE: NOT DETECTED
HCOV 229E RNA SPEC QL NAA+PROBE: NOT DETECTED
HCOV HKU1 RNA SPEC QL NAA+PROBE: NOT DETECTED
HCOV NL63 RNA SPEC QL NAA+PROBE: NOT DETECTED
HCOV OC43 RNA SPEC QL NAA+PROBE: NOT DETECTED
HMPV RNA NPH QL NAA+NON-PROBE: NOT DETECTED
HPIV1 RNA ISLT QL NAA+PROBE: NOT DETECTED
HPIV2 RNA SPEC QL NAA+PROBE: NOT DETECTED
HPIV3 RNA NPH QL NAA+PROBE: NOT DETECTED
HPIV4 P GENE NPH QL NAA+PROBE: NOT DETECTED
M PNEUMO IGG SER IA-ACNC: NOT DETECTED
RHINOVIRUS RNA SPEC NAA+PROBE: NOT DETECTED
RSV RNA NPH QL NAA+NON-PROBE: NOT DETECTED
SARS-COV-2 RNA NPH QL NAA+NON-PROBE: DETECTED

## 2023-10-12 PROCEDURE — 99282 EMERGENCY DEPT VISIT SF MDM: CPT

## 2023-10-12 PROCEDURE — 0202U NFCT DS 22 TRGT SARS-COV-2: CPT | Performed by: NURSE PRACTITIONER

## 2023-10-12 NOTE — DISCHARGE INSTRUCTIONS
take Tylenol as needed for headache or fever    Take over-the-counter cough and congestion medication as needed for symptomatic relief    Quarantine for first 5 days.  After that you must wear a mask for 5 days.    Follow-up with primary care for recheck  Return to the ER for new or worsening symptoms

## 2023-10-12 NOTE — ED PROVIDER NOTES
Subjective   History of Present Illness  Chief Complaint: Nasal congestion, runny nose    Patient is a 42-year-old  female with history of anxiety, diabetes and headache presents to the ER with complaints of runny nose and nasal congestion since last night.  She denies any cough sore throat or chest pain.  No shortness of breath.  No abdominal pain, nausea vomiting diarrhea.  No headache or lightheadedness.  No fever or chills.  No recent sick contacts.  No recent travel.  Patient states that she took NyQuil last night and DayQuil this morning with improvement in her symptoms.  Patient states she wanted to be tested for COVID.    PCP: Bree Wan    History provided by:  Patient      Review of Systems   Constitutional:  Negative for chills and fever.   HENT:  Positive for congestion and rhinorrhea. Negative for sore throat and trouble swallowing.    Eyes: Negative.    Respiratory:  Negative for chest tightness, shortness of breath and wheezing.    Cardiovascular:  Negative for chest pain and palpitations.   Gastrointestinal:  Negative for abdominal pain, diarrhea, nausea and vomiting.   Endocrine: Negative.    Genitourinary:  Negative for dysuria.   Musculoskeletal:  Negative for myalgias.   Skin:  Negative for rash.   Allergic/Immunologic: Negative.    Neurological:  Negative for light-headedness and headaches.   Psychiatric/Behavioral:  Negative for behavioral problems.    All other systems reviewed and are negative.      Past Medical History:   Diagnosis Date    Allergic     Anger     Anxiety     Depression     Diabetes mellitus     Headache     HTN (hypertension)     Knee pain, bilateral     Obesity     PTSD (post-traumatic stress disorder)     Type 2 diabetes mellitus        Allergies   Allergen Reactions    Tylenol With Codeine #3 [Acetaminophen-Codeine] Hallucinations    Abilify [Aripiprazole] Rash    Metformin Diarrhea    Vicodin [Hydrocodone-Acetaminophen] GI Intolerance       Past Surgical  History:   Procedure Laterality Date    EAR TUBES      ENDOMETRIAL ABLATION      INTRAUTERINE DEVICE INSERTION  10/14/2020    LAPAROSCOPIC TUBAL LIGATION      TONSILLECTOMY AND ADENOIDECTOMY      TOTAL LAPAROSCOPIC HYSTERECTOMY WITH DAVINCI ROBOT  2023    [facility]       Family History   Problem Relation Age of Onset    Hypertension Mother     Depression Mother     Post-traumatic stress disorder Mother     Hyperlipidemia Mother     Anxiety disorder Mother     Hypertension Father     Hyperlipidemia Father     Hypertension Brother     Depression Brother     Parkinsonism Maternal Grandmother     Dementia Maternal Grandmother     Atrial fibrillation Maternal Grandfather     Breast cancer Paternal Grandmother     COPD Paternal Grandfather        Social History     Socioeconomic History    Marital status: Single    Number of children: 0   Tobacco Use    Smoking status: Former     Packs/day: 1.00     Years: 1.00     Additional pack years: 0.00     Total pack years: 1.00     Types: Cigarettes     Start date: 2017     Quit date: 2018     Years since quittin.7     Passive exposure: Past    Smokeless tobacco: Never    Tobacco comments:     socially    Vaping Use    Vaping Use: Never used   Substance and Sexual Activity    Alcohol use: Never    Drug use: Never    Sexual activity: Not Currently     Partners: Male     Birth control/protection: Hysterectomy           Objective   Physical Exam  Vitals and nursing note reviewed.   Constitutional:       Appearance: Normal appearance. She is well-developed. She is not ill-appearing or toxic-appearing.   HENT:      Head: Normocephalic and atraumatic.      Nose: Rhinorrhea present.   Eyes:      Pupils: Pupils are equal, round, and reactive to light.   Cardiovascular:      Rate and Rhythm: Normal rate and regular rhythm.      Pulses: Normal pulses.      Heart sounds: Normal heart sounds. No murmur heard.  Pulmonary:      Effort: Pulmonary effort is normal. No  "respiratory distress.      Breath sounds: Normal breath sounds. No wheezing.   Abdominal:      General: Bowel sounds are normal. There is no distension.      Palpations: Abdomen is soft.      Tenderness: There is no abdominal tenderness.   Skin:     General: Skin is warm and dry.      Capillary Refill: Capillary refill takes less than 2 seconds.      Findings: No rash.   Neurological:      General: No focal deficit present.      Mental Status: She is alert and oriented to person, place, and time.   Psychiatric:         Mood and Affect: Mood normal.         Behavior: Behavior normal.         Procedures           ED Course    /79   Pulse 90   Temp 99 øF (37.2 øC) (Temporal)   Resp 20   Ht 162.6 cm (64\")   Wt 122 kg (268 lb 15.4 oz)   LMP 06/02/2023 (Exact Date)   SpO2 98%   BMI 46.17 kg/mý   Labs Reviewed   RESPIRATORY PANEL PCR W/ COVID-19 (SARS-COV-2) KYLEE/PENELOPE/JORGE/[facility] IN-HOUSE, NP SWAB IN UTM/VTP, 3-4 HR TAT - Abnormal; Notable for the following components:       Result Value    COVID19 Detected (*)     All other components within normal limits    Narrative:     In the setting of a positive respiratory panel with a viral infection PLUS a negative procalcitonin without other underlying concern for bacterial infection, consider observing off antibiotics or discontinuation of antibiotics and continue supportive care. If the respiratory panel is positive for atypical bacterial infection (Bordetella pertussis, Chlamydophila pneumoniae, or Mycoplasma pneumoniae), consider antibiotic de-escalation to target atypical bacterial infection.     Medications - No data to display  No radiology results for the last day                                         Medical Decision Making  Differential Dx (Includes but not limited to): COVID, viral syndrome, flu  Medical Records Reviewed: N/A  Labs: Respiratory panel positive for COVID-19 on my interpretation  Imaging: N/A  Telemetry: N/A  Testing considered but " not ordered: Chest x-ray, patient denies chest pain shortness of breath, vital signs are stable  Nature of Complaint: Acute  Admission vs Discharge: Discharge  Discussion: While in the ED labs were obtained appropriate PPE was worn during exam and throughout all encounters with the patient.  Patient had the above evaluation.  Patient afebrile, nontoxic appearance and in no acute respiratory distress.  Vital signs stable.  Patient's only complaint is nasal congestion and runny nose.  Respiratory panel positive for COVID.  Findings discussed with patient and family, all questions answered.  Recommend over-the-counter symptomatic treatment as needed.      Discharge plan and instructions were discussed with the patient who verbalized understanding and is in agreement with the plan, all questions were answered at this time.  Patient is aware of signs symptoms that would require immediate return to the emergency room.  Patient understands importance of following up with primary care provider for further evaluation and worsening concerns as well as blood pressure recheck in the next 4 weeks.    Patient was discharged in improved stable condition with an upright steady gait.    Patient is aware that discharge does not mean that nothing is wrong but indicates no emergencies present and they must continue care with follow-up as given below or physician of their choice.    Problems Addressed:  COVID: acute illness or injury  Rhinorrhea: acute illness or injury    Amount and/or Complexity of Data Reviewed  Labs: ordered. Decision-making details documented in ED Course.    Risk  OTC drugs.        Final diagnoses:   COVID   Rhinorrhea       ED Disposition  ED Disposition       ED Disposition   Discharge    Condition   Stable    Comment   --               Bree Wan MD  [address]  [facility] [address]  [place] [address] [phone]    Schedule an appointment as soon as possible for a visit in 2 days  As needed, If symptoms  worsen         Medication List      No changes were made to your prescriptions during this visit.            Zee Diaz PA  10/12/23 [id number]

## 2023-10-24 NOTE — PROGRESS NOTES
EMG and Nerve Conduction Studies    Patient Name: Noa Soni    Date of Study:10/27/23    Referring Provider:    History:    [facility]    Results:    The complete report includes the data sheets.     Prior to starting the procedure, the patient's identity was verified, pertinent available records were reviewed, the nature of the procedure was explained, the appropriate site of the exam were confirmed directly with the patient, and a pre-procedure pause was performed for final verification of all the above.    1.  The right median sensory distal latency is prolonged with a very low amplitude sensory nerve action potential amplitude.    2.  The right median motor distal latency is prolonged with low amplitude compound muscle action potential amplitude.  The forearm conduction velocity is normal.    3.  The right ulnar sensory and motor nerve conduction studies are essentially normal.    4.  EMG of the muscles examined in the right C5-T1 myotomes was normal except for some minor neurogenic change in the right abductor pollicis brevis muscle.    Impression:    This is an abnormal study.  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  There is no evidence of focal ulnar neuropathy at the elbow or significant neurogenic change of the muscles examined in the right C5-T1 myotome except for the APB muscle which showed minor abnormalities      Electronically signed by :    Joseph Seipel, M.D.  October 27, 2023

## 2023-10-27 ENCOUNTER — PROCEDURE VISIT (OUTPATIENT)
Dept: NEUROLOGY | Facility: CLINIC | Age: 43
End: 2023-10-27
Payer: MEDICAID

## 2023-10-27 VITALS
SYSTOLIC BLOOD PRESSURE: 151 MMHG | DIASTOLIC BLOOD PRESSURE: 88 MMHG | HEART RATE: 90 BPM | HEIGHT: 64 IN | WEIGHT: 268 LBS | BODY MASS INDEX: 45.75 KG/M2

## 2023-10-27 DIAGNOSIS — R20.0 NUMBNESS AND TINGLING IN RIGHT HAND: ICD-10-CM

## 2023-10-27 DIAGNOSIS — R20.2 NUMBNESS AND TINGLING IN RIGHT HAND: ICD-10-CM

## 2023-10-31 DIAGNOSIS — R20.2 NUMBNESS AND TINGLING IN RIGHT HAND: Primary | ICD-10-CM

## 2023-10-31 DIAGNOSIS — R20.0 NUMBNESS AND TINGLING IN RIGHT HAND: Primary | ICD-10-CM

## 2023-11-03 ENCOUNTER — OFFICE VISIT (OUTPATIENT)
Dept: FAMILY MEDICINE CLINIC | Facility: CLINIC | Age: 43
End: 2023-11-03
Payer: MEDICAID

## 2023-11-03 VITALS
OXYGEN SATURATION: 94 % | HEART RATE: 85 BPM | DIASTOLIC BLOOD PRESSURE: 80 MMHG | HEIGHT: 64 IN | SYSTOLIC BLOOD PRESSURE: 132 MMHG | RESPIRATION RATE: 18 BRPM | TEMPERATURE: 97.3 F | BODY MASS INDEX: 45.75 KG/M2 | WEIGHT: 268 LBS

## 2023-11-03 DIAGNOSIS — H65.33 CHRONIC MUCOID OTITIS MEDIA OF BOTH EARS: Primary | ICD-10-CM

## 2023-11-03 DIAGNOSIS — R09.81 NASAL CONGESTION: ICD-10-CM

## 2023-11-03 DIAGNOSIS — G56.01 RIGHT CARPAL TUNNEL SYNDROME: ICD-10-CM

## 2023-11-03 DIAGNOSIS — J30.89 PERENNIAL ALLERGIC RHINITIS: ICD-10-CM

## 2023-11-03 PROCEDURE — 1159F MED LIST DOCD IN RCRD: CPT | Performed by: FAMILY MEDICINE

## 2023-11-03 PROCEDURE — T1015 CLINIC SERVICE: HCPCS | Performed by: FAMILY MEDICINE

## 2023-11-03 PROCEDURE — 99213 OFFICE O/P EST LOW 20 MIN: CPT | Performed by: FAMILY MEDICINE

## 2023-11-03 PROCEDURE — 3075F SYST BP GE 130 - 139MM HG: CPT | Performed by: FAMILY MEDICINE

## 2023-11-03 PROCEDURE — 1160F RVW MEDS BY RX/DR IN RCRD: CPT | Performed by: FAMILY MEDICINE

## 2023-11-03 PROCEDURE — 3046F HEMOGLOBIN A1C LEVEL >9.0%: CPT | Performed by: FAMILY MEDICINE

## 2023-11-03 PROCEDURE — 3079F DIAST BP 80-89 MM HG: CPT | Performed by: FAMILY MEDICINE

## 2023-11-03 NOTE — PROGRESS NOTES
Subjective   Noa Soni is a 42 y.o. female.   Chief Complaint   Patient presents with    Earache    Neurologic Problem       History of Present Illness   Presents to the office today for what the computer says is med review.  I have no idea why.  I saw her not quite 2 months ago.  The plan was to see her back at the end of this month with lab work ahead of time.  That is in the computer.    Complains of an earache.  This has been a chronic complaint for her.  I referred her to ENT over a year ago.  She called in recently and told us no one had gotten in touch with her yet.  I had her come in.  Symptoms are unchanged.  Severe nasal congestion.  Popping in her ears.  Tells me she has had tubes in her ears 6 or 8 times.    She was at [place] emergency room on October 24.  Looks like she had a complaint of back and flank pain.  She has chronic back pain and is seeing pain management.  They told her to come see me in a few days.  Urinalysis showed trace leukocytes,    She has recently been referred to [facility] because of carpal tunnel syndrome.  Has not seen them yet.    She wanted to see neurology because she thinks she has Tourette's syndrome because she snorts when she gets aggravated.        Patient Active Problem List    Diagnosis Date Noted    History of robot-assisted laparoscopic hysterectomy 08/21/2023     Note Last Updated: 8/21/2023 6/2/23 - Yevgeniy.  Dr. Meraz.  Ovaries left!      Lumbar radiculopathy 07/12/2023    Rotator cuff arthropathy of right shoulder 04/03/2023    Chronic pain of both knees 05/24/2022    Chronic bilateral low back pain with bilateral sciatica 05/24/2022    Chronic right shoulder pain 05/24/2022    Morbid obesity 04/27/2022    Nasal congestion 11/20/2021    Post-traumatic osteoarthritis of both knees 11/10/2021    Severe recurrent major depression without psychotic features 06/17/2021    Chronic posttraumatic stress disorder 06/17/2021    Generalized anxiety  disorder 06/17/2021    Callus of foot 10/19/2020    Perennial allergic rhinitis 10/19/2020    NESHA (obstructive sleep apnea) 10/19/2020    Class 3 severe obesity due to excess calories without serious comorbidity with body mass index (BMI) of 45.0 to 49.9 in adult 10/12/2020    Lymphadenopathy 10/06/2020    Essential hypertension 07/27/2020    Learning disability 07/27/2020     Note Last Updated: 7/27/2020     No other details      Type 2 diabetes mellitus 01/17/2020    History of endometriosis 01/17/2020    History of PCOS 01/17/2020           Past Surgical History:   Procedure Laterality Date    EAR TUBES      ENDOMETRIAL ABLATION      INTRAUTERINE DEVICE INSERTION  10/14/2020    LAPAROSCOPIC TUBAL LIGATION      TONSILLECTOMY AND ADENOIDECTOMY      TOTAL LAPAROSCOPIC HYSTERECTOMY WITH DAVINCI ROBOT  06/02/2023    [facility]     Current Outpatient Medications on File Prior to Visit   Medication Sig    Blood Glucose Monitoring Suppl kit Use as directed to check blood glucose    Cariprazine HCl (Vraylar) 3 MG capsule capsule Take 1 capsule by mouth Daily.    clotrimazole (LOTRIMIN) 1 % cream Apply 1 application topically to the appropriate area as directed 2 (Two) Times a Day.    Diclofenac Epolamine (Licart) 1.3 % patch 24 hour Apply 1 patch topically Daily As Needed (back pain).    Dulaglutide (Trulicity) 3 MG/0.5ML solution pen-injector Inject 0.5 mL under the skin into the appropriate area as directed Every 7 (Seven) Days.    ferrous sulfate 325 (65 FE) MG tablet Take 1 tablet by mouth Daily With Breakfast.    fexofenadine (ALLEGRA) 180 MG tablet Take 1 tablet by mouth Daily.    FLUoxetine (PROzac) 40 MG capsule Take 1 capsule by mouth Daily.    glucose blood test strip Use as instructed to check blood glucose once daily    lamoTRIgine (LaMICtal) 100 MG tablet Take 1 tablet by mouth once daily    Lancets misc Use once daily with meter and strips to check blood glucose    linaclotide (Linzess) 145 MCG capsule  capsule Take 1 capsule by mouth Every Morning Before Breakfast.    lisinopril (PRINIVIL,ZESTRIL) 10 MG tablet Take 1 tablet by mouth Daily.    Melatonin 5 MG chewable tablet Chew 5 mg every night at bedtime.    oxyCODONE-acetaminophen (Percocet)  MG per tablet Take 1 tablet by mouth Every 6 (Six) Hours As Needed for Moderate Pain.    oxyCODONE-acetaminophen (Percocet)  MG per tablet Take 1 tablet by mouth Every 6 (Six) Hours As Needed for Moderate Pain.    oxyCODONE-acetaminophen (Percocet)  MG per tablet Take 1 tablet by mouth Every 6 (Six) Hours As Needed for Moderate Pain.    pregabalin (LYRICA) 75 MG capsule Take 1 capsule by mouth 2 (Two) Times a Day.    SITagliptin (Januvia) 100 MG tablet Take 1 tablet by mouth Daily.    SUMAtriptan (IMITREX) 50 MG tablet TAKE 1 TABLET BY MOUTH AT ONSET OF HEADACHE. MAY REPEAT DOSE ONE TIME IN 2 HOURS IF HEADACHE NOT RELIEVED.    EPINEPHrine (EPIPEN) 0.3 MG/0.3ML solution auto-injector injection USE AS DIRECTED FOR ACUTE ALLERGIC REACTION    [DISCONTINUED] cephalexin (KEFLEX) 500 MG capsule Take 1 capsule by mouth 4 (Four) Times a Day.    [DISCONTINUED] nitrofurantoin, macrocrystal-monohydrate, (MACROBID) 100 MG capsule Take 1 capsule by mouth 2 (Two) Times a Day.     No current facility-administered medications on file prior to visit.     Allergies   Allergen Reactions    Tylenol With Codeine #3 [Acetaminophen-Codeine] Hallucinations    Abilify [Aripiprazole] Rash    Metformin Diarrhea    Vicodin [Hydrocodone-Acetaminophen] GI Intolerance     Social History     Socioeconomic History    Marital status: Single    Number of children: 0   Tobacco Use    Smoking status: Former     Packs/day: 1.00     Years: 1.00     Additional pack years: 0.00     Total pack years: 1.00     Types: Cigarettes     Start date: 2017     Quit date: 2018     Years since quittin.8     Passive exposure: Past    Smokeless tobacco: Never    Tobacco comments:     socially   "  Vaping Use    Vaping Use: Never used   Substance and Sexual Activity    Alcohol use: Never    Drug use: Never    Sexual activity: Not Currently     Partners: Male     Birth control/protection: Hysterectomy     Family History   Problem Relation Age of Onset    Hypertension Mother     Depression Mother     Post-traumatic stress disorder Mother     Hyperlipidemia Mother     Anxiety disorder Mother     Hypertension Father     Hyperlipidemia Father     Hypertension Brother     Depression Brother     Parkinsonism Maternal Grandmother     Dementia Maternal Grandmother     Atrial fibrillation Maternal Grandfather     Breast cancer Paternal Grandmother     COPD Paternal Grandfather        Review of Systems    Objective   /80 (BP Location: Left arm, Patient Position: Sitting, Cuff Size: Large Adult)   Pulse 85   Temp 97.3 °F (36.3 °C) (Infrared)   Resp 18   Ht 162.6 cm (64\")   Wt 122 kg (268 lb)   LMP 06/02/2023 (Exact Date)   SpO2 94%   Breastfeeding No   BMI 46.00 kg/m²   Physical Exam  Constitutional:       Appearance: She is well-developed.      Comments:      HENT:      Head: Normocephalic and atraumatic.      Right Ear: There is no impacted cerumen.      Left Ear: There is no impacted cerumen.      Ears:      Comments: There appears to be a small, persistent perforation of the left TM.  Right TM is dull, bulging.  Fluid behind it.     Nose: Congestion present.      Comments: Voice is very nasal.  She can hardly breathe through her nose.  Eyes:      Conjunctiva/sclera: Conjunctivae normal.   Cardiovascular:      Rate and Rhythm: Normal rate.   Pulmonary:      Effort: Pulmonary effort is normal.   Musculoskeletal:         General: Normal range of motion.      Cervical back: Normal range of motion.   Skin:     General: Skin is warm and dry.      Findings: No rash.   Neurological:      Mental Status: She is alert and oriented to person, place, and time.   Psychiatric:         Behavior: Behavior normal. " "                    Assessment & Plan   Diagnoses and all orders for this visit:    1. Chronic mucoid otitis media of both ears (Primary)  -     Cancel: Ambulatory Referral to ENT (Otolaryngology)  -     Ambulatory Referral to ENT (Otolaryngology)    2. Nasal congestion  -     Cancel: Ambulatory Referral to ENT (Otolaryngology)  -     Ambulatory Referral to ENT (Otolaryngology)    3. Perennial allergic rhinitis  -     Cancel: Ambulatory Referral to ENT (Otolaryngology)  -     Ambulatory Referral to ENT (Otolaryngology)    4. Right carpal tunnel syndrome    Overall, I spent 20 minutes on the day of service with her.  We talked about everything above and below.  There are to make a new referral to ENT-Dr. MCKEON in [place].  Keep follow-up as planned with [facility] regarding her  carpal tunnel syndrome.  I explained to her that neurology would not be the right place to go if she had concerns about Tourette's.  She used to go to [facility] [place] [facility], but does not want to go there anymore.  She has been relatively noncompliant with efforts to treat her mental health issues.  I explained to her that Tourette's is a mental health diagnosis.  Furthermore, she is severely congested, and I think that may be the source of the \"snorting she does.  I think we should see what can be done from an ENT perspective before referring to psychiatry.    Keep appointment as previously scheduled in early December.  Be sure to get the blood work a few days before.    Call with any problems or concerns before next visit       Return keep f/u as planned in December.      Much of this report is an electronic transcription of spoken language to printed text using Dragon dictation software.  As such, the subtleties and finesse of spoken language may permit erroneous, or at times, nonsensical words or phrases to be inadvertently transcribed; thus changes may be made at a later date to rectify these errors.     Bree Wan, " MD11/3/[id number]:13 EDT  This note has been electronically signed

## 2023-12-12 ENCOUNTER — PATIENT MESSAGE (OUTPATIENT)
Dept: PAIN MEDICINE | Facility: CLINIC | Age: 43
End: 2023-12-12
Payer: MEDICAID

## 2023-12-12 NOTE — TELEPHONE ENCOUNTER
From: Noa Soni  To: Perez Connors  Sent: 12/12/2023 3:30 PM EST  Subject: Pregablin    I went to the [facility] the other night and they upped my pregablin to 150 mg and he said that u would have to up them too so I'm just letting u know

## 2023-12-15 ENCOUNTER — OFFICE VISIT (OUTPATIENT)
Dept: FAMILY MEDICINE CLINIC | Facility: CLINIC | Age: 43
End: 2023-12-15
Payer: MEDICAID

## 2023-12-15 VITALS
DIASTOLIC BLOOD PRESSURE: 92 MMHG | HEIGHT: 64 IN | WEIGHT: 271.4 LBS | SYSTOLIC BLOOD PRESSURE: 130 MMHG | TEMPERATURE: 97.6 F | HEART RATE: 72 BPM | OXYGEN SATURATION: 95 % | BODY MASS INDEX: 46.33 KG/M2

## 2023-12-15 DIAGNOSIS — F81.9 LEARNING DISABILITY: ICD-10-CM

## 2023-12-15 DIAGNOSIS — E11.42 DIABETIC POLYNEUROPATHY ASSOCIATED WITH TYPE 2 DIABETES MELLITUS: ICD-10-CM

## 2023-12-15 DIAGNOSIS — R10.11 RIGHT UPPER QUADRANT ABDOMINAL PAIN: ICD-10-CM

## 2023-12-15 DIAGNOSIS — E66.01 CLASS 3 SEVERE OBESITY DUE TO EXCESS CALORIES WITHOUT SERIOUS COMORBIDITY WITH BODY MASS INDEX (BMI) OF 45.0 TO 49.9 IN ADULT: ICD-10-CM

## 2023-12-15 DIAGNOSIS — I10 ESSENTIAL HYPERTENSION: ICD-10-CM

## 2023-12-15 DIAGNOSIS — E11.65 TYPE 2 DIABETES MELLITUS WITH HYPERGLYCEMIA, WITHOUT LONG-TERM CURRENT USE OF INSULIN: Primary | ICD-10-CM

## 2023-12-15 RX ORDER — DULAGLUTIDE 4.5 MG/.5ML
4.5 INJECTION, SOLUTION SUBCUTANEOUS
Qty: 2 ML | Refills: 2 | Status: SHIPPED | OUTPATIENT
Start: 2023-12-15

## 2023-12-15 NOTE — PROGRESS NOTES
Subjective   Noa Soni is a 43 y.o. female.   Chief Complaint   Patient presents with    Depression    Diabetes       History of Present Illness   Presents to the office today for follow-up on multiple issues as below.  I saw her about 6 weeks ago.  She had multiple complaints.  Please see that note for details.  She had blood work done a few days ago.  She is here today to follow-up on her diabetes.  A1c a few days ago was down to 7.9%.  That is the lowest I have seen it.  Her CMP was normal except for glucose of 199.  Electrolytes including magnesium of 1.7 were normal.  CBC was normal.  Hemoglobin was up to 11.9.  Urine microalbumin was down to 15.6.  Her weight today is 271.4 pounds.    Doesn't check her blood sugars.  Her boyfriend who accompanies her via telephone call thinks may be a continuous monitor but make it easier for her to want to check her sugars.    HTN-blood pressure is better at 130/92.    She has chronic pain and follows with Dr. Connors    Severe pain in her feet - Says went to hospital.  They increased Lyrica to 150mg BID.  Told her to follow up with [facility].      Has BM right after eating.  Sometimes has complained of abdominal pain, sometimes not.  Bowel movements after eating or sometimes formed, sometimes loose.    Sees [facility] in March again -  Dr. Braxton.      Patient Active Problem List    Diagnosis Date Noted    History of robot-assisted laparoscopic hysterectomy 08/21/2023     Note Last Updated: 8/21/2023 6/2/23 - Yevgeniy.  Dr. Meraz.  Ovaries left!      Lumbar radiculopathy 07/12/2023    Rotator cuff arthropathy of right shoulder 04/03/2023    Chronic pain of both knees 05/24/2022    Chronic bilateral low back pain with bilateral sciatica 05/24/2022    Chronic right shoulder pain 05/24/2022    Morbid obesity 04/27/2022    Nasal congestion 11/20/2021    Post-traumatic osteoarthritis of both knees 11/10/2021    Severe recurrent major depression without psychotic features  06/17/2021    Chronic posttraumatic stress disorder 06/17/2021    Generalized anxiety disorder 06/17/2021    Callus of foot 10/19/2020    Perennial allergic rhinitis 10/19/2020    NESHA (obstructive sleep apnea) 10/19/2020    Class 3 severe obesity due to excess calories without serious comorbidity with body mass index (BMI) of 45.0 to 49.9 in adult 10/12/2020    Lymphadenopathy 10/06/2020    Essential hypertension 07/27/2020    Learning disability 07/27/2020     Note Last Updated: 7/27/2020     No other details      Type 2 diabetes mellitus 01/17/2020    History of endometriosis 01/17/2020    History of PCOS 01/17/2020           Past Surgical History:   Procedure Laterality Date    EAR TUBES      ENDOMETRIAL ABLATION      INTRAUTERINE DEVICE INSERTION  10/14/2020    LAPAROSCOPIC TUBAL LIGATION      TONSILLECTOMY AND ADENOIDECTOMY      TOTAL LAPAROSCOPIC HYSTERECTOMY WITH DAVINCI ROBOT  06/02/2023    [facility]     Current Outpatient Medications on File Prior to Visit   Medication Sig    Blood Glucose Monitoring Suppl kit Use as directed to check blood glucose    Cariprazine HCl (Vraylar) 3 MG capsule capsule Take 1 capsule by mouth Daily.    clotrimazole (LOTRIMIN) 1 % cream Apply 1 application topically to the appropriate area as directed 2 (Two) Times a Day.    Diclofenac Epolamine (Licart) 1.3 % patch 24 hour Apply 1 patch topically Daily As Needed (back pain).    EPINEPHrine (EPIPEN) 0.3 MG/0.3ML solution auto-injector injection USE AS DIRECTED FOR ACUTE ALLERGIC REACTION    ferrous sulfate 325 (65 FE) MG tablet Take 1 tablet by mouth Daily With Breakfast.    fexofenadine (ALLEGRA) 180 MG tablet Take 1 tablet by mouth Daily.    FLUoxetine (PROzac) 40 MG capsule Take 1 capsule by mouth Daily.    glucose blood test strip Use as instructed to check blood glucose once daily    lamoTRIgine (LaMICtal) 100 MG tablet Take 1 tablet by mouth once daily    Lancets misc Use once daily with meter and strips to check  blood glucose    linaclotide (Linzess) 145 MCG capsule capsule Take 1 capsule by mouth Every Morning Before Breakfast.    lisinopril (PRINIVIL,ZESTRIL) 10 MG tablet Take 1 tablet by mouth Daily.    Melatonin 5 MG chewable tablet Chew 5 mg every night at bedtime.    oxyCODONE-acetaminophen (Percocet)  MG per tablet Take 1 tablet by mouth Every 6 (Six) Hours As Needed for Moderate Pain.    oxyCODONE-acetaminophen (Percocet)  MG per tablet Take 1 tablet by mouth Every 6 (Six) Hours As Needed for Moderate Pain.    oxyCODONE-acetaminophen (Percocet)  MG per tablet Take 1 tablet by mouth Every 6 (Six) Hours As Needed for Moderate Pain.    pregabalin (LYRICA) 75 MG capsule Take 1 capsule by mouth 2 (Two) Times a Day.    SITagliptin (Januvia) 100 MG tablet Take 1 tablet by mouth Daily.    SUMAtriptan (IMITREX) 50 MG tablet TAKE 1 TABLET BY MOUTH AT ONSET OF HEADACHE. MAY REPEAT DOSE ONE TIME IN 2 HOURS IF HEADACHE NOT RELIEVED.    [DISCONTINUED] Dulaglutide (Trulicity) 3 MG/0.5ML solution pen-injector Inject 0.5 mL under the skin into the appropriate area as directed Every 7 (Seven) Days.     No current facility-administered medications on file prior to visit.     Allergies   Allergen Reactions    Tylenol With Codeine #3 [Acetaminophen-Codeine] Hallucinations    Abilify [Aripiprazole] Rash    Metformin Diarrhea    Vicodin [Hydrocodone-Acetaminophen] GI Intolerance     Social History     Socioeconomic History    Marital status: Single    Number of children: 0   Tobacco Use    Smoking status: Former     Packs/day: 1.00     Years: 1.00     Additional pack years: 0.00     Total pack years: 1.00     Types: Cigarettes     Start date: 2017     Quit date: 2018     Years since quittin.9     Passive exposure: Past    Smokeless tobacco: Never    Tobacco comments:     socially    Vaping Use    Vaping Use: Never used   Substance and Sexual Activity    Alcohol use: Never    Drug use: Never    Sexual  "activity: Not Currently     Partners: Male     Birth control/protection: Hysterectomy     Family History   Problem Relation Age of Onset    Hypertension Mother     Depression Mother     Post-traumatic stress disorder Mother     Hyperlipidemia Mother     Anxiety disorder Mother     Hypertension Father     Hyperlipidemia Father     Hypertension Brother     Depression Brother     Parkinsonism Maternal Grandmother     Dementia Maternal Grandmother     Atrial fibrillation Maternal Grandfather     Breast cancer Paternal Grandmother     COPD Paternal Grandfather        Review of Systems    Objective   /92 (BP Location: Right arm, Patient Position: Sitting, Cuff Size: Adult)   Pulse 72   Temp 97.6 °F (36.4 °C) (Infrared)   Ht 162.6 cm (64.02\")   Wt 123 kg (271 lb 6.4 oz)   LMP 06/02/2023 (Exact Date)   SpO2 95%   BMI 46.56 kg/m²   Physical Exam  Constitutional:       Appearance: She is well-developed and overweight. She is not ill-appearing.      Comments: Unkempt appearing 43-year-old female     HENT:      Head: Normocephalic and atraumatic.   Eyes:      Conjunctiva/sclera: Conjunctivae normal.   Cardiovascular:      Rate and Rhythm: Normal rate.   Pulmonary:      Effort: Pulmonary effort is normal. No respiratory distress.   Musculoskeletal:         General: Normal range of motion.      Cervical back: Normal range of motion.      Right lower leg: No edema.      Left lower leg: No edema.   Skin:     General: Skin is warm and dry.      Findings: No rash.   Neurological:      Mental Status: She is alert and oriented to person, place, and time.      Gait: Gait normal.   Psychiatric:         Mood and Affect: Affect is flat.         Behavior: Behavior normal.         Judgment: Judgment is impulsive.           Results Encounter on 11/27/2023   Component Date Value Ref Range Status    Hemoglobin A1C 12/11/2023 7.9 (H)  4.8 - 5.6 % Final    Comment:          Prediabetes: 5.7 - 6.4           Diabetes: >6.4           " Glycemic control for adults with diabetes: <7.0      Glucose 12/11/2023 199 (H)  70 - 99 mg/dL Final    BUN 12/11/2023 9  6 - 24 mg/dL Final    Creatinine 12/11/2023 0.70  0.57 - 1.00 mg/dL Final    EGFR Result 12/11/2023 112  >59 mL/min/1.73 Final    BUN/Creatinine Ratio 12/11/2023 13  9 - 23 Final    Sodium 12/11/2023 141  134 - 144 mmol/L Final    Potassium 12/11/2023 4.9  3.5 - 5.2 mmol/L Final    Chloride 12/11/2023 100  96 - 106 mmol/L Final    Total CO2 12/11/2023 26  20 - 29 mmol/L Final    Calcium 12/11/2023 9.7  8.7 - 10.2 mg/dL Final    Total Protein 12/11/2023 7.8  6.0 - 8.5 g/dL Final    Albumin 12/11/2023 4.3  3.9 - 4.9 g/dL Final    Globulin 12/11/2023 3.5  1.5 - 4.5 g/dL Final    A/G Ratio 12/11/2023 1.2  1.2 - 2.2 Final    Total Bilirubin 12/11/2023 1.2  0.0 - 1.2 mg/dL Final    Alkaline Phosphatase 12/11/2023 132 (H)  44 - 121 IU/L Final    AST (SGOT) 12/11/2023 20  0 - 40 IU/L Final    ALT (SGPT) 12/11/2023 23  0 - 32 IU/L Final    Magnesium 12/11/2023 1.7  1.6 - 2.3 mg/dL Final    WBC 12/11/2023 7.1  3.4 - 10.8 x10E3/uL Final    RBC 12/11/2023 4.39  3.77 - 5.28 x10E6/uL Final    Hemoglobin 12/11/2023 11.9  11.1 - 15.9 g/dL Final    Hematocrit 12/11/2023 36.8  34.0 - 46.6 % Final    MCV 12/11/2023 84  79 - 97 fL Final    MCH 12/11/2023 27.1  26.6 - 33.0 pg Final    MCHC 12/11/2023 32.3  31.5 - 35.7 g/dL Final    RDW 12/11/2023 14.5  11.7 - 15.4 % Final    Platelets 12/11/2023 232  150 - 450 x10E3/uL Final    Neutrophil Rel % 12/11/2023 60  Not Estab. % Final    Lymphocyte Rel % 12/11/2023 31  Not Estab. % Final    Monocyte Rel % 12/11/2023 8  Not Estab. % Final    Eosinophil Rel % 12/11/2023 1  Not Estab. % Final    Basophil Rel % 12/11/2023 0  Not Estab. % Final    Neutrophils Absolute 12/11/2023 4.3  1.4 - 7.0 x10E3/uL Final    Lymphocytes Absolute 12/11/2023 2.2  0.7 - 3.1 x10E3/uL Final    Monocytes Absolute 12/11/2023 0.5  0.1 - 0.9 x10E3/uL Final    Eosinophils Absolute 12/11/2023 0.1  0.0 -  0.4 x10E3/uL Final    Basophils Absolute 12/11/2023 0.0  0.0 - 0.2 x10E3/uL Final    Immature Granulocyte Rel % 12/11/2023 0  Not Estab. % Final    Immature Grans Absolute 12/11/2023 0.0  0.0 - 0.1 x10E3/uL Final    Microalbumin, Urine 12/11/2023 15.6  Not Estab. ug/mL Final   Admission on 10/12/2023, Discharged on 10/12/2023   Component Date Value Ref Range Status    ADENOVIRUS, PCR 10/12/2023 Not Detected  Not Detected Final    Coronavirus 229E 10/12/2023 Not Detected  Not Detected Final    Coronavirus HKU1 10/12/2023 Not Detected  Not Detected Final    Coronavirus NL63 10/12/2023 Not Detected  Not Detected Final    Coronavirus OC43 10/12/2023 Not Detected  Not Detected Final    COVID19 10/12/2023 Detected (C)  Not Detected - Ref. Range Final    Human Metapneumovirus 10/12/2023 Not Detected  Not Detected Final    Human Rhinovirus/Enterovirus 10/12/2023 Not Detected  Not Detected Final    Influenza A PCR 10/12/2023 Not Detected  Not Detected Final    Influenza B PCR 10/12/2023 Not Detected  Not Detected Final    Parainfluenza Virus 1 10/12/2023 Not Detected  Not Detected Final    Parainfluenza Virus 2 10/12/2023 Not Detected  Not Detected Final    Parainfluenza Virus 3 10/12/2023 Not Detected  Not Detected Final    Parainfluenza Virus 4 10/12/2023 Not Detected  Not Detected Final    RSV, PCR 10/12/2023 Not Detected  Not Detected Final    Bordetella pertussis pcr 10/12/2023 Not Detected  Not Detected Final    Bordetella parapertussis PCR 10/12/2023 Not Detected  Not Detected Final    Chlamydophila pneumoniae PCR 10/12/2023 Not Detected  Not Detected Final    Mycoplasma pneumo by PCR 10/12/2023 Not Detected  Not Detected Final   Admission on 09/07/2023, Discharged on 09/07/2023   Component Date Value Ref Range Status    Glucose 09/07/2023 330 (H)  65 - 99 mg/dL Final    BUN 09/07/2023 6  6 - 20 mg/dL Final    Creatinine 09/07/2023 0.55 (L)  0.57 - 1.00 mg/dL Final    Sodium 09/07/2023 129 (L)  136 - 145 mmol/L Final     Potassium 09/07/2023 4.1  3.5 - 5.2 mmol/L Final    Slight hemolysis detected by analyzer. Results may be affected.    Chloride 09/07/2023 93 (L)  98 - 107 mmol/L Final    CO2 09/07/2023 22.0  22.0 - 29.0 mmol/L Final    Calcium 09/07/2023 9.1  8.6 - 10.5 mg/dL Final    Total Protein 09/07/2023 7.3  6.0 - 8.5 g/dL Final    Albumin 09/07/2023 3.7  3.5 - 5.2 g/dL Final    ALT (SGPT) 09/07/2023 20  1 - 33 U/L Final    AST (SGOT) 09/07/2023 19  1 - 32 U/L Final    Slight hemolysis detected by analyzer. Results may be affected.    Alkaline Phosphatase 09/07/2023 110  39 - 117 U/L Final    Total Bilirubin 09/07/2023 3.4 (H)  0.0 - 1.2 mg/dL Final    Globulin 09/07/2023 3.6  gm/dL Final    A/G Ratio 09/07/2023 1.0  g/dL Final    BUN/Creatinine Ratio 09/07/2023 10.9  7.0 - 25.0 Final    Anion Gap 09/07/2023 14.0  5.0 - 15.0 mmol/L Final    eGFR 09/07/2023 119.0  >60.0 mL/min/1.73 Final    Lipase 09/07/2023 14  13 - 60 U/L Final    Color, UA 09/07/2023 Yellow  Yellow, Straw Final    Appearance, UA 09/07/2023 Clear  Clear Final    pH, UA 09/07/2023 5.5  5.0 - 8.0 Final    Specific Gravity, UA 09/07/2023 1.025  1.005 - 1.030 Final    Glucose, UA 09/07/2023 500 mg/dL (2+) (A)  Negative Final    Ketones, UA 09/07/2023 Trace (A)  Negative Final    Bilirubin, UA 09/07/2023 Negative  Negative Final    Blood, UA 09/07/2023 Trace (A)  Negative Final    Protein, UA 09/07/2023 30 mg/dL (1+) (A)  Negative Final    Leuk Esterase, UA 09/07/2023 Trace (A)  Negative Final    Nitrite, UA 09/07/2023 Positive (A)  Negative Final    Urobilinogen, UA 09/07/2023 1.0 E.U./dL  0.2 - 1.0 E.U./dL Final    Extra Tube 09/07/2023 Hold for add-ons.   Final    Auto resulted.    Extra Tube 09/07/2023 hold for add-on   Final    Auto resulted    Extra Tube 09/07/2023 Hold for add-ons.   Final    Auto resulted.    Extra Tube 09/07/2023 Hold for add-ons.   Final    Auto resulted    WBC 09/07/2023 15.20 (H)  3.40 - 10.80 10*3/mm3 Final    RBC 09/07/2023 4.16   3.77 - 5.28 10*6/mm3 Final    Hemoglobin 09/07/2023 11.4 (L)  12.0 - 15.9 g/dL Final    Hematocrit 09/07/2023 34.5  34.0 - 46.6 % Final    MCV 09/07/2023 82.9  79.0 - 97.0 fL Final    MCH 09/07/2023 27.4  26.6 - 33.0 pg Final    MCHC 09/07/2023 33.1  31.5 - 35.7 g/dL Final    RDW 09/07/2023 16.3 (H)  12.3 - 15.4 % Final    RDW-SD 09/07/2023 49.9  37.0 - 54.0 fl Final    MPV 09/07/2023 8.3  6.0 - 12.0 fL Final    Platelets 09/07/2023 213  140 - 450 10*3/mm3 Final    Neutrophil % 09/07/2023 76.9 (H)  42.7 - 76.0 % Final    Lymphocyte % 09/07/2023 12.2 (L)  19.6 - 45.3 % Final    Monocyte % 09/07/2023 10.7  5.0 - 12.0 % Final    Eosinophil % 09/07/2023 0.0 (L)  0.3 - 6.2 % Final    Basophil % 09/07/2023 0.2  0.0 - 1.5 % Final    Neutrophils, Absolute 09/07/2023 11.70 (H)  1.70 - 7.00 10*3/mm3 Final    Lymphocytes, Absolute 09/07/2023 1.90  0.70 - 3.10 10*3/mm3 Final    Monocytes, Absolute 09/07/2023 1.60 (H)  0.10 - 0.90 10*3/mm3 Final    Eosinophils, Absolute 09/07/2023 0.00  0.00 - 0.40 10*3/mm3 Final    Basophils, Absolute 09/07/2023 0.00  0.00 - 0.20 10*3/mm3 Final    nRBC 09/07/2023 0.0  0.0 - 0.2 /100 WBC Final    Hemoglobin A1C 09/07/2023 9.40 (H)  4.80 - 5.60 % Final    D-Dimer, Quantitative 09/07/2023 0.33  0.00 - 0.50 mg/L (FEU) Final    HS Troponin T 09/07/2023 <6  <10 ng/L Final    RBC, UA 09/07/2023 None Seen  None Seen /HPF Final    WBC, UA 09/07/2023 13-20 (A)  None Seen /HPF Final    Bacteria, UA 09/07/2023 None Seen  None Seen /HPF Final    Squamous Epithelial Cells, UA 09/07/2023 3-6 (A)  None Seen, 0-2 /HPF Final    Yeast, UA 09/07/2023 Small/1+ Yeast  None Seen /HPF Final    Hyaline Casts, UA 09/07/2023 None Seen  None Seen /LPF Final    Mucus, UA 09/07/2023 Small/1+ (A)  None Seen, Trace /HPF Final    Methodology 09/07/2023 Manual Light Microscopy   Final    Urine Culture 09/07/2023 50,000 CFU/mL Mixed Kathleen Isolated   Final    QT Interval 09/07/2023 331  ms Final    QTC Interval 09/07/2023 419   ms Final             Assessment & Plan   Diagnoses and all orders for this visit:    1. Type 2 diabetes mellitus with hyperglycemia, without long-term current use of insulin (Primary)  -     Continuous Blood Gluc  (FreeStyle Christina 2 [place]) device; Use 1 Device Daily.  Dispense: 1 each; Refill: 0  -     Continuous Blood Gluc Sensor (FreeStyle Christina 2 Sensor) misc; Use 1 Device Every 14 (Fourteen) Days.  Dispense: 2 each; Refill: 5  -     Dulaglutide (Trulicity) 4.5 MG/0.5ML solution pen-injector; Inject 0.5 mL under the skin into the appropriate area as directed Every 7 (Seven) Days.  Dispense: 2 mL; Refill: 2    2. Class 3 severe obesity due to excess calories without serious comorbidity with body mass index (BMI) of 45.0 to 49.9 in adult    3. Essential hypertension    4. Learning disability    5. Diabetic polyneuropathy associated with type 2 diabetes mellitus    6. Right upper quadrant abdominal pain  -     US Gallbladder; Future    Status of multiple issues reviewed today as above.  A1c has improved.  It does not sound like her symptoms are specifically related to Trulicity.  That has made the biggest impact on her diabetic control than any of the other medications.  We are going to increase that on up to the maximum dose.  Will send an order in for a continuous glucose meter and see if that is covered by her insurance.  Blood pressure today is good at 130/92.  She is taking lisinopril.  With regard to the neuropathic pains in her feet.  Her pain management doctor has been prescribing the Lyrica.  That was increased through the ER.  I instructed her to follow-up with Dr. Connors as he is the one prescribing her pain treatment.  Continue her current mental health medications.  Please see previous notes for discussion regarding that.  Keep follow-up with GI per their recommendations.  She had a CT of her abdomen a few months ago that did not show any obvious abnormalities with her gallbladder.  Will repeat  a gallbladder ultrasound just to be sure there are no small stones, polyps etc.  Her symptoms do sound a lot like irritable bowel syndrome.  I will follow-up with her when the results of his test are back and we will check her back here again in a month since there are so many active issues.        Call with any problems or concerns before next visit       Return in about 4 weeks (around 1/12/2024).      Much of this report is an electronic transcription of spoken language to printed text using Dragon dictation software.  As such, the subtleties and finesse of spoken language may permit erroneous, or at times, nonsensical words or phrases to be inadvertently transcribed; thus changes may be made at a later date to rectify these errors.     Bree Wan [id number] EST  This note has been electronically signed

## 2024-01-03 ENCOUNTER — HOSPITAL ENCOUNTER (OUTPATIENT)
Dept: ULTRASOUND IMAGING | Facility: HOSPITAL | Age: 44
Discharge: HOME | End: 2024-01-03
Payer: MEDICAID

## 2024-01-03 ENCOUNTER — OFFICE VISIT (OUTPATIENT)
Dept: PAIN MEDICINE | Facility: CLINIC | Age: 44
End: 2024-01-03
Payer: MEDICAID

## 2024-01-03 VITALS
HEART RATE: 81 BPM | DIASTOLIC BLOOD PRESSURE: 87 MMHG | RESPIRATION RATE: 16 BRPM | SYSTOLIC BLOOD PRESSURE: 139 MMHG | OXYGEN SATURATION: 96 %

## 2024-01-03 DIAGNOSIS — M25.561 CHRONIC PAIN OF BOTH KNEES: ICD-10-CM

## 2024-01-03 DIAGNOSIS — M17.2 POST-TRAUMATIC OSTEOARTHRITIS OF BOTH KNEES: ICD-10-CM

## 2024-01-03 DIAGNOSIS — R10.11 RIGHT UPPER QUADRANT ABDOMINAL PAIN: ICD-10-CM

## 2024-01-03 DIAGNOSIS — G89.29 CHRONIC RIGHT SHOULDER PAIN: ICD-10-CM

## 2024-01-03 DIAGNOSIS — M54.42 CHRONIC BILATERAL LOW BACK PAIN WITH BILATERAL SCIATICA: Primary | ICD-10-CM

## 2024-01-03 DIAGNOSIS — M54.16 LUMBAR RADICULOPATHY: ICD-10-CM

## 2024-01-03 DIAGNOSIS — M12.811 ROTATOR CUFF ARTHROPATHY OF RIGHT SHOULDER: ICD-10-CM

## 2024-01-03 DIAGNOSIS — M54.41 CHRONIC BILATERAL LOW BACK PAIN WITH BILATERAL SCIATICA: Primary | ICD-10-CM

## 2024-01-03 DIAGNOSIS — G89.29 CHRONIC PAIN OF BOTH KNEES: ICD-10-CM

## 2024-01-03 DIAGNOSIS — G89.29 CHRONIC BILATERAL LOW BACK PAIN WITH BILATERAL SCIATICA: Primary | ICD-10-CM

## 2024-01-03 DIAGNOSIS — M25.562 CHRONIC PAIN OF BOTH KNEES: ICD-10-CM

## 2024-01-03 DIAGNOSIS — M25.511 CHRONIC RIGHT SHOULDER PAIN: ICD-10-CM

## 2024-01-03 PROCEDURE — 99214 OFFICE O/P EST MOD 30 MIN: CPT | Performed by: PHYSICAL MEDICINE & REHABILITATION

## 2024-01-03 PROCEDURE — 3079F DIAST BP 80-89 MM HG: CPT | Performed by: PHYSICAL MEDICINE & REHABILITATION

## 2024-01-03 PROCEDURE — G0463 HOSPITAL OUTPT CLINIC VISIT: HCPCS | Performed by: PHYSICAL MEDICINE & REHABILITATION

## 2024-01-03 PROCEDURE — 76705 ECHO EXAM OF ABDOMEN: CPT

## 2024-01-03 PROCEDURE — 1125F AMNT PAIN NOTED PAIN PRSNT: CPT | Performed by: PHYSICAL MEDICINE & REHABILITATION

## 2024-01-03 PROCEDURE — 1159F MED LIST DOCD IN RCRD: CPT | Performed by: PHYSICAL MEDICINE & REHABILITATION

## 2024-01-03 PROCEDURE — 1160F RVW MEDS BY RX/DR IN RCRD: CPT | Performed by: PHYSICAL MEDICINE & REHABILITATION

## 2024-01-03 PROCEDURE — 3075F SYST BP GE 130 - 139MM HG: CPT | Performed by: PHYSICAL MEDICINE & REHABILITATION

## 2024-01-03 RX ORDER — MORPHINE SULFATE 15 MG/1
15 TABLET, FILM COATED, EXTENDED RELEASE ORAL 3 TIMES DAILY
Qty: 90 TABLET | Refills: 0 | Status: SHIPPED | OUTPATIENT
Start: 2024-01-03

## 2024-01-03 RX ORDER — PREGABALIN 100 MG/1
100 CAPSULE ORAL 2 TIMES DAILY
Qty: 60 CAPSULE | Refills: 2 | Status: SHIPPED | OUTPATIENT
Start: 2024-01-03

## 2024-01-03 NOTE — PROGRESS NOTES
Subjective   Noa Soni is a 43 y.o. female.     History of Present Illness  Chronic bilateral knee pain, also low back and right shoulder pain, nonradiating, 10/10 at worst, 8/10 at best, always present, varies, began years ago, worsening over time, aching, sharp, worse with bending and walking, interferes with sleep, exercise. X-ray b/l knees with mod OA worst in medial compartment. Saw PCP, notes reviewed, with referral to Dr. Leiva who decline steroid injections with DM2, has failed NSAIDs, Tylenol, Voltaren gel, sees Dr. Valencia on multiple medications. No FH of substance abuse. Began Percocet 5mg TID prn, then 7.5mg TID prn. Worsening LBP, R shoulder pain.  Back Pain  Associated symptoms include abdominal pain. Pertinent negatives include no bladder incontinence, chest pain, fever, numbness or weakness.        The following portions of the patient's history were reviewed and updated as appropriate: allergies, current medications, past family history, past medical history, past social history, past surgical history and problem list.    Review of Systems   Constitutional:  Negative for chills, fatigue and fever.   HENT:  Positive for hearing loss. Negative for trouble swallowing.    Eyes:  Negative for visual disturbance.   Respiratory:  Negative for shortness of breath.    Cardiovascular:  Negative for chest pain.   Gastrointestinal:  Positive for abdominal pain and nausea. Negative for constipation, diarrhea and vomiting.   Genitourinary:  Negative for urinary incontinence.   Musculoskeletal:  Positive for arthralgias and back pain. Negative for joint swelling, myalgias and neck pain.   Neurological:  Positive for headache. Negative for dizziness, weakness and numbness.       Objective   Physical Exam  Constitutional:       Appearance: Normal appearance. She is well-developed.   HENT:      Head: Normocephalic and atraumatic.   Eyes:      Pupils: Pupils are equal, round, and reactive to light.    Cardiovascular:      Rate and Rhythm: Normal rate and regular rhythm.      Heart sounds: Normal heart sounds.   Pulmonary:      Effort: Pulmonary effort is normal.      Breath sounds: Normal breath sounds.   Abdominal:      General: Bowel sounds are normal. There is no distension.      Palpations: Abdomen is soft.      Tenderness: There is no abdominal tenderness.   Musculoskeletal:      Cervical back: Normal range of motion.      Comments: R shoulder: (+) empty can test     Neurological:      Mental Status: She is alert and oriented to person, place, and time.      Sensory: No sensory deficit.      Deep Tendon Reflexes: Reflexes are normal and symmetric. Reflexes normal.   Psychiatric:         Mood and Affect: Mood normal.         Behavior: Behavior normal.         Thought Content: Thought content normal.         Judgment: Judgment normal.           Assessment & Plan   Diagnoses and all orders for this visit:    1. Chronic bilateral low back pain with bilateral sciatica (Primary)    2. Chronic pain of both knees    3. Chronic right shoulder pain    4. Lumbar radiculopathy    5. Post-traumatic osteoarthritis of both knees    6. Rotator cuff arthropathy of right shoulder      UDS in order 4/3/23.  Discussed risks and benefits of opioid treatment for chonic pain with patient, including expectations related to prescription requests, alternative modalities to opioids for managing pain, her treatment plan, risks of dependency and addiction, and safe storage practices for prescribed opioids, as well as proper and improper disposal of all medications.  Treatment plan will consist of continuing current medication as long as it remains effective and is necessary, while evaluating patient at each visit and determining if the medication can be lowered or discontinued, while also using nonopioid therapies to reduce reliance on opioids.  Failed Tylenol #3 TID prn with AMS. Failed NSAIDs, Tylenol, Voltaren gel, allergic to  Hydrocodone. Began Percocet 5mg TID prn, helping but not sufficient, increased to 7.5mg QID prn, insufficient, increased to 10mg QID prn. Denies any side effects but no longer effective. Filled 12/26/23. Rotate to MS-Contin 15mg TID.  Trialed Lyrica 75mg BID, very helpful. Began Lyrica 75mg BID, helping, increase to 100mg BID.  Began Licart qdaily prn, denies being told not to take NSAIDs.  Performed b/l Monovisc injections with significant improvement, will repeat as necessary up to q6 months, cannot have steroids with DM2. Repeated 2/1/23.  Ordered MRI L-spine, with mild DDD and DJD only, worst pain appears to be facetogenic on history and exam, discussed b/l L4-S1 facet injections. Ordered LSO for now to improve truncal stability.  Performed R shoulder injection.  RTC in 2-3 months for f/u.    INSPECT REPORT     As part of the patient's treatment plan, I am prescribing controlled substances. The patient has been made aware of appropriate use of such medications, including potential risk of somnolence, limited ability to drive and/or work safely, and the potential for dependence or overdose. It has also bee made clear that these medications are for use by this patient only, without concomitant use of alcohol or other substances unless prescribed.      Patient has completed prescribing agreement detailing terms of continued prescribing of controlled substances, including monitoring INSPECT reports, urine drug screening, and pill counts if necessary. The patient is aware that inappropriate use will results in cessation of prescribing such medications.     INSPECT report has been reviewed and scanned into the patient's chart.     As the clinician, I personally reviewed the INSPECT while the patient was in the office today.     History and physical exam exhibit continued safe and appropriate use of controlled substances.

## 2024-01-04 DIAGNOSIS — K80.20 MULTIPLE GALLSTONES: ICD-10-CM

## 2024-01-04 DIAGNOSIS — K80.20 CALCULUS OF GALLBLADDER WITHOUT CHOLECYSTITIS WITHOUT OBSTRUCTION: ICD-10-CM

## 2024-01-04 DIAGNOSIS — R10.11 RIGHT UPPER QUADRANT ABDOMINAL PAIN: Primary | ICD-10-CM

## 2024-01-11 ENCOUNTER — OFFICE VISIT (OUTPATIENT)
Dept: SURGERY | Facility: CLINIC | Age: 44
End: 2024-01-11
Payer: MEDICAID

## 2024-01-11 VITALS
DIASTOLIC BLOOD PRESSURE: 82 MMHG | OXYGEN SATURATION: 96 % | HEIGHT: 64 IN | HEART RATE: 80 BPM | TEMPERATURE: 98.7 F | WEIGHT: 291 LBS | SYSTOLIC BLOOD PRESSURE: 133 MMHG | BODY MASS INDEX: 49.68 KG/M2 | RESPIRATION RATE: 18 BRPM

## 2024-01-11 DIAGNOSIS — K80.20 SYMPTOMATIC CHOLELITHIASIS: Primary | ICD-10-CM

## 2024-01-11 PROCEDURE — 1159F MED LIST DOCD IN RCRD: CPT | Performed by: SURGERY

## 2024-01-11 PROCEDURE — 1160F RVW MEDS BY RX/DR IN RCRD: CPT | Performed by: SURGERY

## 2024-01-11 PROCEDURE — 3075F SYST BP GE 130 - 139MM HG: CPT | Performed by: SURGERY

## 2024-01-11 PROCEDURE — 3079F DIAST BP 80-89 MM HG: CPT | Performed by: SURGERY

## 2024-01-11 PROCEDURE — 99204 OFFICE O/P NEW MOD 45 MIN: CPT | Performed by: SURGERY

## 2024-01-11 RX ORDER — SODIUM CHLORIDE 0.9 % (FLUSH) 0.9 %
3 SYRINGE (ML) INJECTION EVERY 12 HOURS SCHEDULED
OUTPATIENT
Start: 2024-01-11

## 2024-01-11 RX ORDER — SODIUM CHLORIDE 0.9 % (FLUSH) 0.9 %
3-10 SYRINGE (ML) INJECTION AS NEEDED
OUTPATIENT
Start: 2024-01-11

## 2024-01-11 RX ORDER — SODIUM CHLORIDE 9 MG/ML
100 INJECTION, SOLUTION INTRAVENOUS CONTINUOUS
OUTPATIENT
Start: 2024-01-11

## 2024-01-11 RX ORDER — SODIUM CHLORIDE 9 MG/ML
40 INJECTION, SOLUTION INTRAVENOUS AS NEEDED
OUTPATIENT
Start: 2024-01-11

## 2024-01-11 RX ORDER — PANTOPRAZOLE SODIUM 40 MG/1
40 TABLET, DELAYED RELEASE ORAL DAILY
Qty: 30 TABLET | Refills: 1 | Status: SHIPPED | OUTPATIENT
Start: 2024-01-11 | End: 2024-03-11

## 2024-01-11 NOTE — PROGRESS NOTES
"Subjective   Noa Soni is a 43 y.o. female.   Chief Complaint   Patient presents with    Abdominal Pain     New pt ref Dr. Wan, RUQ Abd Pain, Gallstones     /82 (BP Location: Left arm, Patient Position: Sitting, Cuff Size: Adult)   Pulse 80   Temp 98.7 °F (37.1 °C) (Infrared)   Resp 18   Ht 162.6 cm (64\")   Wt 132 kg (291 lb)   LMP 06/02/2023 (Exact Date)   SpO2 96%   BMI 49.95 kg/m²     HISTORY OF PRESENT ILLNESS:  43-year-old lady referred over to me from primary medicine for symptomatic cholelithiasis.  She says over the last month and a half she has had intermittent right upper quadrant abdominal pain that is worse with eating has been associated with some nausea no recent vomiting.  She has noted some bowel changes including diarrhea.  Denies any acholic stools dark discolored urine or scleral icterus.      Outpatient Encounter Medications as of 1/11/2024   Medication Sig Dispense Refill    Blood Glucose Monitoring Suppl kit Use as directed to check blood glucose 1 each 0    Cariprazine HCl (Vraylar) 3 MG capsule capsule Take 1 capsule by mouth Daily. 90 capsule 3    clotrimazole (LOTRIMIN) 1 % cream Apply 1 application topically to the appropriate area as directed 2 (Two) Times a Day. 45 g 1    Continuous Blood Gluc  (FreeStyle Christina 2 Jber) device Use 1 Device Daily. 1 each 0    Continuous Blood Gluc Sensor (FreeStyle Christina 2 Sensor) misc Use 1 Device Every 14 (Fourteen) Days. 2 each 5    Diclofenac Epolamine (Licart) 1.3 % patch 24 hour Apply 1 patch topically Daily As Needed (back pain). 30 patch 0    Dulaglutide (Trulicity) 4.5 MG/0.5ML solution pen-injector Inject 0.5 mL under the skin into the appropriate area as directed Every 7 (Seven) Days. 2 mL 2    EPINEPHrine (EPIPEN) 0.3 MG/0.3ML solution auto-injector injection USE AS DIRECTED FOR ACUTE ALLERGIC REACTION      ferrous sulfate 325 (65 FE) MG tablet Take 1 tablet by mouth Daily With Breakfast. 90 tablet 1    " fexofenadine (ALLEGRA) 180 MG tablet Take 1 tablet by mouth Daily. 90 tablet 3    FLUoxetine (PROzac) 40 MG capsule Take 1 capsule by mouth Daily. 90 capsule 3    glucose blood test strip Use as instructed to check blood glucose once daily 100 each 3    lamoTRIgine (LaMICtal) 100 MG tablet Take 1 tablet by mouth once daily 90 tablet 0    Lancets misc Use once daily with meter and strips to check blood glucose 100 each 3    linaclotide (Linzess) 145 MCG capsule capsule Take 1 capsule by mouth Every Morning Before Breakfast. 30 capsule 5    lisinopril (PRINIVIL,ZESTRIL) 10 MG tablet Take 1 tablet by mouth Daily. 90 tablet 3    Melatonin 5 MG chewable tablet Chew 5 mg every night at bedtime. 30 tablet 3    Morphine (MS CONTIN) 15 MG 12 hr tablet Take 1 tablet by mouth 3 (Three) Times a Day. 90 tablet 0    Morphine (MS CONTIN) 15 MG 12 hr tablet Take 1 tablet by mouth 3 (Three) Times a Day. 90 tablet 0    pregabalin (LYRICA) 100 MG capsule Take 1 capsule by mouth 2 (Two) Times a Day. 60 capsule 2    SITagliptin (Januvia) 100 MG tablet Take 1 tablet by mouth Daily. 90 tablet 3    SUMAtriptan (IMITREX) 50 MG tablet TAKE 1 TABLET BY MOUTH AT ONSET OF HEADACHE. MAY REPEAT DOSE ONE TIME IN 2 HOURS IF HEADACHE NOT RELIEVED. 6 tablet 0    pantoprazole (Protonix) 40 MG EC tablet Take 1 tablet by mouth Daily for 60 days. 30 tablet 1    [DISCONTINUED] oxyCODONE-acetaminophen (Percocet)  MG per tablet Take 1 tablet by mouth Every 6 (Six) Hours As Needed for Moderate Pain. (Patient not taking: Reported on 1/3/2024) 120 tablet 0    [DISCONTINUED] oxyCODONE-acetaminophen (Percocet)  MG per tablet Take 1 tablet by mouth Every 6 (Six) Hours As Needed for Moderate Pain. (Patient not taking: Reported on 1/3/2024) 120 tablet 0    [DISCONTINUED] oxyCODONE-acetaminophen (Percocet)  MG per tablet Take 1 tablet by mouth Every 6 (Six) Hours As Needed for Moderate Pain. (Patient not taking: Reported on 1/3/2024) 120 tablet 0      No facility-administered encounter medications on file as of 2024.     Past Medical History:   Diagnosis Date    Allergic     Anger     Anxiety     Cholelithiasis 1/3/2024    I had alot of stones in my gallbladder    Depression     Diabetes mellitus     Headache     HTN (hypertension)     Knee pain, bilateral     Neuropathy in diabetes 25    Obesity     PTSD (post-traumatic stress disorder)     Type 2 diabetes mellitus      Past Surgical History:   Procedure Laterality Date    EAR TUBES      ENDOMETRIAL ABLATION      INTRAUTERINE DEVICE INSERTION  10/14/2020    LAPAROSCOPIC TUBAL LIGATION      TONSILLECTOMY AND ADENOIDECTOMY      TOTAL LAPAROSCOPIC HYSTERECTOMY WITH DAVINCI ROBOT  2023    [facility]     Family History   Problem Relation Age of Onset    Hypertension Mother     Depression Mother     Post-traumatic stress disorder Mother     Hyperlipidemia Mother     Anxiety disorder Mother     Mental illness Mother     Hypertension Father     Hyperlipidemia Father     Hypertension Brother     Depression Brother     Parkinsonism Maternal Grandmother     Dementia Maternal Grandmother     Atrial fibrillation Maternal Grandfather     Breast cancer Paternal Grandmother     Diabetes Paternal Grandmother     COPD Paternal Grandfather        Social History     Tobacco Use    Smoking status: Former     Packs/day: 1.00     Years: 5.00     Additional pack years: 0.00     Total pack years: 5.00     Types: Cigarettes     Start date: 2017     Quit date: 2018     Years since quittin.0     Passive exposure: Past    Smokeless tobacco: Never    Tobacco comments:     socially    Vaping Use    Vaping Use: Never used   Substance Use Topics    Alcohol use: Never    Drug use: Never     Dilaudid [hydromorphone], Tylenol with codeine #3 [acetaminophen-codeine], Abilify [aripiprazole], Metformin, and Vicodin [hydrocodone-acetaminophen]    Review of Systems  Complete review of systems been obtained is  positive for appetite change sweating drooling ear pain nosebleeds abdominal pain constipation diarrhea nausea excessive thirst depression nervousness and anxiety the remainder of the review of systems is negative  Objective     Physical Exam  Constitutional:       Appearance: She is obese.   HENT:      Head: Normocephalic and atraumatic.   Cardiovascular:      Rate and Rhythm: Normal rate.   Pulmonary:      Effort: Pulmonary effort is normal. No respiratory distress.   Abdominal:      General: There is no distension.      Palpations: Abdomen is soft.      Comments: Tender to palpation the right upper quadrant   Skin:     General: Skin is warm and dry.   Neurological:      General: No focal deficit present.      Mental Status: She is alert. Mental status is at baseline.   Psychiatric:         Mood and Affect: Mood normal.         Behavior: Behavior normal.           Assessment & Plan   Diagnoses and all orders for this visit:    1. Symptomatic cholelithiasis (Primary)  -     Case Request; Standing  -     sodium chloride 0.9 % flush 3 mL  -     sodium chloride 0.9 % flush 3-10 mL  -     sodium chloride 0.9 % infusion 40 mL  -     sodium chloride 0.9 % infusion  -     ceFAZolin (ANCEF) 3 g in sodium chloride 0.9 % 100 mL IVPB  -     Case Request    Other orders  -     pantoprazole (Protonix) 40 MG EC tablet; Take 1 tablet by mouth Daily for 60 days.  Dispense: 30 tablet; Refill: 1  -     Follow Anesthesia Guidelines / Protocol; Future  -     Follow Anesthesia Guidelines / Protocol; Standing  -     Verify / Perform Chlorhexidine Skin Prep; Standing  -     Obtain Informed Consent; Future  -     Provide NPO Instructions to Patient; Future  -     Chlorhexidine Skin Prep; Future  -     Insert Peripheral IV; Standing  -     Saline Lock & Maintain IV Access; Standing    Counseled her about the possible diagnosis of symptomatic cholelithiasis.  On her ultrasound imaging there is evidence of cholelithiasis without  cholecystitis no evidence of choledocholithiasis.  We talked about some alternatives including gastritis gastroparesis kidney stones etc.  Talked her about the steps of cholecystectomy to spitter cover the possible complication we talked about open surgery bile duct injury and life without a gallbladder.  After our discussion about the treatment option she understands the risk of surgery and does want a move forward with cholecystectomy.    I have also gone ahead and started her on a PPI to see if it will help and all prior to surgery     Moderate data reviewed including ultrasound read lab work including hemoglobin A1c of 7.9.  CMP from December reviewed with a elevated alk phos bilirubin AST and ALT were normal.  Counseled about a major abdominal surgery with risk factors for morbidity including BMI of 50.    Dustin Shelley MD  1/11/2024  12:27 PM EST    This note was created using Dragon Voice Recognition software.

## 2024-01-15 ENCOUNTER — OFFICE VISIT (OUTPATIENT)
Dept: FAMILY MEDICINE CLINIC | Facility: CLINIC | Age: 44
End: 2024-01-15
Payer: MEDICAID

## 2024-01-15 ENCOUNTER — PATIENT MESSAGE (OUTPATIENT)
Dept: SURGERY | Facility: CLINIC | Age: 44
End: 2024-01-15
Payer: MEDICAID

## 2024-01-15 VITALS
WEIGHT: 282.4 LBS | BODY MASS INDEX: 48.21 KG/M2 | DIASTOLIC BLOOD PRESSURE: 76 MMHG | TEMPERATURE: 97.3 F | OXYGEN SATURATION: 93 % | HEIGHT: 64 IN | SYSTOLIC BLOOD PRESSURE: 112 MMHG | RESPIRATION RATE: 18 BRPM | HEART RATE: 72 BPM

## 2024-01-15 DIAGNOSIS — I10 ESSENTIAL HYPERTENSION: ICD-10-CM

## 2024-01-15 DIAGNOSIS — F43.12 CHRONIC POSTTRAUMATIC STRESS DISORDER: Chronic | ICD-10-CM

## 2024-01-15 DIAGNOSIS — E11.65 TYPE 2 DIABETES MELLITUS WITH HYPERGLYCEMIA, WITHOUT LONG-TERM CURRENT USE OF INSULIN: Primary | ICD-10-CM

## 2024-01-15 DIAGNOSIS — F41.1 GENERALIZED ANXIETY DISORDER: Chronic | ICD-10-CM

## 2024-01-15 DIAGNOSIS — F33.2 SEVERE RECURRENT MAJOR DEPRESSION WITHOUT PSYCHOTIC FEATURES: ICD-10-CM

## 2024-01-15 DIAGNOSIS — K80.20 SYMPTOMATIC CHOLELITHIASIS: ICD-10-CM

## 2024-01-15 PROCEDURE — T1015 CLINIC SERVICE: HCPCS | Performed by: FAMILY MEDICINE

## 2024-01-15 PROCEDURE — 1160F RVW MEDS BY RX/DR IN RCRD: CPT | Performed by: FAMILY MEDICINE

## 2024-01-15 PROCEDURE — 1159F MED LIST DOCD IN RCRD: CPT | Performed by: FAMILY MEDICINE

## 2024-01-15 PROCEDURE — 3074F SYST BP LT 130 MM HG: CPT | Performed by: FAMILY MEDICINE

## 2024-01-15 PROCEDURE — 99214 OFFICE O/P EST MOD 30 MIN: CPT | Performed by: FAMILY MEDICINE

## 2024-01-15 PROCEDURE — 3078F DIAST BP <80 MM HG: CPT | Performed by: FAMILY MEDICINE

## 2024-01-15 RX ORDER — LAMOTRIGINE 100 MG/1
100 TABLET ORAL DAILY
Qty: 90 TABLET | Refills: 3 | Status: ON HOLD | OUTPATIENT
Start: 2024-01-15

## 2024-01-15 NOTE — PROGRESS NOTES
"Answers submitted by the patient for this visit:  Other (Submitted on 1/8/2024)  Please describe your symptoms.: Its a follow up  Have you had these symptoms before?: Yes  How long have you been having these symptoms?: 1-4 days  Primary Reason for Visit (Submitted on 1/8/2024)  What is the primary reason for your visit?: Other  Subjective   Noa Soni is a 43 y.o. female.   Chief Complaint   Patient presents with    Diabetes    Hypertension    Weight Check          Presents to the office today for follow-up on multiple medical problems including diabetes, hypertension, recent diagnosis of cholelithiasis, recurrent depression, anxiety, PTSD.  She also wants a weight check.  Please see previous notes.  She has symptomatic cholelithiasis.  She is going to have her gallbladder out on March 4.  She is already sent a message telling me she wants to see the bariatric surgeon.  I told her we have to put that off till after her gallbladder is addressed.    Diabetes type 2-no blood sugar numbers to review.  Last A1c about a month ago was 7.9%.  She was pretty excited about that.  She is on Trulicity 4.5 mg weekly.    She asked for refill on Lamictal.  Mood is less stable without it.    HTN-blood pressure is good at 112/76.  Does not need any refills on blood pressure medicines.    Tells me that her pain doctor switched her to morphine because the other pain medicines were not working\".        Patient Active Problem List    Diagnosis Date Noted    Symptomatic cholelithiasis 01/11/2024    History of robot-assisted laparoscopic hysterectomy 08/21/2023     Note Last Updated: 8/21/2023 6/2/23 - Yevgeniy.  Dr. Meraz.  Ovaries left!      Lumbar radiculopathy 07/12/2023    Rotator cuff arthropathy of right shoulder 04/03/2023    Chronic pain of both knees 05/24/2022    Chronic bilateral low back pain with bilateral sciatica 05/24/2022    Chronic right shoulder pain 05/24/2022    Morbid obesity 04/27/2022    Nasal congestion " 11/20/2021    Post-traumatic osteoarthritis of both knees 11/10/2021    Severe recurrent major depression without psychotic features 06/17/2021    Chronic posttraumatic stress disorder 06/17/2021    Generalized anxiety disorder 06/17/2021    Callus of foot 10/19/2020    Perennial allergic rhinitis 10/19/2020    NESHA (obstructive sleep apnea) 10/19/2020    Class 3 severe obesity due to excess calories without serious comorbidity with body mass index (BMI) of 45.0 to 49.9 in adult 10/12/2020    Lymphadenopathy 10/06/2020    Essential hypertension 07/27/2020    Learning disability 07/27/2020     Note Last Updated: 7/27/2020     No other details      Type 2 diabetes mellitus 01/17/2020    History of endometriosis 01/17/2020    History of PCOS 01/17/2020           Past Surgical History:   Procedure Laterality Date    EAR TUBES      ENDOMETRIAL ABLATION      INTRAUTERINE DEVICE INSERTION  10/14/2020    LAPAROSCOPIC TUBAL LIGATION      SUBTOTAL HYSTERECTOMY      TONSILLECTOMY AND ADENOIDECTOMY      TOTAL LAPAROSCOPIC HYSTERECTOMY WITH DAVINCI ROBOT  06/02/2023    [facility]     Current Outpatient Medications on File Prior to Visit   Medication Sig    Blood Glucose Monitoring Suppl kit Use as directed to check blood glucose    Cariprazine HCl (Vraylar) 3 MG capsule capsule Take 1 capsule by mouth Daily.    clotrimazole (LOTRIMIN) 1 % cream Apply 1 application topically to the appropriate area as directed 2 (Two) Times a Day.    Diclofenac Epolamine (Licart) 1.3 % patch 24 hour Apply 1 patch topically Daily As Needed (back pain).    Dulaglutide (Trulicity) 4.5 MG/0.5ML solution pen-injector Inject 0.5 mL under the skin into the appropriate area as directed Every 7 (Seven) Days.    ferrous sulfate 325 (65 FE) MG tablet Take 1 tablet by mouth Daily With Breakfast.    fexofenadine (ALLEGRA) 180 MG tablet Take 1 tablet by mouth Daily.    FLUoxetine (PROzac) 40 MG capsule Take 1 capsule by mouth Daily.    glucose blood test  strip Use as instructed to check blood glucose once daily    Lancets misc Use once daily with meter and strips to check blood glucose    linaclotide (Linzess) 145 MCG capsule capsule Take 1 capsule by mouth Every Morning Before Breakfast.    lisinopril (PRINIVIL,ZESTRIL) 10 MG tablet Take 1 tablet by mouth Daily.    Melatonin 5 MG chewable tablet Chew 5 mg every night at bedtime.    Morphine (MS CONTIN) 15 MG 12 hr tablet Take 1 tablet by mouth 3 (Three) Times a Day.    pantoprazole (Protonix) 40 MG EC tablet Take 1 tablet by mouth Daily for 60 days.    pregabalin (LYRICA) 100 MG capsule Take 1 capsule by mouth 2 (Two) Times a Day.    SITagliptin (Januvia) 100 MG tablet Take 1 tablet by mouth Daily.    SUMAtriptan (IMITREX) 50 MG tablet TAKE 1 TABLET BY MOUTH AT ONSET OF HEADACHE. MAY REPEAT DOSE ONE TIME IN 2 HOURS IF HEADACHE NOT RELIEVED.    [DISCONTINUED] lamoTRIgine (LaMICtal) 100 MG tablet Take 1 tablet by mouth once daily    EPINEPHrine (EPIPEN) 0.3 MG/0.3ML solution auto-injector injection USE AS DIRECTED FOR ACUTE ALLERGIC REACTION    Morphine (MS CONTIN) 15 MG 12 hr tablet Take 1 tablet by mouth 3 (Three) Times a Day.    [DISCONTINUED] Continuous Blood Gluc  (FreeStyle Christina 2 [place]) device Use 1 Device Daily.    [DISCONTINUED] Continuous Blood Gluc Sensor (FreeStyle Christina 2 Sensor) misc Use 1 Device Every 14 (Fourteen) Days.     No current facility-administered medications on file prior to visit.     Allergies   Allergen Reactions    Dilaudid [Hydromorphone] Other (See Comments)     Bottoms O2 out    Tylenol With Codeine #3 [Acetaminophen-Codeine] Hallucinations    Abilify [Aripiprazole] Rash    Metformin Diarrhea    Vicodin [Hydrocodone-Acetaminophen] GI Intolerance     Social History     Socioeconomic History    Marital status: Single    Number of children: 0   Tobacco Use    Smoking status: Former     Packs/day: 1.00     Years: 5.00     Additional pack years: 0.00     Total pack years: 5.00     " Types: Cigarettes     Start date: 2017     Quit date: 2018     Years since quittin.0     Passive exposure: Past    Smokeless tobacco: Never    Tobacco comments:     socially    Vaping Use    Vaping Use: Never used   Substance and Sexual Activity    Alcohol use: Never    Drug use: Never    Sexual activity: Not Currently     Partners: Male     Birth control/protection: Other, Hysterectomy     Family History   Problem Relation Age of Onset    Hypertension Mother     Depression Mother     Post-traumatic stress disorder Mother     Hyperlipidemia Mother     Anxiety disorder Mother     Mental illness Mother     Hypertension Father     Hyperlipidemia Father     Hypertension Brother     Depression Brother     Parkinsonism Maternal Grandmother     Dementia Maternal Grandmother     Atrial fibrillation Maternal Grandfather     Breast cancer Paternal Grandmother     Diabetes Paternal Grandmother     COPD Paternal Grandfather        Review of Systems    Objective   /76 (BP Location: Right arm, Patient Position: Sitting, Cuff Size: Large Adult)   Pulse 72   Temp 97.3 °F (36.3 °C) (Infrared)   Resp 18   Ht 162.6 cm (64\")   Wt 128 kg (282 lb 6.4 oz)   LMP 2023 (Exact Date)   SpO2 93%   Breastfeeding No   BMI 48.47 kg/m²   Physical Exam  Constitutional:       Appearance: She is well-developed and overweight. She is not ill-appearing.      Comments: Unkempt appearing 43-year-old female     HENT:      Head: Normocephalic and atraumatic.   Eyes:      Conjunctiva/sclera: Conjunctivae normal.   Cardiovascular:      Rate and Rhythm: Normal rate.   Pulmonary:      Effort: Pulmonary effort is normal. No respiratory distress.   Musculoskeletal:         General: Normal range of motion.      Cervical back: Normal range of motion.      Right lower leg: No edema.      Left lower leg: No edema.   Skin:     General: Skin is warm and dry.      Findings: No rash.   Neurological:      Mental Status: She is alert and " oriented to person, place, and time.      Gait: Gait normal.   Psychiatric:         Mood and Affect: Affect is flat.         Behavior: Behavior normal.         Judgment: Judgment is impulsive.            Assessment & Plan   Diagnoses and all orders for this visit:    1. Type 2 diabetes mellitus with hyperglycemia, without long-term current use of insulin (Primary)    2. Severe recurrent major depression without psychotic features  -     lamoTRIgine (LaMICtal) 100 MG tablet; Take 1 tablet by mouth Daily. At bedtime  Dispense: 90 tablet; Refill: 3    3. Generalized anxiety disorder  -     lamoTRIgine (LaMICtal) 100 MG tablet; Take 1 tablet by mouth Daily. At bedtime  Dispense: 90 tablet; Refill: 3    4. Chronic posttraumatic stress disorder  -     lamoTRIgine (LaMICtal) 100 MG tablet; Take 1 tablet by mouth Daily. At bedtime  Dispense: 90 tablet; Refill: 3    5. Essential hypertension    6. Symptomatic cholelithiasis    Multiple issues reviewed today as above.  Her last A1c was 7.9%.  No change in medications today.  Continue Trulicity 4.5 mg weekly along with Januvia 100 mg/day.  Continue multiple mental health medications including Vraylar 3 mg/day, Prozac 40 mg/day, Lamictal 100 mg/day.  Continue with pain management medications per their orders.  Blood pressure looks good today I would not change or add any medications.  Continue lisinopril 10 mg/day.  Keep follow-up with general surgery for cholecystectomy.  I would like to see her back few weeks after that is done.  We can talk again about whether or not we want to refer for bariatric evaluation.          Call with any problems or concerns before next visit       Return in about 2 months (around 3/15/2024), or recheck DM, HTN, gallbladder.      Much of this report is an electronic transcription of spoken language to printed text using Dragon dictation software.  As such, the subtleties and finesse of spoken language may permit erroneous, or at times, nonsensical  words or phrases to be inadvertently transcribed; thus changes may be made at a later date to rectify these errors.     Bree Wan MD1/15/220880:37 EST  This note has been electronically signed

## 2024-01-17 ENCOUNTER — PATIENT MESSAGE (OUTPATIENT)
Dept: PAIN MEDICINE | Facility: CLINIC | Age: 44
End: 2024-01-17
Payer: MEDICAID

## 2024-01-17 NOTE — TELEPHONE ENCOUNTER
From: Noa Soni  To: Perez Connors  Sent: 1/17/2024 11:47 AM EST  Subject: Morohine    Does morphine make u get dry mouth all the time

## 2024-01-21 ENCOUNTER — APPOINTMENT (OUTPATIENT)
Dept: CT IMAGING | Facility: HOSPITAL | Age: 44
End: 2024-01-21
Payer: MEDICAID

## 2024-01-21 ENCOUNTER — HOSPITAL ENCOUNTER (OUTPATIENT)
Facility: HOSPITAL | Age: 44
Discharge: HOME | End: 2024-01-23
Attending: EMERGENCY MEDICINE | Admitting: SURGERY
Payer: MEDICAID

## 2024-01-21 DIAGNOSIS — K80.20 CALCULUS OF GALLBLADDER WITHOUT CHOLECYSTITIS WITHOUT OBSTRUCTION: ICD-10-CM

## 2024-01-21 DIAGNOSIS — K80.20 SYMPTOMATIC CHOLELITHIASIS: ICD-10-CM

## 2024-01-21 DIAGNOSIS — R10.11 RIGHT UPPER QUADRANT ABDOMINAL PAIN: Primary | ICD-10-CM

## 2024-01-21 LAB
ALBUMIN SERPL-MCNC: 4.3 G/DL (ref 3.5–5.2)
ALBUMIN/GLOB SERPL: 1.1 G/DL
ALP SERPL-CCNC: 112 U/L (ref 39–117)
ALT SERPL W P-5'-P-CCNC: 23 U/L (ref 1–33)
ANION GAP SERPL CALCULATED.3IONS-SCNC: 14 MMOL/L (ref 5–15)
AST SERPL-CCNC: 28 U/L (ref 1–32)
B PARAPERT DNA SPEC QL NAA+PROBE: NOT DETECTED
B PERT DNA SPEC QL NAA+PROBE: NOT DETECTED
BASOPHILS # BLD AUTO: 0 10*3/MM3 (ref 0–0.2)
BASOPHILS NFR BLD AUTO: 0.3 % (ref 0–1.5)
BILIRUB SERPL-MCNC: 1.4 MG/DL (ref 0–1.2)
BILIRUB UR QL STRIP: NEGATIVE
BUN SERPL-MCNC: 6 MG/DL (ref 6–20)
BUN/CREAT SERPL: 9.4 (ref 7–25)
C PNEUM DNA NPH QL NAA+NON-PROBE: NOT DETECTED
CALCIUM SPEC-SCNC: 9.4 MG/DL (ref 8.6–10.5)
CHLORIDE SERPL-SCNC: 99 MMOL/L (ref 98–107)
CLARITY UR: CLEAR
CO2 SERPL-SCNC: 26 MMOL/L (ref 22–29)
COLOR UR: YELLOW
CREAT SERPL-MCNC: 0.64 MG/DL (ref 0.57–1)
DEPRECATED RDW RBC AUTO: 43.3 FL (ref 37–54)
EGFRCR SERPLBLD CKD-EPI 2021: 114 ML/MIN/1.73
EOSINOPHIL # BLD AUTO: 0.1 10*3/MM3 (ref 0–0.4)
EOSINOPHIL NFR BLD AUTO: 0.6 % (ref 0.3–6.2)
ERYTHROCYTE [DISTWIDTH] IN BLOOD BY AUTOMATED COUNT: 14.8 % (ref 12.3–15.4)
FLUAV SUBTYP SPEC NAA+PROBE: NOT DETECTED
FLUBV RNA ISLT QL NAA+PROBE: NOT DETECTED
GLOBULIN UR ELPH-MCNC: 3.9 GM/DL
GLUCOSE SERPL-MCNC: 183 MG/DL (ref 65–99)
GLUCOSE UR STRIP-MCNC: NEGATIVE MG/DL
HADV DNA SPEC NAA+PROBE: NOT DETECTED
HCOV 229E RNA SPEC QL NAA+PROBE: NOT DETECTED
HCOV HKU1 RNA SPEC QL NAA+PROBE: NOT DETECTED
HCOV NL63 RNA SPEC QL NAA+PROBE: NOT DETECTED
HCOV OC43 RNA SPEC QL NAA+PROBE: NOT DETECTED
HCT VFR BLD AUTO: 37.5 % (ref 34–46.6)
HGB BLD-MCNC: 12.4 G/DL (ref 12–15.9)
HGB UR QL STRIP.AUTO: NEGATIVE
HMPV RNA NPH QL NAA+NON-PROBE: NOT DETECTED
HOLD SPECIMEN: NORMAL
HPIV1 RNA ISLT QL NAA+PROBE: NOT DETECTED
HPIV2 RNA SPEC QL NAA+PROBE: NOT DETECTED
HPIV3 RNA NPH QL NAA+PROBE: NOT DETECTED
HPIV4 P GENE NPH QL NAA+PROBE: NOT DETECTED
KETONES UR QL STRIP: NEGATIVE
LEUKOCYTE ESTERASE UR QL STRIP.AUTO: NEGATIVE
LIPASE SERPL-CCNC: 19 U/L (ref 13–60)
LYMPHOCYTES # BLD AUTO: 2.1 10*3/MM3 (ref 0.7–3.1)
LYMPHOCYTES NFR BLD AUTO: 26 % (ref 19.6–45.3)
M PNEUMO IGG SER IA-ACNC: NOT DETECTED
MCH RBC QN AUTO: 27.7 PG (ref 26.6–33)
MCHC RBC AUTO-ENTMCNC: 33 G/DL (ref 31.5–35.7)
MCV RBC AUTO: 83.8 FL (ref 79–97)
MONOCYTES # BLD AUTO: 0.6 10*3/MM3 (ref 0.1–0.9)
MONOCYTES NFR BLD AUTO: 7.5 % (ref 5–12)
NEUTROPHILS NFR BLD AUTO: 5.3 10*3/MM3 (ref 1.7–7)
NEUTROPHILS NFR BLD AUTO: 65.6 % (ref 42.7–76)
NITRITE UR QL STRIP: NEGATIVE
NRBC BLD AUTO-RTO: 0 /100 WBC (ref 0–0.2)
PH UR STRIP.AUTO: 7 [PH] (ref 5–8)
PLATELET # BLD AUTO: 244 10*3/MM3 (ref 140–450)
PMV BLD AUTO: 8.2 FL (ref 6–12)
POTASSIUM SERPL-SCNC: 3.8 MMOL/L (ref 3.5–5.2)
PROT SERPL-MCNC: 8.2 G/DL (ref 6–8.5)
PROT UR QL STRIP: NEGATIVE
RBC # BLD AUTO: 4.48 10*6/MM3 (ref 3.77–5.28)
RHINOVIRUS RNA SPEC NAA+PROBE: NOT DETECTED
RSV RNA NPH QL NAA+NON-PROBE: NOT DETECTED
SARS-COV-2 RNA NPH QL NAA+NON-PROBE: NOT DETECTED
SODIUM SERPL-SCNC: 139 MMOL/L (ref 136–145)
SP GR UR STRIP: 1.03 (ref 1–1.03)
UROBILINOGEN UR QL STRIP: ABNORMAL
WBC NRBC COR # BLD AUTO: 8.1 10*3/MM3 (ref 3.4–10.8)

## 2024-01-21 PROCEDURE — 83690 ASSAY OF LIPASE: CPT | Performed by: NURSE PRACTITIONER

## 2024-01-21 PROCEDURE — 96375 TX/PRO/DX INJ NEW DRUG ADDON: CPT

## 2024-01-21 PROCEDURE — 25010000002 ONDANSETRON PER 1 MG: Performed by: NURSE PRACTITIONER

## 2024-01-21 PROCEDURE — 25010000002 MORPHINE PER 10 MG: Performed by: NURSE PRACTITIONER

## 2024-01-21 PROCEDURE — 96374 THER/PROPH/DIAG INJ IV PUSH: CPT

## 2024-01-21 PROCEDURE — 81003 URINALYSIS AUTO W/O SCOPE: CPT | Performed by: NURSE PRACTITIONER

## 2024-01-21 PROCEDURE — 74177 CT ABD & PELVIS W/CONTRAST: CPT

## 2024-01-21 PROCEDURE — 25510000001 IOPAMIDOL PER 1 ML: Performed by: EMERGENCY MEDICINE

## 2024-01-21 PROCEDURE — 99285 EMERGENCY DEPT VISIT HI MDM: CPT

## 2024-01-21 PROCEDURE — 80053 COMPREHEN METABOLIC PANEL: CPT | Performed by: NURSE PRACTITIONER

## 2024-01-21 PROCEDURE — 0202U NFCT DS 22 TRGT SARS-COV-2: CPT

## 2024-01-21 PROCEDURE — 85025 COMPLETE CBC W/AUTO DIFF WBC: CPT | Performed by: NURSE PRACTITIONER

## 2024-01-21 RX ORDER — AMOXICILLIN 250 MG
2 CAPSULE ORAL 2 TIMES DAILY PRN
Status: DISCONTINUED | OUTPATIENT
Start: 2024-01-21 | End: 2024-01-23 | Stop reason: HOSPADM

## 2024-01-21 RX ORDER — SODIUM CHLORIDE 0.9 % (FLUSH) 0.9 %
10 SYRINGE (ML) INJECTION AS NEEDED
Status: DISCONTINUED | OUTPATIENT
Start: 2024-01-21 | End: 2024-01-23 | Stop reason: HOSPADM

## 2024-01-21 RX ORDER — SODIUM CHLORIDE 9 MG/ML
40 INJECTION, SOLUTION INTRAVENOUS AS NEEDED
Status: DISCONTINUED | OUTPATIENT
Start: 2024-01-21 | End: 2024-01-23 | Stop reason: HOSPADM

## 2024-01-21 RX ORDER — SODIUM CHLORIDE 0.9 % (FLUSH) 0.9 %
10 SYRINGE (ML) INJECTION EVERY 12 HOURS SCHEDULED
Status: DISCONTINUED | OUTPATIENT
Start: 2024-01-21 | End: 2024-01-23 | Stop reason: HOSPADM

## 2024-01-21 RX ORDER — ACETAMINOPHEN 650 MG/1
650 SUPPOSITORY RECTAL EVERY 4 HOURS PRN
Status: DISCONTINUED | OUTPATIENT
Start: 2024-01-21 | End: 2024-01-23 | Stop reason: HOSPADM

## 2024-01-21 RX ORDER — BISACODYL 10 MG
10 SUPPOSITORY, RECTAL RECTAL DAILY PRN
Status: DISCONTINUED | OUTPATIENT
Start: 2024-01-21 | End: 2024-01-23 | Stop reason: HOSPADM

## 2024-01-21 RX ORDER — MORPHINE SULFATE 15 MG/1
15 TABLET, FILM COATED, EXTENDED RELEASE ORAL EVERY 8 HOURS SCHEDULED
Qty: 15 TABLET | Refills: 0 | Status: DISCONTINUED | OUTPATIENT
Start: 2024-01-21 | End: 2024-01-23 | Stop reason: HOSPADM

## 2024-01-21 RX ORDER — NICOTINE POLACRILEX 4 MG
15 LOZENGE BUCCAL
Status: DISCONTINUED | OUTPATIENT
Start: 2024-01-21 | End: 2024-01-23 | Stop reason: HOSPADM

## 2024-01-21 RX ORDER — ACETAMINOPHEN 160 MG/5ML
650 SOLUTION ORAL EVERY 4 HOURS PRN
Status: DISCONTINUED | OUTPATIENT
Start: 2024-01-21 | End: 2024-01-23 | Stop reason: HOSPADM

## 2024-01-21 RX ORDER — ONDANSETRON 2 MG/ML
4 INJECTION INTRAMUSCULAR; INTRAVENOUS EVERY 6 HOURS PRN
Status: DISCONTINUED | OUTPATIENT
Start: 2024-01-21 | End: 2024-01-23 | Stop reason: HOSPADM

## 2024-01-21 RX ORDER — ONDANSETRON 2 MG/ML
4 INJECTION INTRAMUSCULAR; INTRAVENOUS ONCE
Status: COMPLETED | OUTPATIENT
Start: 2024-01-21 | End: 2024-01-21

## 2024-01-21 RX ORDER — LISINOPRIL 5 MG/1
10 TABLET ORAL DAILY
Status: DISCONTINUED | OUTPATIENT
Start: 2024-01-22 | End: 2024-01-23 | Stop reason: HOSPADM

## 2024-01-21 RX ORDER — CHOLECALCIFEROL (VITAMIN D3) 125 MCG
5 CAPSULE ORAL NIGHTLY PRN
Status: DISCONTINUED | OUTPATIENT
Start: 2024-01-21 | End: 2024-01-23 | Stop reason: HOSPADM

## 2024-01-21 RX ORDER — IBUPROFEN 600 MG/1
1 TABLET ORAL
Status: DISCONTINUED | OUTPATIENT
Start: 2024-01-21 | End: 2024-01-23 | Stop reason: HOSPADM

## 2024-01-21 RX ORDER — OXYCODONE HYDROCHLORIDE 5 MG/1
5 TABLET ORAL ONCE
Status: COMPLETED | OUTPATIENT
Start: 2024-01-21 | End: 2024-01-21

## 2024-01-21 RX ORDER — HYDROXYZINE HYDROCHLORIDE 25 MG/1
50 TABLET, FILM COATED ORAL 3 TIMES DAILY PRN
Status: DISCONTINUED | OUTPATIENT
Start: 2024-01-21 | End: 2024-01-23 | Stop reason: HOSPADM

## 2024-01-21 RX ORDER — DEXTROSE MONOHYDRATE 25 G/50ML
25 INJECTION, SOLUTION INTRAVENOUS
Status: DISCONTINUED | OUTPATIENT
Start: 2024-01-21 | End: 2024-01-23 | Stop reason: HOSPADM

## 2024-01-21 RX ORDER — PANTOPRAZOLE SODIUM 40 MG/1
40 TABLET, DELAYED RELEASE ORAL DAILY
Status: DISCONTINUED | OUTPATIENT
Start: 2024-01-22 | End: 2024-01-23 | Stop reason: HOSPADM

## 2024-01-21 RX ORDER — POLYETHYLENE GLYCOL 3350 17 G/17G
17 POWDER, FOR SOLUTION ORAL DAILY PRN
Status: DISCONTINUED | OUTPATIENT
Start: 2024-01-21 | End: 2024-01-23 | Stop reason: HOSPADM

## 2024-01-21 RX ORDER — INSULIN LISPRO 100 [IU]/ML
2-9 INJECTION, SOLUTION INTRAVENOUS; SUBCUTANEOUS EVERY 6 HOURS SCHEDULED
Status: DISCONTINUED | OUTPATIENT
Start: 2024-01-22 | End: 2024-01-23 | Stop reason: HOSPADM

## 2024-01-21 RX ORDER — FLUOXETINE HYDROCHLORIDE 20 MG/1
40 CAPSULE ORAL DAILY
Status: DISCONTINUED | OUTPATIENT
Start: 2024-01-22 | End: 2024-01-23 | Stop reason: HOSPADM

## 2024-01-21 RX ORDER — PREGABALIN 100 MG/1
100 CAPSULE ORAL 2 TIMES DAILY
Status: DISCONTINUED | OUTPATIENT
Start: 2024-01-21 | End: 2024-01-23 | Stop reason: HOSPADM

## 2024-01-21 RX ORDER — BISACODYL 5 MG/1
5 TABLET, DELAYED RELEASE ORAL DAILY PRN
Status: DISCONTINUED | OUTPATIENT
Start: 2024-01-21 | End: 2024-01-23 | Stop reason: HOSPADM

## 2024-01-21 RX ORDER — ACETAMINOPHEN 325 MG/1
650 TABLET ORAL EVERY 4 HOURS PRN
Status: DISCONTINUED | OUTPATIENT
Start: 2024-01-21 | End: 2024-01-23 | Stop reason: HOSPADM

## 2024-01-21 RX ORDER — LAMOTRIGINE 100 MG/1
100 TABLET ORAL DAILY
Status: DISCONTINUED | OUTPATIENT
Start: 2024-01-22 | End: 2024-01-23 | Stop reason: HOSPADM

## 2024-01-21 RX ADMIN — ONDANSETRON 4 MG: 2 INJECTION INTRAMUSCULAR; INTRAVENOUS at 19:22

## 2024-01-21 RX ADMIN — Medication 10 ML: at 21:22

## 2024-01-21 RX ADMIN — MORPHINE SULFATE 4 MG: 4 INJECTION, SOLUTION INTRAMUSCULAR; INTRAVENOUS at 19:22

## 2024-01-21 RX ADMIN — HYDROXYZINE HYDROCHLORIDE 50 MG: 25 TABLET, FILM COATED ORAL at 22:59

## 2024-01-21 RX ADMIN — MORPHINE SULFATE 15 MG: 15 TABLET, FILM COATED, EXTENDED RELEASE ORAL at 22:59

## 2024-01-21 RX ADMIN — OXYCODONE HYDROCHLORIDE 5 MG: 5 TABLET ORAL at 21:22

## 2024-01-21 RX ADMIN — IOPAMIDOL 100 ML: 755 INJECTION, SOLUTION INTRAVENOUS at 20:04

## 2024-01-21 RX ADMIN — PREGABALIN 100 MG: 100 CAPSULE ORAL at 22:59

## 2024-01-21 NOTE — Clinical Note
Level of Care: Med/Surg [1]   Admitting Physician: CARO SR [[id number]]   Attending Physician: CARO SR [[id number]]

## 2024-01-21 NOTE — Clinical Note
[facility] EMERGENCY DEPARTMENT  [address] [place] [address]  Phone: [phone]    Noa Soni was seen and treated in our emergency department on 1/21/2024.  She may return to work on 01/23/2024.         Thank you for choosing [facility].    Edilma Cardoza RN

## 2024-01-22 ENCOUNTER — APPOINTMENT (OUTPATIENT)
Dept: GENERAL RADIOLOGY | Facility: HOSPITAL | Age: 44
End: 2024-01-22
Payer: MEDICAID

## 2024-01-22 ENCOUNTER — ANESTHESIA (OUTPATIENT)
Dept: PERIOP | Facility: HOSPITAL | Age: 44
End: 2024-01-22
Payer: MEDICAID

## 2024-01-22 ENCOUNTER — TELEPHONE (OUTPATIENT)
Dept: PAIN MEDICINE | Facility: CLINIC | Age: 44
End: 2024-01-22
Payer: MEDICAID

## 2024-01-22 ENCOUNTER — ANESTHESIA EVENT (OUTPATIENT)
Dept: PERIOP | Facility: HOSPITAL | Age: 44
End: 2024-01-22
Payer: MEDICAID

## 2024-01-22 LAB
ANION GAP SERPL CALCULATED.3IONS-SCNC: 11 MMOL/L (ref 5–15)
BASOPHILS # BLD AUTO: 0 10*3/MM3 (ref 0–0.2)
BASOPHILS NFR BLD AUTO: 0.5 % (ref 0–1.5)
BUN SERPL-MCNC: 8 MG/DL (ref 6–20)
BUN/CREAT SERPL: 11.9 (ref 7–25)
CALCIUM SPEC-SCNC: 9.2 MG/DL (ref 8.6–10.5)
CHLORIDE SERPL-SCNC: 100 MMOL/L (ref 98–107)
CO2 SERPL-SCNC: 28 MMOL/L (ref 22–29)
CREAT SERPL-MCNC: 0.67 MG/DL (ref 0.57–1)
DEPRECATED RDW RBC AUTO: 44.2 FL (ref 37–54)
EGFRCR SERPLBLD CKD-EPI 2021: 112.8 ML/MIN/1.73
EOSINOPHIL # BLD AUTO: 0.1 10*3/MM3 (ref 0–0.4)
EOSINOPHIL NFR BLD AUTO: 0.9 % (ref 0.3–6.2)
ERYTHROCYTE [DISTWIDTH] IN BLOOD BY AUTOMATED COUNT: 14.7 % (ref 12.3–15.4)
GLUCOSE BLDC GLUCOMTR-MCNC: 188 MG/DL (ref 70–105)
GLUCOSE BLDC GLUCOMTR-MCNC: 188 MG/DL (ref 70–105)
GLUCOSE BLDC GLUCOMTR-MCNC: 251 MG/DL (ref 70–105)
GLUCOSE BLDC GLUCOMTR-MCNC: 260 MG/DL (ref 70–105)
GLUCOSE SERPL-MCNC: 151 MG/DL (ref 65–99)
HCT VFR BLD AUTO: 38.9 % (ref 34–46.6)
HGB BLD-MCNC: 12.7 G/DL (ref 12–15.9)
LYMPHOCYTES # BLD AUTO: 2.7 10*3/MM3 (ref 0.7–3.1)
LYMPHOCYTES NFR BLD AUTO: 32.6 % (ref 19.6–45.3)
MCH RBC QN AUTO: 27.6 PG (ref 26.6–33)
MCHC RBC AUTO-ENTMCNC: 32.7 G/DL (ref 31.5–35.7)
MCV RBC AUTO: 84.5 FL (ref 79–97)
MONOCYTES # BLD AUTO: 0.7 10*3/MM3 (ref 0.1–0.9)
MONOCYTES NFR BLD AUTO: 8.5 % (ref 5–12)
NEUTROPHILS NFR BLD AUTO: 4.8 10*3/MM3 (ref 1.7–7)
NEUTROPHILS NFR BLD AUTO: 57.5 % (ref 42.7–76)
NRBC BLD AUTO-RTO: 0 /100 WBC (ref 0–0.2)
PLATELET # BLD AUTO: 253 10*3/MM3 (ref 140–450)
PMV BLD AUTO: 8.7 FL (ref 6–12)
POTASSIUM SERPL-SCNC: 3.5 MMOL/L (ref 3.5–5.2)
RBC # BLD AUTO: 4.6 10*6/MM3 (ref 3.77–5.28)
SODIUM SERPL-SCNC: 139 MMOL/L (ref 136–145)
WBC NRBC COR # BLD AUTO: 8.4 10*3/MM3 (ref 3.4–10.8)

## 2024-01-22 PROCEDURE — 63710000001 INSULIN LISPRO (HUMAN) PER 5 UNITS: Performed by: SURGERY

## 2024-01-22 PROCEDURE — 25010000002 CEFAZOLIN 3 G RECONSTITUTED SOLUTION 1 EACH VIAL: Performed by: SURGERY

## 2024-01-22 PROCEDURE — 25810000003 SODIUM CHLORIDE 0.9 % SOLUTION: Performed by: HOSPITALIST

## 2024-01-22 PROCEDURE — 25010000002 FENTANYL CITRATE (PF) 100 MCG/2ML SOLUTION: Performed by: NURSE ANESTHETIST, CERTIFIED REGISTERED

## 2024-01-22 PROCEDURE — 25010000002 DEXAMETHASONE PER 1 MG: Performed by: NURSE ANESTHETIST, CERTIFIED REGISTERED

## 2024-01-22 PROCEDURE — 80048 BASIC METABOLIC PNL TOTAL CA: CPT

## 2024-01-22 PROCEDURE — 88304 TISSUE EXAM BY PATHOLOGIST: CPT | Performed by: SURGERY

## 2024-01-22 PROCEDURE — 96376 TX/PRO/DX INJ SAME DRUG ADON: CPT

## 2024-01-22 PROCEDURE — 25010000002 SUCCINYLCHOLINE PER 20 MG: Performed by: NURSE ANESTHETIST, CERTIFIED REGISTERED

## 2024-01-22 PROCEDURE — 25010000002 GLYCOPYRROLATE 0.2 MG/ML SOLUTION: Performed by: NURSE ANESTHETIST, CERTIFIED REGISTERED

## 2024-01-22 PROCEDURE — 74300 X-RAY BILE DUCTS/PANCREAS: CPT

## 2024-01-22 PROCEDURE — 25010000002 MORPHINE PER 10 MG

## 2024-01-22 PROCEDURE — 0 IOHEXOL 300 MG/ML SOLUTION: Performed by: SURGERY

## 2024-01-22 PROCEDURE — 25810000003 SODIUM CHLORIDE 0.9 % SOLUTION: Performed by: NURSE ANESTHETIST, CERTIFIED REGISTERED

## 2024-01-22 PROCEDURE — 25810000003 SODIUM CHLORIDE 0.9 % SOLUTION 250 ML FLEX CONT: Performed by: NURSE ANESTHETIST, CERTIFIED REGISTERED

## 2024-01-22 PROCEDURE — 25010000002 PROPOFOL 200 MG/20ML EMULSION: Performed by: NURSE ANESTHETIST, CERTIFIED REGISTERED

## 2024-01-22 PROCEDURE — 82948 REAGENT STRIP/BLOOD GLUCOSE: CPT

## 2024-01-22 PROCEDURE — 63710000001 INSULIN LISPRO (HUMAN) PER 5 UNITS

## 2024-01-22 PROCEDURE — 99214 OFFICE O/P EST MOD 30 MIN: CPT | Performed by: SURGERY

## 2024-01-22 PROCEDURE — 25010000002 PHENYLEPHRINE 10 MG/ML SOLUTION 5 ML VIAL: Performed by: NURSE ANESTHETIST, CERTIFIED REGISTERED

## 2024-01-22 PROCEDURE — 25010000002 KETOROLAC TROMETHAMINE PER 15 MG: Performed by: SURGERY

## 2024-01-22 PROCEDURE — 25010000002 ONDANSETRON PER 1 MG: Performed by: NURSE ANESTHETIST, CERTIFIED REGISTERED

## 2024-01-22 PROCEDURE — 25010000002 MORPHINE PER 10 MG: Performed by: SURGERY

## 2024-01-22 PROCEDURE — 47563 LAPARO CHOLECYSTECTOMY/GRAPH: CPT | Performed by: SURGERY

## 2024-01-22 PROCEDURE — 25010000002 SUGAMMADEX 200 MG/2ML SOLUTION: Performed by: NURSE ANESTHETIST, CERTIFIED REGISTERED

## 2024-01-22 PROCEDURE — 25010000002 MORPHINE PER 10 MG: Performed by: NURSE ANESTHETIST, CERTIFIED REGISTERED

## 2024-01-22 PROCEDURE — 25010000002 MIDAZOLAM PER 1 MG: Performed by: NURSE ANESTHETIST, CERTIFIED REGISTERED

## 2024-01-22 PROCEDURE — 25010000002 BUPIVACAINE (PF) 0.25 % SOLUTION: Performed by: SURGERY

## 2024-01-22 PROCEDURE — 25810000003 SODIUM CHLORIDE 0.9 % SOLUTION: Performed by: SURGERY

## 2024-01-22 PROCEDURE — 85025 COMPLETE CBC W/AUTO DIFF WBC: CPT

## 2024-01-22 DEVICE — SEAL HEMO SURG ARISTA/AH ABS/PWDR 1GM: Type: IMPLANTABLE DEVICE | Site: ABDOMEN | Status: FUNCTIONAL

## 2024-01-22 DEVICE — CLIPAPPLR M/ ENDO LIGAMAX5 5MM 33CM MD/LG: Type: IMPLANTABLE DEVICE | Site: ABDOMEN | Status: FUNCTIONAL

## 2024-01-22 RX ORDER — NALOXONE HCL 0.4 MG/ML
0.4 VIAL (ML) INJECTION AS NEEDED
Status: DISCONTINUED | OUTPATIENT
Start: 2024-01-22 | End: 2024-01-22

## 2024-01-22 RX ORDER — GLYCOPYRROLATE 0.2 MG/ML
INJECTION INTRAMUSCULAR; INTRAVENOUS AS NEEDED
Status: DISCONTINUED | OUTPATIENT
Start: 2024-01-22 | End: 2024-01-22 | Stop reason: SURG

## 2024-01-22 RX ORDER — MEPERIDINE HYDROCHLORIDE 25 MG/ML
12.5 INJECTION INTRAMUSCULAR; INTRAVENOUS; SUBCUTANEOUS
Status: DISCONTINUED | OUTPATIENT
Start: 2024-01-22 | End: 2024-01-22

## 2024-01-22 RX ORDER — KETAMINE HCL IN NACL, ISO-OSM 100MG/10ML
SYRINGE (ML) INJECTION AS NEEDED
Status: DISCONTINUED | OUTPATIENT
Start: 2024-01-22 | End: 2024-01-22 | Stop reason: SURG

## 2024-01-22 RX ORDER — PROPOFOL 10 MG/ML
INJECTION, EMULSION INTRAVENOUS AS NEEDED
Status: DISCONTINUED | OUTPATIENT
Start: 2024-01-22 | End: 2024-01-22 | Stop reason: SURG

## 2024-01-22 RX ORDER — DEXAMETHASONE SODIUM PHOSPHATE 4 MG/ML
INJECTION, SOLUTION INTRA-ARTICULAR; INTRALESIONAL; INTRAMUSCULAR; INTRAVENOUS; SOFT TISSUE AS NEEDED
Status: DISCONTINUED | OUTPATIENT
Start: 2024-01-22 | End: 2024-01-22 | Stop reason: SURG

## 2024-01-22 RX ORDER — DIPHENHYDRAMINE HYDROCHLORIDE 50 MG/ML
12.5 INJECTION INTRAMUSCULAR; INTRAVENOUS
Status: DISCONTINUED | OUTPATIENT
Start: 2024-01-22 | End: 2024-01-22

## 2024-01-22 RX ORDER — SODIUM CHLORIDE, SODIUM LACTATE, POTASSIUM CHLORIDE, CALCIUM CHLORIDE 600; 310; 30; 20 MG/100ML; MG/100ML; MG/100ML; MG/100ML
INJECTION, SOLUTION INTRAVENOUS CONTINUOUS PRN
Status: DISCONTINUED | OUTPATIENT
Start: 2024-01-22 | End: 2024-01-22

## 2024-01-22 RX ORDER — OXYCODONE AND ACETAMINOPHEN 10; 325 MG/1; MG/1
1 TABLET ORAL EVERY 6 HOURS PRN
Qty: 120 TABLET | Refills: 0 | Status: SHIPPED | OUTPATIENT
Start: 2024-01-22

## 2024-01-22 RX ORDER — MIDAZOLAM HYDROCHLORIDE 1 MG/ML
INJECTION INTRAMUSCULAR; INTRAVENOUS AS NEEDED
Status: DISCONTINUED | OUTPATIENT
Start: 2024-01-22 | End: 2024-01-22 | Stop reason: SURG

## 2024-01-22 RX ORDER — FENTANYL CITRATE 50 UG/ML
INJECTION, SOLUTION INTRAMUSCULAR; INTRAVENOUS AS NEEDED
Status: DISCONTINUED | OUTPATIENT
Start: 2024-01-22 | End: 2024-01-22 | Stop reason: SURG

## 2024-01-22 RX ORDER — OXYCODONE HYDROCHLORIDE 5 MG/1
5 TABLET ORAL ONCE AS NEEDED
Status: DISCONTINUED | OUTPATIENT
Start: 2024-01-22 | End: 2024-01-22

## 2024-01-22 RX ORDER — SODIUM CHLORIDE 9 MG/ML
75 INJECTION, SOLUTION INTRAVENOUS CONTINUOUS
Status: DISCONTINUED | OUTPATIENT
Start: 2024-01-22 | End: 2024-01-23 | Stop reason: HOSPADM

## 2024-01-22 RX ORDER — KETOROLAC TROMETHAMINE 30 MG/ML
15 INJECTION, SOLUTION INTRAMUSCULAR; INTRAVENOUS EVERY 6 HOURS PRN
Status: DISCONTINUED | OUTPATIENT
Start: 2024-01-22 | End: 2024-01-23 | Stop reason: HOSPADM

## 2024-01-22 RX ORDER — LIDOCAINE HYDROCHLORIDE 10 MG/ML
INJECTION, SOLUTION EPIDURAL; INFILTRATION; INTRACAUDAL; PERINEURAL AS NEEDED
Status: DISCONTINUED | OUTPATIENT
Start: 2024-01-22 | End: 2024-01-22 | Stop reason: SURG

## 2024-01-22 RX ORDER — EPHEDRINE SULFATE 5 MG/ML
INJECTION INTRAVENOUS AS NEEDED
Status: DISCONTINUED | OUTPATIENT
Start: 2024-01-22 | End: 2024-01-22 | Stop reason: SURG

## 2024-01-22 RX ORDER — PHENYLEPHRINE HCL IN 0.9% NACL 1 MG/10 ML
SYRINGE (ML) INTRAVENOUS AS NEEDED
Status: DISCONTINUED | OUTPATIENT
Start: 2024-01-22 | End: 2024-01-22 | Stop reason: SURG

## 2024-01-22 RX ORDER — BUPIVACAINE HYDROCHLORIDE 2.5 MG/ML
INJECTION, SOLUTION EPIDURAL; INFILTRATION; INTRACAUDAL AS NEEDED
Status: DISCONTINUED | OUTPATIENT
Start: 2024-01-22 | End: 2024-01-22 | Stop reason: HOSPADM

## 2024-01-22 RX ORDER — SODIUM CHLORIDE 9 MG/ML
INJECTION, SOLUTION INTRAVENOUS CONTINUOUS PRN
Status: DISCONTINUED | OUTPATIENT
Start: 2024-01-22 | End: 2024-01-22 | Stop reason: SURG

## 2024-01-22 RX ORDER — PROCHLORPERAZINE EDISYLATE 5 MG/ML
10 INJECTION INTRAMUSCULAR; INTRAVENOUS ONCE AS NEEDED
Status: DISCONTINUED | OUTPATIENT
Start: 2024-01-22 | End: 2024-01-22

## 2024-01-22 RX ORDER — LIDOCAINE HYDROCHLORIDE 40 MG/ML
SOLUTION TOPICAL AS NEEDED
Status: DISCONTINUED | OUTPATIENT
Start: 2024-01-22 | End: 2024-01-22 | Stop reason: SURG

## 2024-01-22 RX ORDER — OXYCODONE HYDROCHLORIDE 5 MG/1
5 TABLET ORAL EVERY 6 HOURS PRN
Status: DISCONTINUED | OUTPATIENT
Start: 2024-01-22 | End: 2024-01-23 | Stop reason: HOSPADM

## 2024-01-22 RX ORDER — SUCCINYLCHOLINE CHLORIDE 20 MG/ML
INJECTION INTRAMUSCULAR; INTRAVENOUS AS NEEDED
Status: DISCONTINUED | OUTPATIENT
Start: 2024-01-22 | End: 2024-01-22 | Stop reason: SURG

## 2024-01-22 RX ORDER — ONDANSETRON 2 MG/ML
4 INJECTION INTRAMUSCULAR; INTRAVENOUS ONCE AS NEEDED
Status: DISCONTINUED | OUTPATIENT
Start: 2024-01-22 | End: 2024-01-22

## 2024-01-22 RX ORDER — ONDANSETRON 2 MG/ML
INJECTION INTRAMUSCULAR; INTRAVENOUS AS NEEDED
Status: DISCONTINUED | OUTPATIENT
Start: 2024-01-22 | End: 2024-01-22 | Stop reason: SURG

## 2024-01-22 RX ORDER — IPRATROPIUM BROMIDE AND ALBUTEROL SULFATE 2.5; .5 MG/3ML; MG/3ML
3 SOLUTION RESPIRATORY (INHALATION) ONCE AS NEEDED
Status: DISCONTINUED | OUTPATIENT
Start: 2024-01-22 | End: 2024-01-22

## 2024-01-22 RX ORDER — ROCURONIUM BROMIDE 10 MG/ML
INJECTION, SOLUTION INTRAVENOUS AS NEEDED
Status: DISCONTINUED | OUTPATIENT
Start: 2024-01-22 | End: 2024-01-22 | Stop reason: SURG

## 2024-01-22 RX ADMIN — SODIUM CHLORIDE 3000 MG: 900 INJECTION INTRAVENOUS at 13:30

## 2024-01-22 RX ADMIN — PROPOFOL 200 MG: 10 INJECTION, EMULSION INTRAVENOUS at 13:34

## 2024-01-22 RX ADMIN — DEXAMETHASONE SODIUM PHOSPHATE 4 MG: 4 INJECTION, SOLUTION INTRAMUSCULAR; INTRAVENOUS at 13:38

## 2024-01-22 RX ADMIN — MORPHINE SULFATE 15 MG: 15 TABLET, FILM COATED, EXTENDED RELEASE ORAL at 23:00

## 2024-01-22 RX ADMIN — ROCURONIUM BROMIDE 40 MG: 10 INJECTION, SOLUTION INTRAVENOUS at 13:42

## 2024-01-22 RX ADMIN — ONDANSETRON 4 MG: 2 INJECTION INTRAMUSCULAR; INTRAVENOUS at 13:38

## 2024-01-22 RX ADMIN — INSULIN LISPRO 6 UNITS: 100 INJECTION, SOLUTION INTRAVENOUS; SUBCUTANEOUS at 18:07

## 2024-01-22 RX ADMIN — LIDOCAINE HYDROCHLORIDE 50 MG: 10 INJECTION, SOLUTION EPIDURAL; INFILTRATION; INTRACAUDAL; PERINEURAL at 13:34

## 2024-01-22 RX ADMIN — Medication 100 MCG: at 13:39

## 2024-01-22 RX ADMIN — LISINOPRIL 10 MG: 5 TABLET ORAL at 08:49

## 2024-01-22 RX ADMIN — FLUOXETINE 40 MG: 20 CAPSULE ORAL at 08:49

## 2024-01-22 RX ADMIN — LIDOCAINE HYDROCHLORIDE 1 EACH: 40 SOLUTION TOPICAL at 13:35

## 2024-01-22 RX ADMIN — SODIUM CHLORIDE: 9 INJECTION, SOLUTION INTRAVENOUS at 12:40

## 2024-01-22 RX ADMIN — SUCCINYLCHOLINE CHLORIDE 140 MG: 20 INJECTION, SOLUTION INTRAMUSCULAR; INTRAVENOUS at 13:34

## 2024-01-22 RX ADMIN — PHENYLEPHRINE HYDROCHLORIDE 0.5 MCG/KG/MIN: 10 INJECTION INTRAVENOUS at 13:47

## 2024-01-22 RX ADMIN — Medication 30 MG: at 13:46

## 2024-01-22 RX ADMIN — LAMOTRIGINE 100 MG: 100 TABLET ORAL at 08:49

## 2024-01-22 RX ADMIN — Medication 10 ML: at 10:45

## 2024-01-22 RX ADMIN — GLYCOPYRROLATE 0.2 MG: 0.2 INJECTION INTRAMUSCULAR; INTRAVENOUS at 13:40

## 2024-01-22 RX ADMIN — CARIPRAZINE 3 MG: 3 CAPSULE, GELATIN COATED ORAL at 08:49

## 2024-01-22 RX ADMIN — MORPHINE SULFATE 4 MG: 4 INJECTION, SOLUTION INTRAMUSCULAR; INTRAVENOUS at 02:04

## 2024-01-22 RX ADMIN — MORPHINE SULFATE 2 MG: 4 INJECTION INTRAVENOUS at 15:24

## 2024-01-22 RX ADMIN — Medication 200 MCG: at 13:51

## 2024-01-22 RX ADMIN — INSULIN LISPRO 2 UNITS: 100 INJECTION, SOLUTION INTRAVENOUS; SUBCUTANEOUS at 01:02

## 2024-01-22 RX ADMIN — Medication 20 MG: at 13:57

## 2024-01-22 RX ADMIN — INSULIN LISPRO 2 UNITS: 100 INJECTION, SOLUTION INTRAVENOUS; SUBCUTANEOUS at 06:44

## 2024-01-22 RX ADMIN — MIDAZOLAM 2 MG: 1 INJECTION INTRAMUSCULAR; INTRAVENOUS at 13:29

## 2024-01-22 RX ADMIN — LINAGLIPTIN 5 MG: 5 TABLET, FILM COATED ORAL at 20:04

## 2024-01-22 RX ADMIN — PANTOPRAZOLE SODIUM 40 MG: 40 TABLET, DELAYED RELEASE ORAL at 08:49

## 2024-01-22 RX ADMIN — Medication 10 ML: at 20:05

## 2024-01-22 RX ADMIN — EPHEDRINE SULFATE 10 MG: 5 INJECTION INTRAVENOUS at 13:53

## 2024-01-22 RX ADMIN — Medication 100 MCG: at 13:47

## 2024-01-22 RX ADMIN — SODIUM CHLORIDE 75 ML/HR: 9 INJECTION, SOLUTION INTRAVENOUS at 11:29

## 2024-01-22 RX ADMIN — MORPHINE SULFATE 15 MG: 15 TABLET, FILM COATED, EXTENDED RELEASE ORAL at 06:44

## 2024-01-22 RX ADMIN — SUGAMMADEX 200 MG: 100 INJECTION, SOLUTION INTRAVENOUS at 14:35

## 2024-01-22 RX ADMIN — INSULIN LISPRO 2 UNITS: 100 INJECTION, SOLUTION INTRAVENOUS; SUBCUTANEOUS at 12:06

## 2024-01-22 RX ADMIN — GLYCOPYRROLATE 0.2 MG: 0.2 INJECTION INTRAMUSCULAR; INTRAVENOUS at 13:46

## 2024-01-22 RX ADMIN — PREGABALIN 100 MG: 100 CAPSULE ORAL at 08:49

## 2024-01-22 RX ADMIN — FENTANYL CITRATE 100 MCG: 50 INJECTION, SOLUTION INTRAMUSCULAR; INTRAVENOUS at 13:29

## 2024-01-22 RX ADMIN — KETOROLAC TROMETHAMINE 15 MG: 30 INJECTION, SOLUTION INTRAMUSCULAR; INTRAVENOUS at 20:23

## 2024-01-22 RX ADMIN — PREGABALIN 100 MG: 100 CAPSULE ORAL at 20:04

## 2024-01-22 NOTE — ANESTHESIA POSTPROCEDURE EVALUATION
Patient: Noa Soni    Procedure Summary       Date: 01/22/24 Room / Location: [facility] OR [address] [place] [address] MAIN OR    Anesthesia Start: 1324 Anesthesia Stop: 1448    Procedure: CHOLECYSTECTOMY LAPAROSCOPIC INTRAOPERATIVE CHOLANGIOGRAM (Abdomen) Diagnosis:       Symptomatic cholelithiasis      (Symptomatic cholelithiasis [K80.20])    Surgeons: Dustin Shelley MD Provider: Vu Sampson MD    Anesthesia Type: general ASA Status: 3            Anesthesia Type: general    Vitals  Vitals Value Taken Time   /79 01/22/24 1531   Temp 97.9 °F (36.6 °C) 01/22/24 1448   Pulse 96 01/22/24 1535   Resp 13 01/22/24 1518   SpO2 89 % 01/22/24 1535   Vitals shown include unfiled device data.        Post Anesthesia Care and Evaluation    Patient location during evaluation: PACU  Patient participation: complete - patient participated  Level of consciousness: awake  Pain scale: See nurse's notes for pain score.  Pain management: adequate    Airway patency: patent  Anesthetic complications: No anesthetic complications  PONV Status: none  Cardiovascular status: acceptable  Respiratory status: acceptable and spontaneous ventilation  Hydration status: acceptable    Comments: Patient seen and examined postoperatively; vital signs stable; SpO2 greater than or equal to 90%; cardiopulmonary status stable; nausea/vomiting adequately controlled; pain adequately controlled; no apparent anesthesia complications; patient discharged from anesthesia care when discharge criteria were met

## 2024-01-22 NOTE — TELEPHONE ENCOUNTER
Provider: DR ALLEN    Caller: LAN ABURTO    Relationship to Patient: SELF    Phone Number: [phone]    Reason for Call: PATIENT HAD TO RESCHEDULE DUE TO EMERGENCY GALLBLADDER SURGERY ON 1.22; WANTS TO KNOW IF DR ALLEN WANTS HER TO STAY ON THE MORPHINE UNTIL HER NEXT APPT ON 2.19.

## 2024-01-22 NOTE — OP NOTE
Operative Report:    Patient Name:  Noa Soni  YOB: 1980    Date of Surgery:  1/22/2024     Indications: 43-year-old lady who I recently met in the office for what was possibly symptomatic cholelithiasis who was at home and in unremitting pain so came to the emergency department.  New workup did not show anything other than the gallstones counseled about the risk and benefits of cholecystectomy.  She understood and wished to proceed    Pre-op Diagnosis:   Symptomatic cholelithiasis [K80.20]       Post-Op Diagnosis Codes:     * Symptomatic cholelithiasis [K80.20]    Procedure/CPT® Codes:      Procedure(s):  CHOLECYSTECTOMY LAPAROSCOPIC INTRAOPERATIVE CHOLANGIOGRAM    Staff:  Surgeon(s):  Dustin Shelley MD    Circulator: Angelic Walton RN  Radiology Technologist: Latia Rivas  Scrub Person: Wanda Colon  Assistant: Renea Baltazar CSFA  was responsible for performing the following activities: Retraction, Closing, and Held/Positioned Camera and their skilled assistance was necessary for the success of this case.        Anesthesia: General    Estimated Blood Loss: minimal    Implants:    Implant Name Type Inv. Item Serial No.  Lot No. LRB No. Used Action   CLIPAPPLR M/ ENDO LIGAMAX5 5MM 33CM MD/MAYI - [id number] Implant CLIPAPPLR M/ ENDO LIGAMAX5 5MM 33CM MD/LG  [employer]  DIV OF J AND J [id number] N/A 1 Implanted   SEAL HEMO SURG JOSH/AH ABS/PWDR 1GM - [id number] Implant SEAL HEMO SURG JOSH/AH ABS/PWDR 1GM  MED[employer] HEMOSTATIS POLYMER [employer] [id number] N/A 1 Implanted       Specimen:          Specimens       ID Source Type Tests Collected By Collected At Frozen?    A Gallbladder Tissue TISSUE PATHOLOGY EXAM   Dustin Shelley MD 1/22/24 1417 No    Description: GALLBLADDER    Comment: GALLBLADDER FOR PERMANENT                 Findings: Intraoperative cholangiogram showing access within the gallbladder lumen passage of contrast into the cystic duct opacification  of the common bile duct with smooth taper and emptying of contrast into the duodenum without any definitive filling defects.  There was backfilling of the common hepatic duct and branching without any filling defects.    Complications: None    Description of Procedure: Patient was taken to the operating placed in the operating table in supine position under general anesthesia.  Her abdomen was prepped and draped normal sterile fashion.  Timeout was performed identifying the patient procedure and site of operation.  I began the operation by entering the abdomen through a left upper quadrant 5 mm optical trocar entry.  Upon entry the abdominal cavity abdomen was insufflated camera to do there is no evidence of injury to underlying structures.  She was placed in reverse Trendelenburg with right side up.  Periumbilical 12 and 2 right subcostal 5 mm ports placed laparoscopic guidance.  The gallbladder was grasped retractors right upper quadrant infundibulum tract laterally.  Dissection the base the gallbladder took place using hook Bovie it was somewhat obscured by the size of her liver.  I was able to get the cystic duct and cystic artery dissected free of the surrounding structures and the base the gallbladder free of the liver edge revealed the critical view.  The Becker clamp was then placed across the body of the gallbladder cholangiogram was obtained as above there was no filling defects identified.  I then doubly clipped and divided the cystic duct and cystic artery and then dissected the gallbladder off of the liver using hook Bovie.  There was minimal bleeding during this process.  Was placed Endo Catch bag and removed.  The right upper quadrant was then thoroughly inspected the surface of the gallbladder was somewhat oozy her liver had a congested appearance.  I went ahead and placed some Sha on the dissection planes to help with hemostasis.  The ports were serially moved out the abdominal wall hemorrhage.   The 12 mm port site was closed with 0 Vicryl suture the skin with 4-0 Vicryl suture and skin affix.  She tolerated procedure well's been transferred to recovery in satisfactory condition.      Dustin Shelley MD     Date: 1/22/2024  Time: 14:34 EST    This note was created using Dragon Voice Recognition software.

## 2024-01-22 NOTE — H&P
[facility] Medicine Services  History & Physical    Patient Name: Noa Soni  : 1980  MRN: [id number]  Primary Care Physician:  Bree Wan MD  Date of admission: 2024  Date and Time of Service: 2024 at 2200    Subjective      Chief Complaint: abdominal pain    History of Present Illness: Noa Soni is a 43 y.o. female with a previous medical history of obesity, DM, HTN, Chronic pain and Cholelithiasis who presented to [facility] on 2024 with severe abdominal pain, uncontrolled with her home dose of MS Contin 15 mg TID. She had seen Dr. Shelley with General Surgery previously due to her cholelithiasis and had a Cholecystectomy scheduled for March. She had called Dr. Shelley regarding her increased pain and he recommended that she some to the ED for further evaluation for Cholecystitis.    In the ED, a CT of the abdomen and pelvis showed gallbladder sludge and stones with no evidence of acute cholecystitis.  Labs are unremarkable.  She is afebrile, all vitals are stable.  Dr. Shelley spoke with the ED provider and  requested that she be admitted and kept NPO after midnight for a Cholecystectomy.  Hospitalist was consulted for further management.    12 point ROS reviewed and negative except as mentioned above      Personal History     Past Medical History:   Diagnosis Date    Allergic     Anger     Anxiety     Cholelithiasis 1/3/2024    I had alot of stones in my gallbladder    Depression     Diabetes mellitus     Headache     HTN (hypertension)     Knee pain, bilateral     Neuropathy in diabetes 23    Obesity     PTSD (post-traumatic stress disorder)     Type 2 diabetes mellitus        Past Surgical History:   Procedure Laterality Date    EAR TUBES      ENDOMETRIAL ABLATION      INTRAUTERINE DEVICE INSERTION  10/14/2020    LAPAROSCOPIC TUBAL LIGATION      SUBTOTAL HYSTERECTOMY      TONSILLECTOMY AND ADENOIDECTOMY      TOTAL LAPAROSCOPIC HYSTERECTOMY WITH  TYRON MICHAEL  [facility]02/[facility]       Family History: family history includes Anxiety disorder in her mother; Atrial fibrillation in her maternal grandfather; Breast cancer in her paternal grandmother; COPD in her paternal grandfather; Dementia in her maternal grandmother; Depression in her brother and mother; Diabetes in her paternal grandmother; Hyperlipidemia in her father and mother; Hypertension in her brother, father, and mother; Mental illness in her mother; Parkinsonism in her maternal grandmother; Post-traumatic stress disorder in her mother. Otherwise pertinent FHx was reviewed and not pertinent to current issue.    Social History:  reports that she quit smoking about 6 years ago. Her smoking use included cigarettes. She started smoking about 7 years ago. She has a 5.00 pack-year smoking history. She has been exposed to tobacco smoke. She has never used smokeless tobacco. She reports that she does not drink alcohol and does not use drugs.    Home Medications:  Prior to Admission Medications       Prescriptions Last Dose Informant Patient Reported? Taking?    Cariprazine HCl (Vraylar) 3 MG capsule capsule   No Yes    Take 1 capsule by mouth Daily.    Dulaglutide (Trulicity) 4.5 MG/0.5ML solution pen-injector   No Yes    Inject 0.5 mL under the skin into the appropriate area as directed Every 7 (Seven) Days.    fexofenadine (ALLEGRA) 180 MG tablet   No Yes    Take 1 tablet by mouth Daily.    FLUoxetine (PROzac) 40 MG capsule   No Yes    Take 1 capsule by mouth Daily.    lamoTRIgine (LaMICtal) 100 MG tablet   No Yes    Take 1 tablet by mouth Daily. At bedtime    linaclotide (Linzess) 145 MCG capsule capsule   No Yes    Take 1 capsule by mouth Every Morning Before Breakfast.    lisinopril (PRINIVIL,ZESTRIL) 10 MG tablet   No Yes    Take 1 tablet by mouth Daily.    Melatonin 5 MG chewable tablet   No Yes    Chew 5 mg every night at bedtime.    Morphine (MS CONTIN) 15 MG 12 hr tablet   No Yes     Take 1 tablet by mouth 3 (Three) Times a Day.    pantoprazole (Protonix) 40 MG EC tablet   No Yes    Take 1 tablet by mouth Daily for 60 days.    pregabalin (LYRICA) 100 MG capsule   No Yes    Take 1 capsule by mouth 2 (Two) Times a Day.    SITagliptin (Januvia) 100 MG tablet   No Yes    Take 1 tablet by mouth Daily.    SUMAtriptan (IMITREX) 50 MG tablet   No Yes    TAKE 1 TABLET BY MOUTH AT ONSET OF HEADACHE. MAY REPEAT DOSE ONE TIME IN 2 HOURS IF HEADACHE NOT RELIEVED.    Blood Glucose Monitoring Suppl kit   No No    Use as directed to check blood glucose    EPINEPHrine (EPIPEN) 0.3 MG/0.3ML solution auto-injector injection   Yes No    USE AS DIRECTED FOR ACUTE ALLERGIC REACTION    glucose blood test strip   No No    Use as instructed to check blood glucose once daily    Lancets misc   No No    Use once daily with meter and strips to check blood glucose              Allergies:  Allergies   Allergen Reactions    Dilaudid [Hydromorphone] Other (See Comments)     Bottoms O2 out    Tylenol With Codeine #3 [Acetaminophen-Codeine] Hallucinations    Abilify [Aripiprazole] Rash    Metformin Diarrhea    Vicodin [Hydrocodone-Acetaminophen] GI Intolerance       Objective      Vitals:   Temp:  [97.9 °F (36.6 °C)] 97.9 °F (36.6 °C)  Heart Rate:  [69-82] 69  Resp:  [16-18] 18  BP: (129-146)/(64-78) 129/73  Body mass index is 46.7 kg/m².  Physical Exam  Vitals and nursing note reviewed.   Constitutional:       Appearance: Normal appearance. She is obese.   HENT:      Head: Normocephalic and atraumatic.      Nose: Nose normal.      Mouth/Throat:      Mouth: Mucous membranes are moist.   Eyes:      Extraocular Movements: Extraocular movements intact.      Pupils: Pupils are equal, round, and reactive to light.   Cardiovascular:      Rate and Rhythm: Normal rate and regular rhythm.      Pulses: Normal pulses.      Heart sounds: Normal heart sounds.   Pulmonary:      Effort: Pulmonary effort is normal.      Breath sounds: Normal  breath sounds.   Abdominal:      General: Bowel sounds are normal.      Palpations: Abdomen is soft.   Musculoskeletal:         General: Normal range of motion.      Cervical back: Normal range of motion.   Skin:     General: Skin is warm and dry.   Neurological:      General: No focal deficit present.      Mental Status: She is alert and oriented to person, place, and time. Mental status is at baseline.   Psychiatric:         Mood and Affect: Mood normal. Mood is anxious.         Behavior: Behavior normal.           Assessment & Plan        This is a 43 y.o. female with:    Active and Resolved Problems  Active Hospital Problems    Diagnosis  POA    **Cholelithiasis [K80.20]  Yes      Resolved Hospital Problems   No resolved problems to display.       Plan:    Home medications not verified at the time of assessment and plan    Abdominal Pain  -CT of the abdomen reviewed, cholelithiasis noted  -General Surgery consulted, requests NPO after midnight for probable surgery tomorrow  -Analgesics as needed    Depression  Bipolar Disorder  -Continue home Prozac, Lamictal, Vraylar    Diabetes Mellitus II  -Monitor glucose  -SSI ordered  -Hold home medications while NPO    Essential Hypertension  -Controlled  -Monitor BP  -Continue home Lisinopril          DVT prophylaxis:  Mechanical DVT prophylaxis orders are present.    CODE STATUS:    Code Status (Patient has no pulse and is not breathing): CPR (Attempt to Resuscitate)  Medical Interventions (Patient has pulse or is breathing): Full Support        Admission Status:  I believe this patient meets inpatient status.    I discussed the patient's findings and my recommendations with patient, family, and nursing staff.    Signature:     This document has been electronically signed by Nikki Peng DNP, APRN on January 21, 2024 22:08 [facility] Hospitalist Team

## 2024-01-22 NOTE — ANESTHESIA PREPROCEDURE EVALUATION
Anesthesia Evaluation     NPO Solid Status: > 8 hours  NPO Liquid Status: > 8 hours           Airway   Mallampati: II  TM distance: >3 FB  Neck ROM: full  No difficulty expected  Dental - normal exam     Pulmonary - normal exam   (+) ,sleep apnea on CPAP  Cardiovascular - normal exam    (+) hypertension well controlled      Neuro/Psych  (+) headaches, numbness, psychiatric history Bipolar  GI/Hepatic/Renal/Endo    (+) morbid obesity, diabetes mellitus type 2    Musculoskeletal     Abdominal  - normal exam    Bowel sounds: normal.   Substance History      OB/GYN          Other   arthritis,                 Anesthesia Plan    ASA 3     general     intravenous induction     Anesthetic plan, risks, benefits, and alternatives have been provided, discussed and informed consent has been obtained with: patient.  Pre-procedure education provided  Plan discussed with CRNA and CAA.    CODE STATUS:    Code Status (Patient has no pulse and is not breathing): CPR (Attempt to Resuscitate)  Medical Interventions (Patient has pulse or is breathing): Full Support

## 2024-01-22 NOTE — DISCHARGE INSTRUCTIONS
Follow the gallbladder eating plan    Continue your morphine use for chronic pain at home    Call Dr. Shelley to see if they can find you a sooner appointment    Return if worse

## 2024-01-22 NOTE — TELEPHONE ENCOUNTER
It looks like pt was coming to switch back to the oxycodone. Looking back at other phone notes.  It looks like she's ok staying on the morphine until it runs out.  Do you want to go ahead and send in an rx for oxycodone with a dnf date on it?? Inspect in chart

## 2024-01-22 NOTE — PLAN OF CARE
"Goal Outcome Evaluation:  Plan of Care Reviewed With: patient, significant other           Outcome Evaluation: Pt returned from OR in stable condition. Pt rates pain \"4\" on scale, tolerable she states. Denies any nausea. up to br, voided x300 dark yellow urine. Instructed to call for nursing for assistance when needing up. assmt done, abd binder applied, but pt requested off and will use \"pillow\" for splinting. o2 at 2lpm per nc with sats 90-96 while awake. 88-91 while asleep. Instructed on IS and demonstrated, sats 92-96 with IS. Will continue to monitor.                               "

## 2024-01-22 NOTE — ED PROVIDER NOTES
Subjective   History of Present Illness  Patient is a 43-year-old obese female who comes in with abdominal pain who knows she has several stones in her gallbladder who is scheduled to have her gallbladder removed on March 4 with Dr. Shelley-they state that the pain increased today and she was advised by Dr. Shelley to come to the emergency room for evaluation she had some nausea without vomiting and increased pain she uses MS Contin 15mg PO Q12 at home.     Patient states she has had decreased intake-      Review of Systems   Constitutional:  Negative for chills, fatigue and fever.   HENT:  Negative for congestion, tinnitus and trouble swallowing.    Eyes:  Negative for photophobia, discharge and redness.   Respiratory:  Negative for cough and shortness of breath.    Cardiovascular:  Negative for chest pain and palpitations.   Gastrointestinal:  Positive for abdominal pain and nausea. Negative for diarrhea and vomiting.   Genitourinary:  Negative for dysuria, frequency and urgency.   Musculoskeletal:  Negative for back pain, joint swelling and myalgias.   Skin:  Negative for rash.   Neurological:  Negative for dizziness and headaches.   Psychiatric/Behavioral:  Negative for confusion.    All other systems reviewed and are negative.      Past Medical History:   Diagnosis Date    Allergic     Anger     Anxiety     Cholelithiasis 1/3/2024    I had alot of stones in my gallbladder    Depression     Diabetes mellitus     Headache     HTN (hypertension)     Knee pain, bilateral     Neuropathy in diabetes 12/9/23    Obesity     PTSD (post-traumatic stress disorder)     Type 2 diabetes mellitus        Allergies   Allergen Reactions    Dilaudid [Hydromorphone] Other (See Comments)     Bottoms O2 out    Tylenol With Codeine #3 [Acetaminophen-Codeine] Hallucinations    Abilify [Aripiprazole] Rash    Metformin Diarrhea    Vicodin [Hydrocodone-Acetaminophen] GI Intolerance       Past Surgical History:   Procedure Laterality Date     EAR TUBES      ENDOMETRIAL ABLATION      INTRAUTERINE DEVICE INSERTION  10/14/2020    LAPAROSCOPIC TUBAL LIGATION      SUBTOTAL HYSTERECTOMY      TONSILLECTOMY AND ADENOIDECTOMY      TOTAL LAPAROSCOPIC HYSTERECTOMY WITH DAVINCI ROBOT  2023    [facility]       Family History   Problem Relation Age of Onset    Hypertension Mother     Depression Mother     Post-traumatic stress disorder Mother     Hyperlipidemia Mother     Anxiety disorder Mother     Mental illness Mother     Hypertension Father     Hyperlipidemia Father     Hypertension Brother     Depression Brother     Parkinsonism Maternal Grandmother     Dementia Maternal Grandmother     Atrial fibrillation Maternal Grandfather     Breast cancer Paternal Grandmother     Diabetes Paternal Grandmother     COPD Paternal Grandfather        Social History     Socioeconomic History    Marital status: Single    Number of children: 0   Tobacco Use    Smoking status: Former     Packs/day: 1.00     Years: 5.00     Additional pack years: 0.00     Total pack years: 5.00     Types: Cigarettes     Start date: 2017     Quit date: 2018     Years since quittin.0     Passive exposure: Past    Smokeless tobacco: Never    Tobacco comments:     socially    Vaping Use    Vaping Use: Never used   Substance and Sexual Activity    Alcohol use: Never    Drug use: Never    Sexual activity: Not Currently     Partners: Male     Birth control/protection: Other, Hysterectomy           Objective   Physical Exam  Vitals reviewed.   Constitutional:       Appearance: She is well-developed. She is obese.   HENT:      Head: Normocephalic and atraumatic.   Eyes:      Conjunctiva/sclera: Conjunctivae normal.      Pupils: Pupils are equal, round, and reactive to light.   Cardiovascular:      Rate and Rhythm: Normal rate and regular rhythm.      Heart sounds: Normal heart sounds.   Pulmonary:      Effort: Pulmonary effort is normal. No respiratory distress.      Breath sounds:  "Normal breath sounds. No wheezing.   Abdominal:      General: Abdomen is protuberant. Bowel sounds are normal. There is no distension.      Palpations: Abdomen is soft. There is no mass.      Tenderness: There is generalized abdominal tenderness and tenderness in the right upper quadrant. There is no right CVA tenderness, left CVA tenderness, guarding or rebound.   Musculoskeletal:         General: No deformity. Normal range of motion.      Cervical back: Normal range of motion and neck supple.   Skin:     General: Skin is warm and dry.      Capillary Refill: Capillary refill takes less than 2 seconds.   Neurological:      General: No focal deficit present.      Mental Status: She is alert and oriented to person, place, and time.      GCS: GCS eye subscore is 4. GCS verbal subscore is 5. GCS motor subscore is 6.      Cranial Nerves: No cranial nerve deficit.      Sensory: No sensory deficit.      Deep Tendon Reflexes: Reflexes normal.   Psychiatric:         Mood and Affect: Mood normal.         Behavior: Behavior normal.         Procedures           ED Course  ED Course as of 01/21/24 2114   Sun Jan 21, 2024 2106 spoke with Dr. Shelley who wishes this patient to be admitted for gallbladder removal [KW]   2110 Spoke with Nikki the nurse practitioner with the hospitalist who agrees to admit for Dr. Pang [KW]      ED Course User Index  [KW] Reina Cheney, APRN                                 /78   Pulse 79   Temp 97.9 °F (36.6 °C) (Oral)   Resp 18   Ht 162.6 cm (64\")   Wt 123 kg (272 lb 0.8 oz)   LMP 06/02/2023 (Exact Date)   SpO2 95%   BMI 46.70 kg/m²   Labs Reviewed   COMPREHENSIVE METABOLIC PANEL - Abnormal; Notable for the following components:       Result Value    Glucose 183 (*)     Total Bilirubin 1.4 (*)     All other components within normal limits    Narrative:     GFR Normal >60  Chronic Kidney Disease <60  Kidney Failure <15     URINALYSIS W/ CULTURE IF INDICATED - Abnormal; Notable " for the following components:    Specific Gravity, UA 1.031 (*)     All other components within normal limits    Narrative:     In absence of clinical symptoms, the presence of pyuria, bacteria, and/or nitrites on the urinalysis result does not correlate with infection.  Urine microscopic not indicated.   LIPASE - Normal   CBC WITH AUTO DIFFERENTIAL - Normal   CBC AND DIFFERENTIAL    Narrative:     The following orders were created for panel order CBC & Differential.  Procedure                               Abnormality         Status                     ---------                               -----------         ------                     CBC Auto Differential[[id number]]        Normal              Final result                 Please view results for these tests on the individual orders.   EXTRA TUBES    Narrative:     The following orders were created for panel order Extra Tubes.  Procedure                               Abnormality         Status                     ---------                               -----------         ------                     Gold Top - SST[[id number]]                                   Final result                 Please view results for these tests on the individual orders.   GOLD TOP - SST     Medications   sodium chloride 0.9 % flush 10 mL (has no administration in time range)   oxyCODONE (ROXICODONE) immediate release tablet 5 mg (has no administration in time range)   morphine injection 4 mg (4 mg Intravenous Given 1/21/24 1922)   ondansetron (ZOFRAN) injection 4 mg (4 mg Intravenous Given 1/21/24 1922)   iopamidol (ISOVUE-370) 76 % injection 100 mL (100 mL Intravenous Given 1/21/24 2004)     CT Abdomen Pelvis With Contrast    Result Date: 1/21/2024  Impression: 1.No acute abdominal or pelvic abnormality. 2.Hepatosplenomegaly. 3.Gallbladder sludge or stones without evidence of acute cholecystitis. 4.Mild body wall edema. Electronically Signed: Barry Bermudez MD  1/21/2024 8:15 PM EST   Workstation ID: [id number]               Medical Decision Making  Patient is a 43-year-old obese chronically ill female who comes in with known cholelithiasis and comes in today with uncontrolled right upper quadrant pain with decreased oral intake over the last several days she states that Dr. Shelley asked her to come to the emergency room for evaluation worrisome for cholecystitis or uncontrolled pain due to cholelithiasis.  The patient does take MS Contin 15 mg 3 times daily and states that this is not controlling her pain.    Patient had IV established and blood work was obtained and reviewed and found to be within normal limits no white count no elevated liver enzymes she has a slightly elevated bilirubin at 1.4-patient was given morphine and then Roxicodone due to her allergies and discussed with Dr. Shelley who wishes the patient to be admitted and then discussed with the hospitalist who agrees to admit for surgical cholecystectomy    Problems Addressed:  Calculus of gallbladder without cholecystitis without obstruction: complicated acute illness or injury  Right upper quadrant abdominal pain: complicated acute illness or injury    Amount and/or Complexity of Data Reviewed  External Data Reviewed: radiology and notes.     Details: From previous gallbladder evaluation  Labs: ordered. Decision-making details documented in ED Course.  Radiology: ordered and independent interpretation performed. Decision-making details documented in ED Course.  ECG/medicine tests: ordered and independent interpretation performed. Decision-making details documented in ED Course.    Risk  OTC drugs.  Prescription drug management.  Decision regarding hospitalization.        Final diagnoses:   Right upper quadrant abdominal pain   Calculus of gallbladder without cholecystitis without obstruction       ED Disposition  ED Disposition       ED Disposition   Decision to Admit    Condition   --    Comment   Level of Care: Med/Surg [1]    Admitting Physician: CARO SR [[id number]]   Attending Physician: CARO SR [[id number]]                 No follow-up provider specified.       Medication List      No changes were made to your prescriptions during this visit.            Reina Cheney, APRN  01/21/24 2113

## 2024-01-22 NOTE — PROGRESS NOTES
[facility] MEDICINE SERVICE  DAILY PROGRESS NOTE    NAME: Noa Soni  : 1980  MRN: [id number]      LOS: 1 day     PROVIDER OF SERVICE: Annabella Connelly MD    Chief Complaint: Cholelithiasis    Subjective:     Interval History:  History taken from: patient  Patient seen and examined, she is asking for oral pain medications giving Oxy.  She states morphine is not working for her for pain control    Review of Systems:   Review of Systems  10 point ROS is negative other than what is stated positive above  Objective:     Vital Signs  Temp:  [97.9 °F (36.6 °C)-99.5 °F (37.5 °C)] 98.4 °F (36.9 °C)  Heart Rate:  [68-82] 72  Resp:  [16-19] 16  BP: (119-146)/(64-83) 119/75   Body mass index is 46.7 kg/m².    Physical Exam  Physical Exam  General, awake alert, obese  Change normocephalic/atraumatic  Chest clear to auscultation bilaterally  CVs S1-S2 normal, regular rhythm  Abdomen soft, tenderness right upper quadrant, bowel sounds positive but hypoactive  Neuro AOx3, grossly normal  Scheduled Meds   Cariprazine HCl, 3 mg, Oral, Daily  FLUoxetine, 40 mg, Oral, Daily  insulin lispro, 2-9 Units, Subcutaneous, Q6H  lamoTRIgine, 100 mg, Oral, Daily  lisinopril, 10 mg, Oral, Daily  Morphine, 15 mg, Oral, Q8H  pantoprazole, 40 mg, Oral, Daily  pregabalin, 100 mg, Oral, BID  sodium chloride, 10 mL, Intravenous, Q12H       PRN Meds     acetaminophen **OR** acetaminophen **OR** acetaminophen    senna-docusate sodium **AND** polyethylene glycol **AND** bisacodyl **AND** bisacodyl    dextrose    dextrose    glucagon (human recombinant)    hydrOXYzine    influenza vaccine    melatonin    Morphine    ondansetron    sodium chloride    sodium chloride    sodium chloride   Infusions         Diagnostic Data    Results from last 7 days   Lab Units 24  0508 24  1908   WBC 10*3/mm3 8.40 8.10   HEMOGLOBIN g/dL 12.7 12.4   HEMATOCRIT % 38.9 37.5   PLATELETS 10*3/mm3 253 244   GLUCOSE mg/dL 151* 183*   CREATININE mg/dL  0.67 0.64   BUN mg/dL 8 6   SODIUM mmol/L 139 139   POTASSIUM mmol/L 3.5 3.8   AST (SGOT) U/L  --  28   ALT (SGPT) U/L  --  23   ALK PHOS U/L  --  112   BILIRUBIN mg/dL  --  1.4*   ANION GAP mmol/L 11.0 14.0       CT Abdomen Pelvis With Contrast    Result Date: 1/21/2024  Impression: 1.No acute abdominal or pelvic abnormality. 2.Hepatosplenomegaly. 3.Gallbladder sludge or stones without evidence of acute cholecystitis. 4.Mild body wall edema. Electronically Signed: Barry Bermudez MD  1/21/2024 8:15 PM EST  Workstation ID: [id number]       I reviewed the patient's new clinical results.    Assessment/Plan:     Active and Resolved Problems  Active Hospital Problems    Diagnosis  POA    **Cholelithiasis [K80.20]  Yes    Symptomatic cholelithiasis [K80.20]  Unknown      Resolved Hospital Problems   No resolved problems to display.     Patient is a 43-year-old female with history of diabetes hypertension obesity, presented to the hospital with severe abdominal pain, she takes MS Contin 15 mg p.o. 3 times daily at home and that was uncontrolled with that as well.  CT scan of the abdomen showed gallbladder sludge and stones.  Surgery was consulted for cholecystectomy.    Abdominal pain, right upper quadrant  Secondary to gallbladder sludge and stones-cholelithiasis versus cholecystitis-does not have any fever or white count  General surgery consulted  Currently on morphine IV as needed for pain control-states morphine is not working, oxycodone ordered better, will add as needed.  Also will add Toradol as needed.  Plan for cholecystectomy and cholangiogram today  Currently n.p.o.  Continue IV fluids  Zofran prn For nausea vomiting      History of depression  Continue Prozac and Vraylar    Hypertension  Continue lisinopril    GERD  Continue Protonix          DVT prophylaxis:  Mechanical DVT prophylaxis orders are present.     Code status is   Code Status and Medical Interventions:   Ordered at: 01/21/24 2112     Code Status  (Patient has no pulse and is not breathing):    CPR (Attempt to Resuscitate)     Medical Interventions (Patient has pulse or is breathing):    Full Support       Plan for disposition: 2-3 days     Time: 30 minutes    Signature: Electronically signed by Annabella Connelly MD, 01/22/24, 10:50 EST.  [facility] Hospitalist Team

## 2024-01-22 NOTE — ANESTHESIA PROCEDURE NOTES
Airway  Urgency: elective    Date/Time: 1/22/2024 1:35 PM  End Time:1/22/2024 1:35 PM  Airway not difficult    General Information and Staff    Patient location during procedure: OR  CRNA/CAA: Manuel Charles CRNA    Indications and Patient Condition  Indications for airway management: airway protection    Preoxygenated: yes  MILS maintained throughout  Mask difficulty assessment: 1 - vent by mask    Final Airway Details  Final airway type: endotracheal airway      Successful airway: ETT  Cuffed: yes   Successful intubation technique: video laryngoscopy  Facilitating devices/methods: intubating stylet  Endotracheal tube insertion site: oral  Blade: Hedrick  Blade size: 3  ETT size (mm): 7.0  Cormack-Lehane Classification: grade IIa - partial view of glottis  Placement verified by: chest auscultation and capnometry   Cuff volume (mL): 7  Measured from: lips  ETT/EBT  to lips (cm): 21  Number of attempts at approach: 1  Assessment: lips, teeth, and gum same as pre-op and atraumatic intubation

## 2024-01-22 NOTE — CASE MANAGEMENT/SOCIAL WORK
Case Management/Social Work    Patient Name:  Noa Soni  YOB: 1980  MRN: [id number]  Admit Date:  1/21/2024      KP notified by  staff that Pt's significant other, Ruiz stated he doesn't have any money for food and lives an hour away. He requested assistance with food. KP called Ruiz [phone] and asked if he could contribute $5 to a guest tray. Ruiz stated no, he has no money. KP shared that one meal tray can be provided today. KP gave Ruiz the number to dietary [phone] and told him to call from room phone when Pt returns to room from surgery. He voiced appreciation for the assistance.     Lisa Boateng, HALLIEW, LSW, APHSW-C  Medical Social Worker  [address] [address]  Office: [phone]  Fax: [phone]

## 2024-01-22 NOTE — CONSULTS
Surgery (on-call MD unless specified)  Consult performed by: Dustin Shelley MD  Consult ordered by: Reina Cheney APRN  Reason for consult: ruq pain          General Surgery Consult Note      Name: Noa Soni ADMIT: 2024   : 1980  PCP: Bree Wan MD    MRN: [id number] LOS: 1 days   AGE/SEX: 43 y.o. female  ROOM: [address]      Patient Care Team:  Bree Wan MD as PCP - General (Family Medicine)  Chief Complaint   Patient presents with    Abdominal Pain     Pt reports RUQ pain that worsened last night.  Pt reports that she has gallstones.  Pt denies any N/V/D/C.        Subjective   43-year-old lady who I met in the office last week with right upper quadrant abdominal pain had planned on elective cholecystectomy after her workup yielded gallbladder stones versus sludge.  Over the last couple of days she has had a severe flareup of her symptoms causing severe sharp stabbing right upper quadrant abdominal pain abdominal swelling nausea and vomiting.  She is been unable to tolerate her pain at home so her  called and and asked what they should do.  Upon arrival to the emergency department she was found to have any significant leukocytosis her LFTs were relatively normal except for the bilirubin was 1.4.  Imaging demonstrated gallbladder sludge versus cholelithiasis no evidence of cholecystitis.  She was admitted to hospital for pain control.  She says that she still hurts quite a bit.  She wants to get her gallbladder out.    Past Medical History:   Diagnosis Date    Allergic     Anger     Anxiety     Bipolar 1 disorder     Cholelithiasis 1/3/2024    I had alot of stones in my gallbladder    Depression     Diabetes mellitus     Headache     HTN (hypertension)     Knee pain, bilateral     Neuropathy in diabetes 23    Obesity     PTSD (post-traumatic stress disorder)     Type 2 diabetes mellitus      Past Surgical History:   Procedure Laterality Date     EAR TUBES      ENDOMETRIAL ABLATION      INTRAUTERINE DEVICE INSERTION  10/14/2020    LAPAROSCOPIC TUBAL LIGATION      SUBTOTAL HYSTERECTOMY      TONSILLECTOMY AND ADENOIDECTOMY      TOTAL LAPAROSCOPIC HYSTERECTOMY WITH DAVINCI ROBOT  2023    [facility]     Family History   Problem Relation Age of Onset    Hypertension Mother     Depression Mother     Post-traumatic stress disorder Mother     Hyperlipidemia Mother     Anxiety disorder Mother     Mental illness Mother     Hypertension Father     Hyperlipidemia Father     Hypertension Brother     Depression Brother     Parkinsonism Maternal Grandmother     Dementia Maternal Grandmother     Atrial fibrillation Maternal Grandfather     Breast cancer Paternal Grandmother     Diabetes Paternal Grandmother     COPD Paternal Grandfather        Social History     Tobacco Use    Smoking status: Former     Packs/day: 1.00     Years: 5.00     Additional pack years: 0.00     Total pack years: 5.00     Types: Cigarettes     Start date: 2017     Quit date: 2018     Years since quittin.0     Passive exposure: Past    Smokeless tobacco: Never    Tobacco comments:     socially    Vaping Use    Vaping Use: Never used   Substance Use Topics    Alcohol use: Never    Drug use: Never     Medications Prior to Admission   Medication Sig Dispense Refill Last Dose    Cariprazine HCl (Vraylar) 3 MG capsule capsule Take 1 capsule by mouth Daily. 90 capsule 3     Dulaglutide (Trulicity) 4.5 MG/0.5ML solution pen-injector Inject 0.5 mL under the skin into the appropriate area as directed Every 7 (Seven) Days. 2 mL 2     fexofenadine (ALLEGRA) 180 MG tablet Take 1 tablet by mouth Daily. 90 tablet 3     FLUoxetine (PROzac) 40 MG capsule Take 1 capsule by mouth Daily. 90 capsule 3     lamoTRIgine (LaMICtal) 100 MG tablet Take 1 tablet by mouth Daily. At bedtime 90 tablet 3     linaclotide (Linzess) 145 MCG capsule capsule Take 1 capsule by mouth Every Morning Before Breakfast.  30 capsule 5     lisinopril (PRINIVIL,ZESTRIL) 10 MG tablet Take 1 tablet by mouth Daily. 90 tablet 3     Melatonin 5 MG chewable tablet Chew 5 mg every night at bedtime. 30 tablet 3     Morphine (MS CONTIN) 15 MG 12 hr tablet Take 1 tablet by mouth 3 (Three) Times a Day. 90 tablet 0     pantoprazole (Protonix) 40 MG EC tablet Take 1 tablet by mouth Daily for 60 days. 30 tablet 1     pregabalin (LYRICA) 100 MG capsule Take 1 capsule by mouth 2 (Two) Times a Day. 60 capsule 2     SITagliptin (Januvia) 100 MG tablet Take 1 tablet by mouth Daily. 90 tablet 3     SUMAtriptan (IMITREX) 50 MG tablet TAKE 1 TABLET BY MOUTH AT ONSET OF HEADACHE. MAY REPEAT DOSE ONE TIME IN 2 HOURS IF HEADACHE NOT RELIEVED. 6 tablet 0     Blood Glucose Monitoring Suppl kit Use as directed to check blood glucose 1 each 0     EPINEPHrine (EPIPEN) 0.3 MG/0.3ML solution auto-injector injection USE AS DIRECTED FOR ACUTE ALLERGIC REACTION       glucose blood test strip Use as instructed to check blood glucose once daily 100 each 3     Lancets misc Use once daily with meter and strips to check blood glucose 100 each 3      Cariprazine HCl, 3 mg, Oral, Daily  FLUoxetine, 40 mg, Oral, Daily  insulin lispro, 2-9 Units, Subcutaneous, Q6H  lamoTRIgine, 100 mg, Oral, Daily  lisinopril, 10 mg, Oral, Daily  Morphine, 15 mg, Oral, Q8H  pantoprazole, 40 mg, Oral, Daily  pregabalin, 100 mg, Oral, BID  sodium chloride, 10 mL, Intravenous, Q12H           acetaminophen **OR** acetaminophen **OR** acetaminophen    senna-docusate sodium **AND** polyethylene glycol **AND** bisacodyl **AND** bisacodyl    dextrose    dextrose    glucagon (human recombinant)    hydrOXYzine    influenza vaccine    melatonin    Morphine    ondansetron    sodium chloride    sodium chloride    sodium chloride  Dilaudid [hydromorphone], Tylenol with codeine #3 [acetaminophen-codeine], Abilify [aripiprazole], Metformin, and Vicodin [hydrocodone-acetaminophen]    Review of Systems  "  Constitutional:  Positive for chills. Negative for fever.   Respiratory:  Negative for cough and shortness of breath.    Cardiovascular:  Negative for chest pain and leg swelling.   Gastrointestinal:  Positive for abdominal distention, abdominal pain, nausea and vomiting. Negative for blood in stool, constipation and diarrhea.   Genitourinary:  Negative for dysuria and hematuria.        Objective     Vital Signs and Labs:  Vital Signs Patient Vitals for the past 24 hrs:   BP Temp Temp src Pulse Resp SpO2 Height Weight   01/22/24 0700 119/75 98.4 °F (36.9 °C) Oral 72 16 92 % -- --   01/21/24 2331 129/83 99.5 °F (37.5 °C) Oral 68 19 95 % -- --   01/21/24 2253 131/69 -- -- 80 18 97 % -- --   01/21/24 2123 129/73 -- -- 69 18 97 % -- --   01/21/24 2002 146/78 -- -- 79 18 95 % -- --   01/21/24 1839 137/64 97.9 °F (36.6 °C) Oral 82 16 99 % 162.6 cm (64\") 123 kg (272 lb 0.8 oz)       Physical Exam:  Physical Exam  Constitutional:       Appearance: Normal appearance.   HENT:      Head: Normocephalic and atraumatic.   Pulmonary:      Effort: Pulmonary effort is normal. No respiratory distress.   Abdominal:      Palpations: Abdomen is soft.      Comments: Focally tender to palpation right upper quadrant   Skin:     General: Skin is warm and dry.   Neurological:      General: No focal deficit present.      Mental Status: She is alert. Mental status is at baseline.   Psychiatric:         Mood and Affect: Mood normal.         Behavior: Behavior normal.         CBC    Results from last 7 days   Lab Units 01/22/24  0508 01/21/24  1908   WBC 10*3/mm3 8.40 8.10   HEMOGLOBIN g/dL 12.7 12.4   PLATELETS 10*3/mm3 253 244     CMP   Results from last 7 days   Lab Units 01/22/24  0508 01/21/24  1908   SODIUM mmol/L 139 139   POTASSIUM mmol/L 3.5 3.8   CHLORIDE mmol/L 100 99   CO2 mmol/L 28.0 26.0   BUN mg/dL 8 6   CREATININE mg/dL 0.67 0.64   GLUCOSE mg/dL 151* 183*   ALBUMIN g/dL  --  4.3   BILIRUBIN mg/dL  --  1.4*   ALK PHOS U/L  --  " 112   AST (SGOT) U/L  --  28   ALT (SGPT) U/L  --  23   LIPASE U/L  --  19     Radiology(recent) CT Abdomen Pelvis With Contrast    Result Date: 1/21/2024  Impression: 1.No acute abdominal or pelvic abnormality. 2.Hepatosplenomegaly. 3.Gallbladder sludge or stones without evidence of acute cholecystitis. 4.Mild body wall edema. Electronically Signed: Barry Bermudez MD  1/21/2024 8:15 PM EST  Workstation ID: [id number]     I reviewed the patient's new clinical results.  I reviewed the patient's new imaging results and agree with the interpretation.    Assessment & Plan       Cholelithiasis    Symptomatic cholelithiasis      43 y.o. female admitted with severe right upper quadrant abdominal pain presumed diagnosis of symptomatic cholelithiasis versus cholecystitis.  Counseled her about the steps of the operation anticipated cover the possible complications.  With the bilirubin slightly elevated I am in a try to shoot a cholangiogram if I can.  She understands the risk of surgery and is ready to proceed.      This note was created using Dragon Voice Recognition software.    Dustin Shelley MD  01/22/24  10:22 EST

## 2024-01-23 ENCOUNTER — READMISSION MANAGEMENT (OUTPATIENT)
Dept: CALL CENTER | Facility: HOSPITAL | Age: 44
End: 2024-01-23
Payer: MEDICAID

## 2024-01-23 VITALS
TEMPERATURE: 97.9 F | DIASTOLIC BLOOD PRESSURE: 75 MMHG | RESPIRATION RATE: 18 BRPM | OXYGEN SATURATION: 92 % | HEART RATE: 76 BPM | BODY MASS INDEX: 46.26 KG/M2 | WEIGHT: 271 LBS | SYSTOLIC BLOOD PRESSURE: 119 MMHG | HEIGHT: 64 IN

## 2024-01-23 LAB
ALBUMIN SERPL-MCNC: 4.3 G/DL (ref 3.5–5.2)
ALBUMIN/GLOB SERPL: 1.1 G/DL
ALP SERPL-CCNC: 107 U/L (ref 39–117)
ALT SERPL W P-5'-P-CCNC: 35 U/L (ref 1–33)
ANION GAP SERPL CALCULATED.3IONS-SCNC: 12 MMOL/L (ref 5–15)
AST SERPL-CCNC: 57 U/L (ref 1–32)
BASOPHILS # BLD AUTO: 0 10*3/MM3 (ref 0–0.2)
BASOPHILS NFR BLD AUTO: 0.2 % (ref 0–1.5)
BILIRUB SERPL-MCNC: 2.2 MG/DL (ref 0–1.2)
BUN SERPL-MCNC: 18 MG/DL (ref 6–20)
BUN/CREAT SERPL: 20.9 (ref 7–25)
CALCIUM SPEC-SCNC: 9.3 MG/DL (ref 8.6–10.5)
CHLORIDE SERPL-SCNC: 97 MMOL/L (ref 98–107)
CO2 SERPL-SCNC: 26 MMOL/L (ref 22–29)
CREAT SERPL-MCNC: 0.86 MG/DL (ref 0.57–1)
DEPRECATED RDW RBC AUTO: 44.2 FL (ref 37–54)
EGFRCR SERPLBLD CKD-EPI 2021: 87.2 ML/MIN/1.73
EOSINOPHIL # BLD AUTO: 0 10*3/MM3 (ref 0–0.4)
EOSINOPHIL NFR BLD AUTO: 0.1 % (ref 0.3–6.2)
ERYTHROCYTE [DISTWIDTH] IN BLOOD BY AUTOMATED COUNT: 14.8 % (ref 12.3–15.4)
GLOBULIN UR ELPH-MCNC: 3.8 GM/DL
GLUCOSE BLDC GLUCOMTR-MCNC: 210 MG/DL (ref 70–105)
GLUCOSE BLDC GLUCOMTR-MCNC: 222 MG/DL (ref 70–105)
GLUCOSE BLDC GLUCOMTR-MCNC: 341 MG/DL (ref 70–105)
GLUCOSE SERPL-MCNC: 224 MG/DL (ref 65–99)
HCT VFR BLD AUTO: 37.8 % (ref 34–46.6)
HGB BLD-MCNC: 12.3 G/DL (ref 12–15.9)
LYMPHOCYTES # BLD AUTO: 2.1 10*3/MM3 (ref 0.7–3.1)
LYMPHOCYTES NFR BLD AUTO: 15.4 % (ref 19.6–45.3)
MAGNESIUM SERPL-MCNC: 1.8 MG/DL (ref 1.6–2.6)
MCH RBC QN AUTO: 27.6 PG (ref 26.6–33)
MCHC RBC AUTO-ENTMCNC: 32.6 G/DL (ref 31.5–35.7)
MCV RBC AUTO: 84.6 FL (ref 79–97)
MONOCYTES # BLD AUTO: 1.5 10*3/MM3 (ref 0.1–0.9)
MONOCYTES NFR BLD AUTO: 11.2 % (ref 5–12)
NEUTROPHILS NFR BLD AUTO: 10 10*3/MM3 (ref 1.7–7)
NEUTROPHILS NFR BLD AUTO: 73.1 % (ref 42.7–76)
NRBC BLD AUTO-RTO: 0 /100 WBC (ref 0–0.2)
PHOSPHATE SERPL-MCNC: 2.7 MG/DL (ref 2.5–4.5)
PLATELET # BLD AUTO: 275 10*3/MM3 (ref 140–450)
PMV BLD AUTO: 8.6 FL (ref 6–12)
POTASSIUM SERPL-SCNC: 4.2 MMOL/L (ref 3.5–5.2)
PROT SERPL-MCNC: 8.1 G/DL (ref 6–8.5)
RBC # BLD AUTO: 4.47 10*6/MM3 (ref 3.77–5.28)
SODIUM SERPL-SCNC: 135 MMOL/L (ref 136–145)
WBC NRBC COR # BLD AUTO: 13.7 10*3/MM3 (ref 3.4–10.8)
WHOLE BLOOD HOLD SPECIMEN: NORMAL

## 2024-01-23 PROCEDURE — 82948 REAGENT STRIP/BLOOD GLUCOSE: CPT | Performed by: SURGERY

## 2024-01-23 PROCEDURE — 63710000001 INSULIN LISPRO (HUMAN) PER 5 UNITS: Performed by: SURGERY

## 2024-01-23 PROCEDURE — 83735 ASSAY OF MAGNESIUM: CPT | Performed by: SURGERY

## 2024-01-23 PROCEDURE — 84100 ASSAY OF PHOSPHORUS: CPT | Performed by: SURGERY

## 2024-01-23 PROCEDURE — 82948 REAGENT STRIP/BLOOD GLUCOSE: CPT

## 2024-01-23 PROCEDURE — 99024 POSTOP FOLLOW-UP VISIT: CPT | Performed by: SURGERY

## 2024-01-23 PROCEDURE — 85025 COMPLETE CBC W/AUTO DIFF WBC: CPT | Performed by: SURGERY

## 2024-01-23 PROCEDURE — 80053 COMPREHEN METABOLIC PANEL: CPT | Performed by: SURGERY

## 2024-01-23 RX ADMIN — LAMOTRIGINE 100 MG: 100 TABLET ORAL at 09:32

## 2024-01-23 RX ADMIN — CARIPRAZINE 3 MG: 3 CAPSULE, GELATIN COATED ORAL at 09:33

## 2024-01-23 RX ADMIN — FLUOXETINE 40 MG: 20 CAPSULE ORAL at 09:32

## 2024-01-23 RX ADMIN — LISINOPRIL 10 MG: 5 TABLET ORAL at 09:33

## 2024-01-23 RX ADMIN — LINAGLIPTIN 5 MG: 5 TABLET, FILM COATED ORAL at 09:33

## 2024-01-23 RX ADMIN — INSULIN LISPRO 4 UNITS: 100 INJECTION, SOLUTION INTRAVENOUS; SUBCUTANEOUS at 06:15

## 2024-01-23 RX ADMIN — INSULIN LISPRO 4 UNITS: 100 INJECTION, SOLUTION INTRAVENOUS; SUBCUTANEOUS at 12:30

## 2024-01-23 RX ADMIN — PREGABALIN 100 MG: 100 CAPSULE ORAL at 09:33

## 2024-01-23 RX ADMIN — INSULIN LISPRO 7 UNITS: 100 INJECTION, SOLUTION INTRAVENOUS; SUBCUTANEOUS at 00:16

## 2024-01-23 RX ADMIN — PANTOPRAZOLE SODIUM 40 MG: 40 TABLET, DELAYED RELEASE ORAL at 09:33

## 2024-01-23 RX ADMIN — MORPHINE SULFATE 15 MG: 15 TABLET, FILM COATED, EXTENDED RELEASE ORAL at 06:15

## 2024-01-23 NOTE — PLAN OF CARE
Goal Outcome Evaluation:              Outcome Evaluation: Patient rested well throughout night with minimal complaints of pain which was managed with prn and scheduled pain meds. Pt removed O2 around 0400 and tolerated a long walk on unit maintaining sats above 90%. Possible discharge today. No complaints at this time.

## 2024-01-23 NOTE — PLAN OF CARE
Goal Outcome Evaluation:      Pt recovering well, vitals WDL, pt is up ad eugenia and independent. Pt is not c/o pain. D/c education discussed with pt. D/c home with family.

## 2024-01-23 NOTE — PROGRESS NOTES
General Surgery Progress Note    Name: Noa Soni ADMIT: 2024   : 1980  PCP: Bree Wan MD    MRN: [id number] LOS: 2 days   AGE/SEX: 43 y.o. female  ROOM: [address]    Chief Complaint   Patient presents with    Abdominal Pain     Pt reports RUQ pain that worsened last night.  Pt reports that she has gallstones.  Pt denies any N/V/D/C.      Subjective     43 y.o. female status post laparoscopic cholecystectomy and intraoperative cholangiogram on     Looks good this morning.  She says her pain in the right upper quadrant is gone.  Has been tolerating a diet has been ambulatory.    Objective     Scheduled Medications:   Cariprazine HCl, 3 mg, Oral, Daily  FLUoxetine, 40 mg, Oral, Daily  insulin lispro, 2-9 Units, Subcutaneous, Q6H  lamoTRIgine, 100 mg, Oral, Daily  linagliptin, 5 mg, Oral, Daily  lisinopril, 10 mg, Oral, Daily  Morphine, 15 mg, Oral, Q8H  pantoprazole, 40 mg, Oral, Daily  pregabalin, 100 mg, Oral, BID  sodium chloride, 10 mL, Intravenous, Q12H        Active Infusions:  sodium chloride, 75 mL/hr, Last Rate: 75 mL/hr (24 1129)        As Needed Medications:    acetaminophen **OR** acetaminophen **OR** acetaminophen    senna-docusate sodium **AND** polyethylene glycol **AND** bisacodyl **AND** bisacodyl    dextrose    dextrose    glucagon (human recombinant)    hydrOXYzine    influenza vaccine    ketorolac    melatonin    Morphine    ondansetron    oxyCODONE    sodium chloride    sodium chloride    sodium chloride    Vital Signs  Vital Signs Patient Vitals for the past 24 hrs:   BP Temp Temp src Pulse Resp SpO2 Weight   24 0722 119/75 98.1 °F (36.7 °C) Oral 70 18 93 % --   24 0610 -- -- -- -- -- 96 % --   24 0500 -- -- -- -- -- 94 % --   24 0345 -- -- -- -- -- 93 % --   24 0300 98/60 99 °F (37.2 °C) Axillary 87 16 93 % --   24 2300 107/68 99 °F (37.2 °C) Oral 82 16 94 % 123 kg (271 lb)   24 1900 106/65 98.9 °F  (37.2 °C) Oral 91 16 96 % --   01/22/24 1717 -- -- -- -- -- 91 % --   01/22/24 1655 -- -- -- 88 16 94 % --   01/22/24 1624 137/77 97.9 °F (36.6 °C) Oral 95 17 95 % --   01/22/24 1548 132/77 98.8 °F (37.1 °C) Temporal 102 14 93 % --   01/22/24 1533 132/79 -- -- 101 15 98 % --   01/22/24 1518 129/69 -- -- 97 13 98 % --   01/22/24 1503 129/82 -- -- 97 26 99 % --   01/22/24 1458 133/73 -- -- 97 22 97 % --   01/22/24 1453 141/81 -- -- 109 16 99 % --   01/22/24 1448 117/83 97.9 °F (36.6 °C) Temporal 101 8 100 % --   01/22/24 1228 160/77 98.3 °F (36.8 °C) Oral 80 18 94 % --   01/22/24 1154 153/70 97.5 °F (36.4 °C) Oral 73 16 98 % --       Physical Exam:  Physical Exam  Constitutional:       Appearance: Normal appearance.   HENT:      Head: Normocephalic and atraumatic.   Pulmonary:      Effort: Pulmonary effort is normal. No respiratory distress.   Skin:     General: Skin is warm and dry.   Neurological:      General: No focal deficit present.      Mental Status: She is alert. Mental status is at baseline.         Results Review:     CBC    Results from last 7 days   Lab Units 01/22/24  0508 01/21/24  1908   WBC 10*3/mm3 8.40 8.10   HEMOGLOBIN g/dL 12.7 12.4   PLATELETS 10*3/mm3 253 244     CMP   Results from last 7 days   Lab Units 01/22/24  0508 01/21/24  1908   SODIUM mmol/L 139 139   POTASSIUM mmol/L 3.5 3.8   CHLORIDE mmol/L 100 99   CO2 mmol/L 28.0 26.0   BUN mg/dL 8 6   CREATININE mg/dL 0.67 0.64   GLUCOSE mg/dL 151* 183*   ALBUMIN g/dL  --  4.3   BILIRUBIN mg/dL  --  1.4*   ALK PHOS U/L  --  112   AST (SGOT) U/L  --  28   ALT (SGPT) U/L  --  23   LIPASE U/L  --  19       I reviewed the patient's new clinical results.    Assessment & Plan       Cholelithiasis    Symptomatic cholelithiasis      43 y.o. female postop day 1 laparoscopic cholecystectomy intraoperative cholangiogram for symptomatic cholelithiasis    In general looking really good.  K for diet as tolerated  No lifting more than 10 to 15 pounds  Follow-up  in the office in 2 weeks  Okay to shower        This note was created using Dragon Voice Recognition software.    Dustin Shelley MD  01/23/24  08:44 EST

## 2024-01-23 NOTE — PAYOR COMM NOTE
"AUTHORIZATION PENDING:   PLEASE CALL OR FAX DETERMINATION TO CONTACT BELOW. THANK YOU.        Char Barnard RN MSN  /UR  [facility]  [phone] office  [phone] fax  [email or web address]    [facility]  NPI: [id number]  Tax: [phone]          Zahraa Sonidi SHMUEL (43 y.o. Female)       Date of Birth   1980    Social Security Number       Address   [address] [place] [address]    Home Phone   [phone]    MRN   [id number]       Yarsanism   None    Marital Status   Single                            Admission Date   1/21/24    Admission Type   Emergency    Admitting Provider   Janette Pang MD    Attending Provider   Roly Orosco MD    Department, Room/Bed   [facility]ABY, M414/1       Discharge Date       Discharge Disposition   Home or Self Care    Discharge Destination                                 Attending Provider: Roly Orosco MD    Allergies: Dilaudid [Hydromorphone], Tylenol With Codeine #3 [Acetaminophen-codeine], Abilify [Aripiprazole], Metformin, Vicodin [Hydrocodone-acetaminophen]    Isolation: None   Infection: None   Code Status: CPR    Ht: 162.6 cm (64\")   Wt: 123 kg (271 lb)    Admission Cmt: None   Principal Problem: Cholelithiasis [K80.20]                   Active Insurance as of 1/21/2024       Primary Coverage       Payor Plan Insurance Group Employer/Plan Group    [employer] [place] [id number]       Payor Plan Address Payor Plan Phone Number Payor Plan Fax Number Effective Dates    MAIL STOP:   4/1/2017 - None Entered    [address] [address]       [facility] [address]         Subscriber Name Subscriber Birth Date Member ID       NOA SONI 1980 [id number]                     Emergency Contacts        (Rel.) Home Phone Work Phone Mobile Phone    Ruiz Bello (Significant Other) -- -- [phone]          01/21/24 2115  Inpatient Admission  Once  "    Completed     Level of Care: Med/Surg  Diagnosis: Cholelithiasis [096428]  Admitting Physician: JANTETE SR [[id number]]  Attending Physician: JANETTE SR [[id number]]  Certification: I Certify That Inpatient Hospital Services Are Medically Necessary For Greater Than 2 Midnights               History & Physical        Nikki Peng APRN at 24       Attestation signed by Janette Sr MD at 24    I have reviewed this documentation and agree.                      [facility] Medicine Services  History & Physical    Patient Name: Noa Soni  : 1980  MRN: [id number]  Primary Care Physician:  Bree Wan MD  Date of admission: 2024  Date and Time of Service: 2024 at 2200    Subjective      Chief Complaint: abdominal pain    History of Present Illness: Noa Soni is a 43 y.o. female with a previous medical history of obesity, DM, HTN, Chronic pain and Cholelithiasis who presented to [facility] on 2024 with severe abdominal pain, uncontrolled with her home dose of MS Contin 15 mg TID. She had seen Dr. Shelley with General Surgery previously due to her cholelithiasis and had a Cholecystectomy scheduled for March. She had called Dr. Shelley regarding her increased pain and he recommended that she some to the ED for further evaluation for Cholecystitis.    In the ED, a CT of the abdomen and pelvis showed gallbladder sludge and stones with no evidence of acute cholecystitis.  Labs are unremarkable.  She is afebrile, all vitals are stable.  Dr. Shelley spoke with the ED provider and  requested that she be admitted and kept NPO after midnight for a Cholecystectomy.  Hospitalist was consulted for further management.    12 point ROS reviewed and negative except as mentioned above      Personal History     Past Medical History:   Diagnosis Date    Allergic     Anger     Anxiety     Cholelithiasis 1/3/2024    I had alot of stones in my gallbladder     Depression     Diabetes mellitus     Headache     HTN (hypertension)     Knee pain, bilateral     Neuropathy in diabetes 12/9/23    Obesity     PTSD (post-traumatic stress disorder)     Type 2 diabetes mellitus        Past Surgical History:   Procedure Laterality Date    EAR TUBES      ENDOMETRIAL ABLATION      INTRAUTERINE DEVICE INSERTION  10/14/2020    LAPAROSCOPIC TUBAL LIGATION      SUBTOTAL HYSTERECTOMY      TONSILLECTOMY AND ADENOIDECTOMY      TOTAL LAPAROSCOPIC HYSTERECTOMY WITH DAVINCI ROBOT  [facility]       Family History: family history includes Anxiety disorder in her mother; Atrial fibrillation in her maternal grandfather; Breast cancer in her paternal grandmother; COPD in her paternal grandfather; Dementia in her maternal grandmother; Depression in her brother and mother; Diabetes in her paternal grandmother; Hyperlipidemia in her father and mother; Hypertension in her brother, father, and mother; Mental illness in her mother; Parkinsonism in her maternal grandmother; Post-traumatic stress disorder in her mother. Otherwise pertinent FHx was reviewed and not pertinent to current issue.    Social History:  reports that she quit smoking about 6 years ago. Her smoking use included cigarettes. She started smoking about 7 years ago. She has a 5.00 pack-year smoking history. She has been exposed to tobacco smoke. She has never used smokeless tobacco. She reports that she does not drink alcohol and does not use drugs.    Home Medications:  Prior to Admission Medications       Prescriptions Last Dose Informant Patient Reported? Taking?    Cariprazine HCl (Vraylar) 3 MG capsule capsule   No Yes    Take 1 capsule by mouth Daily.    Dulaglutide (Trulicity) 4.5 MG/0.5ML solution pen-injector   No Yes    Inject 0.5 mL under the skin into the appropriate area as directed Every 7 (Seven) Days.    fexofenadine (ALLEGRA) 180 MG tablet   No Yes    Take 1 tablet by mouth Daily.    FLUoxetine  (PROzac) 40 MG capsule   No Yes    Take 1 capsule by mouth Daily.    lamoTRIgine (LaMICtal) 100 MG tablet   No Yes    Take 1 tablet by mouth Daily. At bedtime    linaclotide (Linzess) 145 MCG capsule capsule   No Yes    Take 1 capsule by mouth Every Morning Before Breakfast.    lisinopril (PRINIVIL,ZESTRIL) 10 MG tablet   No Yes    Take 1 tablet by mouth Daily.    Melatonin 5 MG chewable tablet   No Yes    Chew 5 mg every night at bedtime.    Morphine (MS CONTIN) 15 MG 12 hr tablet   No Yes    Take 1 tablet by mouth 3 (Three) Times a Day.    pantoprazole (Protonix) 40 MG EC tablet   No Yes    Take 1 tablet by mouth Daily for 60 days.    pregabalin (LYRICA) 100 MG capsule   No Yes    Take 1 capsule by mouth 2 (Two) Times a Day.    SITagliptin (Januvia) 100 MG tablet   No Yes    Take 1 tablet by mouth Daily.    SUMAtriptan (IMITREX) 50 MG tablet   No Yes    TAKE 1 TABLET BY MOUTH AT ONSET OF HEADACHE. MAY REPEAT DOSE ONE TIME IN 2 HOURS IF HEADACHE NOT RELIEVED.    Blood Glucose Monitoring Suppl kit   No No    Use as directed to check blood glucose    EPINEPHrine (EPIPEN) 0.3 MG/0.3ML solution auto-injector injection   Yes No    USE AS DIRECTED FOR ACUTE ALLERGIC REACTION    glucose blood test strip   No No    Use as instructed to check blood glucose once daily    Lancets misc   No No    Use once daily with meter and strips to check blood glucose              Allergies:  Allergies   Allergen Reactions    Dilaudid [Hydromorphone] Other (See Comments)     Bottoms O2 out    Tylenol With Codeine #3 [Acetaminophen-Codeine] Hallucinations    Abilify [Aripiprazole] Rash    Metformin Diarrhea    Vicodin [Hydrocodone-Acetaminophen] GI Intolerance       Objective      Vitals:   Temp:  [97.9 °F (36.6 °C)] 97.9 °F (36.6 °C)  Heart Rate:  [69-82] 69  Resp:  [16-18] 18  BP: (129-146)/(64-78) 129/73  Body mass index is 46.7 kg/m².  Physical Exam  Vitals and nursing note reviewed.   Constitutional:       Appearance: Normal  appearance. She is obese.   HENT:      Head: Normocephalic and atraumatic.      Nose: Nose normal.      Mouth/Throat:      Mouth: Mucous membranes are moist.   Eyes:      Extraocular Movements: Extraocular movements intact.      Pupils: Pupils are equal, round, and reactive to light.   Cardiovascular:      Rate and Rhythm: Normal rate and regular rhythm.      Pulses: Normal pulses.      Heart sounds: Normal heart sounds.   Pulmonary:      Effort: Pulmonary effort is normal.      Breath sounds: Normal breath sounds.   Abdominal:      General: Bowel sounds are normal.      Palpations: Abdomen is soft.   Musculoskeletal:         General: Normal range of motion.      Cervical back: Normal range of motion.   Skin:     General: Skin is warm and dry.   Neurological:      General: No focal deficit present.      Mental Status: She is alert and oriented to person, place, and time. Mental status is at baseline.   Psychiatric:         Mood and Affect: Mood normal. Mood is anxious.         Behavior: Behavior normal.           Assessment & Plan        This is a 43 y.o. female with:    Active and Resolved Problems  Active Hospital Problems    Diagnosis  POA    **Cholelithiasis [K80.20]  Yes      Resolved Hospital Problems   No resolved problems to display.       Plan:    Home medications not verified at the time of assessment and plan    Abdominal Pain  -CT of the abdomen reviewed, cholelithiasis noted  -General Surgery consulted, requests NPO after midnight for probable surgery tomorrow  -Analgesics as needed    Depression  Bipolar Disorder  -Continue home Prozac, Lamictal, Vraylar    Diabetes Mellitus II  -Monitor glucose  -SSI ordered  -Hold home medications while NPO    Essential Hypertension  -Controlled  -Monitor BP  -Continue home Lisinopril          DVT prophylaxis:  Mechanical DVT prophylaxis orders are present.    CODE STATUS:    Code Status (Patient has no pulse and is not breathing): CPR (Attempt to  Resuscitate)  Medical Interventions (Patient has pulse or is breathing): Full Support        Admission Status:  I believe this patient meets inpatient status.    I discussed the patient's findings and my recommendations with patient, family, and nursing staff.    Signature:     This document has been electronically signed by Nikki Peng DNP, APRN on January 21, 2024 22:08 EST   [facility]    Electronically signed by Janette Pang MD at 01/22/24 2029          Emergency Department Notes        Reian Cheney APRN at 01/21/24 1911       Attestation signed by Marino Najera MD at 01/21/24 2201        SUPERVISE: For this patient encounter, I reviewed the APC's documentation, treatment plan, and medical decision making.  Marino Najera MD 1/21/2024 22:01 EST                         Subjective   History of Present Illness  Patient is a 43-year-old obese female who comes in with abdominal pain who knows she has several stones in her gallbladder who is scheduled to have her gallbladder removed on March 4 with Dr. Shelley-they state that the pain increased today and she was advised by Dr. Shelley to come to the emergency room for evaluation she had some nausea without vomiting and increased pain she uses MS Contin 15mg PO Q12 at home.     Patient states she has had decreased intake-      Review of Systems   Constitutional:  Negative for chills, fatigue and fever.   HENT:  Negative for congestion, tinnitus and trouble swallowing.    Eyes:  Negative for photophobia, discharge and redness.   Respiratory:  Negative for cough and shortness of breath.    Cardiovascular:  Negative for chest pain and palpitations.   Gastrointestinal:  Positive for abdominal pain and nausea. Negative for diarrhea and vomiting.   Genitourinary:  Negative for dysuria, frequency and urgency.   Musculoskeletal:  Negative for back pain, joint swelling and myalgias.   Skin:  Negative for rash.   Neurological:  Negative for  dizziness and headaches.   Psychiatric/Behavioral:  Negative for confusion.    All other systems reviewed and are negative.      Past Medical History:   Diagnosis Date    Allergic     Anger     Anxiety     Cholelithiasis 1/3/2024    I had alot of stones in my gallbladder    Depression     Diabetes mellitus     Headache     HTN (hypertension)     Knee pain, bilateral     Neuropathy in diabetes 12/9/23    Obesity     PTSD (post-traumatic stress disorder)     Type 2 diabetes mellitus        Allergies   Allergen Reactions    Dilaudid [Hydromorphone] Other (See Comments)     Bottoms O2 out    Tylenol With Codeine #3 [Acetaminophen-Codeine] Hallucinations    Abilify [Aripiprazole] Rash    Metformin Diarrhea    Vicodin [Hydrocodone-Acetaminophen] GI Intolerance       Past Surgical History:   Procedure Laterality Date    EAR TUBES      ENDOMETRIAL ABLATION      INTRAUTERINE DEVICE INSERTION  10/14/2020    LAPAROSCOPIC TUBAL LIGATION      SUBTOTAL HYSTERECTOMY      TONSILLECTOMY AND ADENOIDECTOMY      TOTAL LAPAROSCOPIC HYSTERECTOMY WITH DAVINCI ROBOT  06/02/2023    [facility]       Family History   Problem Relation Age of Onset    Hypertension Mother     Depression Mother     Post-traumatic stress disorder Mother     Hyperlipidemia Mother     Anxiety disorder Mother     Mental illness Mother     Hypertension Father     Hyperlipidemia Father     Hypertension Brother     Depression Brother     Parkinsonism Maternal Grandmother     Dementia Maternal Grandmother     Atrial fibrillation Maternal Grandfather     Breast cancer Paternal Grandmother     Diabetes Paternal Grandmother     COPD Paternal Grandfather        Social History     Socioeconomic History    Marital status: Single    Number of children: 0   Tobacco Use    Smoking status: Former     Packs/day: 1.00     Years: 5.00     Additional pack years: 0.00     Total pack years: 5.00     Types: Cigarettes     Start date: 1/1/2017     Quit date: 1/1/2018     Years since  quittin.0     Passive exposure: Past    Smokeless tobacco: Never    Tobacco comments:     socially    Vaping Use    Vaping Use: Never used   Substance and Sexual Activity    Alcohol use: Never    Drug use: Never    Sexual activity: Not Currently     Partners: Male     Birth control/protection: Other, Hysterectomy           Objective   Physical Exam  Vitals reviewed.   Constitutional:       Appearance: She is well-developed. She is obese.   HENT:      Head: Normocephalic and atraumatic.   Eyes:      Conjunctiva/sclera: Conjunctivae normal.      Pupils: Pupils are equal, round, and reactive to light.   Cardiovascular:      Rate and Rhythm: Normal rate and regular rhythm.      Heart sounds: Normal heart sounds.   Pulmonary:      Effort: Pulmonary effort is normal. No respiratory distress.      Breath sounds: Normal breath sounds. No wheezing.   Abdominal:      General: Abdomen is protuberant. Bowel sounds are normal. There is no distension.      Palpations: Abdomen is soft. There is no mass.      Tenderness: There is generalized abdominal tenderness and tenderness in the right upper quadrant. There is no right CVA tenderness, left CVA tenderness, guarding or rebound.   Musculoskeletal:         General: No deformity. Normal range of motion.      Cervical back: Normal range of motion and neck supple.   Skin:     General: Skin is warm and dry.      Capillary Refill: Capillary refill takes less than 2 seconds.   Neurological:      General: No focal deficit present.      Mental Status: She is alert and oriented to person, place, and time.      GCS: GCS eye subscore is 4. GCS verbal subscore is 5. GCS motor subscore is 6.      Cranial Nerves: No cranial nerve deficit.      Sensory: No sensory deficit.      Deep Tendon Reflexes: Reflexes normal.   Psychiatric:         Mood and Affect: Mood normal.         Behavior: Behavior normal.         Procedures          ED Course  ED Course as of 24   Sun 2024  "  2106 spoke with Dr. Shelley who wishes this patient to be admitted for gallbladder removal [KW]   2110 Spoke with Nikki the nurse practitioner with the hospitalist who agrees to admit for Dr. Pang [KW]      ED Course User Index  [KW] Reina Cheney, APRN                                 /78   Pulse 79   Temp 97.9 °F (36.6 °C) (Oral)   Resp 18   Ht 162.6 cm (64\")   Wt 123 kg (272 lb 0.8 oz)   LMP 06/02/2023 (Exact Date)   SpO2 95%   BMI 46.70 kg/m²   Labs Reviewed   COMPREHENSIVE METABOLIC PANEL - Abnormal; Notable for the following components:       Result Value    Glucose 183 (*)     Total Bilirubin 1.4 (*)     All other components within normal limits    Narrative:     GFR Normal >60  Chronic Kidney Disease <60  Kidney Failure <15     URINALYSIS W/ CULTURE IF INDICATED - Abnormal; Notable for the following components:    Specific Gravity, UA 1.031 (*)     All other components within normal limits    Narrative:     In absence of clinical symptoms, the presence of pyuria, bacteria, and/or nitrites on the urinalysis result does not correlate with infection.  Urine microscopic not indicated.   LIPASE - Normal   CBC WITH AUTO DIFFERENTIAL - Normal   CBC AND DIFFERENTIAL    Narrative:     The following orders were created for panel order CBC & Differential.  Procedure                               Abnormality         Status                     ---------                               -----------         ------                     CBC Auto Differential[[id number]]        Normal              Final result                 Please view results for these tests on the individual orders.   EXTRA TUBES    Narrative:     The following orders were created for panel order Extra Tubes.  Procedure                               Abnormality         Status                     ---------                               -----------         ------                     Gold Top - SST[[id number]]                                 "   Final result                 Please view results for these tests on the individual orders.   GOLD TOP - SST     Medications   sodium chloride 0.9 % flush 10 mL (has no administration in time range)   oxyCODONE (ROXICODONE) immediate release tablet 5 mg (has no administration in time range)   morphine injection 4 mg (4 mg Intravenous Given 1/21/24 1922)   ondansetron (ZOFRAN) injection 4 mg (4 mg Intravenous Given 1/21/24 1922)   iopamidol (ISOVUE-370) 76 % injection 100 mL (100 mL Intravenous Given 1/21/24 2004)     CT Abdomen Pelvis With Contrast    Result Date: 1/21/2024  Impression: 1.No acute abdominal or pelvic abnormality. 2.Hepatosplenomegaly. 3.Gallbladder sludge or stones without evidence of acute cholecystitis. 4.Mild body wall edema. Electronically Signed: Barry Bermudez MD  1/21/2024 8:15 PM EST  Workstation ID: [id number]               Medical Decision Making  Patient is a 43-year-old obese chronically ill female who comes in with known cholelithiasis and comes in today with uncontrolled right upper quadrant pain with decreased oral intake over the last several days she states that Dr. Shelley asked her to come to the emergency room for evaluation worrisome for cholecystitis or uncontrolled pain due to cholelithiasis.  The patient does take MS Contin 15 mg 3 times daily and states that this is not controlling her pain.    Patient had IV established and blood work was obtained and reviewed and found to be within normal limits no white count no elevated liver enzymes she has a slightly elevated bilirubin at 1.4-patient was given morphine and then Roxicodone due to her allergies and discussed with Dr. Shelley who wishes the patient to be admitted and then discussed with the hospitalist who agrees to admit for surgical cholecystectomy    Problems Addressed:  Calculus of gallbladder without cholecystitis without obstruction: complicated acute illness or injury  Right upper quadrant abdominal pain: complicated  acute illness or injury    Amount and/or Complexity of Data Reviewed  External Data Reviewed: radiology and notes.     Details: From previous gallbladder evaluation  Labs: ordered. Decision-making details documented in ED Course.  Radiology: ordered and independent interpretation performed. Decision-making details documented in ED Course.  ECG/medicine tests: ordered and independent interpretation performed. Decision-making details documented in ED Course.    Risk  OTC drugs.  Prescription drug management.  Decision regarding hospitalization.        Final diagnoses:   Right upper quadrant abdominal pain   Calculus of gallbladder without cholecystitis without obstruction       ED Disposition  ED Disposition       ED Disposition   Decision to Admit    Condition   --    Comment   Level of Care: Med/Surg [1]   Admitting Physician: CARO SR [[id number]]   Attending Physician: CARO SR [[id number]]                 No follow-up provider specified.       Medication List      No changes were made to your prescriptions during this visit.            Reina Cheney, APRN  01/21/24 2114      Electronically signed by Marino Najera MD at 01/21/24 2201          Operative/Procedure Notes (all)        Dustin Shelley MD at 01/22/24 1349  Version 1 of 1         Operative Report:    Patient Name:  Noa Soni  YOB: 1980    Date of Surgery:  1/22/2024     Indications: 43-year-old lady who I recently met in the office for what was possibly symptomatic cholelithiasis who was at home and in unremitting pain so came to the emergency department.  New workup did not show anything other than the gallstones counseled about the risk and benefits of cholecystectomy.  She understood and wished to proceed    Pre-op Diagnosis:   Symptomatic cholelithiasis [K80.20]       Post-Op Diagnosis Codes:     * Symptomatic cholelithiasis [K80.20]    Procedure/CPT® Codes:      Procedure(s):  CHOLECYSTECTOMY LAPAROSCOPIC  INTRAOPERATIVE CHOLANGIOGRAM    Staff:  Surgeon(s):  Dustin Shelley MD    Circulator: Angelic Walton RN  Radiology Technologist: Latia Rivas  Scrub Person: Wanda Colon  Assistant: Renea Baltazar CSFA  was responsible for performing the following activities: Retraction, Closing, and Held/Positioned Camera and their skilled assistance was necessary for the success of this case.        Anesthesia: General    Estimated Blood Loss: minimal    Implants:    Implant Name Type Inv. Item Serial No.  Lot No. LRB No. Used Action   CLIPAPPLR M/ ENDO [id number] 5MM 33CM MD/LG - [id number] Implant CLIPAPPLR M/ ENDO LIGAMAX5 5MM 33CM MD/LG  [employer]  DIV OF J AND J [id number] N/A 1 Implanted   SEAL HEMO SURG JOSH/AH ABS/PWDR 1GM - [id number] Implant SEAL HEMO SURG JOSH/AH ABS/PWDR 1GM  MEDAFOR HEMOSTATIS Gemidis [id number] N/A 1 Implanted       Specimen:          Specimens       ID Source Type Tests Collected By Collected At Frozen?    A Gallbladder Tissue TISSUE PATHOLOGY EXAM   Dustin Shelley MD 1/22/24 1417 No    Description: GALLBLADDER    Comment: GALLBLADDER FOR PERMANENT                 Findings: Intraoperative cholangiogram showing access within the gallbladder lumen passage of contrast into the cystic duct opacification of the common bile duct with smooth taper and emptying of contrast into the duodenum without any definitive filling defects.  There was backfilling of the common hepatic duct and branching without any filling defects.    Complications: None    Description of Procedure: Patient was taken to the operating placed in the operating table in supine position under general anesthesia.  Her abdomen was prepped and draped normal sterile fashion.  Timeout was performed identifying the patient procedure and site of operation.  I began the operation by entering the abdomen through a left upper quadrant 5 mm optical trocar entry.  Upon entry the abdominal cavity abdomen  was insufflated camera to do there is no evidence of injury to underlying structures.  She was placed in reverse Trendelenburg with right side up.  Periumbilical 12 and 2 right subcostal 5 mm ports placed laparoscopic guidance.  The gallbladder was grasped retractors right upper quadrant infundibulum tract laterally.  Dissection the base the gallbladder took place using hook Bovie it was somewhat obscured by the size of her liver.  I was able to get the cystic duct and cystic artery dissected free of the surrounding structures and the base the gallbladder free of the liver edge revealed the critical view.  The Becker clamp was then placed across the body of the gallbladder cholangiogram was obtained as above there was no filling defects identified.  I then doubly clipped and divided the cystic duct and cystic artery and then dissected the gallbladder off of the liver using hook Bovie.  There was minimal bleeding during this process.  Was placed Endo Catch bag and removed.  The right upper quadrant was then thoroughly inspected the surface of the gallbladder was somewhat oozy her liver had a congested appearance.  I went ahead and placed some Sha on the dissection planes to help with hemostasis.  The ports were serially moved out the abdominal wall hemorrhage.  The 12 mm port site was closed with 0 Vicryl suture the skin with 4-0 Vicryl suture and skin affix.  She tolerated procedure well's been transferred to recovery in satisfactory condition.      Dustin Shelley MD     Date: 2024  Time: 14:34 EST    This note was created using Dragon Voice Recognition software.    Electronically signed by Dustin Shelley MD at 24 1441          Physician Progress Notes (all)        Dustin Shelley MD at 24 0844          General Surgery Progress Note    Name: Noa Soni ADMIT: 2024   : 1980  PCP: Bree Wan MD    MRN: [id number] LOS: 2 days   AGE/SEX: 43 y.o. female  ROOM: [facility] [place]     [place]    Chief Complaint   Patient presents with    Abdominal Pain     Pt reports RUQ pain that worsened last night.  Pt reports that she has gallstones.  Pt denies any N/V/D/C.      Subjective     43 y.o. female status post laparoscopic cholecystectomy and intraoperative cholangiogram on January 22    Looks good this morning.  She says her pain in the right upper quadrant is gone.  Has been tolerating a diet has been ambulatory.    Objective     Scheduled Medications:   Cariprazine HCl, 3 mg, Oral, Daily  FLUoxetine, 40 mg, Oral, Daily  insulin lispro, 2-9 Units, Subcutaneous, Q6H  lamoTRIgine, 100 mg, Oral, Daily  linagliptin, 5 mg, Oral, Daily  lisinopril, 10 mg, Oral, Daily  Morphine, 15 mg, Oral, Q8H  pantoprazole, 40 mg, Oral, Daily  pregabalin, 100 mg, Oral, BID  sodium chloride, 10 mL, Intravenous, Q12H        Active Infusions:  sodium chloride, 75 mL/hr, Last Rate: 75 mL/hr (01/22/24 1129)        As Needed Medications:    acetaminophen **OR** acetaminophen **OR** acetaminophen    senna-docusate sodium **AND** polyethylene glycol **AND** bisacodyl **AND** bisacodyl    dextrose    dextrose    glucagon (human recombinant)    hydrOXYzine    influenza vaccine    ketorolac    melatonin    Morphine    ondansetron    oxyCODONE    sodium chloride    sodium chloride    sodium chloride    Vital Signs  Vital Signs Patient Vitals for the past 24 hrs:   BP Temp Temp src Pulse Resp SpO2 Weight   01/23/24 0722 119/75 98.1 °F (36.7 °C) Oral 70 18 93 % --   01/23/24 0610 -- -- -- -- -- 96 % --   01/23/24 0500 -- -- -- -- -- 94 % --   01/23/24 0345 -- -- -- -- -- 93 % --   01/23/24 0300 98/60 99 °F (37.2 °C) Axillary 87 16 93 % --   01/22/24 2300 107/68 99 °F (37.2 °C) Oral 82 16 94 % 123 kg (271 lb)   01/22/24 1900 106/65 98.9 °F (37.2 °C) Oral 91 16 96 % --   01/22/24 1717 -- -- -- -- -- 91 % --   01/22/24 1655 -- -- -- 88 16 94 % --   01/22/24 1624 137/77 97.9 °F (36.6 °C) Oral 95 17 95 % --   01/22/24 1548 132/77  98.8 °F (37.1 °C) Temporal 102 14 93 % --   01/22/24 1533 132/79 -- -- 101 15 98 % --   01/22/24 1518 129/69 -- -- 97 13 98 % --   01/22/24 1503 129/82 -- -- 97 26 99 % --   01/22/24 1458 133/73 -- -- 97 22 97 % --   01/22/24 1453 141/81 -- -- 109 16 99 % --   01/22/24 1448 117/83 97.9 °F (36.6 °C) Temporal 101 8 100 % --   01/22/24 1228 160/77 98.3 °F (36.8 °C) Oral 80 18 94 % --   01/22/24 1154 153/70 97.5 °F (36.4 °C) Oral 73 16 98 % --       Physical Exam:  Physical Exam  Constitutional:       Appearance: Normal appearance.   HENT:      Head: Normocephalic and atraumatic.   Pulmonary:      Effort: Pulmonary effort is normal. No respiratory distress.   Skin:     General: Skin is warm and dry.   Neurological:      General: No focal deficit present.      Mental Status: She is alert. Mental status is at baseline.         Results Review:     CBC    Results from last 7 days   Lab Units 01/22/24  0508 01/21/24  1908   WBC 10*3/mm3 8.40 8.10   HEMOGLOBIN g/dL 12.7 12.4   PLATELETS 10*3/mm3 253 244     CMP   Results from last 7 days   Lab Units 01/22/24  0508 01/21/24  1908   SODIUM mmol/L 139 139   POTASSIUM mmol/L 3.5 3.8   CHLORIDE mmol/L 100 99   CO2 mmol/L 28.0 26.0   BUN mg/dL 8 6   CREATININE mg/dL 0.67 0.64   GLUCOSE mg/dL 151* 183*   ALBUMIN g/dL  --  4.3   BILIRUBIN mg/dL  --  1.4*   ALK PHOS U/L  --  112   AST (SGOT) U/L  --  28   ALT (SGPT) U/L  --  23   LIPASE U/L  --  19       I reviewed the patient's new clinical results.    Assessment & Plan       Cholelithiasis    Symptomatic cholelithiasis      43 y.o. female postop day 1 laparoscopic cholecystectomy intraoperative cholangiogram for symptomatic cholelithiasis    In general looking really good.  K for diet as tolerated  No lifting more than 10 to 15 pounds  Follow-up in the office in 2 weeks  Okay to shower        This note was created using Dragon Voice Recognition software.    Dustin Shelley MD  01/23/24  08:44 EST    Electronically signed by Karsten,  Dustin WEATHERS MD at 24 0846       Annabella Connelly MD at 24 1050              [facility] MEDICINE SERVICE  DAILY PROGRESS NOTE    NAME: Noa Soni  : 1980  MRN: [id number]      LOS: 1 day     PROVIDER OF SERVICE: Annabella Connelly MD    Chief Complaint: Cholelithiasis    Subjective:     Interval History:  History taken from: patient  Patient seen and examined, she is asking for oral pain medications giving Oxy.  She states morphine is not working for her for pain control    Review of Systems:   Review of Systems  10 point ROS is negative other than what is stated positive above  Objective:     Vital Signs  Temp:  [97.9 °F (36.6 °C)-99.5 °F (37.5 °C)] 98.4 °F (36.9 °C)  Heart Rate:  [68-82] 72  Resp:  [16-19] 16  BP: (119-146)/(64-83) 119/75   Body mass index is 46.7 kg/m².    Physical Exam  Physical Exam  General, awake alert, obese  Change normocephalic/atraumatic  Chest clear to auscultation bilaterally  CVs S1-S2 normal, regular rhythm  Abdomen soft, tenderness right upper quadrant, bowel sounds positive but hypoactive  Neuro AOx3, grossly normal  Scheduled Meds   Cariprazine HCl, 3 mg, Oral, Daily  FLUoxetine, 40 mg, Oral, Daily  insulin lispro, 2-9 Units, Subcutaneous, Q6H  lamoTRIgine, 100 mg, Oral, Daily  lisinopril, 10 mg, Oral, Daily  Morphine, 15 mg, Oral, Q8H  pantoprazole, 40 mg, Oral, Daily  pregabalin, 100 mg, Oral, BID  sodium chloride, 10 mL, Intravenous, Q12H       PRN Meds     acetaminophen **OR** acetaminophen **OR** acetaminophen    senna-docusate sodium **AND** polyethylene glycol **AND** bisacodyl **AND** bisacodyl    dextrose    dextrose    glucagon (human recombinant)    hydrOXYzine    influenza vaccine    melatonin    Morphine    ondansetron    sodium chloride    sodium chloride    sodium chloride   Infusions         Diagnostic Data    Results from last 7 days   Lab Units 24  0508 24  1908   WBC 10*3/mm3 8.40 8.10   HEMOGLOBIN g/dL 12.7 12.4   HEMATOCRIT % 38.9  37.5   PLATELETS 10*3/mm3 253 244   GLUCOSE mg/dL 151* 183*   CREATININE mg/dL 0.67 0.64   BUN mg/dL 8 6   SODIUM mmol/L 139 139   POTASSIUM mmol/L 3.5 3.8   AST (SGOT) U/L  --  28   ALT (SGPT) U/L  --  23   ALK PHOS U/L  --  112   BILIRUBIN mg/dL  --  1.4*   ANION GAP mmol/L 11.0 14.0       CT Abdomen Pelvis With Contrast    Result Date: 1/21/2024  Impression: 1.No acute abdominal or pelvic abnormality. 2.Hepatosplenomegaly. 3.Gallbladder sludge or stones without evidence of acute cholecystitis. 4.Mild body wall edema. Electronically Signed: Barry Bermudez MD  1/21/2024 8:15 PM EST  Workstation ID: [id number]       I reviewed the patient's new clinical results.    Assessment/Plan:     Active and Resolved Problems  Active Hospital Problems    Diagnosis  POA    **Cholelithiasis [K80.20]  Yes    Symptomatic cholelithiasis [K80.20]  Unknown      Resolved Hospital Problems   No resolved problems to display.     Patient is a 43-year-old female with history of diabetes hypertension obesity, presented to the hospital with severe abdominal pain, she takes MS Contin 15 mg p.o. 3 times daily at home and that was uncontrolled with that as well.  CT scan of the abdomen showed gallbladder sludge and stones.  Surgery was consulted for cholecystectomy.    Abdominal pain, right upper quadrant  Secondary to gallbladder sludge and stones-cholelithiasis versus cholecystitis-does not have any fever or white count  General surgery consulted  Currently on morphine IV as needed for pain control-states morphine is not working, oxycodone ordered better, will add as needed.  Also will add Toradol as needed.  Plan for cholecystectomy and cholangiogram today  Currently n.p.o.  Continue IV fluids  Zofran prn For nausea vomiting      History of depression  Continue Prozac and Vraylar    Hypertension  Continue lisinopril    GERD  Continue Protonix          DVT prophylaxis:  Mechanical DVT prophylaxis orders are present.     Code status is   Code  Status and Medical Interventions:   Ordered at: 248     Code Status (Patient has no pulse and is not breathing):    CPR (Attempt to Resuscitate)     Medical Interventions (Patient has pulse or is breathing):    Full Support       Plan for disposition: 2-3 days     Time: 30 minutes    Signature: Electronically signed by Annabella Connelly MD, 24, 10:50 EST.  [facility] Hospitalist Team    Electronically signed by Annabella Connelly MD at 24 1232          Consult Notes (all)        Dustin Shelley MD at 24 1022        Consult Orders    1. Surgery (on-call MD unless specified) [[id number]] ordered by Reina Cheney APRN                 Surgery (on-call MD unless specified)  Consult performed by: Dustin Shelley MD  Consult ordered by: Reina Cheney APRN  Reason for consult: ruq pain          General Surgery Consult Note      Name: Noa Soni ADMIT: 2024   : 1980  PCP: Bree Wan MD    MRN: [id number] LOS: 1 days   AGE/SEX: 43 y.o. female  ROOM: [address]      Patient Care Team:  Bree Wan MD as PCP - General (Family Medicine)  Chief Complaint   Patient presents with    Abdominal Pain     Pt reports RUQ pain that worsened last night.  Pt reports that she has gallstones.  Pt denies any N/V/D/C.        Subjective   43-year-old lady who I met in the office last week with right upper quadrant abdominal pain had planned on elective cholecystectomy after her workup yielded gallbladder stones versus sludge.  Over the last couple of days she has had a severe flareup of her symptoms causing severe sharp stabbing right upper quadrant abdominal pain abdominal swelling nausea and vomiting.  She is been unable to tolerate her pain at home so her  called and and asked what they should do.  Upon arrival to the emergency department she was found to have any significant leukocytosis her LFTs were relatively normal except for the bilirubin was  1.4.  Imaging demonstrated gallbladder sludge versus cholelithiasis no evidence of cholecystitis.  She was admitted to hospital for pain control.  She says that she still hurts quite a bit.  She wants to get her gallbladder out.    Past Medical History:   Diagnosis Date    Allergic     Anger     Anxiety     Bipolar 1 disorder     Cholelithiasis 1/3/2024    I had alot of stones in my gallbladder    Depression     Diabetes mellitus     Headache     HTN (hypertension)     Knee pain, bilateral     Neuropathy in diabetes 23    Obesity     PTSD (post-traumatic stress disorder)     Type 2 diabetes mellitus      Past Surgical History:   Procedure Laterality Date    EAR TUBES      ENDOMETRIAL ABLATION      INTRAUTERINE DEVICE INSERTION  10/14/2020    LAPAROSCOPIC TUBAL LIGATION      SUBTOTAL HYSTERECTOMY      TONSILLECTOMY AND ADENOIDECTOMY      TOTAL LAPAROSCOPIC HYSTERECTOMY WITH DAVINCI ROBOT  2023    [facility]     Family History   Problem Relation Age of Onset    Hypertension Mother     Depression Mother     Post-traumatic stress disorder Mother     Hyperlipidemia Mother     Anxiety disorder Mother     Mental illness Mother     Hypertension Father     Hyperlipidemia Father     Hypertension Brother     Depression Brother     Parkinsonism Maternal Grandmother     Dementia Maternal Grandmother     Atrial fibrillation Maternal Grandfather     Breast cancer Paternal Grandmother     Diabetes Paternal Grandmother     COPD Paternal Grandfather        Social History     Tobacco Use    Smoking status: Former     Packs/day: 1.00     Years: 5.00     Additional pack years: 0.00     Total pack years: 5.00     Types: Cigarettes     Start date: 2017     Quit date: 2018     Years since quittin.0     Passive exposure: Past    Smokeless tobacco: Never    Tobacco comments:     socially    Vaping Use    Vaping Use: Never used   Substance Use Topics    Alcohol use: Never    Drug use: Never     Medications Prior to  Admission   Medication Sig Dispense Refill Last Dose    Cariprazine HCl (Vraylar) 3 MG capsule capsule Take 1 capsule by mouth Daily. 90 capsule 3     Dulaglutide (Trulicity) 4.5 MG/0.5ML solution pen-injector Inject 0.5 mL under the skin into the appropriate area as directed Every 7 (Seven) Days. 2 mL 2     fexofenadine (ALLEGRA) 180 MG tablet Take 1 tablet by mouth Daily. 90 tablet 3     FLUoxetine (PROzac) 40 MG capsule Take 1 capsule by mouth Daily. 90 capsule 3     lamoTRIgine (LaMICtal) 100 MG tablet Take 1 tablet by mouth Daily. At bedtime 90 tablet 3     linaclotide (Linzess) 145 MCG capsule capsule Take 1 capsule by mouth Every Morning Before Breakfast. 30 capsule 5     lisinopril (PRINIVIL,ZESTRIL) 10 MG tablet Take 1 tablet by mouth Daily. 90 tablet 3     Melatonin 5 MG chewable tablet Chew 5 mg every night at bedtime. 30 tablet 3     Morphine (MS CONTIN) 15 MG 12 hr tablet Take 1 tablet by mouth 3 (Three) Times a Day. 90 tablet 0     pantoprazole (Protonix) 40 MG EC tablet Take 1 tablet by mouth Daily for 60 days. 30 tablet 1     pregabalin (LYRICA) 100 MG capsule Take 1 capsule by mouth 2 (Two) Times a Day. 60 capsule 2     SITagliptin (Januvia) 100 MG tablet Take 1 tablet by mouth Daily. 90 tablet 3     SUMAtriptan (IMITREX) 50 MG tablet TAKE 1 TABLET BY MOUTH AT ONSET OF HEADACHE. MAY REPEAT DOSE ONE TIME IN 2 HOURS IF HEADACHE NOT RELIEVED. 6 tablet 0     Blood Glucose Monitoring Suppl kit Use as directed to check blood glucose 1 each 0     EPINEPHrine (EPIPEN) 0.3 MG/0.3ML solution auto-injector injection USE AS DIRECTED FOR ACUTE ALLERGIC REACTION       glucose blood test strip Use as instructed to check blood glucose once daily 100 each 3     Lancets misc Use once daily with meter and strips to check blood glucose 100 each 3      Cariprazine HCl, 3 mg, Oral, Daily  FLUoxetine, 40 mg, Oral, Daily  insulin lispro, 2-9 Units, Subcutaneous, Q6H  lamoTRIgine, 100 mg, Oral, Daily  lisinopril, 10 mg,  "Oral, Daily  Morphine, 15 mg, Oral, Q8H  pantoprazole, 40 mg, Oral, Daily  pregabalin, 100 mg, Oral, BID  sodium chloride, 10 mL, Intravenous, Q12H           acetaminophen **OR** acetaminophen **OR** acetaminophen    senna-docusate sodium **AND** polyethylene glycol **AND** bisacodyl **AND** bisacodyl    dextrose    dextrose    glucagon (human recombinant)    hydrOXYzine    influenza vaccine    melatonin    Morphine    ondansetron    sodium chloride    sodium chloride    sodium chloride  Dilaudid [hydromorphone], Tylenol with codeine #3 [acetaminophen-codeine], Abilify [aripiprazole], Metformin, and Vicodin [hydrocodone-acetaminophen]    Review of Systems   Constitutional:  Positive for chills. Negative for fever.   Respiratory:  Negative for cough and shortness of breath.    Cardiovascular:  Negative for chest pain and leg swelling.   Gastrointestinal:  Positive for abdominal distention, abdominal pain, nausea and vomiting. Negative for blood in stool, constipation and diarrhea.   Genitourinary:  Negative for dysuria and hematuria.        Objective     Vital Signs and Labs:  Vital Signs Patient Vitals for the past 24 hrs:   BP Temp Temp src Pulse Resp SpO2 Height Weight   01/22/24 0700 119/75 98.4 °F (36.9 °C) Oral 72 16 92 % -- --   01/21/24 2331 129/83 99.5 °F (37.5 °C) Oral 68 19 95 % -- --   01/21/24 2253 131/69 -- -- 80 18 97 % -- --   01/21/24 2123 129/73 -- -- 69 18 97 % -- --   01/21/24 2002 146/78 -- -- 79 18 95 % -- --   01/21/24 1839 137/64 97.9 °F (36.6 °C) Oral 82 16 99 % 162.6 cm (64\") 123 kg (272 lb 0.8 oz)       Physical Exam:  Physical Exam  Constitutional:       Appearance: Normal appearance.   HENT:      Head: Normocephalic and atraumatic.   Pulmonary:      Effort: Pulmonary effort is normal. No respiratory distress.   Abdominal:      Palpations: Abdomen is soft.      Comments: Focally tender to palpation right upper quadrant   Skin:     General: Skin is warm and dry.   Neurological:      " General: No focal deficit present.      Mental Status: She is alert. Mental status is at baseline.   Psychiatric:         Mood and Affect: Mood normal.         Behavior: Behavior normal.         CBC    Results from last 7 days   Lab Units 01/22/24  0508 01/21/24  1908   WBC 10*3/mm3 8.40 8.10   HEMOGLOBIN g/dL 12.7 12.4   PLATELETS 10*3/mm3 253 244     CMP   Results from last 7 days   Lab Units 01/22/24  0508 01/21/24  1908   SODIUM mmol/L 139 139   POTASSIUM mmol/L 3.5 3.8   CHLORIDE mmol/L 100 99   CO2 mmol/L 28.0 26.0   BUN mg/dL 8 6   CREATININE mg/dL 0.67 0.64   GLUCOSE mg/dL 151* 183*   ALBUMIN g/dL  --  4.3   BILIRUBIN mg/dL  --  1.4*   ALK PHOS U/L  --  112   AST (SGOT) U/L  --  28   ALT (SGPT) U/L  --  23   LIPASE U/L  --  19     Radiology(recent) CT Abdomen Pelvis With Contrast    Result Date: 1/21/2024  Impression: 1.No acute abdominal or pelvic abnormality. 2.Hepatosplenomegaly. 3.Gallbladder sludge or stones without evidence of acute cholecystitis. 4.Mild body wall edema. Electronically Signed: Barry Bermudez MD  1/21/2024 8:15 PM EST  Workstation ID: [id number]     I reviewed the patient's new clinical results.  I reviewed the patient's new imaging results and agree with the interpretation.    Assessment & Plan       Cholelithiasis    Symptomatic cholelithiasis      43 y.o. female admitted with severe right upper quadrant abdominal pain presumed diagnosis of symptomatic cholelithiasis versus cholecystitis.  Counseled her about the steps of the operation anticipated cover the possible complications.  With the bilirubin slightly elevated I am in a try to shoot a cholangiogram if I can.  She understands the risk of surgery and is ready to proceed.      This note was created using Dragon Voice Recognition software.    Dustin Shelley MD  01/22/24  10:22 EST    Electronically signed by Dustin Shelley MD at 01/22/24 2966

## 2024-01-23 NOTE — DISCHARGE SUMMARY
[facility] Medicine Services  Discharge Summary    Date of Service: 24  Patient Name: Noa Soni  : 1980  MRN: [id number]    Date of Admission: 2024  Discharge Diagnosis:    Diagnosis Plan   1. Right upper quadrant abdominal pain        2. Calculus of gallbladder without cholecystitis without obstruction        3. Symptomatic cholelithiasis  Case Request    Case Request    Tissue Pathology Exam    Tissue Pathology Exam          Date of Discharge:  24  Primary Care Physician: Bree Wan MD      Presenting Problem:   Cholelithiasis [K80.20]  Calculus of gallbladder without cholecystitis without obstruction [K80.20]  Right upper quadrant abdominal pain [R10.11]    Active and Resolved Hospital Problems:  Active Hospital Problems    Diagnosis POA    **Cholelithiasis [K80.20] Yes    Symptomatic cholelithiasis [K80.20] Unknown      Resolved Hospital Problems   No resolved problems to display.         Hospital Course     Hospital Course:  43 y.o. female with a previous medical history of obesity, DM, HTN, Chronic pain and Cholelithiasis who presented to [facility] on 2024 with severe abdominal pain, uncontrolled with her home dose of MS Contin 15 mg TID. She had seen Dr. Shelley with General Surgery previously due to her cholelithiasis and had a Cholecystectomy scheduled for March. She had called Dr. Shelley regarding her increased pain and he recommended that she some to the ED for further evaluation for Cholecystitis.     In the ED, a CT of the abdomen and pelvis showed gallbladder sludge and stones with no evidence of acute cholecystitis.  Labs are unremarkable.  She is afebrile, all vitals are stable.  Dr. Shelley spoke with the ED provider and  requested that she be admitted and kept NPO after midnight for a Cholecystectomy.  Hospitalist was consulted for further management.postop day 1 laparoscopic cholecystectomy intraoperative cholangiogram for  symptomatic cholelithiasis     In general looking really good.  K for diet as tolerated  No lifting more than 10 to 15 pounds  Follow-up in the office in 2 weeks  Okay to shower    DISCHARGE Follow Up Recommendations for labs and diagnostics:       Reasons For Change In Medications and Indications for New Medications:      Day of Discharge     Vital Signs:  Temp:  [97.9 °F (36.6 °C)-99 °F (37.2 °C)] 97.9 °F (36.6 °C)  Heart Rate:  [] 76  Resp:  [8-26] 18  BP: ()/(60-83) 119/75  Flow (L/min):  [2-8] 2    Physical Exam:  Physical Exam   GENERAL: The patient is well developed and nontoxic.  HEENT: Nonicteric sclerae, PERRLA, EOMI. Oropharynx clear. Moist mucous membranes. Conjunctivae appear well perfused.  CHEST: Chest wall is nontender.  HEART: Regular rate and rhythm without murmurs. No MRG  LUNGS: Clear to auscultation bilaterally. No WRR  ABDOMEN: Soft, positive bowel sounds, nontender, no organomegaly.  RECTAL: Deferred.  SKIN: No rash, no excessive bruising, petechiae, or purpura.  NEUROLOGIC: Cranial nerves II-XII intact without motor/sensory deficit       Pertinent  and/or Most Recent Results     LAB RESULTS:      Lab 01/23/24  0911 01/22/24  0508 01/21/24  1908   WBC 13.70* 8.40 8.10   HEMOGLOBIN 12.3 12.7 12.4   HEMATOCRIT 37.8 38.9 37.5   PLATELETS 275 253 244   NEUTROS ABS 10.00* 4.80 5.30   LYMPHS ABS 2.10 2.70 2.10   MONOS ABS 1.50* 0.70 0.60   EOS ABS 0.00 0.10 0.10   MCV 84.6 84.5 83.8         Lab 01/23/24  1045 01/22/24  0508 01/21/24  1908   SODIUM 135* 139 139   POTASSIUM 4.2 3.5 3.8   CHLORIDE 97* 100 99   CO2 26.0 28.0 26.0   ANION GAP 12.0 11.0 14.0   BUN 18 8 6   CREATININE 0.86 0.67 0.64   EGFR 87.2 112.8 114.0   GLUCOSE 224* 151* 183*   CALCIUM 9.3 9.2 9.4   MAGNESIUM 1.8  --   --    PHOSPHORUS 2.7  --   --          Lab 01/23/24  1045 01/21/24  1908   TOTAL PROTEIN 8.1 8.2   ALBUMIN 4.3 4.3   GLOBULIN 3.8 3.9   ALT (SGPT) 35* 23   AST (SGOT) 57* 28   BILIRUBIN 2.2* 1.4*   ALK PHOS  107 112   LIPASE  --  19                     Brief Urine Lab Results  (Last result in the past 365 days)        Color   Clarity   Blood   Leuk Est   Nitrite   Protein   CREAT   Urine HCG        01/21/24 2031 Yellow   Clear   Negative   Negative   Negative   Negative                 Microbiology Results (last 10 days)       Procedure Component Value - Date/Time    Respiratory Panel PCR w/COVID-19(SARS-CoV-2) KYLEE/PENELOPE/JORGE/PAD/COR/MARCO In-House, NP Swab in UTM/VTM, 2 HR TAT - Swab, Nasopharynx [[id number]]  (Normal) Collected: 01/21/24 2207    Lab Status: Final result Specimen: Swab from Nasopharynx Updated: 01/21/24 2301     ADENOVIRUS, PCR Not Detected     Coronavirus 229E Not Detected     Coronavirus HKU1 Not Detected     Coronavirus NL63 Not Detected     Coronavirus OC43 Not Detected     COVID19 Not Detected     Human Metapneumovirus Not Detected     Human Rhinovirus/Enterovirus Not Detected     Influenza A PCR Not Detected     Influenza B PCR Not Detected     Parainfluenza Virus 1 Not Detected     Parainfluenza Virus 2 Not Detected     Parainfluenza Virus 3 Not Detected     Parainfluenza Virus 4 Not Detected     RSV, PCR Not Detected     Bordetella pertussis pcr Not Detected     Bordetella parapertussis PCR Not Detected     Chlamydophila pneumoniae PCR Not Detected     Mycoplasma pneumo by PCR Not Detected    Narrative:      In the setting of a positive respiratory panel with a viral infection PLUS a negative procalcitonin without other underlying concern for bacterial infection, consider observing off antibiotics or discontinuation of antibiotics and continue supportive care. If the respiratory panel is positive for atypical bacterial infection (Bordetella pertussis, Chlamydophila pneumoniae, or Mycoplasma pneumoniae), consider antibiotic de-escalation to target atypical bacterial infection.            FL Cholangiogram Operative    Result Date: 1/22/2024  Impression: Impression: Fluoroscopy provided for  intraoperative cholangiogram. Electronically Signed: Frederick Tillman MD  1/22/2024 2:34 PM EST  Workstation ID: [id number]    CT Abdomen Pelvis With Contrast    Result Date: 1/21/2024  Impression: Impression: 1.No acute abdominal or pelvic abnormality. 2.Hepatosplenomegaly. 3.Gallbladder sludge or stones without evidence of acute cholecystitis. 4.Mild body wall edema. Electronically Signed: Barry Bermudez MD  1/21/2024 8:15 PM EST  Workstation ID: [id number]    US Abdomen Limited    Result Date: 1/3/2024  Impression: Impression: 1. Stone filled gallbladder. No overt sonographic features of cholecystitis. The sonographer documents a positive Herring's sign during the examination. 2. No choledocholithiasis or abnormal biliary dilation is seen. 3. Hepatomegaly with sonographic features of diffuse hepatic steatosis. Electronically Signed: Noemy Sutton MD  1/3/2024 10:16 AM EST  Workstation ID: [id number]                 Labs Pending at Discharge:  Pending Labs       Order Current Status    Tissue Pathology Exam In process            Procedures Performed  Procedure(s):  CHOLECYSTECTOMY LAPAROSCOPIC INTRAOPERATIVE CHOLANGIOGRAM         Consults:   Consults       Date and Time Order Name Status Description    1/21/2024  9:07 PM Surgery (on-call MD unless specified) Completed     1/21/2024  8:59 PM Hospitalist (on-call MD unless specified)                Discharge Details        Discharge Medications        New Medications        Instructions Start Date   oxyCODONE-acetaminophen  MG per tablet  Commonly known as: Percocet   1 tablet, Oral, Every 6 Hours PRN             Continue These Medications        Instructions Start Date   Blood Glucose Monitoring Suppl kit   Use as directed to check blood glucose      Cariprazine HCl 3 MG capsule capsule  Commonly known as: Vraylar   3 mg, Oral, Daily      EPINEPHrine 0.3 MG/0.3ML solution auto-injector injection  Commonly known as: EPIPEN   USE AS DIRECTED FOR ACUTE ALLERGIC  REACTION      fexofenadine 180 MG tablet  Commonly known as: ALLEGRA   180 mg, Oral, Daily      FLUoxetine 40 MG capsule  Commonly known as: PROzac   40 mg, Oral, Daily      glucose blood test strip   Use as instructed to check blood glucose once daily      lamoTRIgine 100 MG tablet  Commonly known as: LaMICtal   100 mg, Oral, Daily, At bedtime      Lancets misc   Use once daily with meter and strips to check blood glucose      linaclotide 145 MCG capsule capsule  Commonly known as: Linzess   145 mcg, Oral, Every Morning Before Breakfast      lisinopril 10 MG tablet  Commonly known as: PRINIVIL,ZESTRIL   10 mg, Oral, Daily      Melatonin 5 MG chewable tablet   5 mg, Oral, Every Night at Bedtime      Morphine 15 MG 12 hr tablet  Commonly known as: MS CONTIN   15 mg, Oral, 3 Times Daily      pantoprazole 40 MG EC tablet  Commonly known as: Protonix   40 mg, Oral, Daily      pregabalin 100 MG capsule  Commonly known as: LYRICA   100 mg, Oral, 2 Times Daily      SITagliptin 100 MG tablet  Commonly known as: Januvia   100 mg, Oral, Daily      SUMAtriptan 50 MG tablet  Commonly known as: IMITREX   TAKE 1 TABLET BY MOUTH AT ONSET OF HEADACHE. MAY REPEAT DOSE ONE TIME IN 2 HOURS IF HEADACHE NOT RELIEVED.      Trulicity 4.5 MG/0.5ML solution pen-injector  Generic drug: Dulaglutide   4.5 mg, Subcutaneous, Every 7 Days               Allergies   Allergen Reactions    Dilaudid [Hydromorphone] Other (See Comments)     Bottoms O2 out    Tylenol With Codeine #3 [Acetaminophen-Codeine] Hallucinations    Abilify [Aripiprazole] Rash    Metformin Diarrhea    Vicodin [Hydrocodone-Acetaminophen] GI Intolerance         Discharge Disposition:   Home or Self Care    Diet:  Hospital:  Diet Order   Procedures    Diet: Diabetic Diets; Consistent Carbohydrate; Texture: Regular Texture (IDDSI 7); Fluid Consistency: Thin (IDDSI 0)         Discharge Activity:         CODE STATUS:  Code Status and Medical Interventions:   Ordered at: 01/21/24 0035      Code Status (Patient has no pulse and is not breathing):    CPR (Attempt to Resuscitate)     Medical Interventions (Patient has pulse or is breathing):    Full Support         Future Appointments   Date Time Provider Department Center   2/19/2024  2:20 PM Perez Connors MD [facility] PM [facility]   3/11/2024  1:50 PM Perez Connors MD [facility] PM [facility]   3/25/2024  3:15 PM Bree Wan MD [facility]           Time spent on Discharge including face to face service:  >30 minutes    Signature: Electronically signed by Roly Orosco MD, 01/23/24, 12:22 EST.  [facility] Hospitalist Team

## 2024-01-24 ENCOUNTER — TRANSITIONAL CARE MANAGEMENT TELEPHONE ENCOUNTER (OUTPATIENT)
Dept: CALL CENTER | Facility: HOSPITAL | Age: 44
End: 2024-01-24
Payer: MEDICAID

## 2024-01-24 LAB
LAB AP CASE REPORT: NORMAL
LAB AP CLINICAL INFORMATION: NORMAL
PATH REPORT.FINAL DX SPEC: NORMAL
PATH REPORT.GROSS SPEC: NORMAL

## 2024-01-24 NOTE — OUTREACH NOTE
Prep Survey      Flowsheet Row Responses   [facility] patient discharged from? [facility]   Is LACE score < 7 ? No   Eligibility [facility]   Date of Admission 01/21/24   Date of Discharge 01/23/24   Discharge Disposition Home or Self Care   Discharge diagnosis Right upper quadrant abdominal pain Cholecystectomy   Does the patient have one of the following disease processes/diagnoses(primary or secondary)? General Surgery   Does the patient have Home health ordered? No   Is there a DME ordered? No   Prep survey completed? Yes            KOTA LANDIN - Registered Nurse

## 2024-01-24 NOTE — OUTREACH NOTE
Call Center TCM Note      Flowsheet Row Responses   [facility] patient discharged from? [facility]   Does the patient have one of the following disease processes/diagnoses(primary or secondary)? General Surgery   TCM attempt successful? Yes   Call start time 1208   Call end time 1210   Discharge diagnosis Right upper quadrant abdominal pain Cholecystectomy   Meds reviewed with patient/caregiver? Yes   Is the patient having any side effects they believe may be caused by any medication additions or changes? No   Does the patient have all medications related to this admission filled (includes all antibiotics, pain medications, etc.) Yes   Does the patient have an appointment with their PCP within 7-14 days of discharge? No   Nursing Interventions Routed TCM call to PCP office   Has home health visited the patient within 72 hours of discharge? N/A   Psychosocial issues? No   Did the patient receive a copy of their discharge instructions? Yes   Nursing interventions Reviewed instructions with patient   What is the patient's perception of their health status since discharge? Improving   Nursing interventions Nurse provided patient education   Is the patient /caregiver able to teach back basic post-op care? Lifting as instructed by MD in discharge instructions, Keep incision areas clean,dry and protected, Do not remove steri-strips, No tub bath, swimming, or hot tub until instructed by MD, Take showers only when approved by MD-sponge bathe until then, Drive as instructed by MD in discharge instructions, Practice 'cough and deep breath', Continue use of incentive spirometry at least 1 week post discharge   Is the patient/caregiver able to teach back signs and symptoms of incisional infection? Fever, Pus or odor from incision, Incisional warmth, Increased drainage or bleeding, Increased redness, swelling or pain at the incisonal site   Is the patient/caregiver able to teach back the hierarchy of who to call/visit for  symptoms/problems? PCP, Specialist, Home health nurse, Urgent Care, ED, 911 Yes   TCM call completed? Yes   Wrap up additional comments Doing well, all questions addressed, patient will take care of her f/u appts.   Call end time 1210   Would this patient benefit from a Referral to [facility] Social Work? No   Is the patient interested in additional calls from an ambulatory ? No            Diane Franklin RN    1/24/2024, 12:10 EST

## 2024-01-24 NOTE — PROGRESS NOTES
"Enter Query Response Below      Query Response: yes             If applicable, please update the problem list.       Patient: Noa Soni        : 1980  Account: [id number]           Admit Date: 2024        How to Respond to this query:       a. Click New Note     b. Answer query within the yellow box.                c. Update the Problem List, if applicable.      If you have any questions about this query contact me at: [email or web address]     Dr. Shelley,    Based on inpatient coding guidelines, [facility] are not allowed to use diagnoses from pathology reports as the sole basis for billing.  Any findings noted on a pathology report must be affirmed by the treating physician before billing.     The pathologist reported that the specimen shows:    \"Final Diagnosis -- -- -- [facility] LAB  Result:  Gallbladder, cholecystectomy:  Chronic calculus cholecystitis\"    Is this finding consistent with your clinical impression and treatment plan for this patient?    - yes  - no  - other explanation for clinical impression and treatment plan_________  - unable to determine      By submitting this query, we are merely seeking further clarification of documentation to accurately reflect all conditions that you are monitoring, evaluating, treating or that extend the hospitalization or utilize additional resources of care. Please utilize your independent clinical judgment when addressing the question(s) above.     This query and your response, once completed, will be entered into the legal medical record.    Sincerely,  Ros Hoskins RN CCDS [facility]  Clinical Documentation Integrity Program     "

## 2024-01-25 ENCOUNTER — TELEPHONE (OUTPATIENT)
Dept: SURGERY | Facility: CLINIC | Age: 44
End: 2024-01-25
Payer: MEDICAID

## 2024-01-25 NOTE — TELEPHONE ENCOUNTER
PO FU Call- spoke with pt. corey 2 wk po appt. Will fu then. Knows to call with any questions or concerns.      States doing fine.

## 2024-01-31 ENCOUNTER — READMISSION MANAGEMENT (OUTPATIENT)
Dept: CALL CENTER | Facility: HOSPITAL | Age: 44
End: 2024-01-31
Payer: MEDICAID

## 2024-01-31 NOTE — OUTREACH NOTE
General Surgery Week 2 Survey      Flowsheet Row Responses   [facility] patient discharged from? [facility]   Does the patient have one of the following disease processes/diagnoses(primary or secondary)? General Surgery   Week 2 attempt successful? Yes   Call start time 1605   Call end time 1607   Discharge diagnosis Right upper quadrant abdominal pain Cholecystectomy   Meds reviewed with patient/caregiver? Yes   Is the patient taking all medications as directed (includes completed medication regime)? Yes   Does the patient have a follow up appointment scheduled with their surgeon? Yes   Has the patient kept scheduled appointments due by today? N/A   Comments 2/8/24 post op apt   What is the patient's perception of their health status since discharge? Improving  [a little itching around incisional site per pt-pt instructed to call surgeon's office if symptoms worsen]   Is the patient /caregiver able to teach back basic post-op care? Take showers only when approved by MD-sponge bathe until then, No tub bath, swimming, or hot tub until instructed by MD, Keep incision areas clean,dry and protected   Is the patient/caregiver able to teach back steps to recovery at home? Set small, achievable goals for return to baseline health, Rest and rebuild strength, gradually increase activity, Make a list of questions for surgeon's appointment   Week 2 call completed? Yes   Call end time 1607            Tierney H - Registered Nurse

## 2024-02-05 ENCOUNTER — TELEPHONE (OUTPATIENT)
Dept: PAIN MEDICINE | Facility: CLINIC | Age: 44
End: 2024-02-05
Payer: MEDICAID

## 2024-02-05 DIAGNOSIS — M54.16 LUMBAR RADICULOPATHY: ICD-10-CM

## 2024-02-05 RX ORDER — PREGABALIN 100 MG/1
100 CAPSULE ORAL 2 TIMES DAILY
Qty: 60 CAPSULE | Refills: 5 | Status: SHIPPED | OUTPATIENT
Start: 2024-02-05

## 2024-02-08 ENCOUNTER — READMISSION MANAGEMENT (OUTPATIENT)
Dept: CALL CENTER | Facility: HOSPITAL | Age: 44
End: 2024-02-08
Payer: MEDICAID

## 2024-02-08 ENCOUNTER — OFFICE VISIT (OUTPATIENT)
Dept: SURGERY | Facility: CLINIC | Age: 44
End: 2024-02-08
Payer: MEDICAID

## 2024-02-08 VITALS
HEIGHT: 64 IN | BODY MASS INDEX: 45.85 KG/M2 | OXYGEN SATURATION: 97 % | DIASTOLIC BLOOD PRESSURE: 84 MMHG | WEIGHT: 268.6 LBS | SYSTOLIC BLOOD PRESSURE: 123 MMHG | TEMPERATURE: 98.4 F | HEART RATE: 79 BPM

## 2024-02-08 DIAGNOSIS — K80.20 SYMPTOMATIC CHOLELITHIASIS: Primary | ICD-10-CM

## 2024-02-08 PROCEDURE — 1160F RVW MEDS BY RX/DR IN RCRD: CPT | Performed by: SURGERY

## 2024-02-08 PROCEDURE — 1159F MED LIST DOCD IN RCRD: CPT | Performed by: SURGERY

## 2024-02-08 PROCEDURE — 99024 POSTOP FOLLOW-UP VISIT: CPT | Performed by: SURGERY

## 2024-02-08 PROCEDURE — 3074F SYST BP LT 130 MM HG: CPT | Performed by: SURGERY

## 2024-02-08 PROCEDURE — 3079F DIAST BP 80-89 MM HG: CPT | Performed by: SURGERY

## 2024-02-08 NOTE — OUTREACH NOTE
General Surgery Week 3 Survey      Flowsheet Row Responses   [facility] patient discharged from? [facility]   Does the patient have one of the following disease processes/diagnoses(primary or secondary)? General Surgery   Week 3 attempt successful? Yes   Call start time 1425   Unsuccessful attempts Attempt 1   Call end time 1426   Is patient permission given to speak with other caregiver? Yes   Person spoke with today (if not patient) and relationship Ruiz-s/o.   Meds reviewed with patient/caregiver? Yes   Is the patient taking all medications as directed (includes completed medication regime)? Yes   Does the patient have a follow up appointment scheduled with their surgeon? Yes   Has the patient kept scheduled appointments due by today? Yes   Comments Kept appt with surgeon today.   What is the patient's perception of their health status since discharge? Improving   Week 3 call completed? Yes   Graduated Yes   Is the patient interested in additional calls from an ambulatory ? No   Would this patient benefit from a Referral to [facility] Social Work? No   Wrap up additional comments Brief call with s/oRuiz, who states patient is doing well. States patient was released by her surgeon today. Denies any needs/concerns.   Call end time 1426            Edda AGUILAR - Registered Nurse

## 2024-02-08 NOTE — PROGRESS NOTES
"Subjective   Noa Soni is a 43 y.o. female.   Status post laparoscopic cholecystectomy with intraoperative cholangiogram for symptomatic cholelithiasis.  In general she is doing better.  She says her right upper quadrant matthew pain is much better.  She has some itching at her incisions.  She says that after she eats she has some difficulty with food getting stuck and some epigastric discomfort.  She has been going to the bathroom 3-5 times a day on the looser side.    Objective   /84   Pulse 79   Temp 98.4 °F (36.9 °C) (Infrared)   Ht 162.6 cm (64\")   Wt 122 kg (268 lb 9.6 oz)   LMP 06/02/2023 (Exact Date)   SpO2 97%   BMI 46.11 kg/m²   Physical Exam  Exam abdomen is soft nondistended minimally tender to palpation incisions are all healing well without erythema or drainage.  Assessment & Plan   Diagnoses and all orders for this visit:    1. Symptomatic cholelithiasis (Primary)    Status post laparoscopic cholecystectomy for symptomatic cholelithiasis.  All in all I think she is on the right track.  I removed her skin glue the incisions will gradually continue to heal over I think the skin glue is the reason for the itching.  With regards to some of the epigastric abdominal discomfort and food getting stuck my recommendation is to talk to her primary care doctor about this to consider involving gastroenterology for endoscopy.  With regards to the loose stool recommendation to start would be for additional fiber supplementation for now over the course of the next couple of months if this does not resolve consider starting additional medications.    Dustin Shelley MD  2/8/2024  2:08 PM EST    This note was created using Dragon Voice Recognition software.  "

## 2024-02-19 ENCOUNTER — OFFICE VISIT (OUTPATIENT)
Dept: PAIN MEDICINE | Facility: CLINIC | Age: 44
End: 2024-02-19
Payer: MEDICAID

## 2024-02-19 ENCOUNTER — TELEPHONE (OUTPATIENT)
Dept: PAIN MEDICINE | Facility: HOSPITAL | Age: 44
End: 2024-02-19
Payer: MEDICAID

## 2024-02-19 VITALS
DIASTOLIC BLOOD PRESSURE: 66 MMHG | RESPIRATION RATE: 16 BRPM | SYSTOLIC BLOOD PRESSURE: 137 MMHG | HEART RATE: 80 BPM | OXYGEN SATURATION: 97 %

## 2024-02-19 DIAGNOSIS — M25.511 CHRONIC RIGHT SHOULDER PAIN: ICD-10-CM

## 2024-02-19 DIAGNOSIS — M12.811 ROTATOR CUFF ARTHROPATHY OF RIGHT SHOULDER: ICD-10-CM

## 2024-02-19 DIAGNOSIS — M54.16 LUMBAR RADICULOPATHY: ICD-10-CM

## 2024-02-19 DIAGNOSIS — Z79.899 HIGH RISK MEDICATION USE: Primary | ICD-10-CM

## 2024-02-19 DIAGNOSIS — M54.42 CHRONIC BILATERAL LOW BACK PAIN WITH BILATERAL SCIATICA: ICD-10-CM

## 2024-02-19 DIAGNOSIS — M25.562 CHRONIC PAIN OF BOTH KNEES: ICD-10-CM

## 2024-02-19 DIAGNOSIS — G89.29 CHRONIC BILATERAL LOW BACK PAIN WITH BILATERAL SCIATICA: ICD-10-CM

## 2024-02-19 DIAGNOSIS — G89.29 CHRONIC PAIN OF BOTH KNEES: ICD-10-CM

## 2024-02-19 DIAGNOSIS — G89.29 CHRONIC RIGHT SHOULDER PAIN: ICD-10-CM

## 2024-02-19 DIAGNOSIS — M25.561 CHRONIC PAIN OF BOTH KNEES: ICD-10-CM

## 2024-02-19 DIAGNOSIS — M54.41 CHRONIC BILATERAL LOW BACK PAIN WITH BILATERAL SCIATICA: ICD-10-CM

## 2024-02-19 PROCEDURE — G0463 HOSPITAL OUTPT CLINIC VISIT: HCPCS | Performed by: PHYSICAL MEDICINE & REHABILITATION

## 2024-02-19 PROCEDURE — 3078F DIAST BP <80 MM HG: CPT | Performed by: PHYSICAL MEDICINE & REHABILITATION

## 2024-02-19 PROCEDURE — 1160F RVW MEDS BY RX/DR IN RCRD: CPT | Performed by: PHYSICAL MEDICINE & REHABILITATION

## 2024-02-19 PROCEDURE — 3075F SYST BP GE 130 - 139MM HG: CPT | Performed by: PHYSICAL MEDICINE & REHABILITATION

## 2024-02-19 PROCEDURE — 99214 OFFICE O/P EST MOD 30 MIN: CPT | Performed by: PHYSICAL MEDICINE & REHABILITATION

## 2024-02-19 PROCEDURE — 1125F AMNT PAIN NOTED PAIN PRSNT: CPT | Performed by: PHYSICAL MEDICINE & REHABILITATION

## 2024-02-19 PROCEDURE — 1159F MED LIST DOCD IN RCRD: CPT | Performed by: PHYSICAL MEDICINE & REHABILITATION

## 2024-02-19 RX ORDER — OXYCODONE AND ACETAMINOPHEN 10; 325 MG/1; MG/1
1 TABLET ORAL EVERY 6 HOURS PRN
Qty: 120 TABLET | Refills: 0 | Status: SHIPPED | OUTPATIENT
Start: 2024-02-19

## 2024-02-19 RX ORDER — PREGABALIN 150 MG/1
150 CAPSULE ORAL 2 TIMES DAILY
Qty: 60 CAPSULE | Refills: 2 | Status: SHIPPED | OUTPATIENT
Start: 2024-02-19

## 2024-02-19 NOTE — PROGRESS NOTES
Subjective   Noa Snoi is a 43 y.o. female.     History of Present Illness  Chronic bilateral knee pain, also low back and right shoulder pain, nonradiating, 10/10 at worst, 8/10 at best, always present, varies, began years ago, worsening over time, aching, sharp, worse with bending and walking, interferes with sleep, exercise. X-ray b/l knees with mod OA worst in medial compartment. Saw PCP, notes reviewed, with referral to Dr. Leiva who decline steroid injections with DM2, has failed NSAIDs, Tylenol, Voltaren gel, sees Dr. Valencia on multiple medications. No FH of substance abuse. Began Percocet 5mg TID prn, then 7.5mg TID prn. Worsening LBP, R shoulder pain.  Back Pain  Associated symptoms include abdominal pain. Pertinent negatives include no bladder incontinence, chest pain, fever, numbness or weakness.   Knee Pain   Pertinent negatives include no numbness.        The following portions of the patient's history were reviewed and updated as appropriate: allergies, current medications, past family history, past medical history, past social history, past surgical history and problem list.    Review of Systems   Constitutional:  Negative for chills, fatigue and fever.   HENT:  Positive for hearing loss. Negative for trouble swallowing.    Eyes:  Negative for visual disturbance.   Respiratory:  Negative for shortness of breath.    Cardiovascular:  Negative for chest pain.   Gastrointestinal:  Positive for abdominal pain and nausea. Negative for constipation, diarrhea and vomiting.   Genitourinary:  Negative for urinary incontinence.   Musculoskeletal:  Positive for arthralgias and back pain. Negative for joint swelling, myalgias and neck pain.   Neurological:  Positive for headache. Negative for dizziness, weakness and numbness.       Objective   Physical Exam  Constitutional:       Appearance: Normal appearance. She is well-developed.   HENT:      Head: Normocephalic and atraumatic.   Eyes:      Pupils: Pupils  are equal, round, and reactive to light.   Cardiovascular:      Rate and Rhythm: Normal rate and regular rhythm.      Heart sounds: Normal heart sounds.   Pulmonary:      Effort: Pulmonary effort is normal.      Breath sounds: Normal breath sounds.   Abdominal:      General: Bowel sounds are normal. There is no distension.      Palpations: Abdomen is soft.      Tenderness: There is no abdominal tenderness.   Musculoskeletal:      Cervical back: Normal range of motion.      Comments: R shoulder: (+) empty can test     Neurological:      Mental Status: She is alert and oriented to person, place, and time.      Sensory: No sensory deficit.      Deep Tendon Reflexes: Reflexes are normal and symmetric. Reflexes normal.   Psychiatric:         Mood and Affect: Mood normal.         Behavior: Behavior normal.         Thought Content: Thought content normal.         Judgment: Judgment normal.           Assessment & Plan   Diagnoses and all orders for this visit:    1. Chronic bilateral low back pain with bilateral sciatica (Primary)    2. Chronic pain of both knees    3. Chronic right shoulder pain    4. Lumbar radiculopathy    5. Rotator cuff arthropathy of right shoulder      UDS in order 4/3/23.  Discussed risks and benefits of opioid treatment for chonic pain with patient, including expectations related to prescription requests, alternative modalities to opioids for managing pain, her treatment plan, risks of dependency and addiction, and safe storage practices for prescribed opioids, as well as proper and improper disposal of all medications.  Treatment plan will consist of continuing current medication as long as it remains effective and is necessary, while evaluating patient at each visit and determining if the medication can be lowered or discontinued, while also using nonopioid therapies to reduce reliance on opioids.  Failed Tylenol #3 TID prn with AMS. Failed NSAIDs, Tylenol, Voltaren gel, allergic to Hydrocodone.  Began Percocet 5mg TID prn, helping but not sufficient, increased to 7.5mg QID prn, insufficient, increased to 10mg QID prn. Denies any side effects but no longer effective. Filled 1/23/24. Rotated to MS-Contin 15mg TID with SI, stopped immediately, disposed of here, cont Percocet 10mg QID prn.  Trialed Lyrica 75mg BID, very helpful. Began Lyrica 75mg BID, helping, increased to 100mg BID, increase to 150mg BID.  Began Licart qdaily prn, denies being told not to take NSAIDs.  Performed b/l Monovisc injections with significant improvement, will repeat as necessary up to q6 months, cannot have steroids with DM2. Repeated 2/1/23.  Ordered MRI L-spine, with mild DDD and DJD only, worst pain appears to be facetogenic on history and exam, discussed b/l L4-S1 facet injections. Ordered LSO for now to improve truncal stability.  Performed R shoulder injection.  RTC in 3 months for f/u.    INSPECT REPORT     As part of the patient's treatment plan, I am prescribing controlled substances. The patient has been made aware of appropriate use of such medications, including potential risk of somnolence, limited ability to drive and/or work safely, and the potential for dependence or overdose. It has also bee made clear that these medications are for use by this patient only, without concomitant use of alcohol or other substances unless prescribed.      Patient has completed prescribing agreement detailing terms of continued prescribing of controlled substances, including monitoring INSPECT reports, urine drug screening, and pill counts if necessary. The patient is aware that inappropriate use will results in cessation of prescribing such medications.     INSPECT report has been reviewed and scanned into the patient's chart.     As the clinician, I personally reviewed the INSPECT while the patient was in the office today.     History and physical exam exhibit continued safe and appropriate use of controlled substances.

## 2024-02-19 NOTE — TELEPHONE ENCOUNTER
Pt brought in #37 morphine to waste as she had suicidal thoughts while taking it.  Count was witnessed by cherise thomas cma.  Pills were counted, destroyed and disposed of in the sharps container.

## 2024-02-23 DIAGNOSIS — M25.562 ACUTE PAIN OF LEFT KNEE: Primary | ICD-10-CM

## 2024-03-15 ENCOUNTER — TELEPHONE (OUTPATIENT)
Dept: FAMILY MEDICINE CLINIC | Facility: CLINIC | Age: 44
End: 2024-03-15
Payer: MEDICAID

## 2024-03-15 DIAGNOSIS — M25.562 CHRONIC PAIN OF LEFT KNEE: Primary | ICD-10-CM

## 2024-03-15 DIAGNOSIS — G89.29 CHRONIC PAIN OF LEFT KNEE: Primary | ICD-10-CM

## 2024-03-15 NOTE — TELEPHONE ENCOUNTER
Caller: Noa Soni    Relationship: Self    Best call back number: [phone]    What orders are you requesting (i.e. lab or imaging): X-RAY OF LEFT KNEE     In what timeframe would the patient need to come in: PAIN IN LEFT KNEE, POSSIBLE DISLOCATION     Where will you receive your lab/imaging services: [facility]     Additional notes: PATIENT CALLED AND SAID HER LEFT KNEE HAS BEEN BOTHERING HER AND SHE NEEDS TO HAVE IT X-RAYED BEFORE SHE GOES AND SEES THE SPORTS MEDICINE DOCTOR. SHE IS ASKING IF DR. GONSALES WOULD SEND AN ORDER FOR IT

## 2024-03-18 RX ORDER — PANTOPRAZOLE SODIUM 40 MG/1
40 TABLET, DELAYED RELEASE ORAL DAILY
Qty: 30 TABLET | Refills: 1 | OUTPATIENT
Start: 2024-03-18 | End: 2024-05-17

## 2024-04-01 ENCOUNTER — OFFICE VISIT (OUTPATIENT)
Dept: FAMILY MEDICINE CLINIC | Facility: CLINIC | Age: 44
End: 2024-04-01
Payer: MEDICAID

## 2024-04-01 VITALS
HEART RATE: 72 BPM | OXYGEN SATURATION: 93 % | SYSTOLIC BLOOD PRESSURE: 119 MMHG | DIASTOLIC BLOOD PRESSURE: 80 MMHG | HEIGHT: 64 IN | RESPIRATION RATE: 18 BRPM | TEMPERATURE: 97.8 F | BODY MASS INDEX: 46.2 KG/M2 | WEIGHT: 270.6 LBS

## 2024-04-01 DIAGNOSIS — E66.01 CLASS 3 SEVERE OBESITY DUE TO EXCESS CALORIES WITHOUT SERIOUS COMORBIDITY WITH BODY MASS INDEX (BMI) OF 45.0 TO 49.9 IN ADULT: ICD-10-CM

## 2024-04-01 DIAGNOSIS — F43.12 CHRONIC POSTTRAUMATIC STRESS DISORDER: Chronic | ICD-10-CM

## 2024-04-01 DIAGNOSIS — F41.1 GENERALIZED ANXIETY DISORDER: Chronic | ICD-10-CM

## 2024-04-01 DIAGNOSIS — R09.81 NASAL CONGESTION: ICD-10-CM

## 2024-04-01 DIAGNOSIS — F33.2 SEVERE RECURRENT MAJOR DEPRESSION WITHOUT PSYCHOTIC FEATURES: ICD-10-CM

## 2024-04-01 DIAGNOSIS — E11.65 TYPE 2 DIABETES MELLITUS WITH HYPERGLYCEMIA, WITHOUT LONG-TERM CURRENT USE OF INSULIN: Primary | ICD-10-CM

## 2024-04-01 RX ORDER — DULAGLUTIDE 3 MG/.5ML
0.5 INJECTION, SOLUTION SUBCUTANEOUS WEEKLY
COMMUNITY

## 2024-04-01 RX ORDER — LANCETS 30 GAUGE
EACH MISCELLANEOUS
Qty: 100 EACH | Refills: 3 | Status: SHIPPED | OUTPATIENT
Start: 2024-04-01

## 2024-04-01 NOTE — PROGRESS NOTES
Subjective   Noa Soni is a 43 y.o. female.   Chief Complaint   Patient presents with    Diabetes    Hypertension       History of Present Illness   43-year-old female with learning disability, chronic pain due to arthritis of her knees and back pain, NESHA, PTSD, ROBERT, depression, possible bipolar disorder, PCOS, type 2 diabetes, hypertension presents to the office today for follow-up.    Has rejected routine follow-up with psychiatry regarding her mental health.  Used to see someone but just quit going.  Used to go to [facility], but quit going.  I am prescribing her mental health medicines.  Expressed to MA during rooming that she was suicidal.  She told us that she took too many of her Vraylar 1 night because she thought she would be better off dead.  She feels this way because her boyfriend has become her charge.  She has become his paid caregiver.  She tells me she never wanted to be a caregiver.  She wanted a relationship.  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  Has to help him attend to his bodily functions.  Tells me she does not really want to die, she just does not want to have to do what she does every day.    Chronic pain-follows with pain management and is on Percocet.  She is seeing sports medicine about her knees.  She is on anti-inflammatories and I think she has had some injections.      Diabetes type 2-does not check her blood sugar routinely.  Tells me she needs a new monitor.  Some confusion about her Trulicity dose.  By my notes she is supposed to be on 4.5 mg, but she does not think she is.  She called back when she got home and told us she was on 3mg and not 4.5mg.    Reports that her nose is stuffed up.  She has mild sore throat.  No fevers.          Patient Active Problem List    Diagnosis Date Noted    Cholelithiasis 01/21/2024    Symptomatic cholelithiasis 01/11/2024    History of robot-assisted laparoscopic hysterectomy 08/21/2023     Note Last Updated:  8/21/2023 6/2/23 - [facility].  Dr. Meraz.  Ovaries left!      Lumbar radiculopathy 07/12/2023    Rotator cuff arthropathy of right shoulder 04/03/2023    Chronic pain of both knees 05/24/2022    Chronic bilateral low back pain with bilateral sciatica 05/24/2022    Chronic right shoulder pain 05/24/2022    Morbid obesity 04/27/2022    Nasal congestion 11/20/2021    Post-traumatic osteoarthritis of both knees 11/10/2021    Severe recurrent major depression without psychotic features 06/17/2021    Chronic posttraumatic stress disorder 06/17/2021    Generalized anxiety disorder 06/17/2021    Callus of foot 10/19/2020    Perennial allergic rhinitis 10/19/2020    NESHA (obstructive sleep apnea) 10/19/2020    Class 3 severe obesity due to excess calories without serious comorbidity with body mass index (BMI) of 45.0 to 49.9 in adult 10/12/2020    Lymphadenopathy 10/06/2020    Essential hypertension 07/27/2020    Learning disability 07/27/2020     Note Last Updated: 7/27/2020     No other details      Type 2 diabetes mellitus 01/17/2020    History of endometriosis 01/17/2020    History of PCOS 01/17/2020           Past Surgical History:   Procedure Laterality Date    CHOLECYSTECTOMY WITH INTRAOPERATIVE CHOLANGIOGRAM N/A 1/22/2024    Procedure: CHOLECYSTECTOMY LAPAROSCOPIC INTRAOPERATIVE CHOLANGIOGRAM;  Surgeon: Dustin Shelley MD;  Location: [facility] OR;  Service: General;  Laterality: N/A;    EAR TUBES      ENDOMETRIAL ABLATION      INTRAUTERINE DEVICE INSERTION  10/14/2020    LAPAROSCOPIC TUBAL LIGATION      SUBTOTAL HYSTERECTOMY      TONSILLECTOMY AND ADENOIDECTOMY      TOTAL LAPAROSCOPIC HYSTERECTOMY WITH DAVINCI ROBOT  06/02/2023    [facility]     Current Outpatient Medications on File Prior to Visit   Medication Sig    Cariprazine HCl (Vraylar) 3 MG capsule capsule Take 1 capsule by mouth Daily.    Dulaglutide (Trulicity) 3 MG/0.5ML solution pen-injector Inject 0.5 mg under the skin into the appropriate area  as directed 1 (One) Time Per Week.    fexofenadine (ALLEGRA) 180 MG tablet Take 1 tablet by mouth Daily.    FLUoxetine (PROzac) 40 MG capsule Take 1 capsule by mouth Daily.    lamoTRIgine (LaMICtal) 100 MG tablet Take 1 tablet by mouth Daily. At bedtime    linaclotide (Linzess) 145 MCG capsule capsule Take 1 capsule by mouth Every Morning Before Breakfast.    lisinopril (PRINIVIL,ZESTRIL) 10 MG tablet Take 1 tablet by mouth Daily.    Melatonin 5 MG chewable tablet Chew 5 mg every night at bedtime.    oxyCODONE-acetaminophen (Percocet)  MG per tablet Take 1 tablet by mouth Every 6 (Six) Hours As Needed for Moderate Pain.    pregabalin (LYRICA) 150 MG capsule Take 1 capsule by mouth 2 (Two) Times a Day.    SITagliptin (Januvia) 100 MG tablet Take 1 tablet by mouth Daily.    SUMAtriptan (IMITREX) 50 MG tablet TAKE 1 TABLET BY MOUTH AT ONSET OF HEADACHE. MAY REPEAT DOSE ONE TIME IN 2 HOURS IF HEADACHE NOT RELIEVED.    Blood Glucose Monitoring Suppl kit Use as directed to check blood glucose    Dulaglutide (Trulicity) 4.5 MG/0.5ML solution pen-injector Inject 0.5 mL under the skin into the appropriate area as directed Every 7 (Seven) Days.    glucose blood test strip Use as instructed to check blood glucose once daily    oxyCODONE-acetaminophen (Percocet)  MG per tablet Take 1 tablet by mouth Every 6 (Six) Hours As Needed for Moderate Pain.    oxyCODONE-acetaminophen (Percocet)  MG per tablet Take 1 tablet by mouth Every 6 (Six) Hours As Needed for Moderate Pain.     No current facility-administered medications on file prior to visit.     Allergies   Allergen Reactions    Dilaudid [Hydromorphone] Other (See Comments)     Bottoms O2 out    Morphine Other (See Comments)     Suicical ideations    Tylenol With Codeine #3 [Acetaminophen-Codeine] Hallucinations    Abilify [Aripiprazole] Rash    Metformin Diarrhea    Vicodin [Hydrocodone-Acetaminophen] GI Intolerance     Social History     Socioeconomic  "History    Marital status: Single    Number of children: 0   Tobacco Use    Smoking status: Former     Current packs/day: 0.00     Average packs/day: 1 pack/day for 5.0 years (5.0 ttl pk-yrs)     Types: Cigarettes     Start date: 2017     Quit date: 2018     Years since quittin.2     Passive exposure: Past    Smokeless tobacco: Never    Tobacco comments:     socially    Vaping Use    Vaping status: Never Used   Substance and Sexual Activity    Alcohol use: Never    Drug use: Never    Sexual activity: Not Currently     Partners: Male     Birth control/protection: Other, Hysterectomy     Family History   Problem Relation Age of Onset    Hypertension Mother     Depression Mother     Post-traumatic stress disorder Mother     Hyperlipidemia Mother     Anxiety disorder Mother     Mental illness Mother     Hypertension Father     Hyperlipidemia Father     Hypertension Brother     Depression Brother     Parkinsonism Maternal Grandmother     Dementia Maternal Grandmother     Atrial fibrillation Maternal Grandfather     Breast cancer Paternal Grandmother     Diabetes Paternal Grandmother     COPD Paternal Grandfather        Review of Systems    Objective   /80 (BP Location: Right arm, Patient Position: Sitting, Cuff Size: Large Adult)   Pulse 72   Temp 97.8 °F (36.6 °C) (Infrared)   Resp 18   Ht 162.6 cm (64\")   Wt 123 kg (270 lb 9.6 oz)   LMP 2023 (Exact Date)   SpO2 93%   Breastfeeding No   BMI 46.45 kg/m²   Physical Exam  Constitutional:       Appearance: She is well-developed and overweight. She is obese. She is not ill-appearing.      Comments:      HENT:      Head: Normocephalic and atraumatic.   Eyes:      Conjunctiva/sclera: Conjunctivae normal.   Cardiovascular:      Rate and Rhythm: Normal rate.   Pulmonary:      Effort: Pulmonary effort is normal. No respiratory distress.   Musculoskeletal:         General: Normal range of motion.      Cervical back: Normal range of motion.      " Right lower leg: No edema.      Left lower leg: No edema.   Skin:     General: Skin is warm and dry.      Findings: No rash.   Neurological:      Mental Status: She is alert and oriented to person, place, and time.      Gait: Gait normal.   Psychiatric:         Mood and Affect: Affect is flat.         Behavior: Behavior normal.         Judgment: Judgment is impulsive.                     Assessment & Plan   Diagnoses and all orders for this visit:    1. Type 2 diabetes mellitus with hyperglycemia, without long-term current use of insulin (Primary)  -     Hemoglobin A1c  -     Comprehensive Metabolic Panel  -     CBC & Differential  -     Lancets misc; Use once daily with meter and strips to check blood glucose  Dispense: 100 each; Refill: 3  -     glucose blood test strip; Use as instructed to check blood glucose once daily  Dispense: 100 each; Refill: 3  -     Blood Glucose Monitoring Suppl kit; Use as directed to check blood glucose  Dispense: 1 each; Refill: 0    2. Class 3 severe obesity due to excess calories without serious comorbidity with body mass index (BMI) of 45.0 to 49.9 in adult    3. Severe recurrent major depression without psychotic features    4. Chronic posttraumatic stress disorder    5. Generalized anxiety disorder    6. Nasal congestion    Very complicated visit.  Her diabetes is a big problem and she really does not take a lot of ownership and managing it.  I am going to send in orders again for diabetes testing supplies.  Will get labs today to see where she is.  As above, we found out after the visit she was on 3 mg of Trulicity.  I increased the dose to 4.5mg.    Her nasal congestion appears to be allergies.  Verified that she has Allegra.  Recommended she also had a daily nasal corticosteroid.    The bulk of the visit today was dominated by her mental health.  Medication wise she is on Prozac 40 mg/day, Lamictal 100 mg/day, Vraylar 3 mg/day.  Confirmed again she is not suicidal.  No change  in medications.  She really needs to talk to her boyfriend about how she feels.  If she does not want to be a caregiver, other arrangements may need to be made.  I think she needs to get back into counseling.  The core of this current conflict is not an internal self-esteem, depression, anxiety issues but seems to be regarding her dissatisfaction with daily life.  I am sure her mental health issues have influenced her response to this situation.  I think psychiatry is also a good idea to take a look at her medications and reevaluate her diagnoses.  She does not want to do that, she is going to think about going back to [facility].  I will follow-up with her when her blood tests are back and I will see her again in 3 months or sooner if needed.        Call with any problems or concerns before next visit       Return in about 3 months (around 7/1/2024).      Much of this report is an electronic transcription of spoken language to printed text using Dragon dictation software.  As such, the subtleties and finesse of spoken language may permit erroneous, or at times, nonsensical words or phrases to be inadvertently transcribed; thus changes may be made at a later date to rectify these errors.     Bree Wan MD4/14/202411:29 EDT  This note has been electronically signed

## 2024-04-02 LAB
ALBUMIN SERPL-MCNC: 4.1 G/DL (ref 3.9–4.9)
ALBUMIN/GLOB SERPL: 1.2 {RATIO} (ref 1.2–2.2)
ALP SERPL-CCNC: 136 IU/L (ref 44–121)
ALT SERPL-CCNC: 18 IU/L (ref 0–32)
AST SERPL-CCNC: 19 IU/L (ref 0–40)
BASOPHILS # BLD AUTO: 0 X10E3/UL (ref 0–0.2)
BASOPHILS NFR BLD AUTO: 0 %
BILIRUB SERPL-MCNC: 1.1 MG/DL (ref 0–1.2)
BUN SERPL-MCNC: 12 MG/DL (ref 6–24)
BUN/CREAT SERPL: 19 (ref 9–23)
CALCIUM SERPL-MCNC: 8.9 MG/DL (ref 8.7–10.2)
CHLORIDE SERPL-SCNC: 101 MMOL/L (ref 96–106)
CO2 SERPL-SCNC: 22 MMOL/L (ref 20–29)
CREAT SERPL-MCNC: 0.64 MG/DL (ref 0.57–1)
EGFRCR SERPLBLD CKD-EPI 2021: 114 ML/MIN/1.73
EOSINOPHIL # BLD AUTO: 0.1 X10E3/UL (ref 0–0.4)
EOSINOPHIL NFR BLD AUTO: 1 %
ERYTHROCYTE [DISTWIDTH] IN BLOOD BY AUTOMATED COUNT: 15.1 % (ref 11.7–15.4)
GLOBULIN SER CALC-MCNC: 3.5 G/DL (ref 1.5–4.5)
GLUCOSE SERPL-MCNC: 193 MG/DL (ref 70–99)
HBA1C MFR BLD: 9.2 % (ref 4.8–5.6)
HCT VFR BLD AUTO: 39.3 % (ref 34–46.6)
HGB BLD-MCNC: 12.7 G/DL (ref 11.1–15.9)
IMM GRANULOCYTES # BLD AUTO: 0 X10E3/UL (ref 0–0.1)
IMM GRANULOCYTES NFR BLD AUTO: 0 %
LYMPHOCYTES # BLD AUTO: 2.5 X10E3/UL (ref 0.7–3.1)
LYMPHOCYTES NFR BLD AUTO: 29 %
MCH RBC QN AUTO: 27.3 PG (ref 26.6–33)
MCHC RBC AUTO-ENTMCNC: 32.3 G/DL (ref 31.5–35.7)
MCV RBC AUTO: 85 FL (ref 79–97)
MONOCYTES # BLD AUTO: 0.6 X10E3/UL (ref 0.1–0.9)
MONOCYTES NFR BLD AUTO: 7 %
NEUTROPHILS # BLD AUTO: 5.4 X10E3/UL (ref 1.4–7)
NEUTROPHILS NFR BLD AUTO: 63 %
PLATELET # BLD AUTO: 235 X10E3/UL (ref 150–450)
POTASSIUM SERPL-SCNC: 4.2 MMOL/L (ref 3.5–5.2)
PROT SERPL-MCNC: 7.6 G/DL (ref 6–8.5)
RBC # BLD AUTO: 4.65 X10E6/UL (ref 3.77–5.28)
SODIUM SERPL-SCNC: 139 MMOL/L (ref 134–144)
WBC # BLD AUTO: 8.7 X10E3/UL (ref 3.4–10.8)

## 2024-04-03 RX ORDER — PANTOPRAZOLE SODIUM 40 MG/1
40 TABLET, DELAYED RELEASE ORAL DAILY
Qty: 30 TABLET | Refills: 1 | OUTPATIENT
Start: 2024-04-03 | End: 2024-06-02

## 2024-04-04 ENCOUNTER — PATIENT MESSAGE (OUTPATIENT)
Dept: PAIN MEDICINE | Facility: CLINIC | Age: 44
End: 2024-04-04
Payer: MEDICAID

## 2024-04-04 ENCOUNTER — OFFICE VISIT (OUTPATIENT)
Dept: SPORTS MEDICINE | Facility: CLINIC | Age: 44
End: 2024-04-04
Payer: MEDICAID

## 2024-04-04 VITALS — HEIGHT: 64 IN | WEIGHT: 270 LBS | BODY MASS INDEX: 46.1 KG/M2 | HEART RATE: 77 BPM | OXYGEN SATURATION: 96 %

## 2024-04-04 DIAGNOSIS — M25.562 ACUTE PAIN OF LEFT KNEE: Primary | ICD-10-CM

## 2024-04-04 DIAGNOSIS — M17.12 PRIMARY OSTEOARTHRITIS OF LEFT KNEE: ICD-10-CM

## 2024-04-04 DIAGNOSIS — E66.01 CLASS 3 SEVERE OBESITY DUE TO EXCESS CALORIES WITHOUT SERIOUS COMORBIDITY WITH BODY MASS INDEX (BMI) OF 45.0 TO 49.9 IN ADULT: ICD-10-CM

## 2024-04-04 DIAGNOSIS — E11.65 TYPE 2 DIABETES MELLITUS WITH HYPERGLYCEMIA, WITHOUT LONG-TERM CURRENT USE OF INSULIN: ICD-10-CM

## 2024-04-04 RX ORDER — MELOXICAM 15 MG/1
15 TABLET ORAL DAILY
Qty: 30 TABLET | Refills: 1 | Status: SHIPPED | OUTPATIENT
Start: 2024-04-04

## 2024-04-04 NOTE — PROGRESS NOTES
NEW VISIT    Patient: Noa Soni  ?  YOB: 1980    MRN: [id number]  ?  Chief Complaint   Patient presents with    Left Knee - Initial Evaluation      ?  HPI: Patient is a 43-year-old female presents today for acute left knee pain.  She states symptoms started at the end of January without injury.  She has diffuse pain across the knee most significant over the anterior aspect which is worse with weightbearing activity or repetitive walking.  Has associated popping over the anterior knee and feelings of the knee giving way.  Also reporting occasional catching episodes.  Treatment at this time includes as needed over-the-counter Tylenol, as well as Percocet which she takes for chronic pain.  Denies any prior formal physical therapy, home exercise program, knee bracing, or injections.      Allergies:   Allergies   Allergen Reactions    Dilaudid [Hydromorphone] Other (See Comments)     Bottoms O2 out    Morphine Other (See Comments)     Suicical ideations    Tylenol With Codeine #3 [Acetaminophen-Codeine] Hallucinations    Abilify [Aripiprazole] Rash    Metformin Diarrhea    Vicodin [Hydrocodone-Acetaminophen] GI Intolerance       Past Medical History:   Diagnosis Date    Allergic     Anger     Anxiety     Bipolar 1 disorder     Cholelithiasis 1/3/2024    I had alot of stones in my gallbladder    Depression     Diabetes mellitus     Headache     HTN (hypertension)     Knee pain, bilateral     Low back pain     Neuropathy in diabetes 12/9/23    Obesity     PTSD (post-traumatic stress disorder)     Type 2 diabetes mellitus      Past Surgical History:   Procedure Laterality Date    CHOLECYSTECTOMY WITH INTRAOPERATIVE CHOLANGIOGRAM N/A 1/22/2024    Procedure: CHOLECYSTECTOMY LAPAROSCOPIC INTRAOPERATIVE CHOLANGIOGRAM;  Surgeon: Dustin Shelley MD;  [facility];  Service: General;  Laterality: N/A;    EAR TUBES      ENDOMETRIAL ABLATION      INTRAUTERINE DEVICE INSERTION  10/14/2020     "LAPAROSCOPIC TUBAL LIGATION      SUBTOTAL HYSTERECTOMY      TONSILLECTOMY AND ADENOIDECTOMY      TOTAL LAPAROSCOPIC HYSTERECTOMY WITH DAVINCI ROBOT  2023    [facility]     Social History     Occupational History    Occupation: disability    Tobacco Use    Smoking status: Former     Current packs/day: 0.00     Average packs/day: 1 pack/day for 5.0 years (5.0 ttl pk-yrs)     Types: Cigarettes     Start date: 2017     Quit date: 2018     Years since quittin.2     Passive exposure: Past    Smokeless tobacco: Never    Tobacco comments:     socially    Vaping Use    Vaping status: Never Used   Substance and Sexual Activity    Alcohol use: Never    Drug use: Never    Sexual activity: Not Currently     Partners: Male     Birth control/protection: Other, Hysterectomy      Social History     Social History Narrative    Not on file     Family History   Problem Relation Age of Onset    Hypertension Mother     Depression Mother     Post-traumatic stress disorder Mother     Hyperlipidemia Mother     Anxiety disorder Mother     Mental illness Mother     Hypertension Father     Hyperlipidemia Father     Hypertension Brother     Depression Brother     Parkinsonism Maternal Grandmother     Dementia Maternal Grandmother     Atrial fibrillation Maternal Grandfather     Breast cancer Paternal Grandmother     Diabetes Paternal Grandmother     COPD Paternal Grandfather        Review of Systems  Constitutional: Negative.  Negative for fever.   Musculoskeletal: Positive for joint pain  Skin: Negative.  Negative for rash and wound.    Neurological: Negative for numbness.     Vitals:    24 1123   Pulse: 77   SpO2: 96%   Weight: 122 kg (270 lb)   Height: 162.6 cm (64\")        Physical Exam  Constitutional: Patient is oriented to person, place, and time.  Patient is obese.  Head: Normocephalic and atraumatic.   Pulmonary/Chest: Effort normal.   Musculoskeletal:   See detailed exam below   Neurological: Alert and " oriented to person, place, and time. No sensory deficit. Coordination normal.   Skin: Skin is warm and dry. Capillary refill takes less than 2 seconds. No rash noted. No erythema.     The left knee is without obvious signs of acute bony deformity, quadriceps atrophy, swelling, erythema, or ecchymosis.  Trace joint effusion. The patella is with tenderness. Apprehension is negative with medial and lateral glide. Patella crepitus is positive. Patella grind is positive. The medial joint line is tender to palpation. The lateral joint line is tender to palpation. Soft tissue including the distal hamstring tendons, pes anserine, quad tendon, patellar tendon, proximal gastroc tendon, distal IT band, and Gerdy's tubercle are nontender. Flexion & extension are limited at end range motion and painful. Knee strength is 4/5 secondary to pain. Varus & valgus stress, anterior drawer, Ava's, and posterior drawer are all negative. The opposite knee is nontender and stable. Gait is painful and tandem.      Diagnostics:  Reviewed radiology report of xrays of left knee, from [facility] on 3/18/2024, summary of impression below:    No fracture or bone lesion.  Mild medial and patellofemoral joint space narrowing and osteoarthritic change.       Assessment:  Diagnoses and all orders for this visit:    1. Acute pain of left knee (Primary)  -     meloxicam (Mobic) 15 MG tablet; Take 1 tablet by mouth Daily.  Dispense: 30 tablet; Refill: 1  -     Ambulatory Referral to Physical Therapy Evaluate and treat; Stretching, ROM, Strengthening    2. Primary osteoarthritis of left knee  -     meloxicam (Mobic) 15 MG tablet; Take 1 tablet by mouth Daily.  Dispense: 30 tablet; Refill: 1  -     Ambulatory Referral to Physical Therapy Evaluate and treat; Stretching, ROM, Strengthening    3. Class 3 severe obesity due to excess calories without serious comorbidity with body mass index (BMI) of 45.0 to 49.9 in adult    4. Type 2 diabetes  mellitus with hyperglycemia, without long-term current use of insulin        Plan    Above diagnosis and plan discussed with the patient in office today.  Was unable to personally review x-ray imaging, as disc patient had brought into the office from [facility] was unable to load.  Did personally review prior x-ray report remarkable for mild degenerative change of the medial and patellofemoral compartment.  Patient with diffuse tenderness across the knee on exam today, most significantly involving the patellofemoral joint and medial compartment consistent with her x-ray findings.  We discussed conservative management ultimately elected to proceed with formal physical therapy, regular low impact cardiovascular activities, anti-inflammatory in the form of prescription Mobic, and RICE treatment.  Did strongly encourage weight loss.  Briefly discussed injection options, specifically intra-articular corticosteroid injection.  Unable to proceed with corticosteroid injection until better glucose control is current A1c greater than 9, with goal of less than 8 prior to injection.  Can revisit as alternative option in the future if improved A1c.  Intra-articular corticosteroid and viscosupplementation injection discussed   Physical Therapy discussed and referral placed as noted  Use of neoprene sleeve brace, hinge knee brace discussed and recommended   Weight loss recommended  Rest, ice, compression, and elevation (RICE) therapy  Prescription for Mobic as noted above  Follow up in 4-6 month(s)    Date of encounter: 04/04/2024   Maxwell Granados DO    Electronically signed by Maxwell Granados DO, 04/04/24, 11:28 AM EDT.    Disclaimer: Please note that areas of this note were completed with computer voice recognition software.  Quite often unanticipated grammatical, syntax, homophones, and other interpretive errors are inadvertently transcribed by the computer software. Please excuse any errors that have escaped final  proofreading.

## 2024-04-05 ENCOUNTER — PATIENT ROUNDING (BHMG ONLY) (OUTPATIENT)
Dept: SPORTS MEDICINE | Facility: CLINIC | Age: 44
End: 2024-04-05
Payer: MEDICAID

## 2024-04-05 DIAGNOSIS — E11.65 TYPE 2 DIABETES MELLITUS WITH HYPERGLYCEMIA, WITHOUT LONG-TERM CURRENT USE OF INSULIN: Primary | ICD-10-CM

## 2024-04-05 DIAGNOSIS — E66.01 CLASS 3 SEVERE OBESITY DUE TO EXCESS CALORIES WITHOUT SERIOUS COMORBIDITY WITH BODY MASS INDEX (BMI) OF 45.0 TO 49.9 IN ADULT: ICD-10-CM

## 2024-04-05 NOTE — TELEPHONE ENCOUNTER
From: Noa Soni  To: Perez Connors  Sent: 4/4/2024 5:56 PM EDT  Subject: Oxycodone     U need to call in a prescription for my oxycodone for April because [facility] is saying that there isn't any more refills so please can u send in a request

## 2024-04-05 NOTE — TELEPHONE ENCOUNTER
Pt has Oxycodone RX to be filled 4/22/24 @ [facility] in [place]. Called pt- no answer and no VM. HUB please read message if pt calls.

## 2024-05-02 DIAGNOSIS — M17.12 PRIMARY OSTEOARTHRITIS OF LEFT KNEE: ICD-10-CM

## 2024-05-02 DIAGNOSIS — M25.562 ACUTE PAIN OF LEFT KNEE: ICD-10-CM

## 2024-05-03 RX ORDER — MELOXICAM 15 MG/1
15 TABLET ORAL DAILY
Qty: 30 TABLET | Refills: 1 | Status: SHIPPED | OUTPATIENT
Start: 2024-05-03

## 2024-05-13 ENCOUNTER — OFFICE VISIT (OUTPATIENT)
Dept: PAIN MEDICINE | Facility: CLINIC | Age: 44
End: 2024-05-13
Payer: MEDICAID

## 2024-05-13 VITALS
DIASTOLIC BLOOD PRESSURE: 90 MMHG | SYSTOLIC BLOOD PRESSURE: 138 MMHG | HEART RATE: 91 BPM | RESPIRATION RATE: 16 BRPM | OXYGEN SATURATION: 95 %

## 2024-05-13 DIAGNOSIS — M54.42 CHRONIC BILATERAL LOW BACK PAIN WITH BILATERAL SCIATICA: Primary | ICD-10-CM

## 2024-05-13 DIAGNOSIS — M25.562 CHRONIC PAIN OF BOTH KNEES: ICD-10-CM

## 2024-05-13 DIAGNOSIS — M25.511 CHRONIC RIGHT SHOULDER PAIN: ICD-10-CM

## 2024-05-13 DIAGNOSIS — M12.811 ROTATOR CUFF ARTHROPATHY OF RIGHT SHOULDER: ICD-10-CM

## 2024-05-13 DIAGNOSIS — G89.29 CHRONIC BILATERAL LOW BACK PAIN WITH BILATERAL SCIATICA: Primary | ICD-10-CM

## 2024-05-13 DIAGNOSIS — M54.41 CHRONIC BILATERAL LOW BACK PAIN WITH BILATERAL SCIATICA: Primary | ICD-10-CM

## 2024-05-13 DIAGNOSIS — G89.29 CHRONIC PAIN OF BOTH KNEES: ICD-10-CM

## 2024-05-13 DIAGNOSIS — M25.561 CHRONIC PAIN OF BOTH KNEES: ICD-10-CM

## 2024-05-13 DIAGNOSIS — G89.29 CHRONIC RIGHT SHOULDER PAIN: ICD-10-CM

## 2024-05-13 DIAGNOSIS — M17.2 POST-TRAUMATIC OSTEOARTHRITIS OF BOTH KNEES: ICD-10-CM

## 2024-05-13 DIAGNOSIS — M54.16 LUMBAR RADICULOPATHY: ICD-10-CM

## 2024-05-13 PROCEDURE — 1159F MED LIST DOCD IN RCRD: CPT | Performed by: PHYSICAL MEDICINE & REHABILITATION

## 2024-05-13 PROCEDURE — G0463 HOSPITAL OUTPT CLINIC VISIT: HCPCS | Performed by: PHYSICAL MEDICINE & REHABILITATION

## 2024-05-13 PROCEDURE — 1160F RVW MEDS BY RX/DR IN RCRD: CPT | Performed by: PHYSICAL MEDICINE & REHABILITATION

## 2024-05-13 PROCEDURE — 3080F DIAST BP >= 90 MM HG: CPT | Performed by: PHYSICAL MEDICINE & REHABILITATION

## 2024-05-13 PROCEDURE — 1125F AMNT PAIN NOTED PAIN PRSNT: CPT | Performed by: PHYSICAL MEDICINE & REHABILITATION

## 2024-05-13 PROCEDURE — 3075F SYST BP GE 130 - 139MM HG: CPT | Performed by: PHYSICAL MEDICINE & REHABILITATION

## 2024-05-13 PROCEDURE — 99213 OFFICE O/P EST LOW 20 MIN: CPT | Performed by: PHYSICAL MEDICINE & REHABILITATION

## 2024-05-13 RX ORDER — OXYCODONE AND ACETAMINOPHEN 10; 325 MG/1; MG/1
1 TABLET ORAL EVERY 6 HOURS PRN
Qty: 120 TABLET | Refills: 0 | Status: SHIPPED | OUTPATIENT
Start: 2024-05-13

## 2024-05-13 RX ORDER — PREGABALIN 150 MG/1
150 CAPSULE ORAL 2 TIMES DAILY
Qty: 60 CAPSULE | Refills: 2 | Status: SHIPPED | OUTPATIENT
Start: 2024-05-13

## 2024-05-13 NOTE — PROGRESS NOTES
Subjective   Noa Soni is a 43 y.o. female.     History of Present Illness  Chronic bilateral knee pain, also low back and right shoulder pain, nonradiating, 10/10 at worst, 8/10 at best, always present, varies, began years ago, worsening over time, aching, sharp, worse with bending and walking, interferes with sleep, exercise. X-ray b/l knees with mod OA worst in medial compartment. Saw PCP, notes reviewed, with referral to Dr. Leiva who decline steroid injections with DM2, has failed NSAIDs, Tylenol, Voltaren gel, sees Dr. Valencia on multiple medications. No FH of substance abuse. Began Percocet 5mg TID prn, then 7.5mg TID prn. Worsening LBP, R shoulder pain.  Back Pain  Associated symptoms include abdominal pain. Pertinent negatives include no bladder incontinence, chest pain, fever, numbness or weakness.   Knee Pain   Pertinent negatives include no numbness.        The following portions of the patient's history were reviewed and updated as appropriate: allergies, current medications, past family history, past medical history, past social history, past surgical history and problem list.    Review of Systems   Constitutional:  Negative for chills, fatigue and fever.   HENT:  Positive for hearing loss. Negative for trouble swallowing.    Eyes:  Negative for visual disturbance.   Respiratory:  Negative for shortness of breath.    Cardiovascular:  Negative for chest pain.   Gastrointestinal:  Positive for abdominal pain and nausea. Negative for constipation, diarrhea and vomiting.   Genitourinary:  Negative for urinary incontinence.   Musculoskeletal:  Positive for arthralgias and back pain. Negative for joint swelling, myalgias and neck pain.   Neurological:  Positive for headache. Negative for dizziness, weakness and numbness.       Objective   Physical Exam  Constitutional:       Appearance: Normal appearance. She is well-developed.   HENT:      Head: Normocephalic and atraumatic.   Eyes:      Pupils: Pupils  are equal, round, and reactive to light.   Cardiovascular:      Rate and Rhythm: Normal rate and regular rhythm.      Heart sounds: Normal heart sounds.   Pulmonary:      Effort: Pulmonary effort is normal.      Breath sounds: Normal breath sounds.   Abdominal:      General: Bowel sounds are normal. There is no distension.      Palpations: Abdomen is soft.      Tenderness: There is no abdominal tenderness.   Musculoskeletal:      Cervical back: Normal range of motion.      Comments: R shoulder: (+) empty can test     Neurological:      Mental Status: She is alert and oriented to person, place, and time.      Sensory: No sensory deficit.      Deep Tendon Reflexes: Reflexes are normal and symmetric. Reflexes normal.   Psychiatric:         Mood and Affect: Mood normal.         Behavior: Behavior normal.         Thought Content: Thought content normal.         Judgment: Judgment normal.           Assessment & Plan   Diagnoses and all orders for this visit:    1. Chronic bilateral low back pain with bilateral sciatica (Primary)    2. Chronic pain of both knees    3. Chronic right shoulder pain    4. Lumbar radiculopathy    5. Post-traumatic osteoarthritis of both knees    6. Rotator cuff arthropathy of right shoulder      Oral DS in order 2/19/24.  Discussed risks and benefits of opioid treatment for chonic pain with patient, including expectations related to prescription requests, alternative modalities to opioids for managing pain, her treatment plan, risks of dependency and addiction, and safe storage practices for prescribed opioids, as well as proper and improper disposal of all medications.  Treatment plan will consist of continuing current medication as long as it remains effective and is necessary, while evaluating patient at each visit and determining if the medication can be lowered or discontinued, while also using nonopioid therapies to reduce reliance on opioids.  Failed Tylenol #3 TID prn with AMS. Failed  NSAIDs, Tylenol, Voltaren gel, allergic to Hydrocodone. Began Percocet 5mg TID prn, helping but not sufficient, increased to 7.5mg QID prn, insufficient, increased to 10mg QID prn. Denies any side effects but no longer effective. Filled 4/22/24. Rotated to MS-Contin 15mg TID with SI, stopped immediately, disposed of here, cont Percocet 10mg QID prn.  Trialed Lyrica 75mg BID, very helpful. Began Lyrica 75mg BID, helping, increased to 100mg BID, increased to 150mg BID.  Began Licart qdaily prn, denies being told not to take NSAIDs.  Performed b/l Monovisc injections with significant improvement, will repeat as necessary up to q6 months, cannot have steroids with DM2. Repeated 2/1/23.  Ordered MRI L-spine, with mild DDD and DJD only, worst pain appears to be facetogenic on history and exam, discussed b/l L4-S1 facet injections. Ordered LSO for now to improve truncal stability.  Performed R shoulder injection.  RTC in 3 months for f/u.    INSPECT REPORT     As part of the patient's treatment plan, I am prescribing controlled substances. The patient has been made aware of appropriate use of such medications, including potential risk of somnolence, limited ability to drive and/or work safely, and the potential for dependence or overdose. It has also bee made clear that these medications are for use by this patient only, without concomitant use of alcohol or other substances unless prescribed.      Patient has completed prescribing agreement detailing terms of continued prescribing of controlled substances, including monitoring INSPECT reports, urine drug screening, and pill counts if necessary. The patient is aware that inappropriate use will results in cessation of prescribing such medications.     INSPECT report has been reviewed and scanned into the patient's chart.     As the clinician, I personally reviewed the INSPECT while the patient was in the office today.     History and physical exam exhibit continued safe and  appropriate use of controlled substances.

## 2024-05-21 DIAGNOSIS — Z01.419 ENCOUNTER FOR WELL WOMAN EXAM: ICD-10-CM

## 2024-05-21 DIAGNOSIS — M17.2 POST-TRAUMATIC OSTEOARTHRITIS OF BOTH KNEES: Primary | ICD-10-CM

## 2024-05-31 ENCOUNTER — OFFICE VISIT (OUTPATIENT)
Dept: ORTHOPEDIC SURGERY | Facility: CLINIC | Age: 44
End: 2024-05-31
Payer: MEDICAID

## 2024-05-31 VITALS — WEIGHT: 270 LBS | BODY MASS INDEX: 46.1 KG/M2 | OXYGEN SATURATION: 97 % | HEIGHT: 64 IN

## 2024-05-31 DIAGNOSIS — M17.0 BILATERAL PRIMARY OSTEOARTHRITIS OF KNEE: Primary | ICD-10-CM

## 2024-05-31 RX ORDER — TRIAMCINOLONE ACETONIDE 40 MG/ML
80 INJECTION, SUSPENSION INTRA-ARTICULAR; INTRAMUSCULAR
Status: COMPLETED | OUTPATIENT
Start: 2024-05-31 | End: 2024-05-31

## 2024-05-31 RX ORDER — LIDOCAINE HYDROCHLORIDE 10 MG/ML
4 INJECTION, SOLUTION EPIDURAL; INFILTRATION; INTRACAUDAL; PERINEURAL
Status: COMPLETED | OUTPATIENT
Start: 2024-05-31 | End: 2024-05-31

## 2024-05-31 RX ADMIN — LIDOCAINE HYDROCHLORIDE 4 ML: 10 INJECTION, SOLUTION EPIDURAL; INFILTRATION; INTRACAUDAL; PERINEURAL at 14:54

## 2024-05-31 RX ADMIN — LIDOCAINE HYDROCHLORIDE 4 ML: 10 INJECTION, SOLUTION EPIDURAL; INFILTRATION; INTRACAUDAL; PERINEURAL at 14:53

## 2024-05-31 RX ADMIN — TRIAMCINOLONE ACETONIDE 80 MG: 40 INJECTION, SUSPENSION INTRA-ARTICULAR; INTRAMUSCULAR at 14:54

## 2024-05-31 RX ADMIN — TRIAMCINOLONE ACETONIDE 80 MG: 40 INJECTION, SUSPENSION INTRA-ARTICULAR; INTRAMUSCULAR at 14:53

## 2024-05-31 NOTE — PROGRESS NOTES
Subjective:     Patient ID: Noa Soni is a 43 y.o. female.    Chief Complaint:    History of Present Illness  Noa Soni presents to clinic today for evaluation of severe bilateral knee pain.  The patient states that the knee pain has gradually progressed and she denies any specific injury.  She states that the pain is locatedmedially in both knees.  She states it is better with rest and worse with activity.  So far she has tried PT NSAIDS and pain cream.  She is hopeful to avoid surgery but get some resolution of her symptoms.     Social History     Occupational History   • Occupation: disability    Tobacco Use   • Smoking status: Former     Current packs/day: 0.00     Average packs/day: 1 pack/day for 5.0 years (5.0 ttl pk-yrs)     Types: Cigarettes     Start date: 2017     Quit date: 2018     Years since quittin.4     Passive exposure: Past   • Smokeless tobacco: Never   • Tobacco comments:     socially    Vaping Use   • Vaping status: Never Used   Substance and Sexual Activity   • Alcohol use: Never   • Drug use: Never   • Sexual activity: Not Currently     Partners: Male     Birth control/protection: Other, Hysterectomy      Past Medical History:   Diagnosis Date   • Allergic    • Anger    • Anxiety    • Bipolar 1 disorder    • Cholelithiasis 1/3/2024    I had alot of stones in my gallbladder   • CTS (carpal tunnel syndrome)    • Depression    • Diabetes mellitus    • Headache    • HTN (hypertension)    • Knee pain, bilateral    • Knee swelling    • Low back pain    • Neuropathy in diabetes 23   • Obesity    • PTSD (post-traumatic stress disorder)    • Type 2 diabetes mellitus      Past Surgical History:   Procedure Laterality Date   • CHOLECYSTECTOMY WITH INTRAOPERATIVE CHOLANGIOGRAM N/A 2024    Procedure: CHOLECYSTECTOMY LAPAROSCOPIC INTRAOPERATIVE CHOLANGIOGRAM;  Surgeon: Dustin Shelley MD;  Location: [facility] MAIN OR;  Service: General;  Laterality: N/A;   • EAR TUBES     •  "ENDOMETRIAL ABLATION     • INTRAUTERINE DEVICE INSERTION  10/14/2020   • LAPAROSCOPIC TUBAL LIGATION     • SUBTOTAL HYSTERECTOMY     • TONSILLECTOMY AND ADENOIDECTOMY     • TOTAL LAPAROSCOPIC HYSTERECTOMY WITH DAVINCI ROBOT  06/02/2023    [facility]       Family History   Problem Relation Age of Onset   • Hypertension Mother    • Depression Mother    • Post-traumatic stress disorder Mother    • Hyperlipidemia Mother    • Anxiety disorder Mother    • Mental illness Mother    • Hypertension Father    • Hyperlipidemia Father    • Hypertension Brother    • Depression Brother    • Parkinsonism Maternal Grandmother    • Dementia Maternal Grandmother    • Atrial fibrillation Maternal Grandfather    • Breast cancer Paternal Grandmother    • Diabetes Paternal Grandmother    • COPD Paternal Grandfather    • Diabetes Paternal Grandfather                  Objective:  Vitals:    05/31/24 1427   SpO2: 97%   Weight: 122 kg (270 lb)   Height: 162.6 cm (64\")         05/31/24  1427   Weight: 122 kg (270 lb)     Body mass index is 46.35 kg/m².        Right Knee Exam     Tenderness   The patient is experiencing tenderness in the medial retinaculum and medial joint line.    Range of Motion   Extension:  0   Flexion:  130     Tests   Varus: negative Valgus: negative  Lachman:  Anterior - negative    Posterior - negative  Drawer:  Anterior - negative    Posterior - negative    Other   Erythema: absent  Scars: absent  Sensation: normal  Pulse: present  Swelling: none  Effusion: no effusion present      Left Knee Exam     Tenderness   The patient is experiencing tenderness in the medial retinaculum and medial joint line.    Range of Motion   Extension:  0   Flexion:  130     Tests   Varus: negative Valgus: negative  Lachman:  Anterior - negative    Posterior - negative  Drawer:  Anterior - negative     Posterior - negative    Other   Erythema: absent  Scars: absent  Sensation: normal  Pulse: present  Effusion: no effusion present       "     Large Joint Arthrocentesis: R knee  Date/Time: 2024 2:53 PM  Consent given by: patient  Site marked: site marked  Timeout: Immediately prior to procedure a time out was called to verify the correct patient, procedure, equipment, support staff and site/side marked as required   Supporting Documentation  Indications: pain   Procedure Details  Location: knee - R knee  Preparation: Patient was prepped and draped in the usual sterile fashion  Needle size: 22 G  Approach: anteromedial  Medications administered: 4 mL lidocaine PF 1% 1 %; 80 mg triamcinolone acetonide 40 MG/ML  Patient tolerance: patient tolerated the procedure well with no immediate complications      Large Joint Arthrocentesis: L knee  Date/Time: 2024 2:54 PM  Consent given by: patient  Supporting Documentation  Indications: pain   Procedure Details  Location: knee - L knee  Needle size: 22 G  Approach: anterolateral  Medications administered: 4 mL lidocaine PF 1% 1 %; 80 mg triamcinolone acetonide 40 MG/ML      Imagin views of the bilateral knee were ordered and reviewed by myself in the office today  Indication: bilateral knee pain  Findings: X-rays demonstrate no acute osseous abnormality.  There is no signs of fracture dislocation or subluxation.  There is joint space narrowing in the medial joint but no significant subchondral sclerosis, cystic changes, or periarticular osteophytes   Comparative studies: None      Assessment:        1. Bilateral primary osteoarthritis of knee           Plan:          Discussed treatment options at length with patient at today's visit.  I discussed with the patient that she is developing.  Mild bilateral knee predominantly medial compartment osteoarthritis.  I discussed with the patient that the mainstays of conservative treatment for knee OA include physical therapy, nonsteroidal anti-inflammatories, injections, activity modification, and home exercises.  The patient states that they  would like to  try bilateral knee corticosteroid injections.  I discussed with her that she needs to keep a better control of her glucose levels and that injections can cause a spike in blood glucose.  The patient states she does not check it regularly.  I encouraged her to be more adamant about that.  I discussed with her that I would not recommend arthroscopic surgery as she has degenerative arthritis of both right and left medial compartments.  Additionally she is not a surgical candidate with a hemoglobin A1c of 9.2 which was drawn in April and a BMI today of 46.35.  At this point time the patient does not need to schedule follow-up with me today.  I am happy to see the patient back at any point time however if issues arise or they fail to make a full recovery.  Follow up: as needed      Noa Soni was in agreement with plan and had all questions answered.     Medications:  No orders of the defined types were placed in this encounter.      Followup:  No follow-ups on file.    Diagnoses and all orders for this visit:    1. Bilateral primary osteoarthritis of knee (Primary)  -     XR Knee 4+ View Bilateral          Dictated utilizing Dragon dictation

## 2024-07-22 ENCOUNTER — TELEPHONE (OUTPATIENT)
Dept: FAMILY MEDICINE CLINIC | Facility: CLINIC | Age: 44
End: 2024-07-22
Payer: MEDICAID

## 2024-07-22 NOTE — TELEPHONE ENCOUNTER
Patient sent [facility] message with below complaint.  She needs an appointment.  Please contact her and assist with arranging that.  Thanks!

## 2024-08-15 DIAGNOSIS — M25.562 ACUTE PAIN OF LEFT KNEE: ICD-10-CM

## 2024-08-15 DIAGNOSIS — M17.12 PRIMARY OSTEOARTHRITIS OF LEFT KNEE: ICD-10-CM

## 2024-08-15 RX ORDER — MELOXICAM 15 MG/1
15 TABLET ORAL DAILY
Qty: 30 TABLET | Refills: 1 | OUTPATIENT
Start: 2024-08-15

## 2024-08-19 ENCOUNTER — TELEPHONE (OUTPATIENT)
Dept: PAIN MEDICINE | Facility: CLINIC | Age: 44
End: 2024-08-19

## 2024-08-19 ENCOUNTER — OFFICE VISIT (OUTPATIENT)
Dept: PAIN MEDICINE | Facility: CLINIC | Age: 44
End: 2024-08-19
Payer: MEDICAID

## 2024-08-19 VITALS
SYSTOLIC BLOOD PRESSURE: 150 MMHG | HEART RATE: 80 BPM | OXYGEN SATURATION: 94 % | DIASTOLIC BLOOD PRESSURE: 90 MMHG | RESPIRATION RATE: 16 BRPM

## 2024-08-19 DIAGNOSIS — G89.29 CHRONIC PAIN OF BOTH KNEES: ICD-10-CM

## 2024-08-19 DIAGNOSIS — G89.29 CHRONIC RIGHT SHOULDER PAIN: ICD-10-CM

## 2024-08-19 DIAGNOSIS — M12.811 ROTATOR CUFF ARTHROPATHY OF RIGHT SHOULDER: ICD-10-CM

## 2024-08-19 DIAGNOSIS — G89.29 CHRONIC BILATERAL LOW BACK PAIN WITH BILATERAL SCIATICA: Primary | ICD-10-CM

## 2024-08-19 DIAGNOSIS — M54.42 CHRONIC BILATERAL LOW BACK PAIN WITH BILATERAL SCIATICA: Primary | ICD-10-CM

## 2024-08-19 DIAGNOSIS — M25.562 CHRONIC PAIN OF BOTH KNEES: ICD-10-CM

## 2024-08-19 DIAGNOSIS — M25.511 CHRONIC RIGHT SHOULDER PAIN: ICD-10-CM

## 2024-08-19 DIAGNOSIS — M54.16 LUMBAR RADICULOPATHY: ICD-10-CM

## 2024-08-19 DIAGNOSIS — M54.41 CHRONIC BILATERAL LOW BACK PAIN WITH BILATERAL SCIATICA: Primary | ICD-10-CM

## 2024-08-19 DIAGNOSIS — M17.2 POST-TRAUMATIC OSTEOARTHRITIS OF BOTH KNEES: ICD-10-CM

## 2024-08-19 DIAGNOSIS — M25.561 CHRONIC PAIN OF BOTH KNEES: ICD-10-CM

## 2024-08-19 PROCEDURE — 1160F RVW MEDS BY RX/DR IN RCRD: CPT | Performed by: PHYSICAL MEDICINE & REHABILITATION

## 2024-08-19 PROCEDURE — 3077F SYST BP >= 140 MM HG: CPT | Performed by: PHYSICAL MEDICINE & REHABILITATION

## 2024-08-19 PROCEDURE — 1125F AMNT PAIN NOTED PAIN PRSNT: CPT | Performed by: PHYSICAL MEDICINE & REHABILITATION

## 2024-08-19 PROCEDURE — 1159F MED LIST DOCD IN RCRD: CPT | Performed by: PHYSICAL MEDICINE & REHABILITATION

## 2024-08-19 PROCEDURE — G0463 HOSPITAL OUTPT CLINIC VISIT: HCPCS | Performed by: PHYSICAL MEDICINE & REHABILITATION

## 2024-08-19 PROCEDURE — 99214 OFFICE O/P EST MOD 30 MIN: CPT | Performed by: PHYSICAL MEDICINE & REHABILITATION

## 2024-08-19 PROCEDURE — 3080F DIAST BP >= 90 MM HG: CPT | Performed by: PHYSICAL MEDICINE & REHABILITATION

## 2024-08-19 RX ORDER — OXYCODONE AND ACETAMINOPHEN 10; 325 MG/1; MG/1
1 TABLET ORAL EVERY 6 HOURS PRN
Qty: 120 TABLET | Refills: 0 | Status: SHIPPED | OUTPATIENT
Start: 2024-08-19

## 2024-08-19 RX ORDER — PREGABALIN 150 MG/1
150 CAPSULE ORAL 2 TIMES DAILY
Qty: 60 CAPSULE | Refills: 2 | Status: SHIPPED | OUTPATIENT
Start: 2024-08-19

## 2024-08-19 NOTE — PROGRESS NOTES
Subjective   Noa Soni is a 43 y.o. female.     History of Present Illness  Chronic bilateral knee pain, also low back and right shoulder pain, nonradiating, 10/10 at worst, 8/10 at best, always present, varies, began years ago, worsening over time, aching, sharp, worse with bending and walking, interferes with sleep, exercise. X-ray b/l knees with mod OA worst in medial compartment. Saw PCP, notes reviewed, with referral to Dr. Leiva who decline steroid injections with DM2, has failed NSAIDs, Tylenol, Voltaren gel, sees Dr. Valencia on multiple medications. No FH of substance abuse. Began Percocet 5mg TID prn, then 7.5mg TID prn. Worsening LBP, R shoulder pain.  Back Pain  Associated symptoms include abdominal pain. Pertinent negatives include no bladder incontinence, chest pain, fever, numbness or weakness.   Knee Pain   Pertinent negatives include no numbness.        The following portions of the patient's history were reviewed and updated as appropriate: allergies, current medications, past family history, past medical history, past social history, past surgical history and problem list.    Review of Systems   Constitutional:  Negative for chills, fatigue and fever.   HENT:  Positive for hearing loss. Negative for trouble swallowing.    Eyes:  Negative for visual disturbance.   Respiratory:  Negative for shortness of breath.    Cardiovascular:  Negative for chest pain.   Gastrointestinal:  Positive for abdominal pain and nausea. Negative for constipation, diarrhea and vomiting.   Genitourinary:  Negative for urinary incontinence.   Musculoskeletal:  Positive for arthralgias and back pain. Negative for joint swelling, myalgias and neck pain.   Neurological:  Positive for headache. Negative for dizziness, weakness and numbness.       Objective   Physical Exam  Constitutional:       Appearance: Normal appearance. She is well-developed.   HENT:      Head: Normocephalic and atraumatic.   Eyes:      Pupils: Pupils  are equal, round, and reactive to light.   Cardiovascular:      Rate and Rhythm: Normal rate and regular rhythm.      Heart sounds: Normal heart sounds.   Pulmonary:      Effort: Pulmonary effort is normal.      Breath sounds: Normal breath sounds.   Abdominal:      General: Bowel sounds are normal. There is no distension.      Palpations: Abdomen is soft.      Tenderness: There is no abdominal tenderness.   Musculoskeletal:      Cervical back: Normal range of motion.      Comments: R shoulder: (+) empty can test     Neurological:      Mental Status: She is alert and oriented to person, place, and time.      Sensory: No sensory deficit.      Deep Tendon Reflexes: Reflexes are normal and symmetric. Reflexes normal.   Psychiatric:         Mood and Affect: Mood normal.         Behavior: Behavior normal.         Thought Content: Thought content normal.         Judgment: Judgment normal.           Assessment & Plan   Diagnoses and all orders for this visit:    1. Chronic bilateral low back pain with bilateral sciatica (Primary)    2. Chronic pain of both knees    3. Chronic right shoulder pain    4. Lumbar radiculopathy    5. Post-traumatic osteoarthritis of both knees    6. Rotator cuff arthropathy of right shoulder      Oral DS in order 2/19/24.  Discussed risks and benefits of opioid treatment for chonic pain with patient, including expectations related to prescription requests, alternative modalities to opioids for managing pain, her treatment plan, risks of dependency and addiction, and safe storage practices for prescribed opioids, as well as proper and improper disposal of all medications.  Treatment plan will consist of continuing current medication as long as it remains effective and is necessary, while evaluating patient at each visit and determining if the medication can be lowered or discontinued, while also using nonopioid therapies to reduce reliance on opioids.  Failed Tylenol #3 TID prn with AMS. Failed  NSAIDs, Tylenol, Voltaren gel, allergic to Hydrocodone. Began Percocet 5mg TID prn, helping but not sufficient, increased to 7.5mg QID prn, insufficient, increased to 10mg QID prn. Denies any side effects but no longer effective. Rotated to MS-Contin 15mg TID with SI, stopped immediately, disposed of here, cont Percocet 10mg QID prn. Filled 7/20/24.   Trialed Lyrica 75mg BID, very helpful. Began Lyrica 75mg BID, helping, increased to 100mg BID, increased to 150mg BID.  Began Licart qdaily prn, denies being told not to take NSAIDs.  Performed b/l Monovisc injections with significant improvement, will repeat as necessary up to q6 months, cannot have steroids with DM2. Repeated 2/1/23. Schedule repeat.  Performed R shoulder injection.  Ordered MRI L-spine, with mild DDD and DJD only, worst pain appears to be facetogenic on history and exam, discussed b/l L4-S1 facet injections. Ordered LSO for now to improve truncal stability.  RTC in 3 months for f/u.    INSPECT REPORT     As part of the patient's treatment plan, I am prescribing controlled substances. The patient has been made aware of appropriate use of such medications, including potential risk of somnolence, limited ability to drive and/or work safely, and the potential for dependence or overdose. It has also bee made clear that these medications are for use by this patient only, without concomitant use of alcohol or other substances unless prescribed.      Patient has completed prescribing agreement detailing terms of continued prescribing of controlled substances, including monitoring INSPECT reports, urine drug screening, and pill counts if necessary. The patient is aware that inappropriate use will results in cessation of prescribing such medications.     INSPECT report has been reviewed and scanned into the patient's chart.     As the clinician, I personally reviewed the INSPECT while the patient was in the office today.     History and physical exam exhibit  continued safe and appropriate use of controlled substances.

## 2024-10-09 ENCOUNTER — TELEPHONE (OUTPATIENT)
Dept: PAIN MEDICINE | Facility: CLINIC | Age: 44
End: 2024-10-09
Payer: MEDICAID

## 2024-10-09 NOTE — TELEPHONE ENCOUNTER
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

## 2024-10-14 DIAGNOSIS — F43.12 CHRONIC POSTTRAUMATIC STRESS DISORDER: Chronic | ICD-10-CM

## 2024-10-14 DIAGNOSIS — E11.65 TYPE 2 DIABETES MELLITUS WITH HYPERGLYCEMIA, WITHOUT LONG-TERM CURRENT USE OF INSULIN: ICD-10-CM

## 2024-10-14 DIAGNOSIS — F41.1 GENERALIZED ANXIETY DISORDER: Chronic | ICD-10-CM

## 2024-10-14 DIAGNOSIS — F33.2 SEVERE RECURRENT MAJOR DEPRESSION WITHOUT PSYCHOTIC FEATURES: ICD-10-CM

## 2024-10-14 DIAGNOSIS — I10 ESSENTIAL HYPERTENSION: ICD-10-CM

## 2024-10-14 RX ORDER — LISINOPRIL 10 MG/1
10 TABLET ORAL DAILY
Qty: 90 TABLET | Refills: 3 | Status: SHIPPED | OUTPATIENT
Start: 2024-10-14

## 2024-10-14 RX ORDER — SITAGLIPTIN 100 MG/1
100 TABLET, FILM COATED ORAL DAILY
Qty: 90 TABLET | Refills: 3 | Status: SHIPPED | OUTPATIENT
Start: 2024-10-14

## 2024-10-14 RX ORDER — FLUOXETINE 40 MG/1
40 CAPSULE ORAL DAILY
Qty: 90 CAPSULE | Refills: 3 | Status: SHIPPED | OUTPATIENT
Start: 2024-10-14

## 2024-10-16 ENCOUNTER — TELEPHONE (OUTPATIENT)
Dept: PAIN MEDICINE | Facility: CLINIC | Age: 44
End: 2024-10-16
Payer: MEDICAID

## 2024-10-16 NOTE — TELEPHONE ENCOUNTER
Hub staff attempted to follow warm transfer process and was unsuccessful     Caller: Noa Soni    Relationship to patient: Self    Best call back number: [phone]*    Patient is needing: PT IS CALLING TO RS HER APPOINTMENT FOR TODAY FOR A PROCEDURE WITH DR ALLEN.. IT WAS SCHEDULED FOR 1:30PM.. PLEASE ADVISE..

## 2024-10-16 NOTE — TELEPHONE ENCOUNTER
Caller: Noa Soni    Relationship to patient: Self    Best call back number: [phone]    Chief complaint: N/A     Type of visit: INJECTION    If rescheduling, when is the original appointment: 10-16-24     Additional notes:PLEASE CALL TO RESCHEDULE - TRANSFERRED TO HOSPITAL TO CANCEL AS WELL

## 2024-10-23 DIAGNOSIS — M25.562 ACUTE PAIN OF LEFT KNEE: ICD-10-CM

## 2024-10-23 DIAGNOSIS — M17.12 PRIMARY OSTEOARTHRITIS OF LEFT KNEE: ICD-10-CM

## 2024-10-23 RX ORDER — MELOXICAM 15 MG/1
15 TABLET ORAL DAILY
Qty: 30 TABLET | Refills: 1 | Status: SHIPPED | OUTPATIENT
Start: 2024-10-23

## 2024-11-18 ENCOUNTER — OFFICE VISIT (OUTPATIENT)
Dept: PAIN MEDICINE | Facility: CLINIC | Age: 44
End: 2024-11-18
Payer: MEDICAID

## 2024-11-18 VITALS
HEART RATE: 90 BPM | RESPIRATION RATE: 16 BRPM | SYSTOLIC BLOOD PRESSURE: 138 MMHG | OXYGEN SATURATION: 95 % | DIASTOLIC BLOOD PRESSURE: 91 MMHG

## 2024-11-18 DIAGNOSIS — G89.29 CHRONIC PAIN OF BOTH KNEES: Primary | ICD-10-CM

## 2024-11-18 DIAGNOSIS — M54.41 CHRONIC BILATERAL LOW BACK PAIN WITH BILATERAL SCIATICA: ICD-10-CM

## 2024-11-18 DIAGNOSIS — M25.561 CHRONIC PAIN OF BOTH KNEES: Primary | ICD-10-CM

## 2024-11-18 DIAGNOSIS — M25.562 CHRONIC PAIN OF BOTH KNEES: Primary | ICD-10-CM

## 2024-11-18 DIAGNOSIS — M54.42 CHRONIC BILATERAL LOW BACK PAIN WITH BILATERAL SCIATICA: ICD-10-CM

## 2024-11-18 DIAGNOSIS — M25.511 CHRONIC RIGHT SHOULDER PAIN: ICD-10-CM

## 2024-11-18 DIAGNOSIS — G89.29 CHRONIC BILATERAL LOW BACK PAIN WITH BILATERAL SCIATICA: ICD-10-CM

## 2024-11-18 DIAGNOSIS — M54.16 LUMBAR RADICULOPATHY: ICD-10-CM

## 2024-11-18 DIAGNOSIS — M12.811 ROTATOR CUFF ARTHROPATHY OF RIGHT SHOULDER: ICD-10-CM

## 2024-11-18 DIAGNOSIS — M17.2 POST-TRAUMATIC OSTEOARTHRITIS OF BOTH KNEES: ICD-10-CM

## 2024-11-18 DIAGNOSIS — G89.29 CHRONIC RIGHT SHOULDER PAIN: ICD-10-CM

## 2024-11-18 PROCEDURE — 1125F AMNT PAIN NOTED PAIN PRSNT: CPT | Performed by: PHYSICAL MEDICINE & REHABILITATION

## 2024-11-18 PROCEDURE — G0463 HOSPITAL OUTPT CLINIC VISIT: HCPCS | Performed by: PHYSICAL MEDICINE & REHABILITATION

## 2024-11-18 PROCEDURE — 3075F SYST BP GE 130 - 139MM HG: CPT | Performed by: PHYSICAL MEDICINE & REHABILITATION

## 2024-11-18 PROCEDURE — 1159F MED LIST DOCD IN RCRD: CPT | Performed by: PHYSICAL MEDICINE & REHABILITATION

## 2024-11-18 PROCEDURE — 3080F DIAST BP >= 90 MM HG: CPT | Performed by: PHYSICAL MEDICINE & REHABILITATION

## 2024-11-18 PROCEDURE — 99214 OFFICE O/P EST MOD 30 MIN: CPT | Performed by: PHYSICAL MEDICINE & REHABILITATION

## 2024-11-18 PROCEDURE — 1160F RVW MEDS BY RX/DR IN RCRD: CPT | Performed by: PHYSICAL MEDICINE & REHABILITATION

## 2024-11-18 RX ORDER — OXYCODONE AND ACETAMINOPHEN 10; 325 MG/1; MG/1
1 TABLET ORAL EVERY 6 HOURS PRN
Qty: 120 TABLET | Refills: 0 | Status: SHIPPED | OUTPATIENT
Start: 2024-11-18

## 2024-11-18 RX ORDER — PREGABALIN 150 MG/1
150 CAPSULE ORAL 2 TIMES DAILY
Qty: 60 CAPSULE | Refills: 5 | Status: SHIPPED | OUTPATIENT
Start: 2024-11-18

## 2024-11-18 NOTE — PROGRESS NOTES
Subjective   Noa Soni is a 43 y.o. female.     History of Present Illness  Chronic bilateral knee pain, also low back and right shoulder pain, nonradiating, 10/10 at worst, 8/10 at best, always present, varies, began years ago, worsening over time, aching, sharp, worse with bending and walking, interferes with sleep, exercise. X-ray b/l knees with mod OA worst in medial compartment. Saw PCP, notes reviewed, with referral to Dr. Leiva who decline steroid injections with DM2, has failed NSAIDs, Tylenol, Voltaren gel, sees Dr. Valencia on multiple medications. No FH of substance abuse. Began Percocet 5mg TID prn, then 7.5mg TID prn. Worsening LBP, R shoulder pain.  Back Pain  Associated symptoms include abdominal pain. Pertinent negatives include no bladder incontinence, chest pain, fever, numbness or weakness.   Knee Pain   Pertinent negatives include no numbness.        The following portions of the patient's history were reviewed and updated as appropriate: allergies, current medications, past family history, past medical history, past social history, past surgical history and problem list.    Review of Systems   Constitutional:  Negative for chills, fatigue and fever.   HENT:  Positive for hearing loss. Negative for trouble swallowing.    Eyes:  Negative for visual disturbance.   Respiratory:  Negative for shortness of breath.    Cardiovascular:  Negative for chest pain.   Gastrointestinal:  Positive for abdominal pain and nausea. Negative for constipation, diarrhea and vomiting.   Genitourinary:  Negative for urinary incontinence.   Musculoskeletal:  Positive for arthralgias and back pain. Negative for joint swelling, myalgias and neck pain.   Neurological:  Positive for headache. Negative for dizziness, weakness and numbness.       Objective   Physical Exam  Constitutional:       Appearance: Normal appearance. She is well-developed.   HENT:      Head: Normocephalic and atraumatic.   Eyes:      Pupils: Pupils  are equal, round, and reactive to light.   Cardiovascular:      Rate and Rhythm: Normal rate and regular rhythm.      Heart sounds: Normal heart sounds.   Pulmonary:      Effort: Pulmonary effort is normal.      Breath sounds: Normal breath sounds.   Abdominal:      General: Bowel sounds are normal. There is no distension.      Palpations: Abdomen is soft.      Tenderness: There is no abdominal tenderness.   Musculoskeletal:      Cervical back: Normal range of motion.      Comments: R shoulder: (+) empty can test     Neurological:      Mental Status: She is alert and oriented to person, place, and time.      Sensory: No sensory deficit.      Deep Tendon Reflexes: Reflexes are normal and symmetric. Reflexes normal.   Psychiatric:         Mood and Affect: Mood normal.         Behavior: Behavior normal.         Thought Content: Thought content normal.         Judgment: Judgment normal.           Assessment & Plan   Diagnoses and all orders for this visit:    1. Chronic pain of both knees (Primary)    2. Chronic bilateral low back pain with bilateral sciatica    3. Chronic right shoulder pain    4. Lumbar radiculopathy    5. Post-traumatic osteoarthritis of both knees    6. Rotator cuff arthropathy of right shoulder      Oral DS in order 2/19/24.  Discussed risks and benefits of opioid treatment for chonic pain with patient, including expectations related to prescription requests, alternative modalities to opioids for managing pain, her treatment plan, risks of dependency and addiction, and safe storage practices for prescribed opioids, as well as proper and improper disposal of all medications.  Treatment plan will consist of continuing current medication as long as it remains effective and is necessary, while evaluating patient at each visit and determining if the medication can be lowered or discontinued, while also using nonopioid therapies to reduce reliance on opioids.  Failed Tylenol #3 TID prn with AMS. Failed  NSAIDs, Tylenol, Voltaren gel, allergic to Hydrocodone. Began Percocet 5mg TID prn, helping but not sufficient, increased to 7.5mg QID prn, insufficient, increased to 10mg QID prn. Denies any side effects but no longer effective. Rotated to MS-Contin 15mg TID with SI, stopped immediately, disposed of here, cont Percocet 10mg QID prn. Filled 10/17/24.   Trialed Lyrica 75mg BID, very helpful. Began Lyrica 75mg BID, helping, increased to 100mg BID, increased to 150mg BID.  Began Licart qdaily prn, denies being told not to take NSAIDs.  Performed b/l Monovisc injections with significant improvement, will repeat as necessary up to q6 months, cannot have steroids with DM2. Repeated 2/1/23. Schedule repeat.  Performed R shoulder injection.  Ordered MRI L-spine, with mild DDD and DJD only, worst pain appears to be facetogenic on history and exam, discussed b/l L4-S1 facet injections, schedule.   Ordered LSO to improve truncal stability.  RTC for b/l L4-S1 facet inj then in 3 months for f/u.    INSPECT REPORT     As part of the patient's treatment plan, I am prescribing controlled substances. The patient has been made aware of appropriate use of such medications, including potential risk of somnolence, limited ability to drive and/or work safely, and the potential for dependence or overdose. It has also bee made clear that these medications are for use by this patient only, without concomitant use of alcohol or other substances unless prescribed.      Patient has completed prescribing agreement detailing terms of continued prescribing of controlled substances, including monitoring INSPECT reports, urine drug screening, and pill counts if necessary. The patient is aware that inappropriate use will results in cessation of prescribing such medications.     INSPECT report has been reviewed and scanned into the patient's chart.     As the clinician, I personally reviewed the INSPECT while the patient was in the office today.      History and physical exam exhibit continued safe and appropriate use of controlled substances.

## 2024-11-20 ENCOUNTER — TELEPHONE (OUTPATIENT)
Dept: PAIN MEDICINE | Facility: CLINIC | Age: 44
End: 2024-11-20
Payer: MEDICAID

## 2024-11-20 NOTE — TELEPHONE ENCOUNTER
Hub staff attempted to follow warm transfer process and was unsuccessful     Caller: Noa Soni    Relationship to patient: Self    Best call back number: [phone]    Patient is needing: TO R/ S TODAYS' INJECTION

## 2024-11-27 ENCOUNTER — HOSPITAL ENCOUNTER (OUTPATIENT)
Dept: PAIN MEDICINE | Facility: HOSPITAL | Age: 44
Discharge: HOME | End: 2024-11-27
Payer: MEDICAID

## 2024-11-27 VITALS
RESPIRATION RATE: 16 BRPM | HEART RATE: 79 BPM | DIASTOLIC BLOOD PRESSURE: 85 MMHG | OXYGEN SATURATION: 97 % | SYSTOLIC BLOOD PRESSURE: 149 MMHG | TEMPERATURE: 97.5 F

## 2024-11-27 DIAGNOSIS — G89.29 CHRONIC PAIN OF BOTH KNEES: Primary | ICD-10-CM

## 2024-11-27 DIAGNOSIS — M25.561 CHRONIC PAIN OF BOTH KNEES: Primary | ICD-10-CM

## 2024-11-27 DIAGNOSIS — M25.562 CHRONIC PAIN OF BOTH KNEES: Primary | ICD-10-CM

## 2024-11-27 DIAGNOSIS — M17.2 POST-TRAUMATIC OSTEOARTHRITIS OF BOTH KNEES: ICD-10-CM

## 2024-11-27 PROCEDURE — 25010000002 LIDOCAINE PF 1% 1 % SOLUTION: Performed by: PHYSICAL MEDICINE & REHABILITATION

## 2024-11-27 PROCEDURE — 25010000002 HYALURONAN 88 MG/4ML SOLUTION PREFILLED SYRINGE: Performed by: PHYSICAL MEDICINE & REHABILITATION

## 2024-11-27 RX ORDER — LIDOCAINE HYDROCHLORIDE 10 MG/ML
5 INJECTION, SOLUTION EPIDURAL; INFILTRATION; INTRACAUDAL; PERINEURAL ONCE
Status: COMPLETED | OUTPATIENT
Start: 2024-11-27 | End: 2024-11-27

## 2024-11-27 RX ADMIN — Medication 88 MG: at 14:16

## 2024-11-27 RX ADMIN — LIDOCAINE HYDROCHLORIDE 5 ML: 10 INJECTION, SOLUTION EPIDURAL; INFILTRATION; INTRACAUDAL; PERINEURAL at 14:16

## 2024-11-27 NOTE — PROCEDURES
"Procedures    KNEE MONOVISC INJECTION     PREOPERATIVE DIAGNOSIS:  Knee pain     POSTOPERATIVE DIAGNOSIS:  Knee pain     PROCEDURE PERFORMED:  BILATERAL MONOVISC KNEE INJECTION      The patient understands the risks and benefits of the procedure and wishes to proceed. The patient was seen in the preoperative area. Patient's consent was obtained and updated. Vitals were taken. Patient was then placed in a seated position for the injection. Site marked for an inferior lateral approach, and a 22 gauge 1.5\" needle was passed through skin anesthetized with 1% Lidocaine without epinephrine. The needle tip was advanced to the joint space, the syringes were exchanged, and Monovisc was injected after negative aspiration. The patient tolerated with no geetha-procedural complications.  A sterile dressing was placed over the puncture site.  "

## 2024-12-03 ENCOUNTER — TELEPHONE (OUTPATIENT)
Dept: PAIN MEDICINE | Facility: CLINIC | Age: 44
End: 2024-12-03
Payer: MEDICAID

## 2024-12-03 NOTE — TELEPHONE ENCOUNTER
Caller: Noa Soni    Relationship to patient: Self    Best call back number:     Type of visit: FUP INJECTION PROCEDURE     If rescheduling, when is the original appointment: 12/4/24

## 2024-12-04 NOTE — TELEPHONE ENCOUNTER
Called patient multiple times to get injection r/s no answer and could not leave VM due to her not having one set up. Canceled appointment for 12.04.24

## 2024-12-14 DIAGNOSIS — M25.562 ACUTE PAIN OF LEFT KNEE: ICD-10-CM

## 2024-12-14 DIAGNOSIS — F33.2 SEVERE RECURRENT MAJOR DEPRESSION WITHOUT PSYCHOTIC FEATURES: ICD-10-CM

## 2024-12-14 DIAGNOSIS — M17.12 PRIMARY OSTEOARTHRITIS OF LEFT KNEE: ICD-10-CM

## 2024-12-16 RX ORDER — CARIPRAZINE 3 MG/1
3 CAPSULE, GELATIN COATED ORAL DAILY
Qty: 90 CAPSULE | Refills: 3 | Status: SHIPPED | OUTPATIENT
Start: 2024-12-16

## 2024-12-16 RX ORDER — MELOXICAM 15 MG/1
15 TABLET ORAL DAILY
Qty: 30 TABLET | Refills: 1 | Status: SHIPPED | OUTPATIENT
Start: 2024-12-16

## 2024-12-16 RX ORDER — FEXOFENADINE HCL 180 MG/1
180 TABLET ORAL DAILY
Qty: 90 TABLET | Refills: 3 | Status: SHIPPED | OUTPATIENT
Start: 2024-12-16

## 2025-01-03 DIAGNOSIS — M17.12 PRIMARY OSTEOARTHRITIS OF LEFT KNEE: ICD-10-CM

## 2025-01-03 DIAGNOSIS — M25.562 ACUTE PAIN OF LEFT KNEE: ICD-10-CM

## 2025-01-03 RX ORDER — MELOXICAM 15 MG/1
15 TABLET ORAL DAILY
Qty: 30 TABLET | Refills: 1 | Status: SHIPPED | OUTPATIENT
Start: 2025-01-03

## 2025-01-03 NOTE — TELEPHONE ENCOUNTER
Caller: Noa Soni    Relationship: Self    Best call back number: [phone]    Requested Prescriptions:   Requested Prescriptions     Pending Prescriptions Disp Refills    meloxicam (MOBIC) 15 MG tablet 30 tablet 1     Sig: Take 1 tablet by mouth Daily.        Pharmacy where request should be sent: [facility] #[id number] - [place], [place] - [address] [facility] [phone] [facility] [phone] FX     Last office visit with prescribing clinician: 4/1/2024   Last telemedicine visit with prescribing clinician: Visit date not found   Next office visit with prescribing clinician: 1/14/2025     Additional details provided by patient: RAN OUT ON 1/2/25    Does the patient have less than a 3 day supply:  [x] Yes  [] No    Would you like a call back once the refill request has been completed: [] Yes [x] No    If the office needs to give you a call back, can they leave a voicemail: [] Yes [x] No    Krys Velazco   01/03/25 14:01 EST

## 2025-01-13 ENCOUNTER — TELEPHONE (OUTPATIENT)
Dept: PAIN MEDICINE | Facility: CLINIC | Age: 45
End: 2025-01-13
Payer: MEDICAID

## 2025-01-13 NOTE — TELEPHONE ENCOUNTER
Caller: Noa Soni    Relationship to patient: Self    Best call back number: [phone]     PLEASE CALL PATIENT TO DISCUSS RESCHEDULING PROCEDURE FROM THIS WED 01-15-24 (PATIENT DOES NOT HAVE A RIDE)     THANKS

## 2025-01-14 ENCOUNTER — OFFICE VISIT (OUTPATIENT)
Dept: FAMILY MEDICINE CLINIC | Facility: CLINIC | Age: 45
End: 2025-01-14
Payer: MEDICAID

## 2025-01-14 VITALS
RESPIRATION RATE: 18 BRPM | OXYGEN SATURATION: 93 % | TEMPERATURE: 97.7 F | BODY MASS INDEX: 49.03 KG/M2 | DIASTOLIC BLOOD PRESSURE: 71 MMHG | HEART RATE: 70 BPM | SYSTOLIC BLOOD PRESSURE: 106 MMHG | WEIGHT: 287.2 LBS | HEIGHT: 64 IN

## 2025-01-14 DIAGNOSIS — M25.561 CHRONIC PAIN OF RIGHT KNEE: ICD-10-CM

## 2025-01-14 DIAGNOSIS — G89.29 CHRONIC BILATERAL LOW BACK PAIN WITH BILATERAL SCIATICA: ICD-10-CM

## 2025-01-14 DIAGNOSIS — F81.9 LEARNING DISABILITY: ICD-10-CM

## 2025-01-14 DIAGNOSIS — M25.561 CHRONIC PAIN OF BOTH KNEES: ICD-10-CM

## 2025-01-14 DIAGNOSIS — M54.41 CHRONIC BILATERAL LOW BACK PAIN WITH BILATERAL SCIATICA: ICD-10-CM

## 2025-01-14 DIAGNOSIS — M54.42 CHRONIC BILATERAL LOW BACK PAIN WITH BILATERAL SCIATICA: ICD-10-CM

## 2025-01-14 DIAGNOSIS — F33.2 SEVERE RECURRENT MAJOR DEPRESSION WITHOUT PSYCHOTIC FEATURES: ICD-10-CM

## 2025-01-14 DIAGNOSIS — F41.1 GENERALIZED ANXIETY DISORDER: Chronic | ICD-10-CM

## 2025-01-14 DIAGNOSIS — M25.562 ACUTE PAIN OF LEFT KNEE: ICD-10-CM

## 2025-01-14 DIAGNOSIS — E11.65 TYPE 2 DIABETES MELLITUS WITH HYPERGLYCEMIA, WITHOUT LONG-TERM CURRENT USE OF INSULIN: ICD-10-CM

## 2025-01-14 DIAGNOSIS — Z23 NEED FOR INFLUENZA VACCINATION: ICD-10-CM

## 2025-01-14 DIAGNOSIS — G89.29 CHRONIC PAIN OF RIGHT KNEE: ICD-10-CM

## 2025-01-14 DIAGNOSIS — G89.29 CHRONIC PAIN OF BOTH KNEES: ICD-10-CM

## 2025-01-14 DIAGNOSIS — I10 ESSENTIAL HYPERTENSION: Primary | ICD-10-CM

## 2025-01-14 DIAGNOSIS — M25.562 CHRONIC PAIN OF BOTH KNEES: ICD-10-CM

## 2025-01-14 DIAGNOSIS — M17.12 PRIMARY OSTEOARTHRITIS OF LEFT KNEE: ICD-10-CM

## 2025-01-14 PROCEDURE — 3074F SYST BP LT 130 MM HG: CPT | Performed by: FAMILY MEDICINE

## 2025-01-14 PROCEDURE — 1125F AMNT PAIN NOTED PAIN PRSNT: CPT | Performed by: FAMILY MEDICINE

## 2025-01-14 PROCEDURE — T1015 CLINIC SERVICE: HCPCS | Performed by: FAMILY MEDICINE

## 2025-01-14 PROCEDURE — 3078F DIAST BP <80 MM HG: CPT | Performed by: FAMILY MEDICINE

## 2025-01-14 PROCEDURE — 1159F MED LIST DOCD IN RCRD: CPT | Performed by: FAMILY MEDICINE

## 2025-01-14 PROCEDURE — 1160F RVW MEDS BY RX/DR IN RCRD: CPT | Performed by: FAMILY MEDICINE

## 2025-01-14 PROCEDURE — 90656 IIV3 VACC NO PRSV 0.5 ML IM: CPT | Performed by: FAMILY MEDICINE

## 2025-01-14 PROCEDURE — 90471 IMMUNIZATION ADMIN: CPT | Performed by: FAMILY MEDICINE

## 2025-01-14 PROCEDURE — 99214 OFFICE O/P EST MOD 30 MIN: CPT | Performed by: FAMILY MEDICINE

## 2025-01-14 RX ORDER — MELOXICAM 15 MG/1
15 TABLET ORAL DAILY
Qty: 90 TABLET | Refills: 1 | Status: SHIPPED | OUTPATIENT
Start: 2025-01-14

## 2025-01-14 NOTE — PROGRESS NOTES
Subjective   Noa Soni is a 44 y.o. female.   Chief Complaint   Patient presents with    Diabetes    Hypertension    Knee Pain       History of Present Illness   44 y.o. female presents to the office.  Schedule says 3-month follow-up.  I have not seen her since April of last year.  Lost to follow-up.  Complains of right knee pain because she fell yesterday.  She sees pain management and orthopedics because of chronic knee pain.    Last lab work for her diabetes was April 1 of last year.    Blood pressure today is 106/71.    History of Present Illness  The patient presents for evaluation of diabetes and right knee pain.    She has not been monitoring her blood glucose levels but has been adhering to her medication regimen, including Trulicity injections once a week. She expresses concern regarding her weight, reporting inconsistent eating habits.  Complains that she cannot lose weight as above.  She has gained 17 pounds since I saw her in April of last year.  According to my chart, she should have been out of Trulicity for a year.  She tells me she has been taking her shot once a week.  Tells me she is taking her blood sugar pill as well.  Data heads up.  She should have Januvia available.      She reports a fall in the snow yesterday, resulting in right knee pain. She requests an x-ray of her right knee for further evaluation. She has chronic pain in her knees. She is requesting a refill of her meloxicam prescription.  She follows with pain management for chronic knee pain.            Patient Active Problem List    Diagnosis Date Noted    Cholelithiasis 01/21/2024    Symptomatic cholelithiasis 01/11/2024    History of robot-assisted laparoscopic hysterectomy 08/21/2023     Note Last Updated: 8/21/2023 6/2/23 - Yevgeniy.  Dr. Meraz.  Ovaries left!      Lumbar radiculopathy 07/12/2023    Rotator cuff arthropathy of right shoulder 04/03/2023    Chronic pain of both knees 05/24/2022    Chronic bilateral low back  pain with bilateral sciatica 05/24/2022    Chronic right shoulder pain 05/24/2022    Morbid obesity 04/27/2022    Nasal congestion 11/20/2021    Post-traumatic osteoarthritis of both knees 11/10/2021    Severe recurrent major depression without psychotic features 06/17/2021    Chronic posttraumatic stress disorder 06/17/2021    Generalized anxiety disorder 06/17/2021    Callus of foot 10/19/2020    Perennial allergic rhinitis 10/19/2020    NESHA (obstructive sleep apnea) 10/19/2020    Class 3 severe obesity due to excess calories without serious comorbidity with body mass index (BMI) of 45.0 to 49.9 in adult 10/12/2020    Lymphadenopathy 10/06/2020    Essential hypertension 07/27/2020    Learning disability 07/27/2020     Note Last Updated: 7/27/2020     No other details      Type 2 diabetes mellitus 01/17/2020    History of endometriosis 01/17/2020    History of PCOS 01/17/2020           Past Surgical History:   Procedure Laterality Date    CHOLECYSTECTOMY      CHOLECYSTECTOMY WITH INTRAOPERATIVE CHOLANGIOGRAM N/A 01/22/2024    Procedure: CHOLECYSTECTOMY LAPAROSCOPIC INTRAOPERATIVE CHOLANGIOGRAM;  Surgeon: Dustin Shelley MD;  Location: [facility] OR;  Service: General;  Laterality: N/A;    EAR TUBES      ENDOMETRIAL ABLATION      INTRAUTERINE DEVICE INSERTION  10/14/2020    LAPAROSCOPIC TUBAL LIGATION      SUBTOTAL HYSTERECTOMY      TONSILLECTOMY AND ADENOIDECTOMY      TOTAL LAPAROSCOPIC HYSTERECTOMY WITH DAVINCI ROBOT  06/02/2023    [facility]    TUBAL ABDOMINAL LIGATION       Current Outpatient Medications on File Prior to Visit   Medication Sig    fexofenadine (ALLEGRA) 180 MG tablet TAKE 1 TABLET BY MOUTH EVERY DAY    FLUoxetine (PROzac) 40 MG capsule TAKE 1 CAPSULE BY MOUTH EVERY DAY    Januvia 100 MG tablet TAKE 1 TABLET BY MOUTH EVERY DAY    lamoTRIgine (LaMICtal) 100 MG tablet Take 1 tablet by mouth Daily. At bedtime    linaclotide (Linzess) 145 MCG capsule capsule Take 1 capsule by mouth Every  Morning Before Breakfast.    lisinopril (PRINIVIL,ZESTRIL) 10 MG tablet TAKE 1 TABLET BY MOUTH EVERY DAY    Melatonin 5 MG chewable tablet Chew 5 mg every night at bedtime.    oxyCODONE-acetaminophen (Percocet)  MG per tablet Take 1 tablet by mouth Every 6 (Six) Hours As Needed for Moderate Pain.    pregabalin (LYRICA) 150 MG capsule Take 1 capsule by mouth 2 (Two) Times a Day.    SUMAtriptan (IMITREX) 50 MG tablet TAKE 1 TABLET BY MOUTH AT ONSET OF HEADACHE. MAY REPEAT DOSE ONE TIME IN 2 HOURS IF HEADACHE NOT RELIEVED.    Vraylar 3 MG capsule capsule TAKE 1 CAPSULE BY MOUTH EVERY DAY    [DISCONTINUED] Dulaglutide (Trulicity) 4.5 MG/0.5ML solution pen-injector Inject 0.5 mL under the skin into the appropriate area as directed Every 7 (Seven) Days.    [DISCONTINUED] meloxicam (MOBIC) 15 MG tablet Take 1 tablet by mouth Daily.    Blood Glucose Monitoring Suppl kit Use as directed to check blood glucose (Patient not taking: Reported on 1/14/2025)    glucose blood test strip Use as instructed to check blood glucose once daily (Patient not taking: Reported on 1/14/2025)    Lancets misc Use once daily with meter and strips to check blood glucose (Patient not taking: Reported on 1/14/2025)    oxyCODONE-acetaminophen (Percocet)  MG per tablet Take 1 tablet by mouth Every 6 (Six) Hours As Needed for Moderate Pain.    oxyCODONE-acetaminophen (Percocet)  MG per tablet Take 1 tablet by mouth Every 6 (Six) Hours As Needed for Moderate Pain.    [DISCONTINUED] Blood Glucose Monitoring Suppl kit Use as directed to check blood glucose    [DISCONTINUED] Dulaglutide (Trulicity) 3 MG/0.5ML solution pen-injector Inject 0.5 mg under the skin into the appropriate area as directed 1 (One) Time Per Week.    [DISCONTINUED] glucose blood test strip Use as instructed to check blood glucose once daily     No current facility-administered medications on file prior to visit.     Allergies   Allergen Reactions    Dilaudid  "[Hydromorphone] Other (See Comments)     Bottoms O2 out    Morphine Other (See Comments)     Suicical ideations    Tylenol With Codeine #3 [Acetaminophen-Codeine] Hallucinations    Abilify [Aripiprazole] Rash    Metformin Diarrhea    Vicodin [Hydrocodone-Acetaminophen] GI Intolerance     Social History     Socioeconomic History    Marital status: Single    Number of children: 0   Tobacco Use    Smoking status: Former     Current packs/day: 0.00     Average packs/day: 1 pack/day for 5.0 years (5.0 ttl pk-yrs)     Types: Cigarettes     Start date: 2017     Quit date: 2018     Years since quittin.0     Passive exposure: Past    Smokeless tobacco: Never    Tobacco comments:     socially    Vaping Use    Vaping status: Never Used   Substance and Sexual Activity    Alcohol use: Never    Drug use: Never    Sexual activity: Not Currently     Partners: Male     Birth control/protection: Other, Hysterectomy     Family History   Problem Relation Age of Onset    Hypertension Mother     Depression Mother     Post-traumatic stress disorder Mother     Hyperlipidemia Mother     Anxiety disorder Mother     Mental illness Mother     Hypertension Father     Hyperlipidemia Father     Hypertension Brother     Depression Brother     Parkinsonism Maternal Grandmother     Dementia Maternal Grandmother     Atrial fibrillation Maternal Grandfather     Breast cancer Paternal Grandmother     Diabetes Paternal Grandmother     COPD Paternal Grandfather     Diabetes Paternal Grandfather        Review of Systems    Objective   /71 (BP Location: Right arm, Patient Position: Sitting, Cuff Size: Large Adult)   Pulse 70   Temp 97.7 °F (36.5 °C) (Infrared)   Resp 18   Ht 162.6 cm (64\")   Wt 130 kg (287 lb 3.2 oz)   LMP 2023 (Exact Date)   SpO2 93%   Breastfeeding No   BMI 49.30 kg/m²   Physical Exam  Constitutional:       Appearance: She is well-developed. She is obese.      Comments:      HENT:      Head: Normocephalic " and atraumatic.   Eyes:      Conjunctiva/sclera: Conjunctivae normal.   Cardiovascular:      Rate and Rhythm: Normal rate.   Pulmonary:      Effort: Pulmonary effort is normal. No respiratory distress.   Musculoskeletal:         General: Normal range of motion.      Cervical back: Normal range of motion.      Right lower leg: No edema.      Left lower leg: No edema.      Comments: She has some tenderness to both knees, a little more on the right than the left.  She has a little joint effusion on the right including a bit of a prepatellar effusion.   Skin:     General: Skin is warm and dry.      Findings: No rash.   Neurological:      Mental Status: She is alert and oriented to person, place, and time.   Psychiatric:         Behavior: Behavior normal.         Cognition and Memory: Cognition is impaired.       Physical Exam  There is a bit of fluid on the patient's kneecap.    Vital Signs  Weight was 270 pounds in April 2024.      No visits with results within 4 Month(s) from this visit.   Latest known visit with results is:   Office Visit on 04/01/2024   Component Date Value Ref Range Status    Hemoglobin A1C 04/01/2024 9.2 (H)  4.8 - 5.6 % Final    Comment:          Prediabetes: 5.7 - 6.4           Diabetes: >6.4           Glycemic control for adults with diabetes: <7.0      Glucose 04/01/2024 193 (H)  70 - 99 mg/dL Final    BUN 04/01/2024 12  6 - 24 mg/dL Final    Creatinine 04/01/2024 0.64  0.57 - 1.00 mg/dL Final    EGFR Result 04/01/2024 114  >59 mL/min/1.73 Final    BUN/Creatinine Ratio 04/01/2024 19  9 - 23 Final    Sodium 04/01/2024 139  134 - 144 mmol/L Final    Potassium 04/01/2024 4.2  3.5 - 5.2 mmol/L Final    Chloride 04/01/2024 101  96 - 106 mmol/L Final    Total CO2 04/01/2024 22  20 - 29 mmol/L Final    Calcium 04/01/2024 8.9  8.7 - 10.2 mg/dL Final    Total Protein 04/01/2024 7.6  6.0 - 8.5 g/dL Final    Albumin 04/01/2024 4.1  3.9 - 4.9 g/dL Final    Globulin 04/01/2024 3.5  1.5 - 4.5 g/dL Final     A/G Ratio 04/01/2024 1.2  1.2 - 2.2 Final    Total Bilirubin 04/01/2024 1.1  0.0 - 1.2 mg/dL Final    Alkaline Phosphatase 04/01/2024 136 (H)  44 - 121 IU/L Final    AST (SGOT) 04/01/2024 19  0 - 40 IU/L Final    ALT (SGPT) 04/01/2024 18  0 - 32 IU/L Final    WBC 04/01/2024 8.7  3.4 - 10.8 x10E3/uL Final    RBC 04/01/2024 4.65  3.77 - 5.28 x10E6/uL Final    Hemoglobin 04/01/2024 12.7  11.1 - 15.9 g/dL Final    Hematocrit 04/01/2024 39.3  34.0 - 46.6 % Final    MCV 04/01/2024 85  79 - 97 fL Final    MCH 04/01/2024 27.3  26.6 - 33.0 pg Final    MCHC 04/01/2024 32.3  31.5 - 35.7 g/dL Final    RDW 04/01/2024 15.1  11.7 - 15.4 % Final    Platelets 04/01/2024 235  150 - 450 x10E3/uL Final    Neutrophil Rel % 04/01/2024 63  Not Estab. % Final    Lymphocyte Rel % 04/01/2024 29  Not Estab. % Final    Monocyte Rel % 04/01/2024 7  Not Estab. % Final    Eosinophil Rel % 04/01/2024 1  Not Estab. % Final    Basophil Rel % 04/01/2024 0  Not Estab. % Final    Neutrophils Absolute 04/01/2024 5.4  1.4 - 7.0 x10E3/uL Final    Lymphocytes Absolute 04/01/2024 2.5  0.7 - 3.1 x10E3/uL Final    Monocytes Absolute 04/01/2024 0.6  0.1 - 0.9 x10E3/uL Final    Eosinophils Absolute 04/01/2024 0.1  0.0 - 0.4 x10E3/uL Final    Basophils Absolute 04/01/2024 0.0  0.0 - 0.2 x10E3/uL Final    Immature Granulocyte Rel % 04/01/2024 0  Not Estab. % Final    Immature Grans Absolute 04/01/2024 0.0  0.0 - 0.1 x10E3/uL Final     Results            Assessment & Plan   Diagnoses and all orders for this visit:    1. Essential hypertension (Primary)    2. Type 2 diabetes mellitus with hyperglycemia, without long-term current use of insulin  -     Hemoglobin A1c  -     CBC & Differential  -     Comprehensive Metabolic Panel  -     TSH  -     Tirzepatide 5 MG/0.5ML solution auto-injector; Inject 5 mg under the skin into the appropriate area as directed Every 7 (Seven) Days.  Dispense: 2 mL; Refill: 0    3. Chronic bilateral low back pain with bilateral  sciatica    4. Chronic pain of both knees    5. Severe recurrent major depression without psychotic features    6. Generalized anxiety disorder    7. Learning disability    8. Need for influenza vaccination  -     Fluzone >6mos    9. Chronic pain of right knee  -     XR Knee 1 or 2 View Right (In Office)    10. Acute pain of left knee  -     meloxicam (MOBIC) 15 MG tablet; Take 1 tablet by mouth Daily.  Dispense: 90 tablet; Refill: 1    11. Primary osteoarthritis of left knee  -     meloxicam (MOBIC) 15 MG tablet; Take 1 tablet by mouth Daily.  Dispense: 90 tablet; Refill: 1      Assessment & Plan  1. Diabetes mellitus.  Her weight has increased by 17 pounds since her last visit in April 2024, despite being on Trulicity, possibly. Blood work will be conducted to assess her blood glucose levels and thyroid function. She will be transitioned from Trulicity to Mounjaro, with a dosage regimen of 5 mg for the first month, followed by 7.5 mg for the second month, and 10 mg for the third month. She will receive 4 pens, each containing a single dose. She is instructed to inform us upon administration of the final injection, at which point the next dose will be prescribed.    2. Right knee pain.  She reports chronic pain in her knees and a recent fall in the snow, landing on her right knee. An x-ray of the right knee will be performed today to rule out any fractures. A prescription for meloxicam has been sent to [facility].  Keep follow-up with her pain management doctor per recommendations.    Blood pressure is good at 106/71.  Continue current antihypertensive medicines at current doses.  Lisinopril 10 mg/day.    Mood appears stable.  Continue Vraylar, Lamictal, Prozac at current doses for anxiety and depression.      Follow-up  The patient will follow up in 3 months.    I had a very direct discussion with her regarding difficulty in managing chronic problems with a regular follow-up.  She agrees to follow-up in 3 months.  I will  follow-up with her when the results of her tests are back        Call with any problems or concerns before next visit       Return in about 3 months (around 4/14/2025).  Patient or patient representative verbalized consent for the use of Ambient Listening during the visit with  Bree Wan MD for chart documentation. 1/14/2025  13:20 EST    Part of this note may be an electronic transcription/translation of spoken language to printed text using the Dragon Dictation System    Bree Wan MD1/14/202513:16 EST  This note has been electronically signed

## 2025-01-15 LAB
ALBUMIN SERPL-MCNC: 4.3 G/DL (ref 3.9–4.9)
ALP SERPL-CCNC: 137 IU/L (ref 44–121)
ALT SERPL-CCNC: 29 IU/L (ref 0–32)
AST SERPL-CCNC: 31 IU/L (ref 0–40)
BASOPHILS # BLD AUTO: 0 X10E3/UL (ref 0–0.2)
BASOPHILS NFR BLD AUTO: 0 %
BILIRUB SERPL-MCNC: 1.2 MG/DL (ref 0–1.2)
BUN SERPL-MCNC: 11 MG/DL (ref 6–24)
BUN/CREAT SERPL: 14 (ref 9–23)
CALCIUM SERPL-MCNC: 9.6 MG/DL (ref 8.7–10.2)
CHLORIDE SERPL-SCNC: 98 MMOL/L (ref 96–106)
CO2 SERPL-SCNC: 24 MMOL/L (ref 20–29)
CREAT SERPL-MCNC: 0.81 MG/DL (ref 0.57–1)
EGFRCR SERPLBLD CKD-EPI 2021: 93 ML/MIN/1.73
EOSINOPHIL # BLD AUTO: 0.1 X10E3/UL (ref 0–0.4)
EOSINOPHIL NFR BLD AUTO: 1 %
ERYTHROCYTE [DISTWIDTH] IN BLOOD BY AUTOMATED COUNT: 13.9 % (ref 11.7–15.4)
GLOBULIN SER CALC-MCNC: 3.4 G/DL (ref 1.5–4.5)
GLUCOSE SERPL-MCNC: 314 MG/DL (ref 70–99)
HBA1C MFR BLD: 10 % (ref 4.8–5.6)
HCT VFR BLD AUTO: 41.3 % (ref 34–46.6)
HGB BLD-MCNC: 13.3 G/DL (ref 11.1–15.9)
IMM GRANULOCYTES # BLD AUTO: 0.1 X10E3/UL (ref 0–0.1)
IMM GRANULOCYTES NFR BLD AUTO: 1 %
LYMPHOCYTES # BLD AUTO: 2 X10E3/UL (ref 0.7–3.1)
LYMPHOCYTES NFR BLD AUTO: 21 %
MCH RBC QN AUTO: 28.4 PG (ref 26.6–33)
MCHC RBC AUTO-ENTMCNC: 32.2 G/DL (ref 31.5–35.7)
MCV RBC AUTO: 88 FL (ref 79–97)
MONOCYTES # BLD AUTO: 0.7 X10E3/UL (ref 0.1–0.9)
MONOCYTES NFR BLD AUTO: 7 %
NEUTROPHILS # BLD AUTO: 6.6 X10E3/UL (ref 1.4–7)
NEUTROPHILS NFR BLD AUTO: 70 %
PLATELET # BLD AUTO: 263 X10E3/UL (ref 150–450)
POTASSIUM SERPL-SCNC: 4.1 MMOL/L (ref 3.5–5.2)
PROT SERPL-MCNC: 7.7 G/DL (ref 6–8.5)
RBC # BLD AUTO: 4.69 X10E6/UL (ref 3.77–5.28)
SODIUM SERPL-SCNC: 137 MMOL/L (ref 134–144)
TSH SERPL DL<=0.005 MIU/L-ACNC: 1.93 UIU/ML (ref 0.45–4.5)
WBC # BLD AUTO: 9.5 X10E3/UL (ref 3.4–10.8)

## 2025-01-16 ENCOUNTER — TELEPHONE (OUTPATIENT)
Dept: FAMILY MEDICINE CLINIC | Facility: CLINIC | Age: 45
End: 2025-01-16
Payer: MEDICAID

## 2025-01-16 DIAGNOSIS — E11.65 TYPE 2 DIABETES MELLITUS WITH HYPERGLYCEMIA, WITHOUT LONG-TERM CURRENT USE OF INSULIN: Primary | ICD-10-CM

## 2025-01-16 NOTE — TELEPHONE ENCOUNTER
Please call Marylou and tell her that her insurance will not pay for Mounjaro.  They want her to try Ozempic first, and if it does not work they will consider the Mounjaro.  I sent a prescription for Ozempic to the pharmacy for her.  When she runs out of the current supply of Ozempic, please let me know so I can renew it and increase the dose.  Thanks!

## 2025-01-23 ENCOUNTER — TELEPHONE (OUTPATIENT)
Dept: FAMILY MEDICINE CLINIC | Facility: CLINIC | Age: 45
End: 2025-01-23
Payer: MEDICAID

## 2025-01-31 ENCOUNTER — OFFICE VISIT (OUTPATIENT)
Dept: FAMILY MEDICINE CLINIC | Facility: CLINIC | Age: 45
End: 2025-01-31
Payer: MEDICAID

## 2025-01-31 VITALS
SYSTOLIC BLOOD PRESSURE: 146 MMHG | WEIGHT: 288 LBS | TEMPERATURE: 97.9 F | BODY MASS INDEX: 49.17 KG/M2 | HEART RATE: 91 BPM | OXYGEN SATURATION: 96 % | DIASTOLIC BLOOD PRESSURE: 95 MMHG | HEIGHT: 64 IN

## 2025-01-31 DIAGNOSIS — M62.838 MUSCLE SPASM OF BOTH LOWER LEGS: Primary | ICD-10-CM

## 2025-01-31 PROCEDURE — 3077F SYST BP >= 140 MM HG: CPT | Performed by: PHYSICIAN ASSISTANT

## 2025-01-31 PROCEDURE — T1015 CLINIC SERVICE: HCPCS | Performed by: PHYSICIAN ASSISTANT

## 2025-01-31 PROCEDURE — 1125F AMNT PAIN NOTED PAIN PRSNT: CPT | Performed by: PHYSICIAN ASSISTANT

## 2025-01-31 PROCEDURE — 3080F DIAST BP >= 90 MM HG: CPT | Performed by: PHYSICIAN ASSISTANT

## 2025-01-31 PROCEDURE — 1160F RVW MEDS BY RX/DR IN RCRD: CPT | Performed by: PHYSICIAN ASSISTANT

## 2025-01-31 PROCEDURE — 1159F MED LIST DOCD IN RCRD: CPT | Performed by: PHYSICIAN ASSISTANT

## 2025-01-31 PROCEDURE — 3046F HEMOGLOBIN A1C LEVEL >9.0%: CPT | Performed by: PHYSICIAN ASSISTANT

## 2025-01-31 PROCEDURE — 99213 OFFICE O/P EST LOW 20 MIN: CPT | Performed by: PHYSICIAN ASSISTANT

## 2025-01-31 NOTE — PROGRESS NOTES
"Chief Complaint  Spasms    Subjective        Noa Soni presents to [facility]  History of Present Illness    Noa is a 44-year-old female patient of Dr. Brock.  Here today for leg spasms.    Past medical history type 2 diabetes, class III severe obesity, cholelithiasis, PCOS, hysterectomy, PTSD, ROBERT, learning disability, lumbar radiculopathy see problem list for full summary.    Patient is here today for what she explains as leg spasms that happen 2-3 times a night, she states these happen at night and they have woke her up.  Patient states there is no swelling, no redness, no pain, no pain with palpation.  She states that she has tried to stretch, massage the area, heat does not help, ibuprofen does not help.  Upon examination there is no pain to the touch, no redness no swelling.  The spasms are isolated to the calf area on both legs.    Plan is to start by checking patient's magnesium and vitamin D levels.  Patient has had recent blood work done where potassium and calcium levels were both normal.  These levels are normal next consideration to look at her medications, schedule nerve conduction testing.  Proceed from there        Objective   Vital Signs:  /95 (BP Location: Right arm, Patient Position: Sitting, Cuff Size: Adult)   Pulse 91   Temp 97.9 °F (36.6 °C) (Infrared)   Ht 162.6 cm (64.02\")   Wt 131 kg (288 lb)   SpO2 96%   BMI 49.41 kg/m²   Estimated body mass index is 49.41 kg/m² as calculated from the following:    Height as of this encounter: 162.6 cm (64.02\").    Weight as of this encounter: 131 kg (288 lb).            Review of Systems   Constitutional: Negative.    HENT: Negative.     Eyes: Negative.    Respiratory: Negative.     Cardiovascular: Negative.    Gastrointestinal: Negative.    Endocrine: Negative.    Genitourinary: Negative.    Musculoskeletal: Negative.    Skin: Negative.    Allergic/Immunologic: Negative.    Neurological: Negative. "    Hematological: Negative.    Psychiatric/Behavioral: Negative.          Physical Exam  Constitutional:       Appearance: Normal appearance. She is obese.   HENT:      Head: Normocephalic and atraumatic.   Eyes:      Conjunctiva/sclera: Conjunctivae normal.   Cardiovascular:      Rate and Rhythm: Normal rate and regular rhythm.      Pulses: Normal pulses.      Heart sounds: Normal heart sounds.   Pulmonary:      Effort: Pulmonary effort is normal.      Breath sounds: Normal breath sounds.   Musculoskeletal:         General: Normal range of motion.      Cervical back: Normal range of motion.   Skin:     General: Skin is warm and dry.   Neurological:      Mental Status: She is alert and oriented to person, place, and time.   Psychiatric:         Mood and Affect: Mood normal.         Behavior: Behavior normal.         Thought Content: Thought content normal.         Judgment: Judgment normal.        Result Review :                Assessment and Plan   Diagnoses and all orders for this visit:    1. Muscle spasm of both lower legs (Primary)  -     Vitamin D 25 hydroxy; Future  -     Magnesium; Future             Follow Up   No follow-ups on file.  Patient was given instructions and counseling regarding her condition or for health maintenance advice. Please see specific information pulled into the AVS if appropriate.

## 2025-02-05 DIAGNOSIS — E55.9 VITAMIN D DEFICIENCY: Primary | ICD-10-CM

## 2025-02-05 RX ORDER — ERGOCALCIFEROL 1.25 MG/1
50000 CAPSULE, LIQUID FILLED ORAL WEEKLY
Qty: 5 CAPSULE | Refills: 0 | Status: SHIPPED | OUTPATIENT
Start: 2025-02-05

## 2025-02-10 DIAGNOSIS — M54.42 CHRONIC BILATERAL LOW BACK PAIN WITH BILATERAL SCIATICA: ICD-10-CM

## 2025-02-10 DIAGNOSIS — G89.29 CHRONIC BILATERAL LOW BACK PAIN WITH BILATERAL SCIATICA: ICD-10-CM

## 2025-02-10 DIAGNOSIS — M54.41 CHRONIC BILATERAL LOW BACK PAIN WITH BILATERAL SCIATICA: ICD-10-CM

## 2025-02-12 RX ORDER — OXYCODONE AND ACETAMINOPHEN 10; 325 MG/1; MG/1
1 TABLET ORAL EVERY 6 HOURS PRN
Qty: 120 TABLET | Refills: 0 | Status: SHIPPED | OUTPATIENT
Start: 2025-02-12

## 2025-02-26 ENCOUNTER — OFFICE VISIT (OUTPATIENT)
Dept: PAIN MEDICINE | Facility: CLINIC | Age: 45
End: 2025-02-26
Payer: MEDICAID

## 2025-02-26 VITALS
DIASTOLIC BLOOD PRESSURE: 86 MMHG | OXYGEN SATURATION: 96 % | SYSTOLIC BLOOD PRESSURE: 137 MMHG | RESPIRATION RATE: 16 BRPM | HEART RATE: 85 BPM

## 2025-02-26 DIAGNOSIS — M12.811 ROTATOR CUFF ARTHROPATHY OF RIGHT SHOULDER: ICD-10-CM

## 2025-02-26 DIAGNOSIS — M54.16 LUMBAR RADICULOPATHY: ICD-10-CM

## 2025-02-26 DIAGNOSIS — G89.29 CHRONIC PAIN OF BOTH KNEES: ICD-10-CM

## 2025-02-26 DIAGNOSIS — M54.42 CHRONIC BILATERAL LOW BACK PAIN WITH BILATERAL SCIATICA: ICD-10-CM

## 2025-02-26 DIAGNOSIS — Z79.899 HIGH RISK MEDICATION USE: Primary | ICD-10-CM

## 2025-02-26 DIAGNOSIS — M54.41 CHRONIC BILATERAL LOW BACK PAIN WITH BILATERAL SCIATICA: ICD-10-CM

## 2025-02-26 DIAGNOSIS — G89.29 CHRONIC BILATERAL LOW BACK PAIN WITH BILATERAL SCIATICA: ICD-10-CM

## 2025-02-26 DIAGNOSIS — G89.29 CHRONIC RIGHT SHOULDER PAIN: ICD-10-CM

## 2025-02-26 DIAGNOSIS — M17.2 POST-TRAUMATIC OSTEOARTHRITIS OF BOTH KNEES: ICD-10-CM

## 2025-02-26 DIAGNOSIS — M25.562 CHRONIC PAIN OF BOTH KNEES: ICD-10-CM

## 2025-02-26 DIAGNOSIS — M25.561 CHRONIC PAIN OF BOTH KNEES: ICD-10-CM

## 2025-02-26 DIAGNOSIS — M25.511 CHRONIC RIGHT SHOULDER PAIN: ICD-10-CM

## 2025-02-26 PROCEDURE — G0463 HOSPITAL OUTPT CLINIC VISIT: HCPCS | Performed by: PHYSICAL MEDICINE & REHABILITATION

## 2025-02-26 RX ORDER — OXYCODONE AND ACETAMINOPHEN 10; 325 MG/1; MG/1
1 TABLET ORAL EVERY 6 HOURS PRN
Qty: 120 TABLET | Refills: 0 | Status: SHIPPED | OUTPATIENT
Start: 2025-02-26

## 2025-02-26 RX ORDER — PREGABALIN 150 MG/1
150 CAPSULE ORAL 2 TIMES DAILY
Qty: 60 CAPSULE | Refills: 5 | Status: SHIPPED | OUTPATIENT
Start: 2025-02-26

## 2025-02-26 NOTE — PROGRESS NOTES
Subjective   Noa Soni is a 44 y.o. female.     History of Present Illness  Chronic bilateral knee pain, also low back and right shoulder pain, nonradiating, 10/10 at worst, 8/10 at best, always present, varies, began years ago, worsening over time, aching, sharp, worse with bending and walking, interferes with sleep, exercise. X-ray b/l knees with mod OA worst in medial compartment. Saw PCP, notes reviewed, with referral to Dr. Leiva who decline steroid injections with DM2, has failed NSAIDs, Tylenol, Voltaren gel, sees Dr. Valencia on multiple medications. No FH of substance abuse. Began Percocet 5mg TID prn, then 7.5mg TID prn. Worsening LBP, R shoulder pain.  Pain  Symptoms include abdominal pain and nausea.    Pertinent negative symptoms include no chest pain, no chills, no fatigue, no fever, no myalgias, no neck pain, no numbness, no vomiting and no weakness.   Back Pain  Associated symptoms include abdominal pain. Pertinent negatives include no bladder incontinence, chest pain, fever, numbness or weakness.   Knee Pain   Pertinent negatives include no numbness.        The following portions of the patient's history were reviewed and updated as appropriate: allergies, current medications, past family history, past medical history, past social history, past surgical history and problem list.    Review of Systems   Constitutional:  Negative for chills, fatigue and fever.   HENT:  Positive for hearing loss. Negative for trouble swallowing.    Eyes:  Negative for visual disturbance.   Respiratory:  Negative for shortness of breath.    Cardiovascular:  Negative for chest pain.   Gastrointestinal:  Positive for abdominal pain and nausea. Negative for constipation, diarrhea and vomiting.   Genitourinary:  Negative for urinary incontinence.   Musculoskeletal:  Positive for arthralgias and back pain. Negative for joint swelling, myalgias and neck pain.   Neurological:  Positive for headache. Negative for dizziness,  weakness and numbness.       Objective   Physical Exam  Constitutional:       Appearance: Normal appearance. She is well-developed.   HENT:      Head: Normocephalic and atraumatic.   Eyes:      Pupils: Pupils are equal, round, and reactive to light.   Cardiovascular:      Rate and Rhythm: Normal rate and regular rhythm.      Heart sounds: Normal heart sounds.   Pulmonary:      Effort: Pulmonary effort is normal.      Breath sounds: Normal breath sounds.   Abdominal:      General: Bowel sounds are normal. There is no distension.      Palpations: Abdomen is soft.      Tenderness: There is no abdominal tenderness.   Musculoskeletal:      Cervical back: Normal range of motion.      Comments: R shoulder: (+) empty can test     Neurological:      Mental Status: She is alert and oriented to person, place, and time.      Sensory: No sensory deficit.      Deep Tendon Reflexes: Reflexes are normal and symmetric. Reflexes normal.   Psychiatric:         Mood and Affect: Mood normal.         Behavior: Behavior normal.         Thought Content: Thought content normal.         Judgment: Judgment normal.           Assessment & Plan   Diagnoses and all orders for this visit:    1. Chronic bilateral low back pain with bilateral sciatica (Primary)    2. Chronic pain of both knees    3. Chronic right shoulder pain    4. Lumbar radiculopathy    5. Post-traumatic osteoarthritis of both knees    6. Rotator cuff arthropathy of right shoulder      Oral DS in order 2/19/24.  Discussed risks and benefits of opioid treatment for chonic pain with patient, including expectations related to prescription requests, alternative modalities to opioids for managing pain, her treatment plan, risks of dependency and addiction, and safe storage practices for prescribed opioids, as well as proper and improper disposal of all medications.  Treatment plan will consist of continuing current medication as long as it remains effective and is necessary, while  evaluating patient at each visit and determining if the medication can be lowered or discontinued, while also using nonopioid therapies to reduce reliance on opioids.  Failed Tylenol #3 TID prn with AMS. Failed NSAIDs, Tylenol, Voltaren gel, allergic to Hydrocodone. Began Percocet 5mg TID prn, helping but not sufficient, increased to 7.5mg QID prn, insufficient, increased to 10mg QID prn. Denies any side effects but no longer effective. Rotated to MS-Contin 15mg TID with SI, stopped immediately, disposed of here, cont Percocet 10mg QID prn. Filled 2/16/25.   Patient's pain is still well-managed by current medication regimen, is doing well at this strength and dosage, therefore I will continue to prescribe unchanged as the most appropriate course of treatment.  Trialed Lyrica 75mg BID, very helpful. Began Lyrica 75mg BID, helping, increased to 100mg BID, increased to 150mg BID.  Began Licart qdaily prn, denies being told not to take NSAIDs.  Performed b/l Monovisc injections with significant improvement, will repeat as necessary up to q6 months, cannot have steroids with DM2. Repeated 2/1/23. Schedule repeat.  Performed R shoulder injection.  Ordered MRI L-spine, with mild DDD and DJD only, worst pain appears to be facetogenic on history and exam, discussed b/l L4-S1 facet injections, cancelled, doing OK now.   Ordered LSO to improve truncal stability.  RTC in 3 months for f/u.    INSPECT REPORT     As part of the patient's treatment plan, I am prescribing controlled substances. The patient has been made aware of appropriate use of such medications, including potential risk of somnolence, limited ability to drive and/or work safely, and the potential for dependence or overdose. It has also bee made clear that these medications are for use by this patient only, without concomitant use of alcohol or other substances unless prescribed.      Patient has completed prescribing agreement detailing terms of continued  prescribing of controlled substances, including monitoring INSPECT reports, urine drug screening, and pill counts if necessary. The patient is aware that inappropriate use will results in cessation of prescribing such medications.     INSPECT report has been reviewed and scanned into the patient's chart.     As the clinician, I personally reviewed the INSPECT while the patient was in the office today.     History and physical exam exhibit continued safe and appropriate use of controlled substances.

## 2025-03-01 DIAGNOSIS — K59.04 CHRONIC IDIOPATHIC CONSTIPATION: ICD-10-CM

## 2025-03-01 DIAGNOSIS — F33.2 SEVERE RECURRENT MAJOR DEPRESSION WITHOUT PSYCHOTIC FEATURES: ICD-10-CM

## 2025-03-01 DIAGNOSIS — M17.12 PRIMARY OSTEOARTHRITIS OF LEFT KNEE: ICD-10-CM

## 2025-03-01 DIAGNOSIS — F41.1 GENERALIZED ANXIETY DISORDER: Chronic | ICD-10-CM

## 2025-03-01 DIAGNOSIS — M25.562 ACUTE PAIN OF LEFT KNEE: ICD-10-CM

## 2025-03-01 DIAGNOSIS — F43.12 CHRONIC POSTTRAUMATIC STRESS DISORDER: Chronic | ICD-10-CM

## 2025-03-03 RX ORDER — MELOXICAM 15 MG/1
15 TABLET ORAL DAILY
Qty: 90 TABLET | Refills: 1 | Status: SHIPPED | OUTPATIENT
Start: 2025-03-03

## 2025-03-03 RX ORDER — LAMOTRIGINE 100 MG/1
100 TABLET ORAL DAILY
Qty: 90 TABLET | Refills: 1 | Status: SHIPPED | OUTPATIENT
Start: 2025-03-03

## 2025-03-06 ENCOUNTER — PATIENT MESSAGE (OUTPATIENT)
Dept: FAMILY MEDICINE CLINIC | Facility: CLINIC | Age: 45
End: 2025-03-06
Payer: MEDICAID

## 2025-03-06 DIAGNOSIS — Z79.899 HIGH RISK MEDICATION USE: ICD-10-CM

## 2025-03-06 DIAGNOSIS — E11.65 TYPE 2 DIABETES MELLITUS WITH HYPERGLYCEMIA, WITHOUT LONG-TERM CURRENT USE OF INSULIN: ICD-10-CM

## 2025-03-06 RX ORDER — AVOBENZONE, HOMOSALATE, OCTISALATE, OCTOCRYLENE 30; 40; 45; 26 MG/ML; MG/ML; MG/ML; MG/ML
CREAM TOPICAL
Qty: 100 EACH | Refills: 3 | Status: SHIPPED | OUTPATIENT
Start: 2025-03-06

## 2025-03-07 ENCOUNTER — PATIENT MESSAGE (OUTPATIENT)
Dept: FAMILY MEDICINE CLINIC | Facility: CLINIC | Age: 45
End: 2025-03-07
Payer: MEDICAID

## 2025-03-07 DIAGNOSIS — K59.04 CHRONIC IDIOPATHIC CONSTIPATION: ICD-10-CM

## 2025-03-24 ENCOUNTER — OFFICE VISIT (OUTPATIENT)
Dept: FAMILY MEDICINE CLINIC | Facility: CLINIC | Age: 45
End: 2025-03-24
Payer: MEDICAID

## 2025-03-24 VITALS
HEART RATE: 82 BPM | WEIGHT: 289 LBS | HEIGHT: 64 IN | TEMPERATURE: 97.1 F | OXYGEN SATURATION: 96 % | BODY MASS INDEX: 49.34 KG/M2 | DIASTOLIC BLOOD PRESSURE: 96 MMHG | SYSTOLIC BLOOD PRESSURE: 164 MMHG

## 2025-03-24 DIAGNOSIS — F15.10 CAFFEINE ABUSE: ICD-10-CM

## 2025-03-24 DIAGNOSIS — J01.00 ACUTE NON-RECURRENT MAXILLARY SINUSITIS: ICD-10-CM

## 2025-03-24 DIAGNOSIS — I10 ESSENTIAL HYPERTENSION: ICD-10-CM

## 2025-03-24 DIAGNOSIS — R05.1 ACUTE COUGH: Primary | ICD-10-CM

## 2025-03-24 DIAGNOSIS — T78.40XA ALLERGY, INITIAL ENCOUNTER: ICD-10-CM

## 2025-03-24 LAB
EXPIRATION DATE: NORMAL
FLUAV AG UPPER RESP QL IA.RAPID: NOT DETECTED
FLUBV AG UPPER RESP QL IA.RAPID: NOT DETECTED
INTERNAL CONTROL: NORMAL
Lab: NORMAL
SARS-COV-2 AG UPPER RESP QL IA.RAPID: NOT DETECTED

## 2025-03-24 RX ORDER — PSEUDOEPHEDRINE HCL 30 MG/1
30 TABLET, FILM COATED ORAL EVERY 4 HOURS PRN
Qty: 60 TABLET | Refills: 2 | Status: SHIPPED | OUTPATIENT
Start: 2025-03-24

## 2025-03-24 RX ORDER — CETIRIZINE HYDROCHLORIDE 10 MG/1
10 TABLET ORAL DAILY
Qty: 30 TABLET | Refills: 2 | Status: SHIPPED | OUTPATIENT
Start: 2025-03-24

## 2025-03-24 RX ORDER — LEVOFLOXACIN 750 MG/1
750 TABLET, FILM COATED ORAL DAILY
Qty: 5 TABLET | Refills: 0 | Status: SHIPPED | OUTPATIENT
Start: 2025-03-24

## 2025-03-24 RX ORDER — FLUTICASONE PROPIONATE 50 MCG
2 SPRAY, SUSPENSION (ML) NASAL DAILY
Qty: 16 G | Refills: 2 | Status: SHIPPED | OUTPATIENT
Start: 2025-03-24

## 2025-03-24 NOTE — PATIENT INSTRUCTIONS
Increase lisinopril to 20 mg daily until you see Dr. Wan  Purchase a blood pressure cuff  Monitor blood pressure with parameters given  Levaquin 750 daily x 5 days  Zyrtec/Flonase/Sudafed 30 3 times daily  Wear mask outside.

## 2025-03-24 NOTE — PROGRESS NOTES
"    Noa Soni is a 44 y.o. female.     History of Present Illness  44-year-old white female patient of Dr. Wan who comes in today for an acute visit    Blood pressure 164/96 heart rate 82 she denies any chest pain, dyspnea, tachycardia or dizziness.  Patient's blood pressure was elevated on the last 2 visits as well.  She states she drinks a great deal caffeine and I encouraged her to cut way back on that.  She is on 10 mg of lisinopril. I increased it to 20 mg daily until she sees Dr. Wan in April.  I gave her parameters to monitor blood pressure at home and she needs to purchase a blood pressure cuff    Patient's main reason for coming today is 4-day history of fatigue sore throat cough congestion.  She was negative for COVID influenza she does have a maxillary sinusitis infection along with allergies.  Placed her on antibiotic and allergy medication and encouraged her to wear mask outside              Increase lisinopril to 20 mg daily until you see Dr. Wan  Purchase a blood pressure cuff  Monitor blood pressure with parameters given  Levaquin 750 daily x 5 days  Zyrtec/Flonase/Sudafed 30 3 times daily  Wear mask outside.       The following portions of the patient's history were reviewed and updated as appropriate: allergies, current medications, past family history, past medical history, past social history, past surgical history, and problem list.    Vitals:    03/24/25 0933   BP: 164/96   BP Location: Right arm   Patient Position: Sitting   Cuff Size: Adult   Pulse: 82   Temp: 97.1 °F (36.2 °C)   TempSrc: Infrared   SpO2: 96%   Weight: 131 kg (289 lb)   Height: 162.6 cm (64.02\")       Past Medical History:   Diagnosis Date    Allergic     Anger     Anxiety     Bipolar 1 disorder     Bipolar disorder 9/23/23    Cholelithiasis 1/3/2024    I had alot of stones in my gallbladder    CTS (carpal tunnel syndrome)     Depression     Diabetes mellitus     Headache     Headache, tension-type     HTN " (hypertension)     Irritable bowel syndrome     Knee pain, bilateral     Knee swelling     Low back pain     Migraine     Neuropathy in diabetes 12/9/23    Obesity     PTSD (post-traumatic stress disorder)     Type 2 diabetes mellitus      Past Surgical History:   Procedure Laterality Date    CHOLECYSTECTOMY      CHOLECYSTECTOMY WITH INTRAOPERATIVE CHOLANGIOGRAM N/A 01/22/2024    Procedure: CHOLECYSTECTOMY LAPAROSCOPIC INTRAOPERATIVE CHOLANGIOGRAM;  Surgeon: Dustin Shelley MD;  Location: [facility] MAIN OR;  Service: General;  Laterality: N/A;    EAR TUBES      ENDOMETRIAL ABLATION      INTRAUTERINE DEVICE INSERTION  10/14/2020    LAPAROSCOPIC TUBAL LIGATION      SUBTOTAL HYSTERECTOMY      TONSILLECTOMY AND ADENOIDECTOMY      TOTAL LAPAROSCOPIC HYSTERECTOMY WITH DAVINCI ROBOT  06/02/2023    [facility]    TUBAL ABDOMINAL LIGATION       Family History   Problem Relation Age of Onset    Hypertension Mother     Depression Mother     Post-traumatic stress disorder Mother     Hyperlipidemia Mother     Anxiety disorder Mother     Mental illness Mother     Hypertension Father     Hyperlipidemia Father     Hypertension Brother     Depression Brother     Parkinsonism Maternal Grandmother     Dementia Maternal Grandmother     Atrial fibrillation Maternal Grandfather     Breast cancer Paternal Grandmother     Diabetes Paternal Grandmother     COPD Paternal Grandfather     Diabetes Paternal Grandfather      Immunization History   Administered Date(s) Administered    COVID-19 (MODERNA) 1st,2nd,3rd Dose Monovalent 04/27/2021, 05/25/2021    DTP 02/11/1983, 04/05/1983, 06/06/1983, 07/05/1984, 04/20/1988    Fluzone  >6mos 01/14/2025    Fluzone (or Fluarix & Flulaval for VFC) >6mos 10/19/2020, 12/05/2022    Hpv9 05/17/2023    MMR 03/13/1984    OPV 02/11/1983, 04/05/1983, 06/06/1983, 07/05/1984, 04/20/1988    Pneumococcal Polysaccharide (PPSV23) 06/04/2020    Tdap 06/04/2020       Results Encounter on 02/03/2025   Component Date  Value Ref Range Status    25 Hydroxy, Vitamin D 02/03/2025 6.0 (L)  30.0 - 100.0 ng/mL Final    Comment: Vitamin D deficiency has been defined by the [place] of  [employer] and an Endocrine Society practice guideline as a  level of serum 25-OH vitamin D less than 20 ng/mL (1,2).  The Endocrine Society went on to further define vitamin D  insufficiency as a level between 21 and 29 ng/mL (2).  1. IOM ([place] [employer]). 2010. Dietary reference     intakes for calcium and D. [place]: The     National Academies Press.  2. Natalia MF, Jaylon HOOKS, Mikhail ORTIZ, et al.     Evaluation, treatment, and prevention of vitamin D     deficiency: an Endocrine Society clinical practice     guideline. JCEM. 2011 Jul; 96(7):1911-30.      Magnesium 02/03/2025 1.8  1.6 - 2.3 mg/dL Final         Review of Systems   Constitutional:  Positive for fatigue.   HENT:  Positive for congestion, postnasal drip and sore throat.    Respiratory:  Positive for cough.    Cardiovascular: Negative.    Gastrointestinal: Negative.    Genitourinary: Negative.    Musculoskeletal: Negative.    Skin: Negative.    Neurological: Negative.    Psychiatric/Behavioral: Negative.         Objective   Physical Exam  Constitutional:       Appearance: Normal appearance.   HENT:      Head: Normocephalic.      Nose:      Comments: Turbinates bright red     Mouth/Throat:      Comments: Postnasal drip  Cardiovascular:      Rate and Rhythm: Normal rate and regular rhythm.      Pulses: Normal pulses.      Heart sounds: Normal heart sounds.   Pulmonary:      Effort: Pulmonary effort is normal.      Breath sounds: Normal breath sounds.   Abdominal:      General: Bowel sounds are normal.   Musculoskeletal:         General: Normal range of motion.   Skin:     General: Skin is warm.   Neurological:      General: No focal deficit present.      Mental Status: She is alert and oriented to person, place, and time.   Psychiatric:         Mood and Affect: Mood normal.          Behavior: Behavior normal.         Procedures    Assessment & Plan   Diagnoses and all orders for this visit:    1. Acute cough (Primary)  -     POCT SARS-CoV-2 + Flu Antigen TEOFILO    Other orders  -     levoFLOXacin (Levaquin) 750 MG tablet; Take 1 tablet by mouth Daily.  Dispense: 5 tablet; Refill: 0  -     cetirizine (ZyrTEC Allergy) 10 MG tablet; Take 1 tablet by mouth Daily.  Dispense: 30 tablet; Refill: 2  -     fluticasone (FLONASE) 50 MCG/ACT nasal spray; Administer 2 sprays into the nostril(s) as directed by provider Daily.  Dispense: 16 g; Refill: 2  -     pseudoephedrine (Sudafed) 30 MG tablet; Take 1 tablet by mouth Every 4 (Four) Hours As Needed for Congestion.  Dispense: 60 tablet; Refill: 2           Current Outpatient Medications:     Blood Glucose Monitoring Suppl kit, Use as directed to check blood glucose, Disp: 1 each, Rfl: 0    Cariprazine HCl (Vraylar) 3 MG capsule capsule, Take 1 capsule by mouth Daily., Disp: 90 capsule, Rfl: 1    fexofenadine (ALLEGRA) 180 MG tablet, TAKE 1 TABLET BY MOUTH EVERY DAY, Disp: 90 tablet, Rfl: 3    FLUoxetine (PROzac) 40 MG capsule, TAKE 1 CAPSULE BY MOUTH EVERY DAY, Disp: 90 capsule, Rfl: 3    glucose blood test strip, Use as instructed to check blood glucose once daily, Disp: 100 each, Rfl: 3    Januvia 100 MG tablet, TAKE 1 TABLET BY MOUTH EVERY DAY, Disp: 90 tablet, Rfl: 3    lamoTRIgine (LaMICtal) 100 MG tablet, Take 1 tablet by mouth Daily. At bedtime, Disp: 90 tablet, Rfl: 1    Lancets misc, Use once daily with meter and strips to check blood glucose, Disp: 100 each, Rfl: 3    linaclotide (Linzess) 145 MCG capsule capsule, Take 1 capsule by mouth Every Morning Before Breakfast., Disp: 30 capsule, Rfl: 5    lisinopril (PRINIVIL,ZESTRIL) 10 MG tablet, TAKE 1 TABLET BY MOUTH EVERY DAY, Disp: 90 tablet, Rfl: 3    Melatonin 5 MG chewable tablet, Chew 5 mg every night at bedtime., Disp: 30 tablet, Rfl: 3    meloxicam (MOBIC) 15 MG tablet, Take 1 tablet by  mouth Daily., Disp: 90 tablet, Rfl: 1    naloxone (NARCAN) 4 MG/0.1ML nasal spray, Call 911. Don't prime. [place] in 1 nostril for overdose. Repeat in 2-3 minutes in other nostril if no or minimal breathing/responsiveness., Disp: 2 each, Rfl: 0    oxyCODONE-acetaminophen (Percocet)  MG per tablet, Take 1 tablet by mouth Every 6 (Six) Hours As Needed for Moderate Pain., Disp: 120 tablet, Rfl: 0    oxyCODONE-acetaminophen (Percocet)  MG per tablet, Take 1 tablet by mouth Every 6 (Six) Hours As Needed for Moderate Pain., Disp: 120 tablet, Rfl: 0    oxyCODONE-acetaminophen (Percocet)  MG per tablet, Take 1 tablet by mouth Every 6 (Six) Hours As Needed for Moderate Pain., Disp: 120 tablet, Rfl: 0    pregabalin (LYRICA) 150 MG capsule, Take 1 capsule by mouth 2 (Two) Times a Day., Disp: 60 capsule, Rfl: 5    Semaglutide,0.25 or 0.5MG/DOS, (OZEMPIC) 2 MG/3ML solution pen-injector, Inject 0.25 mg under the skin once weekly x4 weeks, then increase to 0.5 mg weekly, Disp: 3 mL, Rfl: 1    SUMAtriptan (IMITREX) 50 MG tablet, TAKE 1 TABLET BY MOUTH AT ONSET OF HEADACHE. MAY REPEAT DOSE ONE TIME IN 2 HOURS IF HEADACHE NOT RELIEVED., Disp: 6 tablet, Rfl: 0    vitamin D (ERGOCALCIFEROL) 1.25 MG (18293 UT) capsule capsule, Take 1 capsule by mouth 1 (One) Time Per Week., Disp: 5 capsule, Rfl: 0    cetirizine (ZyrTEC Allergy) 10 MG tablet, Take 1 tablet by mouth Daily., Disp: 30 tablet, Rfl: 2    fluticasone (FLONASE) 50 MCG/ACT nasal spray, Administer 2 sprays into the nostril(s) as directed by provider Daily., Disp: 16 g, Rfl: 2    levoFLOXacin (Levaquin) 750 MG tablet, Take 1 tablet by mouth Daily., Disp: 5 tablet, Rfl: 0    pseudoephedrine (Sudafed) 30 MG tablet, Take 1 tablet by mouth Every 4 (Four) Hours As Needed for Congestion., Disp: 60 tablet, Rfl: 2           Daniela Welsh, APRN 3/24/2025 10:35 EDT  This note has been electronically signed

## 2025-03-26 ENCOUNTER — TELEPHONE (OUTPATIENT)
Dept: FAMILY MEDICINE CLINIC | Facility: CLINIC | Age: 45
End: 2025-03-26

## 2025-03-26 NOTE — TELEPHONE ENCOUNTER
Caller: Noa Soni    Relationship to patient: Self    Best call back number: [phone]      Patient is needing: SHE WOULD LIKE TO KNOW IF SHE CAN TAKE DAYQUILL AND NIGHTQUILL TO HELP WITH HER COUGH

## 2025-04-05 DIAGNOSIS — E11.65 TYPE 2 DIABETES MELLITUS WITH HYPERGLYCEMIA, WITHOUT LONG-TERM CURRENT USE OF INSULIN: ICD-10-CM

## 2025-04-07 RX ORDER — SEMAGLUTIDE 0.68 MG/ML
0.5 INJECTION, SOLUTION SUBCUTANEOUS WEEKLY
Qty: 3 ML | Refills: 0 | Status: SHIPPED | OUTPATIENT
Start: 2025-04-07 | End: 2025-04-14 | Stop reason: SDUPTHER

## 2025-04-14 ENCOUNTER — OFFICE VISIT (OUTPATIENT)
Dept: FAMILY MEDICINE CLINIC | Facility: CLINIC | Age: 45
End: 2025-04-14
Payer: MEDICAID

## 2025-04-14 VITALS
TEMPERATURE: 97.5 F | HEIGHT: 64 IN | WEIGHT: 288.6 LBS | SYSTOLIC BLOOD PRESSURE: 116 MMHG | OXYGEN SATURATION: 95 % | RESPIRATION RATE: 18 BRPM | DIASTOLIC BLOOD PRESSURE: 70 MMHG | BODY MASS INDEX: 49.27 KG/M2 | HEART RATE: 83 BPM

## 2025-04-14 DIAGNOSIS — F43.12 CHRONIC POSTTRAUMATIC STRESS DISORDER: Chronic | ICD-10-CM

## 2025-04-14 DIAGNOSIS — F33.2 SEVERE RECURRENT MAJOR DEPRESSION WITHOUT PSYCHOTIC FEATURES: ICD-10-CM

## 2025-04-14 DIAGNOSIS — I10 ESSENTIAL HYPERTENSION: ICD-10-CM

## 2025-04-14 DIAGNOSIS — F41.1 GENERALIZED ANXIETY DISORDER: ICD-10-CM

## 2025-04-14 DIAGNOSIS — E11.65 TYPE 2 DIABETES MELLITUS WITH HYPERGLYCEMIA, WITHOUT LONG-TERM CURRENT USE OF INSULIN: ICD-10-CM

## 2025-04-14 DIAGNOSIS — E11.65 TYPE 2 DIABETES MELLITUS WITH HYPERGLYCEMIA, WITHOUT LONG-TERM CURRENT USE OF INSULIN: Primary | ICD-10-CM

## 2025-04-14 DIAGNOSIS — F81.9 LEARNING DISABILITY: ICD-10-CM

## 2025-04-14 DIAGNOSIS — F39 MOOD DISORDER: ICD-10-CM

## 2025-04-14 PROCEDURE — 1159F MED LIST DOCD IN RCRD: CPT | Performed by: FAMILY MEDICINE

## 2025-04-14 PROCEDURE — 1125F AMNT PAIN NOTED PAIN PRSNT: CPT | Performed by: FAMILY MEDICINE

## 2025-04-14 PROCEDURE — 3078F DIAST BP <80 MM HG: CPT | Performed by: FAMILY MEDICINE

## 2025-04-14 PROCEDURE — T1015 CLINIC SERVICE: HCPCS | Performed by: FAMILY MEDICINE

## 2025-04-14 PROCEDURE — 1160F RVW MEDS BY RX/DR IN RCRD: CPT | Performed by: FAMILY MEDICINE

## 2025-04-14 PROCEDURE — 3046F HEMOGLOBIN A1C LEVEL >9.0%: CPT | Performed by: FAMILY MEDICINE

## 2025-04-14 PROCEDURE — 99214 OFFICE O/P EST MOD 30 MIN: CPT | Performed by: FAMILY MEDICINE

## 2025-04-14 PROCEDURE — 3074F SYST BP LT 130 MM HG: CPT | Performed by: FAMILY MEDICINE

## 2025-04-14 RX ORDER — LAMOTRIGINE 100 MG/1
100 TABLET ORAL 2 TIMES DAILY
Qty: 180 TABLET | Refills: 1 | Status: SHIPPED | OUTPATIENT
Start: 2025-04-14

## 2025-04-14 RX ORDER — SEMAGLUTIDE 0.68 MG/ML
0.5 INJECTION, SOLUTION SUBCUTANEOUS WEEKLY
Qty: 3 ML | Refills: 0 | Status: SHIPPED | OUTPATIENT
Start: 2025-04-14

## 2025-04-14 RX ORDER — ONDANSETRON 4 MG/1
4 TABLET, ORALLY DISINTEGRATING ORAL EVERY 8 HOURS PRN
Qty: 24 TABLET | Refills: 2 | Status: SHIPPED | OUTPATIENT
Start: 2025-04-14

## 2025-04-14 NOTE — PROGRESS NOTES
Subjective   Noa Soni is a 44 y.o. female.   Chief Complaint   Patient presents with    Diabetes    Hypertension    Anxiety    Depression       History of Present Illness   44 y.o. female with learning disability, chronic pain followed by pain management, chronic anxiety, depression, PTSD, NESHA, HTN, DM2 presents to the office today to follow-up.  I last saw her back in January after a 1 year absence.  She has seen Lexii a few times since then.  Her A1c back in January was 10%.  As of January the plan was to start her on Mounjaro.     Her insurance would not cover it.  They wanted to try Ozempic first.  I sent that prescription in.  She called right away and said it made her dizzy.  I just sent in a prescription for that a week ago instructed her to increase to 0.5 mg/week for 4 weeks.    Mood disorder she is on Vraylar, Lamictal.    History of Present Illness  The patient presents for evaluation of diabetes, nausea, and depression.    She is currently on a regimen of Ozempic 0.25 mg, which has been causing her to experience nausea lasting for several days. She does not have any antiemetic medication at her disposal. She reports no significant weight loss and an increase in her blood glucose levels. She has made dietary modifications, including the elimination of soda from her diet, replacing it with juice and flavored water.    She has been experiencing passive suicidal ideation, attributing these thoughts to her relationship with Ruiz. She reports no active plans for self-harm and finds solace in reading the Bible during these episodes. She is considering relocating to [place] to be closer to her family. She maintains daily communication with her mother and is actively working on improving their relationship. She believes that an increase in her Vraylar and Lamictal dosages may be beneficial.    Her blood pressure is well-controlled. She has been taking all her medications regularly.    Supplemental  Information  She also reports experiencing foot pain.    SOCIAL HISTORY  She has stopped drinking soda and has been substituting it with juice and flavored water.    FAMILY HISTORY  Her father has prostate cancer.    MEDICATIONS  Current: Ozempic, Vraylar, Lamictal      Patient Active Problem List    Diagnosis Date Noted    Vitamin D deficiency 02/05/2025    Muscle spasm of both lower legs 01/31/2025    Cholelithiasis 01/21/2024    Symptomatic cholelithiasis 01/11/2024    History of robot-assisted laparoscopic hysterectomy 08/21/2023     Note Last Updated: 8/21/2023 6/2/23 - Yevgeniy.  Dr. Meraz.  Ovaries left!      Lumbar radiculopathy 07/12/2023    Rotator cuff arthropathy of right shoulder 04/03/2023    Chronic pain of both knees 05/24/2022    Chronic bilateral low back pain with bilateral sciatica 05/24/2022    Chronic right shoulder pain 05/24/2022    Morbid obesity 04/27/2022    Nasal congestion 11/20/2021    Post-traumatic osteoarthritis of both knees 11/10/2021    Severe recurrent major depression without psychotic features 06/17/2021    Chronic posttraumatic stress disorder 06/17/2021    Generalized anxiety disorder 06/17/2021    Callus of foot 10/19/2020    Perennial allergic rhinitis 10/19/2020    NESHA (obstructive sleep apnea) 10/19/2020    Class 3 severe obesity due to excess calories without serious comorbidity with body mass index (BMI) of 45.0 to 49.9 in adult 10/12/2020    Lymphadenopathy 10/06/2020    Essential hypertension 07/27/2020    Learning disability 07/27/2020     Note Last Updated: 7/27/2020     No other details      Type 2 diabetes mellitus 01/17/2020    History of endometriosis 01/17/2020    History of PCOS 01/17/2020           Past Surgical History:   Procedure Laterality Date    CHOLECYSTECTOMY      CHOLECYSTECTOMY WITH INTRAOPERATIVE CHOLANGIOGRAM N/A 01/22/2024    Procedure: CHOLECYSTECTOMY LAPAROSCOPIC INTRAOPERATIVE CHOLANGIOGRAM;  Surgeon: Dustin Shelley MD;  [facility];  Service: General;  Laterality: N/A;    EAR TUBES      ENDOMETRIAL ABLATION      INTRAUTERINE DEVICE INSERTION  10/14/2020    LAPAROSCOPIC TUBAL LIGATION      SUBTOTAL HYSTERECTOMY      TONSILLECTOMY AND ADENOIDECTOMY      TOTAL LAPAROSCOPIC HYSTERECTOMY WITH DAVINCI ROBOT  06/02/2023    [facility]    TUBAL ABDOMINAL LIGATION       Current Outpatient Medications on File Prior to Visit   Medication Sig    Blood Glucose Monitoring Suppl kit Use as directed to check blood glucose    Cariprazine HCl (Vraylar) 3 MG capsule capsule Take 1 capsule by mouth Daily.    cetirizine (ZyrTEC Allergy) 10 MG tablet Take 1 tablet by mouth Daily.    fexofenadine (ALLEGRA) 180 MG tablet TAKE 1 TABLET BY MOUTH EVERY DAY    FLUoxetine (PROzac) 40 MG capsule TAKE 1 CAPSULE BY MOUTH EVERY DAY    fluticasone (FLONASE) 50 MCG/ACT nasal spray Administer 2 sprays into the nostril(s) as directed by provider Daily.    glucose blood test strip Use as instructed to check blood glucose once daily    Januvia 100 MG tablet TAKE 1 TABLET BY MOUTH EVERY DAY    Lancets misc Use once daily with meter and strips to check blood glucose    levoFLOXacin (Levaquin) 750 MG tablet Take 1 tablet by mouth Daily.    linaclotide (Linzess) 145 MCG capsule capsule Take 1 capsule by mouth Every Morning Before Breakfast.    lisinopril (PRINIVIL,ZESTRIL) 10 MG tablet TAKE 1 TABLET BY MOUTH EVERY DAY    Melatonin 5 MG chewable tablet Chew 5 mg every night at bedtime.    meloxicam (MOBIC) 15 MG tablet Take 1 tablet by mouth Daily.    naloxone (NARCAN) 4 MG/0.1ML nasal spray Call 911. Don't prime. [place] in 1 nostril for overdose. Repeat in 2-3 minutes in other nostril if no or minimal breathing/responsiveness.    oxyCODONE-acetaminophen (Percocet)  MG per tablet Take 1 tablet by mouth Every 6 (Six) Hours As Needed for Moderate Pain.    oxyCODONE-acetaminophen (Percocet)  MG per tablet Take 1 tablet by mouth Every 6 (Six) Hours As Needed for  Moderate Pain.    oxyCODONE-acetaminophen (Percocet)  MG per tablet Take 1 tablet by mouth Every 6 (Six) Hours As Needed for Moderate Pain.    pregabalin (LYRICA) 150 MG capsule Take 1 capsule by mouth 2 (Two) Times a Day.    pseudoephedrine (Sudafed) 30 MG tablet Take 1 tablet by mouth Every 4 (Four) Hours As Needed for Congestion.    Semaglutide,0.25 or 0.5MG/DOS, (Ozempic, 0.25 or 0.5 MG/DOSE,) 2 MG/3ML solution pen-injector Inject 0.5 mg under the skin into the appropriate area as directed 1 (One) Time Per Week.    SUMAtriptan (IMITREX) 50 MG tablet TAKE 1 TABLET BY MOUTH AT ONSET OF HEADACHE. MAY REPEAT DOSE ONE TIME IN 2 HOURS IF HEADACHE NOT RELIEVED.    vitamin D (ERGOCALCIFEROL) 1.25 MG (46028 UT) capsule capsule Take 1 capsule by mouth 1 (One) Time Per Week.    [DISCONTINUED] lamoTRIgine (LaMICtal) 100 MG tablet Take 1 tablet by mouth Daily. At bedtime     No current facility-administered medications on file prior to visit.     Allergies   Allergen Reactions    Dilaudid [Hydromorphone] Other (See Comments)     Bottoms O2 out    Morphine Other (See Comments)     Suicical ideations    Tylenol With Codeine #3 [Acetaminophen-Codeine] Hallucinations    Abilify [Aripiprazole] Rash    Metformin Diarrhea    Vicodin [Hydrocodone-Acetaminophen] GI Intolerance     Social History     Socioeconomic History    Marital status: Single    Number of children: 0   Tobacco Use    Smoking status: Former     Current packs/day: 0.00     Average packs/day: 1 pack/day for 1 year (1.0 ttl pk-yrs)     Types: Cigarettes     Start date: 2017     Quit date: 2018     Years since quittin.2     Passive exposure: Past    Smokeless tobacco: Never    Tobacco comments:     socially    Vaping Use    Vaping status: Never Used   Substance and Sexual Activity    Alcohol use: Never    Drug use: Never    Sexual activity: Not Currently     Partners: Male     Birth control/protection: Other, Hysterectomy     Family History   Problem  "Relation Age of Onset    Hypertension Mother     Depression Mother     Post-traumatic stress disorder Mother     Hyperlipidemia Mother     Anxiety disorder Mother     Mental illness Mother     Hypertension Father     Hyperlipidemia Father     Hypertension Brother     Depression Brother     Parkinsonism Maternal Grandmother     Dementia Maternal Grandmother     Atrial fibrillation Maternal Grandfather     Breast cancer Paternal Grandmother     Diabetes Paternal Grandmother     COPD Paternal Grandfather     Diabetes Paternal Grandfather        Review of Systems    Objective   /70 (BP Location: Right arm, Patient Position: Sitting, Cuff Size: Large Adult)   Pulse 83   Temp 97.5 °F (36.4 °C) (Infrared)   Resp 18   Ht 162.6 cm (64.02\")   Wt 131 kg (288 lb 9.6 oz)   LMP 06/02/2023 (Exact Date)   SpO2 95%   Breastfeeding No   BMI 49.51 kg/m²   Physical Exam  Constitutional:       Appearance: She is well-developed.      Comments:      HENT:      Head: Normocephalic and atraumatic.   Eyes:      Conjunctiva/sclera: Conjunctivae normal.   Cardiovascular:      Rate and Rhythm: Normal rate.   Pulmonary:      Effort: Pulmonary effort is normal.   Musculoskeletal:         General: Normal range of motion.      Cervical back: Normal range of motion.   Skin:     General: Skin is warm and dry.      Findings: No rash.   Neurological:      Mental Status: She is alert and oriented to person, place, and time.   Psychiatric:         Behavior: Behavior normal.       Physical Exam  Vital Signs  Blood pressure is 116/70.      Office Visit on 03/24/2025   Component Date Value Ref Range Status    SARS Antigen 03/24/2025 Not Detected  Not Detected, Presumptive Negative Final    Influenza A Antigen TEOFILO 03/24/2025 Not Detected  Not Detected Final    Influenza B Antigen TEOFILO 03/24/2025 Not Detected  Not Detected Final    Internal Control 03/24/2025 Passed  Passed Final    Lot Number 03/24/2025 [id number]   Final    Expiration Date " 03/24/2025 11,[id number]   Final   Results Encounter on 02/03/2025   Component Date Value Ref Range Status    25 Hydroxy, Vitamin D 02/03/2025 6.0 (L)  30.0 - 100.0 ng/mL Final    Comment: Vitamin D deficiency has been defined by the [place] of  Medicine and an Endocrine Society practice guideline as a  level of serum 25-OH vitamin D less than 20 ng/mL (1,2).  The Endocrine Society went on to further define vitamin D  insufficiency as a level between 21 and 29 ng/mL (2).  1. IOM ([place] of Medicine). 2010. Dietary reference     intakes for calcium and D. [place]: The     National [employer].  2. Natalia MF, Jaylon OHOKS, Mikhail ORTIZ, et al.     Evaluation, treatment, and prevention of vitamin D     deficiency: an Endocrine Society clinical practice     guideline. JCEM. 2011 Jul; 96(7):1911-30.      Magnesium 02/03/2025 1.8  1.6 - 2.3 mg/dL Final   Office Visit on 01/14/2025   Component Date Value Ref Range Status    Hemoglobin A1C 01/14/2025 10.0 (H)  4.8 - 5.6 % Final    Comment:          Prediabetes: 5.7 - 6.4           Diabetes: >6.4           Glycemic control for adults with diabetes: <7.0      WBC 01/14/2025 9.5  3.4 - 10.8 x10E3/uL Final    RBC 01/14/2025 4.69  3.77 - 5.28 x10E6/uL Final    Hemoglobin 01/14/2025 13.3  11.1 - 15.9 g/dL Final    Hematocrit 01/14/2025 41.3  34.0 - 46.6 % Final    MCV 01/14/2025 88  79 - 97 fL Final    MCH 01/14/2025 28.4  26.6 - 33.0 pg Final    MCHC 01/14/2025 32.2  31.5 - 35.7 g/dL Final    RDW 01/14/2025 13.9  11.7 - 15.4 % Final    Platelets 01/14/2025 263  150 - 450 x10E3/uL Final    Neutrophil Rel % 01/14/2025 70  Not Estab. % Final    Lymphocyte Rel % 01/14/2025 21  Not Estab. % Final    Monocyte Rel % 01/14/2025 7  Not Estab. % Final    Eosinophil Rel % 01/14/2025 1  Not Estab. % Final    Basophil Rel % 01/14/2025 0  Not Estab. % Final    Neutrophils Absolute 01/14/2025 6.6  1.4 - 7.0 x10E3/uL Final    Lymphocytes Absolute 01/14/2025 2.0  0.7 - 3.1  x10E3/uL Final    Monocytes Absolute 01/14/2025 0.7  0.1 - 0.9 x10E3/uL Final    Eosinophils Absolute 01/14/2025 0.1  0.0 - 0.4 x10E3/uL Final    Basophils Absolute 01/14/2025 0.0  0.0 - 0.2 x10E3/uL Final    Immature Granulocyte Rel % 01/14/2025 1  Not Estab. % Final    Immature Grans Absolute 01/14/2025 0.1  0.0 - 0.1 x10E3/uL Final    Glucose 01/14/2025 314 (H)  70 - 99 mg/dL Final    BUN 01/14/2025 11  6 - 24 mg/dL Final    Creatinine 01/14/2025 0.81  0.57 - 1.00 mg/dL Final    EGFR Result 01/14/2025 93  >59 mL/min/1.73 Final    BUN/Creatinine Ratio 01/14/2025 14  9 - 23 Final    Sodium 01/14/2025 137  134 - 144 mmol/L Final    Potassium 01/14/2025 4.1  3.5 - 5.2 mmol/L Final    Chloride 01/14/2025 98  96 - 106 mmol/L Final    Total CO2 01/14/2025 24  20 - 29 mmol/L Final    Calcium 01/14/2025 9.6  8.7 - 10.2 mg/dL Final    Total Protein 01/14/2025 7.7  6.0 - 8.5 g/dL Final    Albumin 01/14/2025 4.3  3.9 - 4.9 g/dL Final    Globulin 01/14/2025 3.4  1.5 - 4.5 g/dL Final    Total Bilirubin 01/14/2025 1.2  0.0 - 1.2 mg/dL Final    Alkaline Phosphatase 01/14/2025 137 (H)  44 - 121 IU/L Final    AST (SGOT) 01/14/2025 31  0 - 40 IU/L Final    ALT (SGPT) 01/14/2025 29  0 - 32 IU/L Final    TSH 01/14/2025 1.930  0.450 - 4.500 uIU/mL Final     Results            Assessment & Plan   Diagnoses and all orders for this visit:    1. Type 2 diabetes mellitus with hyperglycemia, without long-term current use of insulin (Primary)  -     Hemoglobin A1c  -     Comprehensive Metabolic Panel  -     Microalbumin / Creatinine Urine Ratio - Urine, Clean Catch    2. Essential hypertension  -     CBC & Differential    3. Learning disability    4. Generalized anxiety disorder  -     lamoTRIgine (LaMICtal) 100 MG tablet; Take 1 tablet by mouth 2 (Two) Times a Day.  Dispense: 180 tablet; Refill: 1    5. Severe recurrent major depression without psychotic features  -     lamoTRIgine (LaMICtal) 100 MG tablet; Take 1 tablet by mouth 2 (Two)  Times a Day.  Dispense: 180 tablet; Refill: 1    6. Mood disorder    7. Chronic posttraumatic stress disorder  -     lamoTRIgine (LaMICtal) 100 MG tablet; Take 1 tablet by mouth 2 (Two) Times a Day.  Dispense: 180 tablet; Refill: 1    Other orders  -     ondansetron ODT (ZOFRAN-ODT) 4 MG disintegrating tablet; Place 1 tablet on the tongue Every 8 (Eight) Hours As Needed for Nausea or Vomiting.  Dispense: 24 tablet; Refill: 2      Assessment & Plan  1. Diabetes Mellitus.  Her insurance provider has approved coverage for Ozempic until 01/17/2026. She has not yet experienced significant weight loss or changes in blood glucose levels. She has been on the starter dose of 0.25 mg. She has also stopped drinking soda, substituting it with juice and flavored water, which should help her sugar levels. The dosage of Ozempic will be increased to 0.5 mg per week for a duration of one month, after which it will be further increased to 1 mg. An A1c test will be conducted today. She is advised to contact via [employer] or phone call once her Ozempic pen is empty to facilitate a refill.    2. Nausea.  She reports experiencing nausea for a couple of days after taking the Ozempic shot. A prescription for Zofran has been provided to manage this symptom. She is advised to  the prescription from [facility] along with her Ozempic.    3. Depression.  She reports passive suicidal thoughts but no active plans to harm herself. She finds relief by reading the Bible when feeling distressed. The dosage of Lamictal will be increased from 100 mg per day to 100 mg twice daily. This change aims to bolster her mental fortitude and improve her overall well-being.  Will reevaluate her in 6 weeks and make further adjustments to medications as needed.        4. Blood pressure management.  Her blood pressure is well-controlled at 116/70. She has been taking all her medications regularly.    Follow-up  The patient will follow up in 6 weeks.        Call with  any problems or concerns before next visit       Return in about 6 weeks (around 5/26/2025), or to recheck mood.  Patient or patient representative verbalized consent for the use of Ambient Listening during the visit with  Bree Wan MD for chart documentation. 4/14/2025  13:19 EDT    Part of this note may be an electronic transcription/translation of spoken language to printed text using the Dragon Dictation System    Bree Wan MD4/14/202513:17 EDT  This note has been electronically signed

## 2025-04-15 DIAGNOSIS — M79.672 BILATERAL FOOT PAIN: Primary | ICD-10-CM

## 2025-04-15 DIAGNOSIS — M79.671 BILATERAL FOOT PAIN: Primary | ICD-10-CM

## 2025-04-15 LAB
ALBUMIN SERPL-MCNC: 3.8 G/DL (ref 3.9–4.9)
ALBUMIN/CREAT UR: 19 MG/G CREAT (ref 0–29)
ALP SERPL-CCNC: 142 IU/L (ref 44–121)
ALT SERPL-CCNC: 16 IU/L (ref 0–32)
AST SERPL-CCNC: 18 IU/L (ref 0–40)
BASOPHILS # BLD AUTO: 0 X10E3/UL (ref 0–0.2)
BASOPHILS NFR BLD AUTO: 0 %
BILIRUB SERPL-MCNC: 1 MG/DL (ref 0–1.2)
BUN SERPL-MCNC: 8 MG/DL (ref 6–24)
BUN/CREAT SERPL: 11 (ref 9–23)
CALCIUM SERPL-MCNC: 9 MG/DL (ref 8.7–10.2)
CHLORIDE SERPL-SCNC: 100 MMOL/L (ref 96–106)
CO2 SERPL-SCNC: 22 MMOL/L (ref 20–29)
CREAT SERPL-MCNC: 0.72 MG/DL (ref 0.57–1)
CREAT UR-MCNC: 126.4 MG/DL
EGFRCR SERPLBLD CKD-EPI 2021: 107 ML/MIN/1.73
EOSINOPHIL # BLD AUTO: 0.1 X10E3/UL (ref 0–0.4)
EOSINOPHIL NFR BLD AUTO: 2 %
ERYTHROCYTE [DISTWIDTH] IN BLOOD BY AUTOMATED COUNT: 14.1 % (ref 11.7–15.4)
GLOBULIN SER CALC-MCNC: 3.3 G/DL (ref 1.5–4.5)
GLUCOSE SERPL-MCNC: 361 MG/DL (ref 70–99)
HBA1C MFR BLD: 11.5 % (ref 4.8–5.6)
HCT VFR BLD AUTO: 36.6 % (ref 34–46.6)
HGB BLD-MCNC: 11.3 G/DL (ref 11.1–15.9)
IMM GRANULOCYTES # BLD AUTO: 0.1 X10E3/UL (ref 0–0.1)
IMM GRANULOCYTES NFR BLD AUTO: 1 %
LYMPHOCYTES # BLD AUTO: 1.8 X10E3/UL (ref 0.7–3.1)
LYMPHOCYTES NFR BLD AUTO: 26 %
MCH RBC QN AUTO: 27.2 PG (ref 26.6–33)
MCHC RBC AUTO-ENTMCNC: 30.9 G/DL (ref 31.5–35.7)
MCV RBC AUTO: 88 FL (ref 79–97)
MICROALBUMIN UR-MCNC: 24.2 UG/ML
MONOCYTES # BLD AUTO: 0.5 X10E3/UL (ref 0.1–0.9)
MONOCYTES NFR BLD AUTO: 7 %
NEUTROPHILS # BLD AUTO: 4.5 X10E3/UL (ref 1.4–7)
NEUTROPHILS NFR BLD AUTO: 64 %
PLATELET # BLD AUTO: 257 X10E3/UL (ref 150–450)
POTASSIUM SERPL-SCNC: 4.2 MMOL/L (ref 3.5–5.2)
PROT SERPL-MCNC: 7.1 G/DL (ref 6–8.5)
RBC # BLD AUTO: 4.15 X10E6/UL (ref 3.77–5.28)
SODIUM SERPL-SCNC: 137 MMOL/L (ref 134–144)
WBC # BLD AUTO: 6.9 X10E3/UL (ref 3.4–10.8)

## 2025-05-06 ENCOUNTER — OFFICE VISIT (OUTPATIENT)
Age: 45
End: 2025-05-06
Payer: MEDICAID

## 2025-05-06 VITALS — WEIGHT: 288 LBS | BODY MASS INDEX: 49.17 KG/M2 | HEIGHT: 64 IN | RESPIRATION RATE: 21 BRPM

## 2025-05-06 DIAGNOSIS — M79.672 BILATERAL FOOT PAIN: Primary | ICD-10-CM

## 2025-05-06 DIAGNOSIS — M76.821 POSTERIOR TIBIAL TENDINITIS OF BOTH LOWER EXTREMITIES: ICD-10-CM

## 2025-05-06 DIAGNOSIS — M79.671 BILATERAL FOOT PAIN: Primary | ICD-10-CM

## 2025-05-06 DIAGNOSIS — M21.861 ACQUIRED POSTERIOR EQUINUS OF BOTH LOWER EXTREMITIES: ICD-10-CM

## 2025-05-06 DIAGNOSIS — E66.01 MORBID OBESITY WITH BMI OF 45.0-49.9, ADULT: ICD-10-CM

## 2025-05-06 DIAGNOSIS — E11.65 TYPE 2 DIABETES MELLITUS WITH HYPERGLYCEMIA, WITHOUT LONG-TERM CURRENT USE OF INSULIN: ICD-10-CM

## 2025-05-06 DIAGNOSIS — M21.862 ACQUIRED POSTERIOR EQUINUS OF BOTH LOWER EXTREMITIES: ICD-10-CM

## 2025-05-06 DIAGNOSIS — M72.2 PLANTAR FASCIITIS, BILATERAL: ICD-10-CM

## 2025-05-06 DIAGNOSIS — M76.822 POSTERIOR TIBIAL TENDINITIS OF BOTH LOWER EXTREMITIES: ICD-10-CM

## 2025-05-06 NOTE — PATIENT INSTRUCTIONS
[employer]: [place] [employer]  - Full length insoles        Where to find:    Order online:  - [employer]    Local purchase:  - [employer]    [id number]    [place] [address]

## 2025-05-07 ENCOUNTER — PATIENT ROUNDING (BHMG ONLY) (OUTPATIENT)
Age: 45
End: 2025-05-07
Payer: MEDICAID

## 2025-05-07 NOTE — PROGRESS NOTES
05/06/2025  Foot and Ankle Surgery - New Patient   Provider: Dr. Rob Myles DPM  Location: [facility]    Subjective:  Noa Soni is a 44 y.o. female.     Chief Complaint   Patient presents with    Left Foot - Pain, Initial Evaluation     Arch pain    Right Foot - Pain, Initial Evaluation     Arch pain    NEW PROBLEM     PCP: Bree Wan MD  Last PCP Visit: 4/14/25         History of Present Illness  The patient presents for evaluation of arch problems.    She has been experiencing discomfort in the arch of her foot for several weeks, which has now extended to her ankle. The pain is particularly noticeable during the initial steps in the morning, subsides slightly as she continues to move, but intensifies again at night. 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 She does not engage in any gym activities. She has a history of tightness in the back of her legs.    She is under the care of Dr. Rios for her blood glucose management. She has not consulted an endocrinologist. She reports that her A1c levels increase with excessive caffeine intake. She recalls an incident where she sustained a cut on her foot but did not experience any sensation. She expresses a desire to undergo weight loss surgery.    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       Allergies   Allergen Reactions    Dilaudid [Hydromorphone] Other (See Comments)     Bottoms O2 out    Morphine Other (See Comments)     Suicical ideations    Tylenol With Codeine #3 [Acetaminophen-Codeine] Hallucinations    Abilify [Aripiprazole] Rash    Metformin Diarrhea    Vicodin [Hydrocodone-Acetaminophen] GI Intolerance       Past Medical History:   Diagnosis Date    Allergic     Anger     Anxiety     Bipolar 1 disorder     Bipolar disorder 9/23/23    Cholelithiasis 1/3/2024    I had alot of stones in my gallbladder    CTS (carpal tunnel syndrome)     Depression      Diabetes mellitus     Headache     Headache, tension-type     HTN (hypertension)     Irritable bowel syndrome     Knee pain, bilateral     Knee swelling     Low back pain     Migraine     Neuropathy in diabetes 23    Obesity     PTSD (post-traumatic stress disorder)     Type 2 diabetes mellitus        Past Surgical History:   Procedure Laterality Date    CHOLECYSTECTOMY      CHOLECYSTECTOMY WITH INTRAOPERATIVE CHOLANGIOGRAM N/A 2024    Procedure: CHOLECYSTECTOMY LAPAROSCOPIC INTRAOPERATIVE CHOLANGIOGRAM;  Surgeon: Dustin Shelley MD;  Location: [facility] MAIN OR;  Service: General;  Laterality: N/A;    EAR TUBES      ENDOMETRIAL ABLATION      INTRAUTERINE DEVICE INSERTION  10/14/2020    LAPAROSCOPIC TUBAL LIGATION      SUBTOTAL HYSTERECTOMY      TONSILLECTOMY AND ADENOIDECTOMY      TOTAL LAPAROSCOPIC HYSTERECTOMY WITH DAVINCI ROBOT  2023    [facility]    TUBAL ABDOMINAL LIGATION         Family History   Problem Relation Age of Onset    Hypertension Mother     Depression Mother     Post-traumatic stress disorder Mother     Hyperlipidemia Mother     Anxiety disorder Mother     Mental illness Mother     Hypertension Father     Hyperlipidemia Father     Hypertension Brother     Depression Brother     Parkinsonism Maternal Grandmother     Dementia Maternal Grandmother     Atrial fibrillation Maternal Grandfather     Breast cancer Paternal Grandmother     Diabetes Paternal Grandmother     COPD Paternal Grandfather     Diabetes Paternal Grandfather        Social History     Socioeconomic History    Marital status: Single    Number of children: 0   Tobacco Use    Smoking status: Former     Current packs/day: 0.00     Average packs/day: 1 pack/day for 1 year (1.0 ttl pk-yrs)     Types: Cigarettes     Start date: 2017     Quit date: 2018     Years since quittin.3     Passive exposure: Past    Smokeless tobacco: Never    Tobacco comments:     socially    Vaping Use    Vaping status: Never Used    Substance and Sexual Activity    Alcohol use: Never    Drug use: Never    Sexual activity: Not Currently     Partners: Male     Birth control/protection: Other, Hysterectomy        Current Outpatient Medications on File Prior to Visit   Medication Sig Dispense Refill    Blood Glucose Monitoring Suppl kit Use as directed to check blood glucose 1 each 0    Cariprazine HCl (Vraylar) 3 MG capsule capsule Take 1 capsule by mouth Daily. 90 capsule 1    cetirizine (ZyrTEC Allergy) 10 MG tablet Take 1 tablet by mouth Daily. 30 tablet 2    fexofenadine (ALLEGRA) 180 MG tablet TAKE 1 TABLET BY MOUTH EVERY DAY 90 tablet 3    FLUoxetine (PROzac) 40 MG capsule TAKE 1 CAPSULE BY MOUTH EVERY DAY 90 capsule 3    fluticasone (FLONASE) 50 MCG/ACT nasal spray Administer 2 sprays into the nostril(s) as directed by provider Daily. 16 g 2    glucose blood test strip Use as instructed to check blood glucose once daily 100 each 3    Januvia 100 MG tablet TAKE 1 TABLET BY MOUTH EVERY DAY 90 tablet 3    lamoTRIgine (LaMICtal) 100 MG tablet Take 1 tablet by mouth 2 (Two) Times a Day. 180 tablet 1    Lancets misc Use once daily with meter and strips to check blood glucose 100 each 3    levoFLOXacin (Levaquin) 750 MG tablet Take 1 tablet by mouth Daily. 5 tablet 0    linaclotide (Linzess) 145 MCG capsule capsule Take 1 capsule by mouth Every Morning Before Breakfast. 30 capsule 5    lisinopril (PRINIVIL,ZESTRIL) 10 MG tablet TAKE 1 TABLET BY MOUTH EVERY DAY 90 tablet 3    Melatonin 5 MG chewable tablet Chew 5 mg every night at bedtime. 30 tablet 3    meloxicam (MOBIC) 15 MG tablet Take 1 tablet by mouth Daily. 90 tablet 1    naloxone (NARCAN) 4 MG/0.1ML nasal spray Call 911. Don't prime. [place] in 1 nostril for overdose. Repeat in 2-3 minutes in other nostril if no or minimal breathing/responsiveness. 2 each 0    ondansetron ODT (ZOFRAN-ODT) 4 MG disintegrating tablet Place 1 tablet on the tongue Every 8 (Eight) Hours As Needed for Nausea or  "Vomiting. 24 tablet 2    oxyCODONE-acetaminophen (Percocet)  MG per tablet Take 1 tablet by mouth Every 6 (Six) Hours As Needed for Moderate Pain. 120 tablet 0    oxyCODONE-acetaminophen (Percocet)  MG per tablet Take 1 tablet by mouth Every 6 (Six) Hours As Needed for Moderate Pain. 120 tablet 0    oxyCODONE-acetaminophen (Percocet)  MG per tablet Take 1 tablet by mouth Every 6 (Six) Hours As Needed for Moderate Pain. 120 tablet 0    pregabalin (LYRICA) 150 MG capsule Take 1 capsule by mouth 2 (Two) Times a Day. 60 capsule 5    pseudoephedrine (Sudafed) 30 MG tablet Take 1 tablet by mouth Every 4 (Four) Hours As Needed for Congestion. 60 tablet 2    Semaglutide,0.25 or 0.5MG/DOS, (Ozempic, 0.25 or 0.5 MG/DOSE,) 2 MG/3ML solution pen-injector Inject 0.5 mg under the skin into the appropriate area as directed 1 (One) Time Per Week. X 4 weeks 3 mL 0    SUMAtriptan (IMITREX) 50 MG tablet TAKE 1 TABLET BY MOUTH AT ONSET OF HEADACHE. MAY REPEAT DOSE ONE TIME IN 2 HOURS IF HEADACHE NOT RELIEVED. 6 tablet 0    vitamin D (ERGOCALCIFEROL) 1.25 MG (09732 UT) capsule capsule Take 1 capsule by mouth 1 (One) Time Per Week. 5 capsule 0     No current facility-administered medications on file prior to visit.         Objective   Resp 21   Ht 162.6 cm (64\")   Wt 131 kg (288 lb)   LMP 06/02/2023 (Exact Date)   BMI 49.44 kg/m²     Foot/Ankle Exam  Physical Exam   General:   Appearance: appears stated age and healthy and obesity    Orientation: AAOx3    Affect: appropriate    Gait comment:  Early heel off     VASCULAR       Right Foot Vascularity   Normal vascular exam    Dorsalis pedis:  2+  Posterior tibial:  2+  Skin Temperature: warm    Edema Grading:  None  CFT:  < 3 seconds  Pedal Hair Growth:  Present  Varicosities: none        Left Foot Vascularity   Normal vascular exam    Dorsalis pedis:  2+  Posterior tibial:  2+  Skin Temperature: warm    Edema Grading:  None  CFT:  < 3 seconds  Pedal Hair Growth:  " Present  Varicosities: none        NEUROLOGIC      Right Foot Neurologic   Light touch sensation:  Diminished  Hot/Cold sensation: diminished    Protective Sensation using [place] Monofilament:  Diminished  Achilles reflex:  2+      Left Foot Neurologic   Light touch sensation:  Diminished  Hot/cold sensation: diminished    Protective Sensation using [place] Monofilament:  Diminished  Achilles reflex:  2+      MUSCULOSKELETAL       Right Foot Musculoskeletal    Amputation   Right toes amputated: No    Ecchymosis:  None  Tenderness: none    Arch:  Normal      Left Foot Musculoskeletal    Amputation   Left toes amputated: No    Ecchymosis:  None  Tenderness: none    Arch:  Normal      MUSCLE STRENGTH      Right Foot Muscle Strength   Normal strength    Foot dorsiflexion:  5  Foot plantar flexion:  5  Foot inversion:  5  Foot eversion:  5      Left Foot Muscle Strength   Normal strength    Foot dorsiflexion:  5  Foot plantar flexion:  5  Foot inversion:  5  Foot eversion:  5      DERMATOLOGIC      Right Foot Dermatologic   Skin: corn and dry skin    Skin: no right foot ulcer    Nails: onychomycosis, abnormally thick and dystrophic nails        Left Foot Dermatologic   Skin: corn and dry skin    Skin: no left foot ulcer    Nails: onychomycosis, abnormally thick and dystrophic nails        Right Foot Additional Comments No obvious deformity or instability.  Moderate+ equinus contracture with knee extended and flexed, bilateral.  No significant pain with palpation.    5/6/25: Physical exam is relatively unchanged as compared to previous assessment.  Moderate equinus contracture with knee extended and flexed.  No signs of inflammation.  Generalized discomfort involving the plantar medial aspect of the foot.  Moderate soft tissue rigidity involving the forefoot.       Results  Laboratory Studies  A1c is 11.       Assessment & Plan   Diagnoses and all orders for this visit:    1. Bilateral foot pain  (Primary)  -     XR Foot 3+ View Bilateral; Future    2. Plantar fasciitis, bilateral    3. Posterior tibial tendinitis of both lower extremities    4. Acquired posterior equinus of both lower extremities    5. Morbid obesity with BMI of 45.0-49.9, adult  -     Ambulatory Referral to Bariatric Surgery    6. Type 2 diabetes mellitus with hyperglycemia, without long-term current use of insulin       Assessment & Plan    The patient's foot structure appears normal, but the discomfort is likely due to gravity, weight, and activity. The current inserts do not provide adequate arch support. The tightness in the back of her legs may be exacerbating the issue. She was advised to use Powerstep inserts for improved arch support and to replace her shoes. Stretching exercises were recommended, along with low-impact activities such as water therapy, swimming, water aerobics, yoga, Pilates, biking, and elliptical training. A referral to Dr. Rios, a bariatric surgeon, was made for further discussion on weight loss strategies.    Her A1c level is significantly elevated at 11, increasing the risk of foot infections and other complications. She was advised to reduce caffeine intake, particularly from soda, and to consider dietary changes and exercise to manage her blood sugar levels. The importance of weight loss was discussed, and she expressed interest in weight loss surgery. A referral to Dr. Rios was made to discuss potential weight loss strategies, including nutritional counseling and dietitian services.    Reviewed proper basic stretching and manual therapy exercises along with appropriate shoes and activity.  Discussed proper use and/or avoidance of OTC anti-inflammatories.  Patient is to call with any additional issues or concerns.  Greater than 45 minutes was spent before, during, and after evaluation for patient care.    The encounter note is created with the use of AI technology.  I do apologize if there are typos and/or  confusion within the note.  Please feel free to contact me or my office with any questions or concerns.    Follow-up  The patient will follow up with the nurse practitioner in 3 months for a routine diabetic foot check and to discuss any progress regarding her generalized foot discomfort.           Patient or patient representative verbalized consent for the use of Ambient Listening during the visit with  URVASHI Myles DPM for chart documentation. 5/7/2025  07:50 EDT    URVASHI Myles DPM

## 2025-05-21 ENCOUNTER — OFFICE VISIT (OUTPATIENT)
Dept: PAIN MEDICINE | Facility: CLINIC | Age: 45
End: 2025-05-21
Payer: MEDICAID

## 2025-05-21 VITALS
SYSTOLIC BLOOD PRESSURE: 137 MMHG | RESPIRATION RATE: 16 BRPM | DIASTOLIC BLOOD PRESSURE: 97 MMHG | HEART RATE: 86 BPM | OXYGEN SATURATION: 94 %

## 2025-05-21 DIAGNOSIS — M25.561 CHRONIC PAIN OF BOTH KNEES: ICD-10-CM

## 2025-05-21 DIAGNOSIS — G89.29 CHRONIC PAIN OF BOTH KNEES: ICD-10-CM

## 2025-05-21 DIAGNOSIS — M54.16 LUMBAR RADICULOPATHY: ICD-10-CM

## 2025-05-21 DIAGNOSIS — M25.562 CHRONIC PAIN OF BOTH KNEES: ICD-10-CM

## 2025-05-21 DIAGNOSIS — M54.41 CHRONIC BILATERAL LOW BACK PAIN WITH BILATERAL SCIATICA: Primary | ICD-10-CM

## 2025-05-21 DIAGNOSIS — M17.2 POST-TRAUMATIC OSTEOARTHRITIS OF BOTH KNEES: ICD-10-CM

## 2025-05-21 DIAGNOSIS — M25.511 CHRONIC RIGHT SHOULDER PAIN: ICD-10-CM

## 2025-05-21 DIAGNOSIS — M12.811 ROTATOR CUFF ARTHROPATHY OF RIGHT SHOULDER: ICD-10-CM

## 2025-05-21 DIAGNOSIS — M54.42 CHRONIC BILATERAL LOW BACK PAIN WITH BILATERAL SCIATICA: Primary | ICD-10-CM

## 2025-05-21 DIAGNOSIS — G89.29 CHRONIC BILATERAL LOW BACK PAIN WITH BILATERAL SCIATICA: Primary | ICD-10-CM

## 2025-05-21 DIAGNOSIS — G89.29 CHRONIC RIGHT SHOULDER PAIN: ICD-10-CM

## 2025-05-21 PROCEDURE — G0463 HOSPITAL OUTPT CLINIC VISIT: HCPCS | Performed by: PHYSICAL MEDICINE & REHABILITATION

## 2025-05-21 RX ORDER — OXYCODONE AND ACETAMINOPHEN 10; 325 MG/1; MG/1
1 TABLET ORAL EVERY 6 HOURS PRN
Qty: 120 TABLET | Refills: 0 | Status: SHIPPED | OUTPATIENT
Start: 2025-05-21

## 2025-05-21 RX ORDER — PREGABALIN 150 MG/1
150 CAPSULE ORAL 2 TIMES DAILY
Qty: 60 CAPSULE | Refills: 5 | Status: SHIPPED | OUTPATIENT
Start: 2025-05-21

## 2025-05-21 NOTE — PROGRESS NOTES
Subjective   Noa Soni is a 44 y.o. female.     History of Present Illness  Chronic bilateral knee pain, also low back and right shoulder pain, nonradiating, 10/10 at worst, 8/10 at best, always present, varies, began years ago, worsening over time, aching, sharp, worse with bending and walking, interferes with sleep, exercise. X-ray b/l knees with mod OA worst in medial compartment. Saw PCP, notes reviewed, with referral to Dr. Leiva who decline steroid injections with DM2, has failed NSAIDs, Tylenol, Voltaren gel, sees Dr. Valencia on multiple medications. No FH of substance abuse. Began Percocet 5mg TID prn, then 7.5mg TID prn, now 10mg QID prn for worsening LBP, R shoulder pain, working well, no side effects.  Pain  Symptoms include abdominal pain and nausea.    Pertinent negative symptoms include no chest pain, no chills, no fatigue, no fever, no myalgias, no neck pain, no numbness, no vomiting and no weakness.   Back Pain  Associated symptoms include abdominal pain. Pertinent negatives include no bladder incontinence, chest pain, fever, numbness or weakness.   Knee Pain   Pertinent negatives include no numbness.        The following portions of the patient's history were reviewed and updated as appropriate: allergies, current medications, past family history, past medical history, past social history, past surgical history and problem list.    Review of Systems   Constitutional:  Negative for chills, fatigue and fever.   HENT:  Positive for hearing loss. Negative for trouble swallowing.    Eyes:  Negative for visual disturbance.   Respiratory:  Negative for shortness of breath.    Cardiovascular:  Negative for chest pain.   Gastrointestinal:  Positive for abdominal pain and nausea. Negative for constipation, diarrhea and vomiting.   Genitourinary:  Negative for urinary incontinence.   Musculoskeletal:  Positive for arthralgias and back pain. Negative for joint swelling, myalgias and neck pain.    Neurological:  Positive for headache. Negative for dizziness, weakness and numbness.       Objective   Physical Exam  Constitutional:       Appearance: Normal appearance. She is well-developed.   HENT:      Head: Normocephalic and atraumatic.   Eyes:      Pupils: Pupils are equal, round, and reactive to light.   Cardiovascular:      Rate and Rhythm: Normal rate and regular rhythm.      Heart sounds: Normal heart sounds.   Pulmonary:      Effort: Pulmonary effort is normal.      Breath sounds: Normal breath sounds.   Abdominal:      General: Bowel sounds are normal. There is no distension.      Palpations: Abdomen is soft.      Tenderness: There is no abdominal tenderness.   Musculoskeletal:      Cervical back: Normal range of motion.      Comments: R shoulder: (+) empty can test     Neurological:      Mental Status: She is alert and oriented to person, place, and time.      Sensory: No sensory deficit.      Deep Tendon Reflexes: Reflexes are normal and symmetric. Reflexes normal.   Psychiatric:         Mood and Affect: Mood normal.         Behavior: Behavior normal.         Thought Content: Thought content normal.         Judgment: Judgment normal.           Assessment & Plan   Diagnoses and all orders for this visit:    1. Chronic bilateral low back pain with bilateral sciatica (Primary)    2. Chronic pain of both knees    3. Chronic right shoulder pain    4. Lumbar radiculopathy    5. Post-traumatic osteoarthritis of both knees    6. Rotator cuff arthropathy of right shoulder      Oral DS in order 2/26/25.  Discussed risks and benefits of opioid treatment for chonic pain with patient, including expectations related to prescription requests, alternative modalities to opioids for managing pain, her treatment plan, risks of dependency and addiction, and safe storage practices for prescribed opioids, as well as proper and improper disposal of all medications.  Treatment plan will consist of continuing current  medication as long as it remains effective and is necessary, while evaluating patient at each visit and determining if the medication can be lowered or discontinued, while also using nonopioid therapies to reduce reliance on opioids.  Failed Tylenol #3 TID prn with AMS. Failed NSAIDs, Tylenol, Voltaren gel, allergic to Hydrocodone. Began Percocet 5mg TID prn, helping but not sufficient, increased to 7.5mg QID prn, insufficient, increased to 10mg QID prn. Denies any side effects but no longer effective. Rotated to MS-Contin 15mg TID with SI, stopped immediately, disposed of here, cont Percocet 10mg QID prn. Filled 5/17/25.   Patient's pain is still well-managed by current medication regimen, is doing well at this strength and dosage, therefore I will continue to prescribe unchanged as the most appropriate course of treatment.  Trialed Lyrica 75mg BID, very helpful. Began Lyrica 75mg BID, helping, increased to 100mg BID, increased to 150mg BID.  Began Licart qdaily prn, denies being told not to take NSAIDs.  Performed b/l Monovisc injections with significant improvement, will repeat as necessary up to q6 months, cannot have steroids with DM2. Repeated 2/1/23. Schedule repeat.  Performed R shoulder injection.  Ordered MRI L-spine, with mild DDD and DJD only, worst pain appears to be facetogenic on history and exam, discussed b/l L4-S1 facet injections, cancelled, doing OK now.   Ordered LSO to improve truncal stability.  RTC in 3 months for f/u.    INSPECT REPORT     As part of the patient's treatment plan, I am prescribing controlled substances. The patient has been made aware of appropriate use of such medications, including potential risk of somnolence, limited ability to drive and/or work safely, and the potential for dependence or overdose. It has also bee made clear that these medications are for use by this patient only, without concomitant use of alcohol or other substances unless prescribed.      Patient has  completed prescribing agreement detailing terms of continued prescribing of controlled substances, including monitoring INSPECT reports, urine drug screening, and pill counts if necessary. The patient is aware that inappropriate use will results in cessation of prescribing such medications.     INSPECT report has been reviewed and scanned into the patient's chart.     As the clinician, I personally reviewed the INSPECT while the patient was in the office today.     History and physical exam exhibit continued safe and appropriate use of controlled substances.

## 2025-05-27 ENCOUNTER — TELEPHONE (OUTPATIENT)
Dept: BARIATRICS/WEIGHT MGMT | Facility: CLINIC | Age: 45
End: 2025-05-27
Payer: MEDICAID

## 2025-06-24 ENCOUNTER — HOSPITAL ENCOUNTER (OUTPATIENT)
Facility: HOSPITAL | Age: 45
Setting detail: OBSERVATION
Discharge: LEFT BEFORE DISCHARGE | End: 2025-06-25
Attending: EMERGENCY MEDICINE | Admitting: EMERGENCY MEDICINE
Payer: COMMERCIAL

## 2025-06-24 ENCOUNTER — APPOINTMENT (OUTPATIENT)
Dept: CT IMAGING | Facility: HOSPITAL | Age: 45
End: 2025-06-24
Payer: COMMERCIAL

## 2025-06-24 ENCOUNTER — APPOINTMENT (OUTPATIENT)
Dept: GENERAL RADIOLOGY | Facility: HOSPITAL | Age: 45
End: 2025-06-24
Payer: COMMERCIAL

## 2025-06-24 DIAGNOSIS — R51.9 ACUTE NONINTRACTABLE HEADACHE, UNSPECIFIED HEADACHE TYPE: ICD-10-CM

## 2025-06-24 DIAGNOSIS — R07.9 CHEST PAIN, UNSPECIFIED TYPE: ICD-10-CM

## 2025-06-24 DIAGNOSIS — M25.571 ACUTE RIGHT ANKLE PAIN: ICD-10-CM

## 2025-06-24 DIAGNOSIS — N17.9 AKI (ACUTE KIDNEY INJURY): ICD-10-CM

## 2025-06-24 DIAGNOSIS — M25.551 RIGHT HIP PAIN: ICD-10-CM
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
## 2025-06-24 DIAGNOSIS — R06.00 DYSPNEA, UNSPECIFIED TYPE: ICD-10-CM

## 2025-06-24 LAB
ALBUMIN SERPL-MCNC: 3.8 G/DL (ref 3.5–5.2)
ALBUMIN/GLOB SERPL: 1.1 G/DL
ALP SERPL-CCNC: 129 U/L (ref 39–117)
ALT SERPL W P-5'-P-CCNC: 27 U/L (ref 1–33)
ANION GAP SERPL CALCULATED.3IONS-SCNC: 16 MMOL/L (ref 5–15)
AST SERPL-CCNC: 30 U/L (ref 1–32)
BASOPHILS # BLD AUTO: 0.02 10*3/MM3 (ref 0–0.2)
BASOPHILS NFR BLD AUTO: 0.2 % (ref 0–1.5)
BILIRUB SERPL-MCNC: 0.7 MG/DL (ref 0–1.2)
BILIRUB UR QL STRIP: NEGATIVE
BUN SERPL-MCNC: 44.9 MG/DL (ref 6–20)
BUN/CREAT SERPL: 31.2 (ref 7–25)
CALCIUM SPEC-SCNC: 10.1 MG/DL (ref 8.6–10.5)
CHLORIDE SERPL-SCNC: 95 MMOL/L (ref 98–107)
CLARITY UR: CLEAR
CO2 SERPL-SCNC: 19 MMOL/L (ref 22–29)
COLOR UR: ABNORMAL
CREAT SERPL-MCNC: 1.44 MG/DL (ref 0.57–1)
D DIMER PPP FEU-MCNC: 0.47 MCGFEU/ML (ref 0–0.5)
DEPRECATED RDW RBC AUTO: 46.5 FL (ref 37–54)
EGFRCR SERPLBLD CKD-EPI 2021: 46.7 ML/MIN/1.73
EOSINOPHIL # BLD AUTO: 0.11 10*3/MM3 (ref 0–0.4)
EOSINOPHIL NFR BLD AUTO: 1 % (ref 0.3–6.2)
ERYTHROCYTE [DISTWIDTH] IN BLOOD BY AUTOMATED COUNT: 15.2 % (ref 12.3–15.4)
GEN 5 1HR TROPONIN T REFLEX: 8 NG/L
GLOBULIN UR ELPH-MCNC: 3.4 GM/DL
GLUCOSE SERPL-MCNC: 323 MG/DL (ref 65–99)
GLUCOSE UR STRIP-MCNC: NEGATIVE MG/DL
HCT VFR BLD AUTO: 33.8 % (ref 34–46.6)
HGB BLD-MCNC: 11.2 G/DL (ref 12–15.9)
HGB UR QL STRIP.AUTO: NEGATIVE
HOLD SPECIMEN: NORMAL
IMM GRANULOCYTES # BLD AUTO: 0.08 10*3/MM3 (ref 0–0.05)
IMM GRANULOCYTES NFR BLD AUTO: 0.7 % (ref 0–0.5)
KETONES UR QL STRIP: NEGATIVE
LEUKOCYTE ESTERASE UR QL STRIP.AUTO: ABNORMAL
LYMPHOCYTES # BLD AUTO: 2.01 10*3/MM3 (ref 0.7–3.1)
LYMPHOCYTES NFR BLD AUTO: 18.4 % (ref 19.6–45.3)
MCH RBC QN AUTO: 28 PG (ref 26.6–33)
MCHC RBC AUTO-ENTMCNC: 33.1 G/DL (ref 31.5–35.7)
MCV RBC AUTO: 84.5 FL (ref 79–97)
MONOCYTES # BLD AUTO: 0.76 10*3/MM3 (ref 0.1–0.9)
MONOCYTES NFR BLD AUTO: 7 % (ref 5–12)
NEUTROPHILS NFR BLD AUTO: 7.95 10*3/MM3 (ref 1.7–7)
NEUTROPHILS NFR BLD AUTO: 72.7 % (ref 42.7–76)
NITRITE UR QL STRIP: NEGATIVE
NRBC BLD AUTO-RTO: 0 /100 WBC (ref 0–0.2)
NT-PROBNP SERPL-MCNC: 228 PG/ML (ref 0–450)
PH UR STRIP.AUTO: 7.5 [PH] (ref 5–8)
PLATELET # BLD AUTO: 199 10*3/MM3 (ref 140–450)
PMV BLD AUTO: 11.2 FL (ref 6–12)
POTASSIUM SERPL-SCNC: 4.1 MMOL/L (ref 3.5–5.2)
PROT SERPL-MCNC: 7.2 G/DL (ref 6–8.5)
PROT UR QL STRIP: NEGATIVE
RBC # BLD AUTO: 4 10*6/MM3 (ref 3.77–5.28)
SODIUM SERPL-SCNC: 130 MMOL/L (ref 136–145)
SP GR UR STRIP: <=1.005 (ref 1–1.03)
TROPONIN T NUMERIC DELTA: 0 NG/L
TROPONIN T SERPL HS-MCNC: 8 NG/L
UROBILINOGEN UR QL STRIP: ABNORMAL
WBC NRBC COR # BLD AUTO: 10.93 10*3/MM3 (ref 3.4–10.8)

## 2025-06-24 PROCEDURE — G0378 HOSPITAL OBSERVATION PER HR: HCPCS

## 2025-06-24 PROCEDURE — 83880 ASSAY OF NATRIURETIC PEPTIDE: CPT

## 2025-06-24 PROCEDURE — 99285 EMERGENCY DEPT VISIT HI MDM: CPT

## 2025-06-24 PROCEDURE — 73610 X-RAY EXAM OF ANKLE: CPT

## 2025-06-24 PROCEDURE — 81003 URINALYSIS AUTO W/O SCOPE: CPT | Performed by: EMERGENCY MEDICINE

## 2025-06-24 PROCEDURE — 36415 COLL VENOUS BLD VENIPUNCTURE: CPT

## 2025-06-24 PROCEDURE — 73502 X-RAY EXAM HIP UNI 2-3 VIEWS: CPT

## 2025-06-24 PROCEDURE — 70450 CT HEAD/BRAIN W/O DYE: CPT

## 2025-06-24 PROCEDURE — 25810000003 SODIUM CHLORIDE 0.9 % SOLUTION

## 2025-06-24 PROCEDURE — 85025 COMPLETE CBC W/AUTO DIFF WBC: CPT

## 2025-06-24 PROCEDURE — 93005 ELECTROCARDIOGRAM TRACING: CPT | Performed by: EMERGENCY MEDICINE

## 2025-06-24 PROCEDURE — 81003 URINALYSIS AUTO W/O SCOPE: CPT

## 2025-06-24 PROCEDURE — 71045 X-RAY EXAM CHEST 1 VIEW: CPT

## 2025-06-24 PROCEDURE — 84484 ASSAY OF TROPONIN QUANT: CPT

## 2025-06-24 PROCEDURE — 85379 FIBRIN DEGRADATION QUANT: CPT

## 2025-06-24 PROCEDURE — 80053 COMPREHEN METABOLIC PANEL: CPT

## 2025-06-24 PROCEDURE — 83036 HEMOGLOBIN GLYCOSYLATED A1C: CPT | Performed by: PHYSICIAN ASSISTANT

## 2025-06-24 PROCEDURE — 93005 ELECTROCARDIOGRAM TRACING: CPT

## 2025-06-24 RX ORDER — NITROGLYCERIN 0.4 MG/1
0.4 TABLET SUBLINGUAL
Status: DISCONTINUED | OUTPATIENT
Start: 2025-06-24 | End: 2025-06-25 | Stop reason: HOSPADM

## 2025-06-24 RX ORDER — KETOROLAC TROMETHAMINE 30 MG/ML
15 INJECTION, SOLUTION INTRAMUSCULAR; INTRAVENOUS ONCE
Status: DISCONTINUED | OUTPATIENT
Start: 2025-06-24 | End: 2025-06-25

## 2025-06-24 RX ORDER — SODIUM CHLORIDE 9 MG/ML
100 INJECTION, SOLUTION INTRAVENOUS CONTINUOUS
Status: DISCONTINUED | OUTPATIENT
Start: 2025-06-24 | End: 2025-06-25 | Stop reason: HOSPADM

## 2025-06-24 RX ORDER — ASPIRIN 81 MG/1
324 TABLET, CHEWABLE ORAL ONCE
Status: COMPLETED | OUTPATIENT
Start: 2025-06-24 | End: 2025-06-24

## 2025-06-24 RX ORDER — BISACODYL 5 MG/1
5 TABLET, DELAYED RELEASE ORAL DAILY PRN
Status: DISCONTINUED | OUTPATIENT
Start: 2025-06-24 | End: 2025-06-25 | Stop reason: HOSPADM

## 2025-06-24 RX ORDER — BISACODYL 10 MG
10 SUPPOSITORY, RECTAL RECTAL DAILY PRN
Status: DISCONTINUED | OUTPATIENT
Start: 2025-06-24 | End: 2025-06-25 | Stop reason: HOSPADM

## 2025-06-24 RX ORDER — ONDANSETRON 4 MG/1
4 TABLET, ORALLY DISINTEGRATING ORAL EVERY 6 HOURS PRN
Status: DISCONTINUED | OUTPATIENT
Start: 2025-06-24 | End: 2025-06-25 | Stop reason: HOSPADM

## 2025-06-24 RX ORDER — SODIUM CHLORIDE 9 MG/ML
40 INJECTION, SOLUTION INTRAVENOUS AS NEEDED
Status: DISCONTINUED | OUTPATIENT
Start: 2025-06-24 | End: 2025-06-25 | Stop reason: HOSPADM

## 2025-06-24 RX ORDER — KETOROLAC TROMETHAMINE 30 MG/ML
30 INJECTION, SOLUTION INTRAMUSCULAR; INTRAVENOUS EVERY 6 HOURS PRN
Status: DISCONTINUED | OUTPATIENT
Start: 2025-06-24 | End: 2025-06-25

## 2025-06-24 RX ORDER — ONDANSETRON 4 MG/1
4 TABLET, FILM COATED ORAL EVERY 8 HOURS PRN
COMMUNITY

## 2025-06-24 RX ORDER — ENOXAPARIN SODIUM 100 MG/ML
60 INJECTION SUBCUTANEOUS EVERY 12 HOURS
Status: DISCONTINUED | OUTPATIENT
Start: 2025-06-25 | End: 2025-06-25 | Stop reason: HOSPADM

## 2025-06-24 RX ORDER — POLYETHYLENE GLYCOL 3350 17 G/17G
17 POWDER, FOR SOLUTION ORAL DAILY PRN
Status: DISCONTINUED | OUTPATIENT
Start: 2025-06-24 | End: 2025-06-25 | Stop reason: HOSPADM

## 2025-06-24 RX ORDER — SODIUM CHLORIDE 0.9 % (FLUSH) 0.9 %
10 SYRINGE (ML) INJECTION EVERY 12 HOURS SCHEDULED
Status: DISCONTINUED | OUTPATIENT
Start: 2025-06-24 | End: 2025-06-25 | Stop reason: HOSPADM

## 2025-06-24 RX ORDER — ONDANSETRON 2 MG/ML
4 INJECTION INTRAMUSCULAR; INTRAVENOUS EVERY 6 HOURS PRN
Status: DISCONTINUED | OUTPATIENT
Start: 2025-06-24 | End: 2025-06-25 | Stop reason: HOSPADM

## 2025-06-24 RX ORDER — AMOXICILLIN 250 MG
2 CAPSULE ORAL 2 TIMES DAILY PRN
Status: DISCONTINUED | OUTPATIENT
Start: 2025-06-24 | End: 2025-06-25 | Stop reason: HOSPADM

## 2025-06-24 RX ORDER — SODIUM CHLORIDE 0.9 % (FLUSH) 0.9 %
10 SYRINGE (ML) INJECTION AS NEEDED
Status: DISCONTINUED | OUTPATIENT
Start: 2025-06-24 | End: 2025-06-25 | Stop reason: HOSPADM

## 2025-06-24 RX ADMIN — ASPIRIN 81 MG CHEWABLE TABLET 324 MG: 81 TABLET CHEWABLE at 22:29

## 2025-06-24 RX ADMIN — SODIUM CHLORIDE 1000 ML: 0.9 INJECTION, SOLUTION INTRAVENOUS at 22:38

## 2025-06-25 ENCOUNTER — APPOINTMENT (OUTPATIENT)
Dept: NUCLEAR MEDICINE | Facility: HOSPITAL | Age: 45
End: 2025-06-25
Payer: COMMERCIAL

## 2025-06-25 VITALS
OXYGEN SATURATION: 95 % | BODY MASS INDEX: 50.02 KG/M2 | DIASTOLIC BLOOD PRESSURE: 75 MMHG | HEIGHT: 64 IN | SYSTOLIC BLOOD PRESSURE: 137 MMHG | HEART RATE: 89 BPM | TEMPERATURE: 98.4 F | RESPIRATION RATE: 23 BRPM | WEIGHT: 293 LBS

## 2025-06-25 LAB
ANION GAP SERPL CALCULATED.3IONS-SCNC: 16.8 MMOL/L (ref 5–15)
ARTICHOKE IGE QN: 35 MG/DL (ref 0–100)
BACTERIA UR QL AUTO: ABNORMAL /HPF
BASOPHILS # BLD AUTO: 0.02 10*3/MM3 (ref 0–0.2)
BASOPHILS NFR BLD AUTO: 0.3 % (ref 0–1.5)
BH CV REST NUCLEAR ISOTOPE DOSE: 11 MCI
BH CV STRESS BP STAGE 1: NORMAL
BH CV STRESS BP STAGE 2: NORMAL
BH CV STRESS COMMENTS STAGE 1: NORMAL
BH CV STRESS COMMENTS STAGE 2: NORMAL
BH CV STRESS DOSE REGADENOSON STAGE 1: 0.4
BH CV STRESS DURATION MIN STAGE 1: 0
BH CV STRESS DURATION MIN STAGE 2: 4
BH CV STRESS DURATION SEC STAGE 1: 10
BH CV STRESS DURATION SEC STAGE 2: 0
BH CV STRESS HR STAGE 1: 103
BH CV STRESS HR STAGE 2: 94
BH CV STRESS NUCLEAR ISOTOPE DOSE: 33 MCI
BH CV STRESS PROTOCOL 1: NORMAL
BH CV STRESS RECOVERY BP: NORMAL MMHG
BH CV STRESS RECOVERY HR: 94 BPM
BH CV STRESS STAGE 1: 1
BH CV STRESS STAGE 2: 2
BILIRUB UR QL STRIP: NEGATIVE
BUN SERPL-MCNC: 38.5 MG/DL (ref 6–20)
BUN/CREAT SERPL: 35 (ref 7–25)
CALCIUM SPEC-SCNC: 9.6 MG/DL (ref 8.6–10.5)
CHLORIDE SERPL-SCNC: 98 MMOL/L (ref 98–107)
CHOLEST SERPL-MCNC: 179 MG/DL (ref 0–200)
CLARITY UR: CLEAR
CO2 SERPL-SCNC: 20.2 MMOL/L (ref 22–29)
COLOR UR: YELLOW
CREAT SERPL-MCNC: 1.1 MG/DL (ref 0.57–1)
DEPRECATED RDW RBC AUTO: 46.5 FL (ref 37–54)
EGFRCR SERPLBLD CKD-EPI 2021: 64.5 ML/MIN/1.73
EOSINOPHIL # BLD AUTO: 0.11 10*3/MM3 (ref 0–0.4)
EOSINOPHIL NFR BLD AUTO: 1.7 % (ref 0.3–6.2)
ERYTHROCYTE [DISTWIDTH] IN BLOOD BY AUTOMATED COUNT: 15.2 % (ref 12.3–15.4)
GLUCOSE BLDC GLUCOMTR-MCNC: 231 MG/DL (ref 70–105)
GLUCOSE BLDC GLUCOMTR-MCNC: 240 MG/DL (ref 70–105)
GLUCOSE BLDC GLUCOMTR-MCNC: 243 MG/DL (ref 70–105)
GLUCOSE BLDC GLUCOMTR-MCNC: 297 MG/DL (ref 70–105)
GLUCOSE SERPL-MCNC: 266 MG/DL (ref 65–99)
GLUCOSE UR STRIP-MCNC: ABNORMAL MG/DL
HBA1C MFR BLD: 10.5 % (ref 4.8–5.6)
HCT VFR BLD AUTO: 32.8 % (ref 34–46.6)
HDLC SERPL-MCNC: 23 MG/DL (ref 40–60)
HGB BLD-MCNC: 10.6 G/DL (ref 12–15.9)
HGB UR QL STRIP.AUTO: NEGATIVE
HYALINE CASTS UR QL AUTO: ABNORMAL /LPF
IMM GRANULOCYTES # BLD AUTO: 0.06 10*3/MM3 (ref 0–0.05)
IMM GRANULOCYTES NFR BLD AUTO: 0.9 % (ref 0–0.5)
KETONES UR QL STRIP: NEGATIVE
LDLC SERPL CALC-MCNC: ABNORMAL MG/DL
LDLC/HDLC SERPL: ABNORMAL {RATIO}
LEUKOCYTE ESTERASE UR QL STRIP.AUTO: ABNORMAL
LEUKOCYTE ESTERASE UR QL STRIP.AUTO: NEGATIVE
LEUKOCYTE ESTERASE UR QL STRIP.AUTO: NEGATIVE
LYMPHOCYTES # BLD AUTO: 1.93 10*3/MM3 (ref 0.7–3.1)
LYMPHOCYTES NFR BLD AUTO: 29.6 % (ref 19.6–45.3)
MAXIMAL PREDICTED HEART RATE: 178 BPM
MCH RBC QN AUTO: 27.6 PG (ref 26.6–33)
MCHC RBC AUTO-ENTMCNC: 32.3 G/DL (ref 31.5–35.7)
MCV RBC AUTO: 85.4 FL (ref 79–97)
MONOCYTES # BLD AUTO: 0.58 10*3/MM3 (ref 0.1–0.9)
MONOCYTES NFR BLD AUTO: 8.9 % (ref 5–12)
NEUTROPHILS NFR BLD AUTO: 3.83 10*3/MM3 (ref 1.7–7)
NEUTROPHILS NFR BLD AUTO: 58.6 % (ref 42.7–76)
NITRITE UR QL STRIP: NEGATIVE
NRBC BLD AUTO-RTO: 0 /100 WBC (ref 0–0.2)
PERCENT MAX PREDICTED HR: 57.87 %
PH UR STRIP.AUTO: 5.5 [PH] (ref 5–8)
PH UR STRIP.AUTO: 5.5 [PH] (ref 5–8)
PH UR STRIP.AUTO: <=5 [PH] (ref 5–8)
PLATELET # BLD AUTO: 171 10*3/MM3 (ref 140–450)
PMV BLD AUTO: 11.3 FL (ref 6–12)
POTASSIUM SERPL-SCNC: 4.3 MMOL/L (ref 3.5–5.2)
PROT UR QL STRIP: NEGATIVE
QT INTERVAL: 332 MS
QTC INTERVAL: 429 MS
RBC # BLD AUTO: 3.84 10*6/MM3 (ref 3.77–5.28)
RBC # UR STRIP: ABNORMAL /HPF
REF LAB TEST METHOD: ABNORMAL
SODIUM SERPL-SCNC: 135 MMOL/L (ref 136–145)
SP GR UR STRIP: 1.01 (ref 1–1.03)
SP GR UR STRIP: 1.04 (ref 1–1.03)
SP GR UR STRIP: 1.04 (ref 1–1.03)
SPECT HRT GATED+EF W RNC IV: 90 %
SQUAMOUS #/AREA URNS HPF: ABNORMAL /HPF
STRESS BASELINE BP: NORMAL MMHG
STRESS BASELINE HR: 109 BPM
STRESS PERCENT HR: 68 %
STRESS POST PEAK BP: NORMAL MMHG
STRESS POST PEAK HR: 103 BPM
STRESS TARGET HR: 151 BPM
TRIGL SERPL-MCNC: 854 MG/DL (ref 0–150)
UROBILINOGEN UR QL STRIP: ABNORMAL
VLDLC SERPL-MCNC: ABNORMAL MG/DL
WBC # UR STRIP: ABNORMAL /HPF
WBC NRBC COR # BLD AUTO: 6.53 10*3/MM3 (ref 3.4–10.8)

## 2025-06-25 PROCEDURE — 82948 REAGENT STRIP/BLOOD GLUCOSE: CPT

## 2025-06-25 PROCEDURE — G0378 HOSPITAL OBSERVATION PER HR: HCPCS

## 2025-06-25 PROCEDURE — 81001 URINALYSIS AUTO W/SCOPE: CPT | Performed by: EMERGENCY MEDICINE

## 2025-06-25 PROCEDURE — 85025 COMPLETE CBC W/AUTO DIFF WBC: CPT | Performed by: EMERGENCY MEDICINE

## 2025-06-25 PROCEDURE — 87086 URINE CULTURE/COLONY COUNT: CPT | Performed by: EMERGENCY MEDICINE

## 2025-06-25 PROCEDURE — 78452 HT MUSCLE IMAGE SPECT MULT: CPT | Performed by: INTERNAL MEDICINE

## 2025-06-25 PROCEDURE — 34310000005 TECHNETIUM TETROFOSMIN KIT: Performed by: EMERGENCY MEDICINE

## 2025-06-25 PROCEDURE — 93018 CV STRESS TEST I&R ONLY: CPT | Performed by: INTERNAL MEDICINE

## 2025-06-25 PROCEDURE — A9502 TC99M TETROFOSMIN: HCPCS | Performed by: EMERGENCY MEDICINE

## 2025-06-25 PROCEDURE — 97161 PT EVAL LOW COMPLEX 20 MIN: CPT

## 2025-06-25 PROCEDURE — 83721 ASSAY OF BLOOD LIPOPROTEIN: CPT | Performed by: PHYSICIAN ASSISTANT

## 2025-06-25 PROCEDURE — 80061 LIPID PANEL: CPT | Performed by: PHYSICIAN ASSISTANT

## 2025-06-25 PROCEDURE — 25010000002 ENOXAPARIN PER 10 MG

## 2025-06-25 PROCEDURE — 80048 BASIC METABOLIC PNL TOTAL CA: CPT | Performed by: EMERGENCY MEDICINE

## 2025-06-25 PROCEDURE — 82948 REAGENT STRIP/BLOOD GLUCOSE: CPT | Performed by: PHYSICIAN ASSISTANT

## 2025-06-25 PROCEDURE — 25010000002 REGADENOSON 0.4 MG/5ML SOLUTION: Performed by: EMERGENCY MEDICINE

## 2025-06-25 PROCEDURE — 96372 THER/PROPH/DIAG INJ SC/IM: CPT

## 2025-06-25 PROCEDURE — 63710000001 INSULIN LISPRO (HUMAN) PER 5 UNITS: Performed by: PHYSICIAN ASSISTANT

## 2025-06-25 PROCEDURE — 93017 CV STRESS TEST TRACING ONLY: CPT

## 2025-06-25 PROCEDURE — 78452 HT MUSCLE IMAGE SPECT MULT: CPT

## 2025-06-25 RX ORDER — INSULIN LISPRO 100 [IU]/ML
1-200 INJECTION, SOLUTION INTRAVENOUS; SUBCUTANEOUS
Status: DISCONTINUED | OUTPATIENT
Start: 2025-06-25 | End: 2025-06-25 | Stop reason: HOSPADM

## 2025-06-25 RX ORDER — LAMOTRIGINE 100 MG/1
100 TABLET ORAL 2 TIMES DAILY
Status: DISCONTINUED | OUTPATIENT
Start: 2025-06-25 | End: 2025-06-25 | Stop reason: HOSPADM

## 2025-06-25 RX ORDER — REGADENOSON 0.08 MG/ML
0.4 INJECTION, SOLUTION INTRAVENOUS
Status: COMPLETED | OUTPATIENT
Start: 2025-06-25 | End: 2025-06-25

## 2025-06-25 RX ORDER — DEXTROSE MONOHYDRATE 25 G/50ML
10-50 INJECTION, SOLUTION INTRAVENOUS
Status: DISCONTINUED | OUTPATIENT
Start: 2025-06-25 | End: 2025-06-25 | Stop reason: HOSPADM

## 2025-06-25 RX ORDER — NICOTINE POLACRILEX 4 MG
15 LOZENGE BUCCAL
Status: DISCONTINUED | OUTPATIENT
Start: 2025-06-25 | End: 2025-06-25 | Stop reason: HOSPADM

## 2025-06-25 RX ORDER — PREGABALIN 75 MG/1
150 CAPSULE ORAL 2 TIMES DAILY
Status: DISCONTINUED | OUTPATIENT
Start: 2025-06-25 | End: 2025-06-25 | Stop reason: HOSPADM

## 2025-06-25 RX ORDER — CETIRIZINE HYDROCHLORIDE 10 MG/1
10 TABLET ORAL DAILY
Status: DISCONTINUED | OUTPATIENT
Start: 2025-06-25 | End: 2025-06-25 | Stop reason: HOSPADM

## 2025-06-25 RX ORDER — IBUPROFEN 600 MG/1
1 TABLET ORAL
Status: DISCONTINUED | OUTPATIENT
Start: 2025-06-25 | End: 2025-06-25 | Stop reason: HOSPADM

## 2025-06-25 RX ORDER — LISINOPRIL 5 MG/1
10 TABLET ORAL DAILY
Status: DISCONTINUED | OUTPATIENT
Start: 2025-06-25 | End: 2025-06-25 | Stop reason: HOSPADM

## 2025-06-25 RX ORDER — INSULIN LISPRO 100 [IU]/ML
1-200 INJECTION, SOLUTION INTRAVENOUS; SUBCUTANEOUS AS NEEDED
Status: DISCONTINUED | OUTPATIENT
Start: 2025-06-25 | End: 2025-06-25 | Stop reason: HOSPADM

## 2025-06-25 RX ADMIN — REGADENOSON 0.4 MG: 0.08 INJECTION, SOLUTION INTRAVENOUS at 09:27

## 2025-06-25 RX ADMIN — LAMOTRIGINE 100 MG: 100 TABLET ORAL at 10:15

## 2025-06-25 RX ADMIN — TETROFOSMIN 1 DOSE: 1.38 INJECTION, POWDER, LYOPHILIZED, FOR SOLUTION INTRAVENOUS at 08:08

## 2025-06-25 RX ADMIN — PREGABALIN 150 MG: 75 CAPSULE ORAL at 10:15

## 2025-06-25 RX ADMIN — INSULIN LISPRO 18 UNITS: 100 INJECTION, SOLUTION INTRAVENOUS; SUBCUTANEOUS at 10:45

## 2025-06-25 RX ADMIN — CARIPRAZINE 3 MG: 3 CAPSULE, GELATIN COATED ORAL at 10:15

## 2025-06-25 RX ADMIN — FLUOXETINE 40 MG: 20 CAPSULE ORAL at 10:15

## 2025-06-25 RX ADMIN — INSULIN LISPRO 12 UNITS: 100 INJECTION, SOLUTION INTRAVENOUS; SUBCUTANEOUS at 13:51

## 2025-06-25 RX ADMIN — Medication 10 ML: at 10:15

## 2025-06-25 RX ADMIN — TETROFOSMIN 1 DOSE: 1.38 INJECTION, POWDER, LYOPHILIZED, FOR SOLUTION INTRAVENOUS at 09:27

## 2025-06-25 RX ADMIN — CETIRIZINE HYDROCHLORIDE 10 MG: 10 TABLET, FILM COATED ORAL at 10:15

## 2025-06-25 RX ADMIN — ENOXAPARIN SODIUM 60 MG: 100 INJECTION SUBCUTANEOUS at 10:15

## 2025-06-25 NOTE — DISCHARGE SUMMARY
"[facility]    Bree Wan MD    CHIEF COMPLAINT:     Fall with dyspnea and chest discomfort    HISTORY OF PRESENT ILLNESS:    Obtained from ED provider HPI on 6/24/2025:  Patient is a 44-year-old female with PMH of DM2, HTN presenting to the ED for chest pain and dyspnea worsening today.  Patient states earlier today she lost her balance causing her to fall backwards and hit her head on a wall, reports slight headache.  After falling she reported some pain in her right ankle and right hip.  EMS evaluated her at that time and offered to bring her to the ER however declined.  Patient states throughout today she has been having worsening shortness of breath and chest pain.  Patient states her chest pain started last night describes as an intermittent sharp pain in the left side of her chest with no radiation, currently rates it a 10 out of 10.  She has not taking any medications for her pain.  She denies any pain in her neck, changes in vision, nausea, vomiting, abdominal pain, lightheaded or dizziness.  BMI 54.11.    06/25/25:  Patient confirms the HPI noted above reporting that she lost her balance and fell hitting her head on a wall with some pain in her right lower extremity previously.  She also notes that she has had a sensation \"like her heart racing\" with discomfort in the left side of her chest which is worse with exertion and better with rest.  Some associated dyspnea as well as palpitations and lightheadedness have been present but she denies any peripheral edema, fever, cough, changes in bowel habits, nausea or vomiting.  She does note some dysuria however.  No significant family history of cardiovascular disease is reported and she confirms compliance with all of her outpatient medical therapies though she reports her blood sugars have been poorly controlled.  Patient does not smoke or drink alcohol.            Past Medical History:   Diagnosis Date    Allergic     Anger  "    Anxiety     Bipolar 1 disorder     Bipolar disorder 23    Cholelithiasis 1/3/2024    I had alot of stones in my gallbladder    CTS (carpal tunnel syndrome)     Depression     Diabetes mellitus     Headache     Headache, tension-type     HTN (hypertension)     Irritable bowel syndrome     Knee pain, bilateral     Knee swelling     Low back pain     Migraine     Neuropathy in diabetes 23    Obesity     PTSD (post-traumatic stress disorder)     Type 2 diabetes mellitus      Past Surgical History:   Procedure Laterality Date    CHOLECYSTECTOMY      CHOLECYSTECTOMY WITH INTRAOPERATIVE CHOLANGIOGRAM N/A 2024    Procedure: CHOLECYSTECTOMY LAPAROSCOPIC INTRAOPERATIVE CHOLANGIOGRAM;  Surgeon: Dustin Shelley MD;  Location: [facility] MAIN OR;  Service: General;  Laterality: N/A;    EAR TUBES      ENDOMETRIAL ABLATION      INTRAUTERINE DEVICE INSERTION  10/14/2020    LAPAROSCOPIC TUBAL LIGATION      SUBTOTAL HYSTERECTOMY      TONSILLECTOMY AND ADENOIDECTOMY      TOTAL LAPAROSCOPIC HYSTERECTOMY WITH DAVINCI ROBOT  2023    [facility]    TUBAL ABDOMINAL LIGATION       Family History   Problem Relation Age of Onset    Hypertension Mother     Depression Mother     Post-traumatic stress disorder Mother     Hyperlipidemia Mother     Anxiety disorder Mother     Mental illness Mother     Hypertension Father     Hyperlipidemia Father     Hypertension Brother     Depression Brother     Parkinsonism Maternal Grandmother     Dementia Maternal Grandmother     Atrial fibrillation Maternal Grandfather     Breast cancer Paternal Grandmother     Diabetes Paternal Grandmother     COPD Paternal Grandfather     Diabetes Paternal Grandfather      Social History     Tobacco Use    Smoking status: Former     Current packs/day: 0.00     Average packs/day: 1 pack/day for 1 year (1.0 ttl pk-yrs)     Types: Cigarettes     Start date: 2017     Quit date: 2018     Years since quittin.4     Passive exposure: Past     Smokeless tobacco: Never    Tobacco comments:     socially    Vaping Use    Vaping status: Never Used   Substance Use Topics    Alcohol use: Never    Drug use: Never     Medications Prior to Admission   Medication Sig Dispense Refill Last Dose/Taking    Cariprazine HCl (Vraylar) 3 MG capsule capsule Take 1 capsule by mouth Daily. 90 capsule 1 Taking    cetirizine (ZyrTEC Allergy) 10 MG tablet Take 1 tablet by mouth Daily. 30 tablet 2 Taking    FLUoxetine (PROzac) 40 MG capsule TAKE 1 CAPSULE BY MOUTH EVERY DAY 90 capsule 3 Taking    fluticasone (FLONASE) 50 MCG/ACT nasal spray Administer 2 sprays into the nostril(s) as directed by provider Daily. 16 g 2 Taking    Januvia 100 MG tablet TAKE 1 TABLET BY MOUTH EVERY DAY 90 tablet 3 Taking    lamoTRIgine (LaMICtal) 100 MG tablet Take 1 tablet by mouth 2 (Two) Times a Day. 180 tablet 1 Taking    linaclotide (Linzess) 145 MCG capsule capsule Take 1 capsule by mouth Every Morning Before Breakfast. 30 capsule 5 Taking    lisinopril (PRINIVIL,ZESTRIL) 10 MG tablet TAKE 1 TABLET BY MOUTH EVERY DAY 90 tablet 3 Taking    Melatonin 5 MG chewable tablet Chew 5 mg every night at bedtime. 30 tablet 3 Taking    meloxicam (MOBIC) 15 MG tablet Take 1 tablet by mouth Daily. 90 tablet 1 Taking    ondansetron (ZOFRAN) 4 MG tablet Take 1 tablet by mouth Every 8 (Eight) Hours As Needed for Nausea or Vomiting.   Taking As Needed    pregabalin (LYRICA) 150 MG capsule Take 1 capsule by mouth 2 (Two) Times a Day. 60 capsule 5 Taking     Allergies:  Dilaudid [hydromorphone], Morphine, Tylenol with codeine #3 [acetaminophen-codeine], Abilify [aripiprazole], Metformin, and Vicodin [hydrocodone-acetaminophen]    Immunization History   Administered Date(s) Administered    COVID-19 (MODERNA) 1st,2nd,3rd Dose Monovalent 04/27/2021, 05/25/2021    DTP 02/11/1983, 04/05/1983, 06/06/1983, 07/05/1984, 04/20/1988    Fluzone  >6mos 01/14/2025    Fluzone (or Fluarix & Flulaval for VFC) >6mos 10/19/2020,  12/05/2022    Hpv9 05/17/2023    MMR 03/13/1984    OPV 02/11/1983, 04/05/1983, 06/06/1983, 07/05/1984, 04/20/1988    Pneumococcal Polysaccharide (PPSV23) 06/04/2020    Tdap 06/04/2020           REVIEW OF SYSTEMS:    Review of Systems   Constitutional: Negative.   HENT: Negative.     Eyes: Negative.    Cardiovascular:  Positive for chest pain and palpitations.   Respiratory:  Positive for shortness of breath.    Skin: Negative.    Musculoskeletal:  Positive for falls and joint pain.   Gastrointestinal: Negative.    Genitourinary: Negative.    Neurological:  Positive for dizziness.   Psychiatric/Behavioral: Negative.         Vital Signs  Temp:  [97.4 °F (36.3 °C)-98.6 °F (37 °C)] 98.4 °F (36.9 °C)  Heart Rate:  [] 92  Resp:  [13-20] 14  BP: ()/(53-80) 131/66          Physical Exam:  Physical Exam  Vitals reviewed.   Constitutional:       General: She is not in acute distress.     Appearance: Normal appearance. She is obese. She is not ill-appearing, toxic-appearing or diaphoretic.   HENT:      Head: Normocephalic.      Right Ear: External ear normal.      Left Ear: External ear normal.      Nose: Nose normal.      Mouth/Throat:      Mouth: Mucous membranes are moist.   Eyes:      Extraocular Movements: Extraocular movements intact.   Cardiovascular:      Rate and Rhythm: Normal rate and regular rhythm.      Pulses: Normal pulses.   Pulmonary:      Effort: Pulmonary effort is normal.      Breath sounds: Normal breath sounds.   Abdominal:      General: Bowel sounds are normal.      Palpations: Abdomen is soft.   Musculoskeletal:         General: Normal range of motion.      Cervical back: Normal range of motion.      Right lower leg: No edema.      Left lower leg: No edema.   Skin:     General: Skin is warm and dry.      Capillary Refill: Capillary refill takes less than 2 seconds.   Neurological:      General: No focal deficit present.      Mental Status: She is alert and oriented to person, place, and  time.   Psychiatric:         Mood and Affect: Mood normal.         Behavior: Behavior normal.         Thought Content: Thought content normal.         Judgment: Judgment normal.         Emotional Behavior:   Normal   Debilities:  None  Results Review:    I reviewed the patient's new clinical results.  Lab Results (most recent)       Procedure Component Value Units Date/Time    Basic Metabolic Panel [[id number]]  (Abnormal) Collected: 06/25/25 0725    Specimen: Blood from Arm, Right Updated: 06/25/25 0917     Glucose 266 mg/dL      BUN 38.5 mg/dL      Creatinine 1.10 mg/dL      Sodium 135 mmol/L      Potassium 4.3 mmol/L      Chloride 98 mmol/L      CO2 20.2 mmol/L      Calcium 9.6 mg/dL      BUN/Creatinine Ratio 35.0     Anion Gap 16.8 mmol/L      eGFR 64.5 mL/min/1.73     Narrative:      GFR Categories in Chronic Kidney Disease (CKD)              GFR Category          GFR (mL/min/1.73)    Interpretation  G1                    90 or greater        Normal or high (1)  G2                    60-89                Mild decrease (1)  G3a                   45-59                Mild to moderate decrease  G3b                   30-44                Moderate to severe decrease  G4                    15-29                Severe decrease  G5                    14 or less           Kidney failure    (1)In the absence of evidence of kidney disease, neither GFR category G1 or G2 fulfill the criteria for CKD.    eGFR calculation 2021 CKD-EPI creatinine equation, which does not include race as a factor    Hemoglobin A1c [[id number]]  (Abnormal) Collected: 06/24/25 2111    Specimen: Blood Updated: 06/25/25 0838     Hemoglobin A1C 10.50 %     Narrative:      Hemoglobin A1C Ranges:    Increased Risk for Diabetes  5.7% to 6.4%  Diabetes                     >= 6.5%  Diabetic Goal                < 7.0%    CBC Auto Differential [[id number]]  (Abnormal) Collected: 06/25/25 0725    Specimen: Blood from Arm, Right Updated: 06/25/25 0826     WBC  6.53 10*3/mm3      RBC 3.84 10*6/mm3      Hemoglobin 10.6 g/dL      Hematocrit 32.8 %      MCV 85.4 fL      MCH 27.6 pg      MCHC 32.3 g/dL      RDW 15.2 %      RDW-SD 46.5 fl      MPV 11.3 fL      Platelets 171 10*3/mm3      Neutrophil % 58.6 %      Lymphocyte % 29.6 %      Monocyte % 8.9 %      Eosinophil % 1.7 %      Basophil % 0.3 %      Immature Grans % 0.9 %      Neutrophils, Absolute 3.83 10*3/mm3      Lymphocytes, Absolute 1.93 10*3/mm3      Monocytes, Absolute 0.58 10*3/mm3      Eosinophils, Absolute 0.11 10*3/mm3      Basophils, Absolute 0.02 10*3/mm3      Immature Grans, Absolute 0.06 10*3/mm3      nRBC 0.0 /100 WBC     POC Glucose PRN [[id number]]  (Abnormal) Collected: 06/25/25 0740    Specimen: Blood Updated: 06/25/25 0742     Glucose 243 mg/dL      Comment: Serial Number: [id number]:  [id number]       Urinalysis, Microscopic Only - Urine, Clean Catch [[id number]]  (Abnormal) Collected: 06/25/25 0115    Specimen: Urine, Clean Catch Updated: 06/25/25 0128     RBC, UA 0-2 /HPF      WBC, UA 21-50 /HPF      Bacteria, UA None Seen /HPF      Squamous Epithelial Cells, UA 3-6 /HPF      Hyaline Casts, UA 3-6 /LPF      Methodology Automated Microscopy    Urine Culture - Urine, Urine, Clean Catch [[id number]] Collected: 06/25/25 0115    Specimen: Urine, Clean Catch Updated: 06/25/25 0128    Urinalysis With Culture If Indicated - Urine, Clean Catch [[id number]]  (Abnormal) Collected: 06/25/25 0115    Specimen: Urine, Clean Catch Updated: 06/25/25 0126     Color, UA Yellow     Appearance, UA Clear     pH, UA <=5.0     Specific Gravity, UA 1.011     Glucose,  mg/dL (Trace)     Ketones, UA Negative     Bilirubin, UA Negative     Blood, UA Negative     Protein, UA Negative     Leuk Esterase, UA Moderate (2+)     Nitrite, UA Negative     Urobilinogen, UA 0.2 E.U./dL    Narrative:      In absence of clinical symptoms, the presence of pyuria, bacteria, and/or nitrites on the urinalysis result does not correlate  with infection.    Urinalysis With Culture If Indicated - Urine, Clean Catch [[id number]]  (Abnormal) Collected: 06/24/25 2357    Specimen: Urine, Clean Catch Updated: 06/25/25 0009     Color, UA Yellow     Appearance, UA Clear     pH, UA 5.5     Specific Gravity, UA 1.044     Glucose, UA >=1000 mg/dL (3+)     Ketones, UA Negative     Bilirubin, UA Negative     Blood, UA Negative     Protein, UA Negative     Leuk Esterase, UA Negative     Nitrite, UA Negative     Urobilinogen, UA 0.2 E.U./dL    Narrative:      In absence of clinical symptoms, the presence of pyuria, bacteria, and/or nitrites on the urinalysis result does not correlate with infection.  Urine microscopic not indicated.    Urinalysis With Microscopic If Indicated (No Culture) - Urine, Clean Catch [[id number]]  (Abnormal) Collected: 06/24/25 2357    Specimen: Urine, Clean Catch Updated: 06/25/25 0009     Color, UA Yellow     Appearance, UA Clear     pH, UA 5.5     Specific Gravity, UA 1.044     Glucose, UA >=1000 mg/dL (3+)     Ketones, UA Negative     Bilirubin, UA Negative     Blood, UA Negative     Protein, UA Negative     Leuk Esterase, UA Negative     Nitrite, UA Negative     Urobilinogen, UA 0.2 E.U./dL    Narrative:      Urine microscopic not indicated.    High Sensitivity Troponin T 1Hr [[id number]]  (Normal) Collected: 06/24/25 2236    Specimen: Blood Updated: 06/24/25 2303     HS Troponin T 8 ng/L      Troponin T Numeric Delta 0 ng/L     Narrative:      High Sensitive Troponin T Reference Range:  <14.0 ng/L- Negative Female for AMI  <22.0 ng/L- Negative Male for AMI  >=14 - Abnormal Female indicating possible myocardial injury.  >=22 - Abnormal Male indicating possible myocardial injury.   Clinicians would have to utilize clinical acumen, EKG, Troponin, and serial changes to determine if it is an Acute Myocardial Infarction or myocardial injury due to an underlying chronic condition.         D-dimer, Quantitative [[id number]]  (Normal) Collected:  "06/24/25 2111    Specimen: Blood Updated: 06/24/25 2227     D-Dimer, Quantitative 0.47 MCGFEU/mL     Narrative:      According to the assay 's published package insert, a normal (<0.50 MCGFEU/mL) D-dimer result in conjunction with a non-high clinical probability assessment, excludes deep vein thrombosis (DVT) and pulmonary embolism (PE) with high sensitivity.    D-dimer values increase with age and this can make VTE exclusion of an older population difficult. To address this, the [employer], based on best available evidence and recent guidelines, recommends that clinicians use age-adjusted D-dimer thresholds in patients greater than 50 years of age with: a) a low probability of PE who do not meet all Pulmonary Embolism Rule Out Criteria, or b) in those with intermediate probability of PE.   The formula for an age-adjusted D-dimer cut-off is \"age/100\".  For example, a 60 year old patient would have an age-adjusted cut-off of 0.60 MCGFEU/mL and an 80 year old 0.80 MCGFEU/mL.    High Sensitivity Troponin T [[id number]]  (Normal) Collected: 06/24/25 2111    Specimen: Blood Updated: 06/24/25 2151     HS Troponin T 8 ng/L     Narrative:      High Sensitive Troponin T Reference Range:  <14.0 ng/L- Negative Female for AMI  <22.0 ng/L- Negative Male for AMI  >=14 - Abnormal Female indicating possible myocardial injury.  >=22 - Abnormal Male indicating possible myocardial injury.   Clinicians would have to utilize clinical acumen, EKG, Troponin, and serial changes to determine if it is an Acute Myocardial Infarction or myocardial injury due to an underlying chronic condition.         BNP [[id number]]  (Normal) Collected: 06/24/25 2111    Specimen: Blood Updated: 06/24/25 2151     proBNP 228.0 pg/mL     Narrative:      This assay is used as an aid in the diagnosis of individuals suspected of having heart failure. It can be used as an aid in the diagnosis of acute decompensated heart failure (ADHF) " in patients presenting with signs and symptoms of ADHF to the emergency department (ED). In addition, NT-proBNP of <300 pg/mL indicates ADHF is not likely.    Age Range Result Interpretation  NT-proBNP Concentration (pg/mL:      <50             Positive            >450                   Gray                 300-450                    Negative             <300    50-75           Positive            >900                  Gray                300-900                  Negative            <300      >75             Positive            >1800                  Gray                300-1800                  Negative            <300    Comprehensive Metabolic Panel [[id number]]  (Abnormal) Collected: 06/24/25 2111    Specimen: Blood Updated: 06/24/25 2151     Glucose 323 mg/dL      BUN 44.9 mg/dL      Creatinine 1.44 mg/dL      Sodium 130 mmol/L      Potassium 4.1 mmol/L      Chloride 95 mmol/L      CO2 19.0 mmol/L      Calcium 10.1 mg/dL      Total Protein 7.2 g/dL      Albumin 3.8 g/dL      ALT (SGPT) 27 U/L      AST (SGOT) 30 U/L      Alkaline Phosphatase 129 U/L      Total Bilirubin 0.7 mg/dL      Globulin 3.4 gm/dL      A/G Ratio 1.1 g/dL      BUN/Creatinine Ratio 31.2     Anion Gap 16.0 mmol/L      eGFR 46.7 mL/min/1.73     Narrative:      GFR Categories in Chronic Kidney Disease (CKD)              GFR Category          GFR (mL/min/1.73)    Interpretation  G1                    90 or greater        Normal or high (1)  G2                    60-89                Mild decrease (1)  G3a                   45-59                Mild to moderate decrease  G3b                   30-44                Moderate to severe decrease  G4                    15-29                Severe decrease  G5                    14 or less           Kidney failure    (1)In the absence of evidence of kidney disease, neither GFR category G1 or G2 fulfill the criteria for CKD.    eGFR calculation 2021 CKD-EPI creatinine equation, which does not include race  as a factor    Extra Tubes [[id number]] Collected: 06/24/25 2111    Specimen: Blood, Venous Line Updated: 06/24/25 2131    Narrative:      The following orders were created for panel order Extra Tubes.  Procedure                               Abnormality         Status                     ---------                               -----------         ------                     Gold Top - SST[[id number]]                                   Final result                 Please view results for these tests on the individual orders.    Gold Top - SST [[id number]] Collected: 06/24/25 2111    Specimen: Blood Updated: 06/24/25 2131     Extra Tube Hold for add-ons.     Comment: Auto resulted.       CBC & Differential [[id number]]  (Abnormal) Collected: 06/24/25 2111    Specimen: Blood Updated: 06/24/25 2124    Narrative:      The following orders were created for panel order CBC & Differential.  Procedure                               Abnormality         Status                     ---------                               -----------         ------                     CBC Auto Differential[[id number]]        Abnormal            Final result                 Please view results for these tests on the individual orders.    CBC Auto Differential [[id number]]  (Abnormal) Collected: 06/24/25 2111    Specimen: Blood Updated: 06/24/25 2124     WBC 10.93 10*3/mm3      RBC 4.00 10*6/mm3      Hemoglobin 11.2 g/dL      Hematocrit 33.8 %      MCV 84.5 fL      MCH 28.0 pg      MCHC 33.1 g/dL      RDW 15.2 %      RDW-SD 46.5 fl      MPV 11.2 fL      Platelets 199 10*3/mm3      Neutrophil % 72.7 %      Lymphocyte % 18.4 %      Monocyte % 7.0 %      Eosinophil % 1.0 %      Basophil % 0.2 %      Immature Grans % 0.7 %      Neutrophils, Absolute 7.95 10*3/mm3      Lymphocytes, Absolute 2.01 10*3/mm3      Monocytes, Absolute 0.76 10*3/mm3      Eosinophils, Absolute 0.11 10*3/mm3      Basophils, Absolute 0.02 10*3/mm3      Immature Grans, Absolute 0.08  10*3/mm3      nRBC 0.0 /100 WBC             Imaging Results (Most Recent)       Procedure Component Value Units Date/Time    CT Head Without Contrast [[id number]] Collected: 06/24/25 2256     Updated: 06/24/25 2302    Narrative:      CT HEAD WO CONTRAST    HISTORY:  Head injury from fall.    TECHNIQUE:  Axial unenhanced head CT with coronal reformats. Radiation dose reduction techniques included automated exposure control or exposure modulation based on body size.    COMPARISON:  None.    FINDINGS:  Ventricular size and configuration are normal. No acute infarct or hemorrhage is identified. There are no masses. There is no skull fracture.      Impression:      Normal, negative unenhanced head CT.          Electronically Signed: Alfie Martins MD    6/24/2025 11:00 PM EDT    Workstation ID: [id number]    XR Ankle 3+ View Right [[id number]] Collected: 06/24/25 2153     Updated: 06/24/25 2156    Narrative:      XR ANKLE 3+ VW RIGHT    Date of Exam: 6/24/2025 9:37 PM EDT    Indication: Fall, r ankle pain    Comparison: None available.    Findings:  Negative for displaced fracture or joint malalignment. Symmetric appearing ankle mortise. Talar dome unremarkable. Tiny Achilles spur. No significant degenerative change. There is nonfocal reticular subcutaneous edema of the lower extremity.      Impression:      Impression:  Nonfocal subcutaneous edema without acute osseous abnormality.      Electronically Signed: Rob Isaac MD    6/24/2025 9:54 PM EDT    Workstation ID: [id number]    XR Hip With or Without Pelvis 2 - 3 View Right [[id number]] Collected: 06/24/25 2153     Updated: 06/24/25 2155    Narrative:      XR HIP W OR WO PELVIS 2-3 VIEW RIGHT    Date of Exam: 6/24/2025 9:37 PM EDT    Indication: Fall, R hip pain    Comparison: None available.    Findings:  No visualized displaced fracture or joint malalignment. Mild degenerative osteoarthritis of the hip joints. No dilated loops of bowel. Soft tissues radiographically  unremarkable. Pelvic ring appears intact. Bony sacral neuroforamina appear symmetric and   intact.      Impression:      Impression:  No acute osseous findings of the right hip.      Electronically Signed: Rob Isaac MD    6/24/2025 9:53 PM EDT    Workstation ID: [id number]    XR Chest 1 View [[id number]] Collected: 06/24/25 2139     Updated: 06/24/25 2142    Narrative:      XR CHEST 1 VW    Date of Exam: 6/24/2025 9:36 PM EDT    Indication: CP, SOA    Comparison: Chest radiograph 9/7/2023    Findings:  Lung volumes are low. No discrete infectious consolidation, edema, effusion or pneumothorax. Central bronchovascular crowding. Heart size within normal limits allowing for AP technique. Lordotic positioning. Osseous structures unremarkable.      Impression:      Impression:  Hypoventilation changes, without acute airspace process.      Electronically Signed: Rob Isaac MD    6/24/2025 9:39 PM EDT    Workstation ID: [id number]          reviewed    ECG/EMG Results (most recent)       Procedure Component Value Units Date/Time    Telemetry Scan [[id number]] Resulted: 06/25/25 0021     Updated: 06/25/25 0449    Telemetry Scan [[id number]] Resulted: 06/25/25 0414     Updated: 06/25/25 0449    ECG 12 Lead Dyspnea [[id number]] Collected: 06/24/25 2017     Updated: 06/25/25 0736     QT Interval 332 ms      QTC Interval 429 ms     Narrative:      HEART VPUO=894  bpm  RR Qdmlidaz=227  ms  KS Bgbfpzab=565  ms  P Horizontal Axis=13  deg  P Front Axis=26  deg  QRSD Interval=73  ms  QT Nbzzcuae=469  ms  OOwW=424  ms  QRS Axis=-12  deg  T Wave Axis=34  deg  - OTHERWISE NORMAL ECG -  Sinus tachycardia  Low voltage, precordial leads  When compared with ECG of 07-Sep-2023 19:47:35,  No significant change  Electronically Signed By: Ed Nunes (JORGE) 2025-06-25 07:36:11  Date and Time of Study:2025-06-24 20:17:51    Telemetry Scan [[id number]] Resulted: 06/25/25 0748     Updated: 06/25/25 0808          reviewed        No results  found for this or any previous visit.      Microbiology Results (last 10 days)       ** No results found for the last 240 hours. **            Assessment & Plan     TURNER (acute kidney injury)       Chest pain/palpitation with fall  Lab Results   Component Value Date    TROPONINT 8 06/24/2025    TROPONINT 8 06/24/2025    TROPONINT <6 09/07/2023   -proBNP: 228.0  - D-dimer: 0.47  -Lipid panel total cholesterol of 179 with an LDL of 35 and an HDL of 23 and triglycerides of 854  -Chest X-ray: Hypoventilation without acute process  - X-ray of right ankle showed nonfocal subcutaneous edema without acute osseous abnormality  - X-ray of hip showed no acute osseous findings of the right hip  - CT of head reported as normal  -EKG: Sinus tachycardia 100 without obvious acute changes with a QTc of 429 ms  -In the ED pt given 324 mg aspirin and 1 L fluid bolus  -Stress Test   -Telemetry  -NPO    TURNER  Lab Results   Component Value Date    CREATININE 1.10 (H) 06/25/2025    BUN 38.5 (H) 06/25/2025    BCR 35.0 (H) 06/25/2025    EGFR 64.5 06/25/2025   -Creatinine: 1.44 with a BUN of 44.9 on presentation  - Hold ACE inhibitor and meloxicam  - Patient given IV fluid bolus followed by infusion in the ED, continue  -Avoid nephrotoxic medication IV dye unless urgently needed  -Monitor BMP and I's and O's while admitted    Diabetes mellitus  - Poorly controlled   Lab Results   Component Value Date    GLUCOSE 266 (H) 06/25/2025    GLUCOSE 323 (H) 06/24/2025    GLUCOSE 361 (H) 04/14/2025    GLUCOSE 314 (H) 01/14/2025   -Anion gap: 16.8 with a serum CO2 of 20.2  - UA showed trace glucose, 2+ leukocyte esterase, no bacteria, 21-50 WBCs and 3-6 squamous epithelial cells with no ketones  -A1c: 10.50  -Hold Januvia  - Glucomander started  - Diabetes educator consulted  -Diabetic diet  -Monitor before meals and at bedtime    Depression/anxiety/bipolar  - Continue Vraylar, fluoxetine, Lamictal    I discussed the patients findings and my  recommendations with patient and nursing staff.     Discharge Diagnosis:      TURNER (acute kidney injury)      Hospital Course  Patient is a 44 y.o. female presented with recent fall as well as chest pain and palpitations along with some dyspnea with exertion.  Serial troponins were assessed and found to be stable at 8 with a proBNP within normal limits at 228.0.  D-dimer was 0.47 and lipid panel was ordered which showed total cholesterol 179 with an LDL of 35 and an HDL of 23 no with a significantly elevated triglyceride level of 854.  Chest x-ray obtained in the ED was reported with some hypoventilation but no other acute process was noted and x-ray of right ankle showed only some nonfocal subcutaneous edema without acute osseous abnormality.  X-ray of hip showed no acute finding and CT of head was also obtained which was reported by radiologist as normal.  EKG showed sinus tach at 100 without obvious acute changes and she was continued on telemetry without other significant events reported.  Creatinine was initially elevated at 1.44 with a BUN of 44.  Glucose was elevated at 323 with an anion gap of 16.8 and serum CO2 of 20.2.  UA showed trace glucose as well as 2+ leukocyte esterase and 21-50 WBCs though no bacteria or ketones were reported.  A1c was also ordered which was elevated at 10.50.  Her home Jardiance was held and Glucomander protocol was started following her admission.  Patient did receive IV fluid bolus followed by infusion as well as 324 mg aspirin in the ED. stress testing was performed on 6/25/2025, prior to receiving results and any further treatment I was advised by nurse that patient had decided to leave A.  Diabetes educator also attempted to evaluate patient however she signed her paperwork and left prior to that discussion    Past Medical History:     Past Medical History:   Diagnosis Date    Allergic     Anger     Anxiety     Bipolar 1 disorder     Bipolar disorder 9/23/23     Cholelithiasis 1/3/2024    I had alot of stones in my gallbladder    CTS (carpal tunnel syndrome)     Depression     Diabetes mellitus     Headache     Headache, tension-type     HTN (hypertension)     Irritable bowel syndrome     Knee pain, bilateral     Knee swelling     Low back pain     Migraine     Neuropathy in diabetes 23    Obesity     PTSD (post-traumatic stress disorder)     Type 2 diabetes mellitus        Past Surgical History:     Past Surgical History:   Procedure Laterality Date    CHOLECYSTECTOMY      CHOLECYSTECTOMY WITH INTRAOPERATIVE CHOLANGIOGRAM N/A 2024    Procedure: CHOLECYSTECTOMY LAPAROSCOPIC INTRAOPERATIVE CHOLANGIOGRAM;  Surgeon: Dustin Shelley MD;  Location: [facility] MAIN OR;  Service: General;  Laterality: N/A;    EAR TUBES      ENDOMETRIAL ABLATION      INTRAUTERINE DEVICE INSERTION  10/14/2020    LAPAROSCOPIC TUBAL LIGATION      SUBTOTAL HYSTERECTOMY      TONSILLECTOMY AND ADENOIDECTOMY      TOTAL LAPAROSCOPIC HYSTERECTOMY WITH DAVINCI ROBOT  2023    [facility]    TUBAL ABDOMINAL LIGATION         Social History:   Social History     Socioeconomic History    Marital status: Single    Number of children: 0   Tobacco Use    Smoking status: Former     Current packs/day: 0.00     Average packs/day: 1 pack/day for 1 year (1.0 ttl pk-yrs)     Types: Cigarettes     Start date: 2017     Quit date: 2018     Years since quittin.4     Passive exposure: Past    Smokeless tobacco: Never    Tobacco comments:     socially    Vaping Use    Vaping status: Never Used   Substance and Sexual Activity    Alcohol use: Never    Drug use: Never    Sexual activity: Not Currently     Partners: Male     Birth control/protection: Other, Hysterectomy       Procedures Performed         Consults:   Consults       No orders found for last 30 day(s).            Condition on Discharge:     Stable    Discharge Disposition      Discharge Medications     Discharge Medications        ASK  your doctor about these medications        Instructions Start Date   Cariprazine HCl 3 MG capsule capsule  Commonly known as: Vraylar   3 mg, Oral, Daily      cetirizine 10 MG tablet  Commonly known as: ZyrTEC Allergy   10 mg, Oral, Daily      FLUoxetine 40 MG capsule  Commonly known as: PROzac   40 mg, Oral, Daily      fluticasone 50 MCG/ACT nasal spray  Commonly known as: FLONASE   2 sprays, Nasal, Daily      Januvia 100 MG tablet  Generic drug: SITagliptin   100 mg, Oral, Daily      lamoTRIgine 100 MG tablet  Commonly known as: LaMICtal   100 mg, Oral, 2 Times Daily      linaclotide 145 MCG capsule capsule  Commonly known as: Linzess   145 mcg, Oral, Every Morning Before Breakfast      lisinopril 10 MG tablet  Commonly known as: PRINIVIL,ZESTRIL   10 mg, Oral, Daily      Melatonin 5 MG chewable tablet   5 mg, Oral, Every Night at Bedtime      meloxicam 15 MG tablet  Commonly known as: MOBIC   15 mg, Oral, Daily      ondansetron 4 MG tablet  Commonly known as: ZOFRAN   4 mg, Every 8 Hours PRN      pregabalin 150 MG capsule  Commonly known as: LYRICA   150 mg, Oral, 2 Times Daily               Discharge Diet:     Activity at Discharge:     Follow-up Appointments  Future Appointments   Date Time Provider Department Center   6/25/2025 11:05 AM [facility] ROOM 1  [facility]   6/30/2025 11:00 AM Bree Wan MD [facility]   8/8/2025  2:15 PM Chio Dillard APRN [facility]   8/18/2025  3:10 PM Perez Connors MD [facility]         Test Results Pending at Discharge  Pending Results       Procedure [Order ID] Specimen - Date/Time    Stress Test With Myocardial Perfusion One Day - In process [[id number]] Resulted: 06/25/25 0930     Updated: 06/25/25 0930    This result has not been signed. Information might be incomplete.        CV STRESS PROTOCOL 1 Pharmacologic     Stage 1 1.0     HR Stage 1 103     BP Stage 1 120/56     Duration Min Stage 1 0     Duration Sec Stage 1 10      Stress Dose Regadenoson Stage 1 0.40     Stress Comments Stage 1 10 sec bolus injection     Stage 2 2.0     HR Stage 2 94     BP Stage 2 118/57     Duration Min Stage 2 4     Duration Sec Stage 2 0     Stress Comments Stage 2 recovery     Baseline  bpm      Baseline /62 mmHg      Peak  bpm      Peak /56 mmHg      Recovery HR 94 bpm      Recovery /57 mmHg      Target HR (85%) 151 bpm      Max. Pred. HR (100%) 178 bpm      Percent Max Pred HR 57.87 %      Percent Target HR 68 %      BH CV REST NUCLEAR ISOTOPE DOSE 11.0 mCi      BH CV STRESS NUCLEAR ISOTOPE DOSE 33.0 mCi     Urine Culture - Urine, Urine, Clean Catch [[id number]] Collected: 06/25/25 0115    Specimen: Urine, Clean Catch Updated: 06/25/25 0128             Risk for Readmission (LACE) Score: 2 (6/25/2025  6:00 AM)      Greater than 30 minutes spent in discharge activities for this patient    Signature:Electronically signed by Fredi Boateng PA-C, 06/25/25, 4:40 PM EDT.

## 2025-06-25 NOTE — PLAN OF CARE
Problem: Adult Inpatient Plan of Care  Goal: Plan of Care Review  6/25/2025 0155 by Tammy Castle RN  Outcome: Progressing  6/25/2025 0138 by Tammy Castle RN  Outcome: Progressing  6/25/2025 0137 by Tammy Castle RN  Outcome: Progressing  Goal: Patient-Specific Goal (Individualized)  6/25/2025 0155 by Tammy Castle RN  Outcome: Progressing  6/25/2025 0138 by Tammy Castle RN  Outcome: Progressing  6/25/2025 0137 by Tammy Castle RN  Outcome: Progressing  Goal: Absence of Hospital-Acquired Illness or Injury  6/25/2025 0155 by Tammy Castle RN  Outcome: Progressing  6/25/2025 0138 by Tammy Castle RN  Outcome: Progressing  6/25/2025 0137 by Tammy Castle RN  Outcome: Progressing  Intervention: Identify and Manage Fall Risk  Intervention: Prevent Skin Injury  Goal: Optimal Comfort and Wellbeing  6/25/2025 0155 by Tammy Castle RN  Outcome: Progressing  6/25/2025 0138 by Tammy Castle RN  Outcome: Progressing  6/25/2025 0137 by Tammy Castle RN  Outcome: Progressing  Intervention: Provide Person-Centered Care  Goal: Readiness for Transition of Care  6/25/2025 0155 by Tammy Castle RN  Outcome: Progressing  6/25/2025 0138 by Tammy Castle RN  Outcome: Progressing  6/25/2025 0137 by Tammy Castle RN  Outcome: Progressing  Intervention: Mutually Develop Transition Plan   Goal Outcome Evaluation:  Plan of Care Reviewed With: patient        Progress: no change  Outcome Evaluation: unsteady on feet . had headache and shortness of breath on admisssion.myoview in am

## 2025-06-25 NOTE — CONSULTS
Diabetes Education    Patient Name:  Noa Soni  YOB: 1980  MRN: [id number]  Admit Date:  6/24/2025    Consult received due to bs >200. BS this adm 323. A1c this adm 10.5%. Per home med list, pt was taking Januvia 100 mg daily. In hospital, pt was receiving basal/bolus insulin therapy per glucommander. Educator attempted visit with pt today to teach insulin administration. Per nurse pt has signed out AMA.       Electronically signed by:  Mary Ellen Wesley RN  06/25/25 16:33 EDT

## 2025-06-25 NOTE — PLAN OF CARE
Problem: Adult Inpatient Plan of Care  Goal: Plan of Care Review  Outcome: Progressing  Flowsheets (Taken 6/25/2025 1003)  Progress: improving  Plan of Care Reviewed With: patient  Goal: Patient-Specific Goal (Individualized)  Outcome: Progressing  Goal: Absence of Hospital-Acquired Illness or Injury  Outcome: Progressing  Goal: Optimal Comfort and Wellbeing  Outcome: Progressing  Goal: Readiness for Transition of Care  Outcome: Progressing   Goal Outcome Evaluation:  Plan of Care Reviewed With: patient        Progress: improving

## 2025-06-25 NOTE — ED PROVIDER NOTES
Subjective   History of Present Illness  Patient is a 44-year-old female with PMH of DM2, HTN presenting to the ED for chest pain and dyspnea worsening today.  Patient states earlier today she lost her balance causing her to fall backwards and hit her head on a wall, reports slight headache.  After falling she reported some pain in her right ankle and right hip.  EMS evaluated her at that time and offered to bring her to the ER however declined.  Patient states throughout today she has been having worsening shortness of breath and chest pain.  Patient states her chest pain started last night describes as an intermittent sharp pain in the left side of her chest with no radiation, currently rates it a 10 out of 10.  She has not taking any medications for her pain.  She denies any pain in her neck, changes in vision, nausea, vomiting, abdominal pain, lightheaded or dizziness.  BMI 54.11.        Review of Systems   Eyes:  Negative for visual disturbance.   Respiratory:  Positive for shortness of breath.    Cardiovascular:  Positive for chest pain.   Gastrointestinal:  Negative for abdominal pain, nausea and vomiting.   Musculoskeletal:  Positive for arthralgias. Negative for neck pain.   Neurological:  Positive for headaches. Negative for dizziness and light-headedness.       Past Medical History:   Diagnosis Date    Allergic     Anger     Anxiety     Bipolar 1 disorder     Bipolar disorder 9/23/23    Cholelithiasis 1/3/2024    I had alot of stones in my gallbladder    CTS (carpal tunnel syndrome)     Depression     Diabetes mellitus     Headache     Headache, tension-type     HTN (hypertension)     Irritable bowel syndrome     Knee pain, bilateral     Knee swelling     Low back pain     Migraine     Neuropathy in diabetes 12/9/23    Obesity     PTSD (post-traumatic stress disorder)     Type 2 diabetes mellitus        Allergies   Allergen Reactions    Dilaudid [Hydromorphone] Other (See Comments)     Bottoms O2 out     Morphine Other (See Comments)     Suicical ideations    Tylenol With Codeine #3 [Acetaminophen-Codeine] Hallucinations    Abilify [Aripiprazole] Rash    Metformin Diarrhea    Vicodin [Hydrocodone-Acetaminophen] GI Intolerance       Past Surgical History:   Procedure Laterality Date    CHOLECYSTECTOMY      CHOLECYSTECTOMY WITH INTRAOPERATIVE CHOLANGIOGRAM N/A 2024    Procedure: CHOLECYSTECTOMY LAPAROSCOPIC INTRAOPERATIVE CHOLANGIOGRAM;  Surgeon: Dustin Shelley MD;  Location: [facility] MAIN OR;  Service: General;  Laterality: N/A;    EAR TUBES      ENDOMETRIAL ABLATION      INTRAUTERINE DEVICE INSERTION  10/14/2020    LAPAROSCOPIC TUBAL LIGATION      SUBTOTAL HYSTERECTOMY      TONSILLECTOMY AND ADENOIDECTOMY      TOTAL LAPAROSCOPIC HYSTERECTOMY WITH DAVINCI ROBOT  2023    [facility]    TUBAL ABDOMINAL LIGATION         Family History   Problem Relation Age of Onset    Hypertension Mother     Depression Mother     Post-traumatic stress disorder Mother     Hyperlipidemia Mother     Anxiety disorder Mother     Mental illness Mother     Hypertension Father     Hyperlipidemia Father     Hypertension Brother     Depression Brother     Parkinsonism Maternal Grandmother     Dementia Maternal Grandmother     Atrial fibrillation Maternal Grandfather     Breast cancer Paternal Grandmother     Diabetes Paternal Grandmother     COPD Paternal Grandfather     Diabetes Paternal Grandfather        Social History     Socioeconomic History    Marital status: Single    Number of children: 0   Tobacco Use    Smoking status: Former     Current packs/day: 0.00     Average packs/day: 1 pack/day for 1 year (1.0 ttl pk-yrs)     Types: Cigarettes     Start date: 2017     Quit date: 2018     Years since quittin.4     Passive exposure: Past    Smokeless tobacco: Never    Tobacco comments:     socially    Vaping Use    Vaping status: Never Used   Substance and Sexual Activity    Alcohol use: Never    Drug use: Never     "Sexual activity: Not Currently     Partners: Male     Birth control/protection: Other, Hysterectomy           Objective   Physical Exam  Constitutional:       Appearance: Normal appearance.   HENT:      Head: Normocephalic and atraumatic.      Mouth/Throat:      Mouth: Mucous membranes are moist.   Eyes:      Extraocular Movements: Extraocular movements intact.   Cardiovascular:      Rate and Rhythm: Normal rate and regular rhythm.      Pulses: Normal pulses.      Heart sounds: Normal heart sounds.   Pulmonary:      Effort: Pulmonary effort is normal.      Breath sounds: Normal breath sounds.   Abdominal:      General: Bowel sounds are normal. There is distension.      Palpations: Abdomen is soft.      Tenderness: There is no abdominal tenderness.   Musculoskeletal:      Cervical back: Normal range of motion.      Right lower leg: Edema present.      Left lower leg: Edema present.        Legs:       Comments: Bilateral lower extremity edema.  Tenderness to palpation of right ankle without surrounding edema or erythema.  Tenderness to palpation of right hip.  Slight decreased range of motion due to pain of right lower extremity.  No tenderness to palpation in right knee or calfs.  Capillary refill normal.  Pulses palpated bilaterally.   Skin:     General: Skin is warm and dry.      Capillary Refill: Capillary refill takes less than 2 seconds.   Neurological:      General: No focal deficit present.      Mental Status: She is alert and oriented to person, place, and time.   Psychiatric:         Mood and Affect: Mood normal.         Behavior: Behavior normal.         Procedures           ED Course      /58 (BP Location: Right arm, Patient Position: Lying)   Pulse 88   Temp 98.2 °F (36.8 °C) (Oral)   Resp 13   Ht 162.6 cm (64\")   Wt (!) 143 kg (314 lb 13.1 oz)   LMP 06/02/2023 (Exact Date)   SpO2 93%   BMI 54.04 kg/m²   Labs Reviewed   COMPREHENSIVE METABOLIC PANEL - Abnormal; Notable for the following " components:       Result Value    Glucose 323 (*)     BUN 44.9 (*)     Creatinine 1.44 (*)     Sodium 130 (*)     Chloride 95 (*)     CO2 19.0 (*)     Alkaline Phosphatase 129 (*)     BUN/Creatinine Ratio 31.2 (*)     Anion Gap 16.0 (*)     eGFR 46.7 (*)     All other components within normal limits    Narrative:     GFR Categories in Chronic Kidney Disease (CKD)              GFR Category          GFR (mL/min/1.73)    Interpretation  G1                    90 or greater        Normal or high (1)  G2                    60-89                Mild decrease (1)  G3a                   45-59                Mild to moderate decrease  G3b                   30-44                Moderate to severe decrease  G4                    15-29                Severe decrease  G5                    14 or less           Kidney failure    (1)In the absence of evidence of kidney disease, neither GFR category G1 or G2 fulfill the criteria for CKD.    eGFR calculation 2021 CKD-EPI creatinine equation, which does not include race as a factor   URINALYSIS W/ CULTURE IF INDICATED - Abnormal; Notable for the following components:    Color, UA Other (*)     Leuk Esterase, UA Small (1+) (*)     All other components within normal limits    Narrative:     In absence of clinical symptoms, the presence of pyuria, bacteria, and/or nitrites on the urinalysis result does not correlate with infection.   CBC WITH AUTO DIFFERENTIAL - Abnormal; Notable for the following components:    WBC 10.93 (*)     Hemoglobin 11.2 (*)     Hematocrit 33.8 (*)     Lymphocyte % 18.4 (*)     Immature Grans % 0.7 (*)     Neutrophils, Absolute 7.95 (*)     Immature Grans, Absolute 0.08 (*)     All other components within normal limits   URINALYSIS W/ CULTURE IF INDICATED - Abnormal; Notable for the following components:    Specific Gravity, UA 1.044 (*)     Glucose, UA >=1000 mg/dL (3+) (*)     All other components within normal limits    Narrative:     In absence of clinical  "symptoms, the presence of pyuria, bacteria, and/or nitrites on the urinalysis result does not correlate with infection.  Urine microscopic not indicated.   URINALYSIS W/ MICROSCOPIC IF INDICATED (NO CULTURE) - Abnormal; Notable for the following components:    Specific Gravity, UA 1.044 (*)     Glucose, UA >=1000 mg/dL (3+) (*)     All other components within normal limits    Narrative:     Urine microscopic not indicated.   TROPONIN - Normal    Narrative:     High Sensitive Troponin T Reference Range:  <14.0 ng/L- Negative Female for AMI  <22.0 ng/L- Negative Male for AMI  >=14 - Abnormal Female indicating possible myocardial injury.  >=22 - Abnormal Male indicating possible myocardial injury.   Clinicians would have to utilize clinical acumen, EKG, Troponin, and serial changes to determine if it is an Acute Myocardial Infarction or myocardial injury due to an underlying chronic condition.        D-DIMER, QUANTITATIVE - Normal    Narrative:     According to the assay 's published package insert, a normal (<0.50 MCGFEU/mL) D-dimer result in conjunction with a non-high clinical probability assessment, excludes deep vein thrombosis (DVT) and pulmonary embolism (PE) with high sensitivity.    D-dimer values increase with age and this can make VTE exclusion of an older population difficult. To address this, the [employer], based on best available evidence and recent guidelines, recommends that clinicians use age-adjusted D-dimer thresholds in patients greater than 50 years of age with: a) a low probability of PE who do not meet all Pulmonary Embolism Rule Out Criteria, or b) in those with intermediate probability of PE.   The formula for an age-adjusted D-dimer cut-off is \"age/100\".  For example, a 60 year old patient would have an age-adjusted cut-off of 0.60 MCGFEU/mL and an 80 year old 0.80 MCGFEU/mL.   BNP (IN-HOUSE) - Normal    Narrative:     This assay is used as an aid in the " diagnosis of individuals suspected of having heart failure. It can be used as an aid in the diagnosis of acute decompensated heart failure (ADHF) in patients presenting with signs and symptoms of ADHF to the emergency department (ED). In addition, NT-proBNP of <300 pg/mL indicates ADHF is not likely.    Age Range Result Interpretation  NT-proBNP Concentration (pg/mL:      <50             Positive            >450                   Gray                 300-450                    Negative             <300    50-75           Positive            >900                  Gray                300-900                  Negative            <300      >75             Positive            >1800                  Gray                300-1800                  Negative            <300   HIGH SENSITIVITIY TROPONIN T 1HR - Normal    Narrative:     High Sensitive Troponin T Reference Range:  <14.0 ng/L- Negative Female for AMI  <22.0 ng/L- Negative Male for AMI  >=14 - Abnormal Female indicating possible myocardial injury.  >=22 - Abnormal Male indicating possible myocardial injury.   Clinicians would have to utilize clinical acumen, EKG, Troponin, and serial changes to determine if it is an Acute Myocardial Infarction or myocardial injury due to an underlying chronic condition.        BASIC METABOLIC PANEL   CBC WITH AUTO DIFFERENTIAL   CBC AND DIFFERENTIAL    Narrative:     The following orders were created for panel order CBC & Differential.  Procedure                               Abnormality         Status                     ---------                               -----------         ------                     CBC Auto Differential[[id number]]        Abnormal            Final result                 Please view results for these tests on the individual orders.   EXTRA TUBES    Narrative:     The following orders were created for panel order Extra Tubes.  Procedure                               Abnormality         Status                      ---------                               -----------         ------                     Gold Top - [facility][[id number]]                                   Final result                 Please view results for these tests on the individual orders.   GOLD TOP - [facility]     Medications   ketorolac (TORADOL) injection 15 mg (15 mg Intravenous Not Given 6/24/25 5275)   sodium chloride 0.9 % flush 10 mL (has no administration in time range)   sodium chloride 0.9 % flush 10 mL (has no administration in time range)   sodium chloride 0.9 % infusion 40 mL (has no administration in time range)   Pharmacy to Dose enoxaparin (LOVENOX) (has no administration in time range)   nitroglycerin (NITROSTAT) SL tablet 0.4 mg (has no administration in time range)   sodium chloride 0.9 % infusion (has no administration in time range)   ketorolac (TORADOL) injection 30 mg (has no administration in time range)   sennosides-docusate (PERICOLACE) 8.6-50 MG per tablet 2 tablet (has no administration in time range)     And   polyethylene glycol (MIRALAX) packet 17 g (has no administration in time range)     And   bisacodyl (DULCOLAX) EC tablet 5 mg (has no administration in time range)     And   bisacodyl (DULCOLAX) suppository 10 mg (has no administration in time range)   ondansetron ODT (ZOFRAN-ODT) disintegrating tablet 4 mg (has no administration in time range)     Or   ondansetron (ZOFRAN) injection 4 mg (has no administration in time range)   enoxaparin sodium (LOVENOX) syringe 60 mg (has no administration in time range)   aspirin chewable tablet 324 mg (324 mg Oral Given 6/24/25 2229)   sodium chloride 0.9 % bolus 1,000 mL (1,000 mL Intravenous New Bag 6/24/25 2238)     CT Head Without Contrast  Result Date: 6/24/2025  Normal, negative unenhanced head CT. Electronically Signed: Alfie Martins MD  6/24/2025 11:00 PM EDT  Workstation ID: [id number]    XR Ankle 3+ View Right  Result Date: 6/24/2025  Impression: Nonfocal subcutaneous edema without acute  osseous abnormality. Electronically Signed: Rob Isaac MD  6/24/2025 9:54 PM EDT  Workstation ID: [id number]    XR Hip With or Without Pelvis 2 - 3 View Right  Result Date: 6/24/2025  Impression: No acute osseous findings of the right hip. Electronically Signed: Rob Isaac MD  6/24/2025 9:53 PM EDT  Workstation ID: [id number]    XR Chest 1 View  Result Date: 6/24/2025  Impression: Hypoventilation changes, without acute airspace process. Electronically Signed: Rob Isaac MD  6/24/2025 9:39 PM EDT  Workstation ID: [id number]                HEART Score: 2                                      Medical Decision Making  Patient presented to the ED for the above complaint.    Patient underwent the above exam and evaluation.    While in the ED patient was placed in a gown and IV was established.  EKG, chest x-ray, blood work was obtained to assess for arrhythmia, MI, electrolyte abnormality, dehydration, infection, blood clot.  CT head was obtained to assess for ICH.  X-ray of the right ankle and right hip was obtained to assess for fracture or dislocation.  Patient was given p.o. aspirin.  Patient was noted to have an TURNER, was started on IV fluids.  Upon reevaluation patient resting comfortably, vitals stable on room air, reporting resolution in chest pain and improvement in right lower extremity pain.  Discussed findings with patient.  Recommended placing patient in observation for continued IV fluids and cardiac workup due to no history of cardiac workup, heart score of 2.  Patient voiced understanding, agreeable with dispo plan.    EKG independently interpreted by Dr. Nunes showing sinus tachycardia, rate 100, WI interval 142, QTc 429, compared to previous EKG 9/7/2023 showing sinus rhythm, rate 96.  Labs were independently interpreted by myself and deemed remarkable for the following: Leukocytosis of 10.93 without bandemia, hemoglobin stable at 11.2, elevated creatinine of 1.44, hyperglycemia 323,  hyponatremia of 130, initial troponin 8, repeat troponin 8, BMP within normal limits, D-dimer within normal limits, urinalysis negative for hematuria or bacteria.  Chest x-ray, x-ray of right hip, x-ray of right ankle, CT of head independently interpreted by radiologist and reviewed by myself showing: Chest x-ray showed no cardiomegaly, pneumothorax, pleural effusion, or consolidations.  X-ray of right hip showed no fracture or dislocation.  X-ray of right ankle showed nonfocal subcutaneous edema without fractures.  CT head showed no ICH or fracture.    Appropriate PPE was worn during each patient encounter.    Note Disclaimer: At [facility], we believe that sharing information builds trust and better relationships. You are receiving this note because you are receiving care at [facility] or recently visited. It is possible you will see health information before a provider has talked with you about it. This kind of information can be easy to misunderstand. To help you fully understand what it means for your health, we urge you to discuss this note with your provider.    Discussed this patient with Dr. Nunes who agrees with plan.      Problems Addressed:  Acute nonintractable headache, unspecified headache type: complicated acute illness or injury  Acute right ankle pain: complicated acute illness or injury  TURNER (acute kidney injury): complicated acute illness or injury  Chest pain, unspecified type: complicated acute illness or injury  Dyspnea, unspecified type: complicated acute illness or injury  Fall, initial encounter: complicated acute illness or injury  Right hip pain: complicated acute illness or injury    Amount and/or Complexity of Data Reviewed  Labs: ordered.  Radiology: ordered.    Risk  OTC drugs.  Prescription drug management.  Decision regarding hospitalization.        Final diagnoses:   Fall, initial encounter   Acute nonintractable headache, unspecified headache type   Chest pain,  unspecified type   Dyspnea, unspecified type   TURNER (acute kidney injury)   Right hip pain   Acute right ankle pain       ED Disposition  ED Disposition       ED Disposition   Decision to Admit    Condition   --    Comment   --               No follow-up provider specified.       Medication List      No changes were made to your prescriptions during this visit.            Adri Salcido PA-C  06/25/25 0031

## 2025-06-25 NOTE — PLAN OF CARE
Problem: Adult Inpatient Plan of Care  Goal: Plan of Care Review  Outcome: Progressing  Goal: Patient-Specific Goal (Individualized)  Outcome: Progressing  Goal: Absence of Hospital-Acquired Illness or Injury  Outcome: Progressing  Intervention: Identify and Manage Fall Risk  Intervention: Prevent Skin Injury  Goal: Optimal Comfort and Wellbeing  Outcome: Progressing  Intervention: Provide Person-Centered Care  Goal: Readiness for Transition of Care  Outcome: Progressing  Intervention: Mutually Develop Transition Plan   Goal Outcome Evaluation:  Plan of Care Reviewed With: patient           Outcome Evaluation: unsteady on feet . had headache and shortness of breath on admisssion.myoview in am

## 2025-06-25 NOTE — PLAN OF CARE
Goal Outcome Evaluation:  Plan of Care Reviewed With: patient        Progress: improving  Outcome Evaluation: Noa Soni is a 45 y/o F who presented to [facility] on 6/24/25 for CP and dyspnea after having a mechanical fall and hitting her head. CT head (-), X-ray RLE (-). Awaiting results from stress test. At baseline, pt lives with [facility]ab[facility] in [facility] with ramp entry. Pt is IND with ADLs and mobility, and provides primary care for fiance. This date, pt is AAOx4 and demonstrates baseline mobility. Pt has some pain on her lateral hip/thigh upon standing up, but otherwise denies pain with transfers or ambulation. Pt denies concerns for mobility. Pt safe for d/c home when medically appropriate. Will complete orders.    Anticipated Discharge Disposition (PT): home

## 2025-06-25 NOTE — PLAN OF CARE
Problem: Adult Inpatient Plan of Care  Goal: Plan of Care Review  6/25/2025 0155 by Tammy Castle RN  Outcome: Progressing  6/25/2025 0155 by Tammy Castle RN  Outcome: Progressing  6/25/2025 0138 by Tammy Castle RN  Outcome: Progressing  6/25/2025 0137 by Tammy Castle RN  Outcome: Progressing  Goal: Patient-Specific Goal (Individualized)  6/25/2025 0155 by Tammy Castle RN  Outcome: Progressing  6/25/2025 0155 by Tammy Castle RN  Outcome: Progressing  6/25/2025 0138 by Tammy Castle RN  Outcome: Progressing  6/25/2025 0137 by Tammy Castle RN  Outcome: Progressing  Goal: Absence of Hospital-Acquired Illness or Injury  6/25/2025 0155 by Tammy Castle RN  Outcome: Progressing  6/25/2025 0155 by Tammy Castle RN  Outcome: Progressing  6/25/2025 0138 by Tammy Castle RN  Outcome: Progressing  6/25/2025 0137 by Tammy Castle RN  Outcome: Progressing  Intervention: Identify and Manage Fall Risk  Intervention: Prevent Skin Injury  Goal: Optimal Comfort and Wellbeing  6/25/2025 0155 by Tammy Castle RN  Outcome: Progressing  6/25/2025 0155 by Tammy Castle RN  Outcome: Progressing  6/25/2025 0138 by Tammy Castle RN  Outcome: Progressing  6/25/2025 0137 by Tammy Castle RN  Outcome: Progressing  Intervention: Provide Person-Centered Care  Goal: Readiness for Transition of Care  6/25/2025 0155 by Tammy Castle RN  Outcome: Progressing  6/25/2025 0155 by Tammy Castle RN  Outcome: Progressing  6/25/2025 0138 by Tammy Castle RN  Outcome: Progressing  6/25/2025 0137 by Tammy Castle RN  Outcome: Progressing  Intervention: Mutually Develop Transition Plan   Goal Outcome Evaluation:  Plan of Care Reviewed With: patient        Progress: no change  Outcome Evaluation: unsteady on feet . had headache and shortness of breath on admisssion.myoview in am

## 2025-06-25 NOTE — PLAN OF CARE
Problem: Adult Inpatient Plan of Care  Goal: Plan of Care Review  6/25/2025 0138 by Tammy Castle RN  Outcome: Progressing  6/25/2025 0137 by Tammy Castle RN  Outcome: Progressing  Goal: Patient-Specific Goal (Individualized)  6/25/2025 0138 by Tammy Castle RN  Outcome: Progressing  6/25/2025 0137 by Tammy Castle RN  Outcome: Progressing  Goal: Absence of Hospital-Acquired Illness or Injury  6/25/2025 0138 by Tammy Castle RN  Outcome: Progressing  6/25/2025 0137 by Tammy Castle RN  Outcome: Progressing  Intervention: Identify and Manage Fall Risk  Intervention: Prevent Skin Injury  Goal: Optimal Comfort and Wellbeing  6/25/2025 0138 by Tammy Castle RN  Outcome: Progressing  6/25/2025 0137 by Tammy Castle RN  Outcome: Progressing  Intervention: Provide Person-Centered Care  Goal: Readiness for Transition of Care  6/25/2025 0138 by Tammy Castle RN  Outcome: Progressing  6/25/2025 0137 by Tammy Castle RN  Outcome: Progressing  Intervention: Mutually Develop Transition Plan   Goal Outcome Evaluation:  Plan of Care Reviewed With: patient        Progress: no change  Outcome Evaluation: unsteady on feet . had headache and shortness of breath on admisssion.myoview in am

## 2025-06-25 NOTE — NURSING NOTE
Patient stated she wants to go home and does not want to wait on stress test results. She has a dog at home and can no longer wait on results. Oc Boateng PA-C was updated. Patient requested this nurse to remove IV . IV was removed. AMA papers signed by patient. She verbalized understanding of the risks associated with leaving against medical advice. Charge nurse was updated.

## 2025-06-25 NOTE — NURSING NOTE
Pt statd she fell at home, unsure what caused it. Feels unsteady. Advised her to call out before going to bedside commode. She required 1 standing and steadying her. Urine sent for ua and culture. Assisted back to bed. Bed alarm set.

## 2025-06-25 NOTE — CASE MANAGEMENT/SOCIAL WORK
Discharge Planning Assessment   Dereck     Patient Name: Noa Soni  MRN: [id number]  Today's Date: 6/25/2025    Admit Date: 6/24/2025    Plan: Routine home with deidra. Patoent to provide own DC transport.   Discharge Needs Assessment       Row Name 06/25/25 1357       Living Environment    People in Home significant other    Name(s) of People in Home deidra Melchor    Current Living Arrangements home    Potentially Unsafe Housing Conditions none    In the past 12 months has the electric, gas, oil, or water company threatened to shut off services in your home? No    Primary Care Provided by self    Provides Primary Care For no one    Family Caregiver if Needed significant other    Family Caregiver Names Ruiz    Quality of Family Relationships helpful;involved;supportive    Able to Return to Prior Arrangements yes       Resource/Environmental Concerns    Resource/Environmental Concerns none    Transportation Concerns none       Transportation Needs    In the past 12 months, has lack of transportation kept you from medical appointments or from getting medications? no    In the past 12 months, has lack of transportation kept you from meetings, work, or from getting things needed for daily living? No       Food Insecurity    Within the past 12 months, you worried that your food would run out before you got the money to buy more. Never true    Within the past 12 months, the food you bought just didn't last and you didn't have money to get more. Never true       Transition Planning    Patient/Family Anticipates Transition to home with family    Patient/Family Anticipated Services at Transition none    Transportation Anticipated car, drives self;family or friend will provide       Discharge Needs Assessment    Readmission Within the Last 30 Days no previous admission in last 30 days    Equipment Currently Used at Home none    Concerns to be Addressed denies needs/concerns at this time    Anticipated Changes Related to  Illness none    Equipment Needed After Discharge glucometer  Diabetes Educator consult placed                   Discharge Plan       Row Name 06/25/25 1354       Plan    Plan Routine home with fiance. Patoent to provide own DC transport.    Plan Comments CM met with patient at the bedside. Confirmed PCP, insurance, and pharmacy. Enrolled in [facility]. Patient denies any difficulty affording medications. Patient is not current with any HHC/OPPT/OT services. Patient lives at home with her fiance, is independent with ADLS/IADLS, and drives. DC Barriers: pending myoview results and diabetes educator consult for new glucometer.                    Expected Discharge Date and Time       Expected Discharge Date Expected Discharge Time    Jun 25, 2025            Demographic Summary       Row Name 06/25/25 135       General Information    Admission Type observation    Arrived From emergency department    Referral Source admission list    Reason for Consult discharge planning    Preferred Language English       Contact Information    Permission Granted to Share Info With     Contact Information Obtained for                    Functional Status       Row Name 06/25/25 1355       Functional Status    Usual Activity Tolerance good    Current Activity Tolerance good       Functional Status, IADL    Medications independent    Meal Preparation independent    Housekeeping independent    Laundry independent    Shopping independent    If for any reason you need help with day-to-day activities such as bathing, preparing meals, shopping, managing finances, etc., do you get the help you need? I get all the help I need           Mook Campbell RN      Cell number [phone]  Office number [phone]

## 2025-06-25 NOTE — PLAN OF CARE
Problem: Adult Inpatient Plan of Care  Goal: Plan of Care Review  6/25/2025 1004 by Omayra Espinosa RN  Outcome: Progressing  Flowsheets (Taken 6/25/2025 1004)  Progress: improving  Plan of Care Reviewed With: patient  6/25/2025 1003 by Omayra Espinosa RN  Outcome: Progressing  Flowsheets (Taken 6/25/2025 1003)  Progress: improving  Plan of Care Reviewed With: patient  Goal: Patient-Specific Goal (Individualized)  6/25/2025 1004 by Omayra Espinosa RN  Outcome: Progressing  6/25/2025 1003 by Omayra Espinosa RN  Outcome: Progressing  Goal: Absence of Hospital-Acquired Illness or Injury  6/25/2025 1004 by Omayra Espinosa RN  Outcome: Progressing  6/25/2025 1003 by Omayra Espinosa RN  Outcome: Progressing  Goal: Optimal Comfort and Wellbeing  6/25/2025 1004 by Omayra Espinosa RN  Outcome: Progressing  6/25/2025 1003 by Omayra Espinosa RN  Outcome: Progressing  Goal: Readiness for Transition of Care  6/25/2025 1004 by Omayra Espinosa RN  Outcome: Progressing  6/25/2025 1003 by Omayra Espinosa RN  Outcome: Progressing  Goal: Plan of Care Review  Outcome: Progressing  Flowsheets  Taken 6/25/2025 1004  Progress: improving  Plan of Care Reviewed With: patient  Taken 6/25/2025 1003  Progress: improving  Plan of Care Reviewed With: patient  Goal: Patient-Specific Goal (Individualized)  Outcome: Progressing  Goal: Absence of Hospital-Acquired Illness or Injury  Outcome: Progressing  Goal: Optimal Comfort and Wellbeing  Outcome: Progressing  Goal: Readiness for Transition of Care  Outcome: Progressing   Goal Outcome Evaluation:  Plan of Care Reviewed With: patient        Progress: improving

## 2025-06-25 NOTE — PLAN OF CARE
Problem: Adult Inpatient Plan of Care  Goal: Plan of Care Review  6/25/2025 1005 by Omayra Espinosa RN  Outcome: Progressing  Flowsheets (Taken 6/25/2025 1005)  Progress: improving  Plan of Care Reviewed With: patient  6/25/2025 1005 by Omayra Espinosa RN  Outcome: Progressing  Flowsheets  Taken 6/25/2025 1005  Progress: improving  Plan of Care Reviewed With: patient  Taken 6/25/2025 0700  Progress: improving  Plan of Care Reviewed With: patient  6/25/2025 1004 by Omayra Espinosa RN  Outcome: Progressing  Flowsheets (Taken 6/25/2025 1004)  Progress: improving  Plan of Care Reviewed With: patient  6/25/2025 1003 by Omayra Espinosa RN  Outcome: Progressing  Flowsheets  Taken 6/25/2025 1003  Progress: improving  Plan of Care Reviewed With: patient  Taken 6/25/2025 0700  Progress: improving  Plan of Care Reviewed With: patient  Goal: Patient-Specific Goal (Individualized)  6/25/2025 1005 by Omayra Espinosa RN  Outcome: Progressing  6/25/2025 1005 by Omayra Espinosa RN  Outcome: Progressing  6/25/2025 1004 by Omayra Espinosa RN  Outcome: Progressing  6/25/2025 1003 by Omayra Espinosa RN  Outcome: Progressing  Goal: Absence of Hospital-Acquired Illness or Injury  6/25/2025 1005 by Omayra Espinosa RN  Outcome: Progressing  6/25/2025 1005 by Omayra Espinosa RN  Outcome: Progressing  6/25/2025 1004 by Omayra Espinosa RN  Outcome: Progressing  6/25/2025 1003 by Omayra Espinosa RN  Outcome: Progressing  Goal: Optimal Comfort and Wellbeing  6/25/2025 1005 by Omayra Espinosa RN  Outcome: Progressing  6/25/2025 1005 by Omayra Espinosa RN  Outcome: Progressing  6/25/2025 1004 by Omayra Espinosa RN  Outcome: Progressing  6/25/2025 1003 by Omayra Espinosa RN  Outcome: Progressing  Goal: Readiness for Transition of Care  6/25/2025 1005 by Omayra Espinosa, RN  Outcome: Progressing  6/25/2025 1005 by Omayra Espinosa, RN  Outcome: Progressing  6/25/2025 1004 by Omayra Espinosa, RN  Outcome:  Progressing  6/25/2025 1003 by Omayra Espinosa RN  Outcome: Progressing  Goal: Plan of Care Review  6/25/2025 1005 by Omayra Espinosa RN  Outcome: Progressing  6/25/2025 1005 by Omayra Espinosa RN  Outcome: Progressing  Flowsheets (Taken 6/25/2025 1005)  Progress: improving  Plan of Care Reviewed With: patient  6/25/2025 1004 by Omayra Espinosa RN  Outcome: Progressing  Flowsheets  Taken 6/25/2025 1004  Progress: improving  Plan of Care Reviewed With: patient  Taken 6/25/2025 1003  Progress: improving  Plan of Care Reviewed With: patient  Taken 6/25/2025 0700  Progress: improving  Plan of Care Reviewed With: patient  Goal: Patient-Specific Goal (Individualized)  6/25/2025 1005 by Omayra Espinosa RN  Outcome: Progressing  6/25/2025 1005 by Omayra Espinosa RN  Outcome: Progressing  6/25/2025 1004 by Omayra Espinosa RN  Outcome: Progressing  Goal: Absence of Hospital-Acquired Illness or Injury  6/25/2025 1005 by Omayra Espinosa RN  Outcome: Progressing  6/25/2025 1005 by Omayra Espinosa RN  Outcome: Progressing  6/25/2025 1004 by Omayra Espinosa RN  Outcome: Progressing  Goal: Optimal Comfort and Wellbeing  6/25/2025 1005 by Omayra Espinosa RN  Outcome: Progressing  6/25/2025 1005 by Omayra Espinosa RN  Outcome: Progressing  6/25/2025 1004 by Omayra Espinosa RN  Outcome: Progressing  Goal: Readiness for Transition of Care  6/25/2025 1005 by Omayra Espinosa RN  Outcome: Progressing  6/25/2025 1005 by Omayra Espinosa RN  Outcome: Progressing  6/25/2025 1004 by Omayra Espinosa RN  Outcome: Progressing     Problem: Chest Pain  Goal: Resolution of Chest Pain Symptoms  6/25/2025 1005 by Omayra Espinosa RN  Outcome: Progressing  6/25/2025 1005 by Omayra Espinosa RN  Outcome: Progressing     Problem: Electrolyte Imbalance  Goal: Electrolyte Balance  Outcome: Progressing   Goal Outcome Evaluation:  Plan of Care Reviewed With: patient        Progress: improving

## 2025-06-25 NOTE — THERAPY EVALUATION
Patient Name: Noa Soni  : 1980    MRN: [id number]                              Today's Date: 2025       Admit Date: 2025    Visit Dx:     ICD-10-CM ICD-9-CM   1. Fall, initial encounter  W19.XXXA E888.9   2. Acute nonintractable headache, unspecified headache type  R51.9 784.0   3. Chest pain, unspecified type  R07.9 786.50   4. Dyspnea, unspecified type  R06.00 786.09   5. TURNER (acute kidney injury)  N17.9 584.9   6. Right hip pain  M25.551 719.45   7. Acute right ankle pain  M25.571 719.47     338.19     Patient Active Problem List   Diagnosis    Type 2 diabetes mellitus    Essential hypertension    Learning disability    Lymphadenopathy    Class 3 severe obesity due to excess calories without serious comorbidity with body mass index (BMI) of 45.0 to 49.9 in adult    Callus of foot    Perennial allergic rhinitis    NESHA (obstructive sleep apnea)    History of endometriosis    History of PCOS    Severe recurrent major depression without psychotic features    Chronic posttraumatic stress disorder    Generalized anxiety disorder    Post-traumatic osteoarthritis of both knees    Nasal congestion    Morbid obesity    Chronic pain of both knees    Chronic bilateral low back pain with bilateral sciatica    Chronic right shoulder pain    Rotator cuff arthropathy of right shoulder    Lumbar radiculopathy    History of robot-assisted laparoscopic hysterectomy    Symptomatic cholelithiasis    Cholelithiasis    Muscle spasm of both lower legs    Vitamin D deficiency    TURNER (acute kidney injury)     Past Medical History:   Diagnosis Date    Allergic     Anger     Anxiety     Bipolar 1 disorder     Bipolar disorder 23    Cholelithiasis 1/3/2024    I had alot of stones in my gallbladder    CTS (carpal tunnel syndrome)     Depression     Diabetes mellitus     Headache     Headache, tension-type     HTN (hypertension)     Irritable bowel syndrome     Knee pain, bilateral     Knee swelling     Low back  pain     Migraine     Neuropathy in diabetes 12/9/23    Obesity     PTSD (post-traumatic stress disorder)     Type 2 diabetes mellitus      Past Surgical History:   Procedure Laterality Date    CHOLECYSTECTOMY      CHOLECYSTECTOMY WITH INTRAOPERATIVE CHOLANGIOGRAM N/A 01/22/2024    Procedure: CHOLECYSTECTOMY LAPAROSCOPIC INTRAOPERATIVE CHOLANGIOGRAM;  Surgeon: Dustin Shelley MD;  Location: [facility] MAIN OR;  Service: General;  Laterality: N/A;    EAR TUBES      ENDOMETRIAL ABLATION      INTRAUTERINE DEVICE INSERTION  10/14/2020    LAPAROSCOPIC TUBAL LIGATION      SUBTOTAL HYSTERECTOMY      TONSILLECTOMY AND ADENOIDECTOMY      TOTAL LAPAROSCOPIC HYSTERECTOMY WITH DAVINCI ROBOT  06/02/2023    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 Does not work.  -OD     Existing Precautions/Restrictions no known precautions/restrictions  -OD     Barriers to Rehab none identified  -OD       Row Name 06/25/25 1103          Living Environment    Current Living Arrangements home  -OD     People in Home significant other  -OD       Row Name 06/25/25 1103          Home Main Entrance    Number of Stairs, Main Entrance none;other (see comments)  ramp entry  -OD       Row Name 06/25/25 1103          Stairs Within Home, Primary    Number of Stairs, Within Home, Primary none  -OD       Row Name 06/25/25 1103          Cognition    Orientation Status (Cognition) oriented x 4  -OD       Row Name 06/25/25 1103          Safety Issues/Impairments Affecting Functional Mobility    Impairments Affecting Function (Mobility) balance  -OD               User Key  (r) =  Recorded By, (t) = Taken By, (c) = Cosigned By      Initials Name Provider Type    OD Krista Allen PT Physical Therapist                   Mobility       Row Name 06/25/25 1105          Bed Mobility    Bed Mobility bed mobility (all) activities  -OD     All Activities, [place] (Bed Mobility) independent  -OD       Row Name 06/25/25 1105          Bed-Chair Transfer    Bed-Chair [place] (Transfers) standby assist  -OD       Row Name 06/25/25 1105          Sit-Stand Transfer    Sit-Stand [place] (Transfers) independent  -OD       Row Name 06/25/25 1105          Gait/Stairs (Locomotion)    [place] Level (Gait) standby assist  -OD     Patient was able to Ambulate yes  -OD     Distance in Feet (Gait) 200  -OD               User Key  (r) = Recorded By, (t) = Taken By, (c) = Cosigned By      Initials Name Provider Type    OD Krista Allen PT Physical Therapist                   Obj/Interventions       Row Name 06/25/25 1106          Range of Motion Comprehensive    General Range of Motion no range of motion deficits identified  -OD       Row Name 06/25/25 1106          Strength Comprehensive (MMT)    General Manual Muscle Testing (MMT) Assessment no strength deficits identified  -OD       Row Name 06/25/25 1106          Motor Skills    Motor Skills functional endurance  -OD     Functional Endurance fair; observed increased wob after transferring to [facility] and walking  -OD       Row Name 06/25/25 1106          Balance    Balance Interventions sitting;standing;minimal challenge  -OD       Row Name 06/25/25 1106          Sensory Assessment (Somatosensory)    Sensory Assessment (Somatosensory) sensation intact  -OD               User Key  (r) = Recorded By, (t) = Taken By, (c) = Cosigned By      Initials Name Provider Type    Krista Brown PT Physical Therapist                   Goals/Plan    No documentation.                  Clinical Impression       Row Name 06/25/25 1108          Pain    Pain  Location hip  -OD     Pain Side/Orientation right  -OD     Additional Documentation Pain Scale: FACES Pre/Post-Treatment (Group)  -OD       Row Name 06/25/25 1108          Pain Scale: FACES Pre/Post-Treatment    Pain: FACES Scale, Pretreatment 0-->no hurt  -OD     Posttreatment Pain Rating 2-->hurts little bit  -OD       Row Name 06/25/25 1108          Plan of Care Review    Plan of Care Reviewed With patient  -OD     Progress improving  -OD     Outcome Evaluation Noa Soni is a 43 y/o F who presented to [facility] on 6/24/25 for CP and dyspnea after having a mechanical fall and hitting her head. CT head (-), X-ray RLE (-). Awaiting results from stress test. At baseline, pt lives with [facility]ab[facility] in [facility] with ramp entry. Pt is IND with ADLs and mobility, and provides primary care for fiance. This date, pt is AAOx4 and demonstrates baseline mobility. Pt has some pain on her lateral hip/thigh upon standing up, but otherwise denies pain with transfers or ambulation. Pt denies concerns for mobility. Pt safe for d/c home when medically appropriate. Will complete orders.  -OD       Row Name 06/25/25 1108          Therapy Assessment/Plan (PT)    Criteria for Skilled Interventions Met (PT) no;does not meet criteria for skilled intervention  -OD     Therapy Frequency (PT) evaluation only  -OD       Row Name 06/25/25 1108          Vital Signs    Intratreatment Heart Rate (beats/min) 101  -OD     Posttreatment Heart Rate (beats/min) 96  -OD     Intra SpO2 (%) 96  -OD     Pre Patient Position Supine  -OD     Intra Patient Position Standing  -OD     Post Patient Position Sitting  -OD       Row Name 06/25/25 1108          Positioning and Restraints    Pre-Treatment Position in bed  -OD     Post Treatment Position chair  -OD     In Chair notified nsg;reclined;call light within reach;encouraged to call for assist  -OD               User Key  (r) = Recorded By, (t) = Taken By, (c) = Cosigned By      Initials Name Provider Type    OD  Krista Allen, PT Physical Therapist                   Outcome Measures       Row Name 06/25/25 1115 06/25/25 0102       How much help from another person do you currently need...    Turning from your back to your side while in flat bed without using bedrails? 4  -OD 3  -KS    Moving from lying on back to sitting on the side of a flat bed without bedrails? 4  -OD 3  -KS    Moving to and from a bed to a chair (including a wheelchair)? 4  -OD 3  -KS    Standing up from a chair using your arms (e.g., wheelchair, bedside chair)? 4  -OD 2  -KS    Climbing 3-5 steps with a railing? 4  -OD 2  -KS    To walk in hospital room? 4  -OD 3  -KS    AM-PAC 6 Clicks Score (PT) 24  -OD 16  -KS    Highest Level of Mobility Goal Walk 250 Feet or More - 8  -OD Stand (1 or More Minutes)-5  -KS      Row Name 06/25/25 0057          How much help from another person do you currently need...    Turning from your back to your side while in flat bed without using bedrails? 3  -KS     Moving from lying on back to sitting on the side of a flat bed without bedrails? 3  -KS     Moving to and from a bed to a chair (including a wheelchair)? 3  -KS     Standing up from a chair using your arms (e.g., wheelchair, bedside chair)? 3  -KS     Climbing 3-5 steps with a railing? 3  -KS     To walk in hospital room? 3  -KS     AM-PAC 6 Clicks Score (PT) 18  -KS     Highest Level of Mobility Goal Walk 10 Steps or More-6  -KS       Row Name 06/25/25 1115          Functional Assessment    Outcome Measure Options AM-PAC 6 Clicks Basic Mobility (PT)  -OD               User Key  (r) = Recorded By, (t) = Taken By, (c) = Cosigned By      Initials Name Provider Type    Tammy Mo, RN Registered Nurse    Krista Brown, PT Physical Therapist                                 Physical Therapy Education       Title: PT OT SLP Therapies (Done)       Topic: Physical Therapy (Done)       Point: Mobility training (Done)       Learning Progress Summary             Patient Acceptance, E, VU by OD at 6/25/2025 1116                      Point: Home exercise program (Done)       Learning Progress Summary            Patient Acceptance, E, VU by OD at 6/25/2025 1116                      Point: Body mechanics (Done)       Learning Progress Summary            Patient Acceptance, E, VU by OD at 6/25/2025 1116                      Point: Precautions (Done)       Learning Progress Summary            Patient Acceptance, E, VU by OD at 6/25/2025 1116                                      User Key       Initials Effective Dates Name Provider Type Discipline    OD 05/11/23 -  Krista Allen, ROXANA Physical Therapist PT                  PT Recommendation and Plan     Progress: improving  Outcome Evaluation: Noa Soni is a 45 y/o F who presented to [facility] on 6/24/25 for CP and dyspnea after having a mechanical fall and hitting her head. CT head (-), X-ray RLE (-). Awaiting results from stress test. At baseline, pt lives with fiance in [facility] with ramp entry. Pt is IND with ADLs and mobility, and provides primary care for fiance. This date, pt is AAOx4 and demonstrates baseline mobility. Pt has some pain on her lateral hip/thigh upon standing up, but otherwise denies pain with transfers or ambulation. Pt denies concerns for mobility. Pt safe for d/c home when medically appropriate. Will complete orders.     Time Calculation:         PT Charges       Row Name 06/25/25 1116             Time Calculation    Start Time 1015  -OD      Stop Time 1031  -OD      Time Calculation (min) 16 min  -OD      PT Received On 06/25/25  -OD         Time Calculation- PT    Total Timed Code Minutes- PT 0 minute(s)  -OD                User Key  (r) = Recorded By, (t) = Taken By, (c) = Cosigned By      Initials Name Provider Type    OD Krista Allen, PT Physical Therapist                  Therapy Charges for Today       Code Description Service Date Service Provider Modifiers Qty    [id number]  PT EVAL LOW  COMPLEXITY 3 6/25/2025 Krista Allen, PT GP 1            PT G-Codes  Outcome Measure Options: AM-PAC 6 Clicks Basic Mobility (PT)  AM-PAC 6 Clicks Score (PT): 24  PT Discharge Summary  Anticipated Discharge Disposition (PT): home    Krista Allen, ROXANA  6/25/2025

## 2025-06-25 NOTE — NURSING NOTE
Received report from prior shift nurse Tammy GLASS that patient's Left AC IV is no longer patent and upon assessment observed edema to entire left arm and IV cath was removed at 0700. Fredi ASHTON was updated of left arm swelling and also assessed. Patient denies pain, no redness or warmth noted to left arm.

## 2025-06-26 LAB — BACTERIA SPEC AEROBE CULT: NORMAL

## 2025-06-26 RX ORDER — FLUTICASONE PROPIONATE 50 MCG
2 SPRAY, SUSPENSION (ML) NASAL DAILY
Qty: 16 G | Refills: 2 | Status: SHIPPED | OUTPATIENT
Start: 2025-06-26

## 2025-06-26 RX ORDER — CETIRIZINE HYDROCHLORIDE 10 MG/1
10 TABLET ORAL DAILY
Qty: 30 TABLET | Refills: 2 | Status: SHIPPED | OUTPATIENT
Start: 2025-06-26

## 2025-06-26 NOTE — CASE MANAGEMENT/SOCIAL WORK
Case Management Discharge Note      Final Note: AMA        Transportation Services  Transportation: Private Transportation  Private: Car    Final Discharge Disposition Code: 07 - left AMA

## 2025-06-30 ENCOUNTER — OFFICE VISIT (OUTPATIENT)
Dept: FAMILY MEDICINE CLINIC | Facility: CLINIC | Age: 45
End: 2025-06-30
Payer: COMMERCIAL

## 2025-06-30 VITALS
OXYGEN SATURATION: 90 % | WEIGHT: 293 LBS | DIASTOLIC BLOOD PRESSURE: 81 MMHG | TEMPERATURE: 97.3 F | HEIGHT: 64 IN | HEART RATE: 95 BPM | BODY MASS INDEX: 50.02 KG/M2 | SYSTOLIC BLOOD PRESSURE: 143 MMHG | RESPIRATION RATE: 18 BRPM

## 2025-06-30 DIAGNOSIS — E66.813 CLASS 3 SEVERE OBESITY DUE TO EXCESS CALORIES WITHOUT SERIOUS COMORBIDITY WITH BODY MASS INDEX (BMI) OF 45.0 TO 49.9 IN ADULT: ICD-10-CM

## 2025-06-30 DIAGNOSIS — F33.2 SEVERE RECURRENT MAJOR DEPRESSION WITHOUT PSYCHOTIC FEATURES: ICD-10-CM

## 2025-06-30 DIAGNOSIS — Z12.31 ENCOUNTER FOR SCREENING MAMMOGRAM FOR MALIGNANT NEOPLASM OF BREAST: ICD-10-CM

## 2025-06-30 DIAGNOSIS — E11.65 TYPE 2 DIABETES MELLITUS WITH HYPERGLYCEMIA, WITHOUT LONG-TERM CURRENT USE OF INSULIN: Primary | ICD-10-CM

## 2025-06-30 DIAGNOSIS — G47.33 OSA (OBSTRUCTIVE SLEEP APNEA): ICD-10-CM

## 2025-06-30 DIAGNOSIS — N17.9 AKI (ACUTE KIDNEY INJURY): ICD-10-CM

## 2025-06-30 DIAGNOSIS — R07.2 PRECORDIAL PAIN: ICD-10-CM

## 2025-06-30 DIAGNOSIS — E78.2 MIXED HYPERLIPIDEMIA: ICD-10-CM

## 2025-06-30 DIAGNOSIS — F39 MOOD DISORDER: ICD-10-CM

## 2025-06-30 PROCEDURE — T1015 CLINIC SERVICE: HCPCS | Performed by: FAMILY MEDICINE

## 2025-06-30 PROCEDURE — 3046F HEMOGLOBIN A1C LEVEL >9.0%: CPT | Performed by: FAMILY MEDICINE

## 2025-06-30 PROCEDURE — 1159F MED LIST DOCD IN RCRD: CPT | Performed by: FAMILY MEDICINE

## 2025-06-30 PROCEDURE — 3079F DIAST BP 80-89 MM HG: CPT | Performed by: FAMILY MEDICINE

## 2025-06-30 PROCEDURE — 1160F RVW MEDS BY RX/DR IN RCRD: CPT | Performed by: FAMILY MEDICINE

## 2025-06-30 PROCEDURE — 99214 OFFICE O/P EST MOD 30 MIN: CPT | Performed by: FAMILY MEDICINE

## 2025-06-30 PROCEDURE — 3077F SYST BP >= 140 MM HG: CPT | Performed by: FAMILY MEDICINE

## 2025-06-30 PROCEDURE — 1125F AMNT PAIN NOTED PAIN PRSNT: CPT | Performed by: FAMILY MEDICINE

## 2025-06-30 RX ORDER — ORAL SEMAGLUTIDE 3 MG/1
3 TABLET ORAL DAILY
Qty: 30 TABLET | Refills: 0 | Status: SHIPPED | OUTPATIENT
Start: 2025-06-30

## 2025-06-30 RX ORDER — ROSUVASTATIN CALCIUM 10 MG/1
10 TABLET, COATED ORAL NIGHTLY
Qty: 90 TABLET | Refills: 1 | Status: SHIPPED | OUTPATIENT
Start: 2025-06-30

## 2025-06-30 RX ORDER — AVOBENZONE, HOMOSALATE, OCTISALATE, OCTOCRYLENE 30; 40; 45; 26 MG/ML; MG/ML; MG/ML; MG/ML
CREAM TOPICAL
Qty: 100 EACH | Refills: 3 | Status: SHIPPED | OUTPATIENT
Start: 2025-06-30 | End: 2025-07-04 | Stop reason: SDUPTHER

## 2025-06-30 NOTE — PROGRESS NOTES
Subjective   Noa Soni is a 44 y.o. female.   Chief Complaint   Patient presents with    Hospital Follow Up Visit    Fall    Diabetes    Hypertension    Anxiety    Chest Pain       History of Present Illness   44 y.o. female  who presents to the office today to follow-up on chronic medical problems per active problem list that are managed here at this office.  Additional problems may be mentioned and reviewed as below.    Since I last saw her she was hospitalized overnight June 24 through June 25 [facility].  Apparently she was having chest pain and shortness of breath.  Lost her balance and fell and hit her head.  CT of her head was done and it was normal.  She had a stress test that was a low risk study.  Multiple other x-rays done including right ankle which showed no fractures just subcutaneous edema, right hip showed no fracture, chest x-ray consistent with hypoventilation.  She had lab work including a CBC which was okay.  CMP showed low sodium of 130, creatinine of 1.44, glucose of 323, GFR of 46.  All notably abnormal compared to her previous levels.  Her A1c was 10.5.  Lipid panel showed a total cholesterol of 179, triglycerides of 854, HDL of 23 and a direct LDL of 35.  History of Present Illness  The patient presents for evaluation of dehydration, diabetes, anxiety, and elevated cholesterol.    She has been experiencing dehydration due to inadequate fluid intake and has not been making efforts to increase her water consumption. She inquires about the potential benefits of Gatorade Zero for hydration and has been consuming Propel flavored water.    Her blood glucose levels have been unstable. She expresses a preference for oral medication over injections and is currently on Januvia, which she reports as beneficial. She has an allergy to metformin and has not previously taken Jardiance but is considering its use. Her A1c was 10.5 a couple of months ago, down from 11.5.    She has been  experiencing chest discomfort and underwent a stress test but was discharged from the hospital without receiving the results. She reports frequent feelings of anxiety and is currently seeking support at [employer]. She is in the process of securing her own apartment. She is on fluoxetine, Lamictal, and Vraylar for mood stabilization.    She has not yet undergone a mammogram.    She has elevated cholesterol levels.      Patient Active Problem List    Diagnosis Date Noted    TURNER (acute kidney injury) 06/24/2025    Vitamin D deficiency 02/05/2025    Muscle spasm of both lower legs 01/31/2025    Cholelithiasis 01/21/2024    Symptomatic cholelithiasis 01/11/2024    History of robot-assisted laparoscopic hysterectomy 08/21/2023     Note Last Updated: 8/21/2023 6/2/23 - Yevgeniy.  Dr. Meraz.  Ovaries left!      Lumbar radiculopathy 07/12/2023    Rotator cuff arthropathy of right shoulder 04/03/2023    Chronic pain of both knees 05/24/2022    Chronic bilateral low back pain with bilateral sciatica 05/24/2022    Chronic right shoulder pain 05/24/2022    Morbid obesity 04/27/2022    Nasal congestion 11/20/2021    Post-traumatic osteoarthritis of both knees 11/10/2021    Severe recurrent major depression without psychotic features 06/17/2021    Chronic posttraumatic stress disorder 06/17/2021    Generalized anxiety disorder 06/17/2021    Callus of foot 10/19/2020    Perennial allergic rhinitis 10/19/2020    NESHA (obstructive sleep apnea) 10/19/2020    Class 3 severe obesity due to excess calories without serious comorbidity with body mass index (BMI) of 45.0 to 49.9 in adult 10/12/2020    Lymphadenopathy 10/06/2020    Essential hypertension 07/27/2020    Learning disability 07/27/2020     Note Last Updated: 7/27/2020     No other details      Type 2 diabetes mellitus 01/17/2020    History of endometriosis 01/17/2020    History of PCOS 01/17/2020           Past Surgical History:   Procedure Laterality Date     CHOLECYSTECTOMY      CHOLECYSTECTOMY WITH INTRAOPERATIVE CHOLANGIOGRAM N/A 01/22/2024    Procedure: CHOLECYSTECTOMY LAPAROSCOPIC INTRAOPERATIVE CHOLANGIOGRAM;  Surgeon: Dustin Shelley MD;  Location: [facility] MAIN OR;  Service: General;  Laterality: N/A;    EAR TUBES      ENDOMETRIAL ABLATION      INTRAUTERINE DEVICE INSERTION  10/14/2020    LAPAROSCOPIC TUBAL LIGATION      SUBTOTAL HYSTERECTOMY      TONSILLECTOMY AND ADENOIDECTOMY      TOTAL LAPAROSCOPIC HYSTERECTOMY WITH DAVINCI ROBOT  06/02/2023    [facility]    TUBAL ABDOMINAL LIGATION       Current Outpatient Medications on File Prior to Visit   Medication Sig    Cariprazine HCl (Vraylar) 3 MG capsule capsule Take 1 capsule by mouth Daily.    cetirizine (zyrTEC) 10 MG tablet TAKE 1 TABLET BY MOUTH EVERY DAY    FLUoxetine (PROzac) 40 MG capsule TAKE 1 CAPSULE BY MOUTH EVERY DAY    fluticasone (FLONASE) 50 MCG/ACT nasal spray ADMINISTER 2 SPRAYS INTO THE NOSTRIL(S) AS DIRECTED BY PROVIDER DAILY.    Januvia 100 MG tablet TAKE 1 TABLET BY MOUTH EVERY DAY    lamoTRIgine (LaMICtal) 100 MG tablet Take 1 tablet by mouth 2 (Two) Times a Day.    linaclotide (Linzess) 145 MCG capsule capsule Take 1 capsule by mouth Every Morning Before Breakfast.    lisinopril (PRINIVIL,ZESTRIL) 10 MG tablet TAKE 1 TABLET BY MOUTH EVERY DAY    Melatonin 5 MG chewable tablet Chew 5 mg every night at bedtime.    meloxicam (MOBIC) 15 MG tablet Take 1 tablet by mouth Daily.    ondansetron (ZOFRAN) 4 MG tablet Take 1 tablet by mouth Every 8 (Eight) Hours As Needed for Nausea or Vomiting.    pregabalin (LYRICA) 150 MG capsule Take 1 capsule by mouth 2 (Two) Times a Day.     No current facility-administered medications on file prior to visit.     Allergies   Allergen Reactions    Dilaudid [Hydromorphone] Other (See Comments)     Bottoms O2 out    Morphine Other (See Comments)     Suicical ideations    Tylenol With Codeine #3 [Acetaminophen-Codeine] Hallucinations    Abilify [Aripiprazole]  "Rash    Metformin Diarrhea    Vicodin [Hydrocodone-Acetaminophen] GI Intolerance     Social History     Socioeconomic History    Marital status: Single    Number of children: 0   Tobacco Use    Smoking status: Former     Current packs/day: 0.00     Average packs/day: 1 pack/day for 1 year (1.0 ttl pk-yrs)     Types: Cigarettes     Start date: 2017     Quit date: 2018     Years since quittin.4     Passive exposure: Past    Smokeless tobacco: Never    Tobacco comments:     socially    Vaping Use    Vaping status: Never Used   Substance and Sexual Activity    Alcohol use: Never    Drug use: Never    Sexual activity: Not Currently     Partners: Male     Birth control/protection: Other, Hysterectomy     Family History   Problem Relation Age of Onset    Hypertension Mother     Depression Mother     Post-traumatic stress disorder Mother     Hyperlipidemia Mother     Anxiety disorder Mother     Mental illness Mother     Hypertension Father     Hyperlipidemia Father     Hypertension Brother     Depression Brother     Parkinsonism Maternal Grandmother     Dementia Maternal Grandmother     Atrial fibrillation Maternal Grandfather     Breast cancer Paternal Grandmother     Diabetes Paternal Grandmother     COPD Paternal Grandfather     Diabetes Paternal Grandfather        Review of Systems    Objective   /81 (BP Location: Left arm, Patient Position: Sitting, Cuff Size: Large Adult)   Pulse 95   Temp 97.3 °F (36.3 °C) (Infrared)   Resp 18   Ht 162.6 cm (64\")   Wt (!) 136 kg (300 lb 3.2 oz)   LMP 2023 (Exact Date)   SpO2 90%   Breastfeeding No   BMI 51.53 kg/m²   Physical Exam  Constitutional:       Appearance: She is well-developed.      Comments:      HENT:      Head: Normocephalic and atraumatic.   Eyes:      Conjunctiva/sclera: Conjunctivae normal.   Cardiovascular:      Rate and Rhythm: Normal rate.   Pulmonary:      Effort: Pulmonary effort is normal.   Musculoskeletal:         General: " Normal range of motion.      Cervical back: Normal range of motion.   Skin:     General: Skin is warm and dry.      Findings: No rash.   Neurological:      Mental Status: She is alert and oriented to person, place, and time.   Psychiatric:         Behavior: Behavior normal.       Physical Exam  Cardiovascular: Regular rate and rhythm, no murmurs, rubs, or gallops      Admission on 06/24/2025, Discharged on 06/25/2025   Component Date Value Ref Range Status    QT Interval 06/24/2025 332  ms Final    QTC Interval 06/24/2025 429  ms Final    Glucose 06/24/2025 323 (H)  65 - 99 mg/dL Final    BUN 06/24/2025 44.9 (H)  6.0 - 20.0 mg/dL Final    Creatinine 06/24/2025 1.44 (H)  0.57 - 1.00 mg/dL Final    Sodium 06/24/2025 130 (L)  136 - 145 mmol/L Final    Potassium 06/24/2025 4.1  3.5 - 5.2 mmol/L Final    Chloride 06/24/2025 95 (L)  98 - 107 mmol/L Final    CO2 06/24/2025 19.0 (L)  22.0 - 29.0 mmol/L Final    Calcium 06/24/2025 10.1  8.6 - 10.5 mg/dL Final    Total Protein 06/24/2025 7.2  6.0 - 8.5 g/dL Final    Albumin 06/24/2025 3.8  3.5 - 5.2 g/dL Final    ALT (SGPT) 06/24/2025 27  1 - 33 U/L Final    AST (SGOT) 06/24/2025 30  1 - 32 U/L Final    Alkaline Phosphatase 06/24/2025 129 (H)  39 - 117 U/L Final    Total Bilirubin 06/24/2025 0.7  0.0 - 1.2 mg/dL Final    Globulin 06/24/2025 3.4  gm/dL Final    A/G Ratio 06/24/2025 1.1  g/dL Final    BUN/Creatinine Ratio 06/24/2025 31.2 (H)  7.0 - 25.0 Final    Anion Gap 06/24/2025 16.0 (H)  5.0 - 15.0 mmol/L Final    eGFR 06/24/2025 46.7 (L)  >60.0 mL/min/1.73 Final    Color, UA 06/24/2025 Other (A)  Yellow, Straw Final    clear    Appearance, UA 06/24/2025 Clear  Clear Final    pH, UA 06/24/2025 7.5  5.0 - 8.0 Final    Specific Gravity, UA 06/24/2025 <=1.005  1.005 - 1.030 Final    Glucose, UA 06/24/2025 Negative  Negative Final    Ketones, UA 06/24/2025 Negative  Negative Final    Bilirubin, UA 06/24/2025 Negative  Negative Final    Blood, UA 06/24/2025 Negative  Negative  Final    Protein, UA 06/24/2025 Negative  Negative Final    Leuk Esterase, UA 06/24/2025 Small (1+) (A)  Negative Final    Nitrite, UA 06/24/2025 Negative  Negative Final    Urobilinogen, UA 06/24/2025 0.2 E.U./dL  0.2 - 1.0 E.U./dL Final    HS Troponin T 06/24/2025 8  <14 ng/L Final    D-Dimer, Quantitative 06/24/2025 0.47  0.00 - 0.50 MCGFEU/mL Final    proBNP 06/24/2025 228.0  0.0 - 450.0 pg/mL Final    WBC 06/24/2025 10.93 (H)  3.40 - 10.80 10*3/mm3 Final    RBC 06/24/2025 4.00  3.77 - 5.28 10*6/mm3 Final    Hemoglobin 06/24/2025 11.2 (L)  12.0 - 15.9 g/dL Final    Hematocrit 06/24/2025 33.8 (L)  34.0 - 46.6 % Final    MCV 06/24/2025 84.5  79.0 - 97.0 fL Final    MCH 06/24/2025 28.0  26.6 - 33.0 pg Final    MCHC 06/24/2025 33.1  31.5 - 35.7 g/dL Final    RDW 06/24/2025 15.2  12.3 - 15.4 % Final    RDW-SD 06/24/2025 46.5  37.0 - 54.0 fl Final    MPV 06/24/2025 11.2  6.0 - 12.0 fL Final    Platelets 06/24/2025 199  140 - 450 10*3/mm3 Final    Neutrophil % 06/24/2025 72.7  42.7 - 76.0 % Final    Lymphocyte % 06/24/2025 18.4 (L)  19.6 - 45.3 % Final    Monocyte % 06/24/2025 7.0  5.0 - 12.0 % Final    Eosinophil % 06/24/2025 1.0  0.3 - 6.2 % Final    Basophil % 06/24/2025 0.2  0.0 - 1.5 % Final    Immature Grans % 06/24/2025 0.7 (H)  0.0 - 0.5 % Final    Neutrophils, Absolute 06/24/2025 7.95 (H)  1.70 - 7.00 10*3/mm3 Final    Lymphocytes, Absolute 06/24/2025 2.01  0.70 - 3.10 10*3/mm3 Final    Monocytes, Absolute 06/24/2025 0.76  0.10 - 0.90 10*3/mm3 Final    Eosinophils, Absolute 06/24/2025 0.11  0.00 - 0.40 10*3/mm3 Final    Basophils, Absolute 06/24/2025 0.02  0.00 - 0.20 10*3/mm3 Final    Immature Grans, Absolute 06/24/2025 0.08 (H)  0.00 - 0.05 10*3/mm3 Final    nRBC 06/24/2025 0.0  0.0 - 0.2 /100 WBC Final    Extra Tube 06/24/2025 Hold for add-ons.   Final    Auto resulted.    HS Troponin T 06/24/2025 8  <14 ng/L Final    Troponin T Numeric Delta 06/24/2025 0  Abnormal if >/=3 ng/L Final    Glucose 06/25/2025  266 (H)  65 - 99 mg/dL Final    BUN 06/25/2025 38.5 (H)  6.0 - 20.0 mg/dL Final    Creatinine 06/25/2025 1.10 (H)  0.57 - 1.00 mg/dL Final    Sodium 06/25/2025 135 (L)  136 - 145 mmol/L Final    Potassium 06/25/2025 4.3  3.5 - 5.2 mmol/L Final    Chloride 06/25/2025 98  98 - 107 mmol/L Final    CO2 06/25/2025 20.2 (L)  22.0 - 29.0 mmol/L Final    Calcium 06/25/2025 9.6  8.6 - 10.5 mg/dL Final    BUN/Creatinine Ratio 06/25/2025 35.0 (H)  7.0 - 25.0 Final    Anion Gap 06/25/2025 16.8 (H)  5.0 - 15.0 mmol/L Final    eGFR 06/25/2025 64.5  >60.0 mL/min/1.73 Final    WBC 06/25/2025 6.53  3.40 - 10.80 10*3/mm3 Final    RBC 06/25/2025 3.84  3.77 - 5.28 10*6/mm3 Final    Hemoglobin 06/25/2025 10.6 (L)  12.0 - 15.9 g/dL Final    Hematocrit 06/25/2025 32.8 (L)  34.0 - 46.6 % Final    MCV 06/25/2025 85.4  79.0 - 97.0 fL Final    MCH 06/25/2025 27.6  26.6 - 33.0 pg Final    MCHC 06/25/2025 32.3  31.5 - 35.7 g/dL Final    RDW 06/25/2025 15.2  12.3 - 15.4 % Final    RDW-SD 06/25/2025 46.5  37.0 - 54.0 fl Final    MPV 06/25/2025 11.3  6.0 - 12.0 fL Final    Platelets 06/25/2025 171  140 - 450 10*3/mm3 Final    Neutrophil % 06/25/2025 58.6  42.7 - 76.0 % Final    Lymphocyte % 06/25/2025 29.6  19.6 - 45.3 % Final    Monocyte % 06/25/2025 8.9  5.0 - 12.0 % Final    Eosinophil % 06/25/2025 1.7  0.3 - 6.2 % Final    Basophil % 06/25/2025 0.3  0.0 - 1.5 % Final    Immature Grans % 06/25/2025 0.9 (H)  0.0 - 0.5 % Final    Neutrophils, Absolute 06/25/2025 3.83  1.70 - 7.00 10*3/mm3 Final    Lymphocytes, Absolute 06/25/2025 1.93  0.70 - 3.10 10*3/mm3 Final    Monocytes, Absolute 06/25/2025 0.58  0.10 - 0.90 10*3/mm3 Final    Eosinophils, Absolute 06/25/2025 0.11  0.00 - 0.40 10*3/mm3 Final    Basophils, Absolute 06/25/2025 0.02  0.00 - 0.20 10*3/mm3 Final    Immature Grans, Absolute 06/25/2025 0.06 (H)  0.00 - 0.05 10*3/mm3 Final    nRBC 06/25/2025 0.0  0.0 - 0.2 /100 WBC Final    BH CV STRESS PROTOCOL 1 06/25/2025 Pharmacologic   Final     Stage 1 06/25/2025 1.0   Final    HR Stage 1 06/25/2025 103   Final    BP Stage 1 06/25/2025 120/56   Final    Duration Min Stage 1 06/25/2025 0   Final    Duration Sec Stage 1 06/25/2025 10   Final    Stress Dose Regadenoson Stage 1 06/25/2025 0.40   Final    Stress Comments Stage 1 06/25/2025 10 sec bolus injection   Final    Stage 2 06/25/2025 2.0   Final    HR Stage 2 06/25/2025 94   Final    BP Stage 2 06/25/2025 118/57   Final    Duration Min Stage 2 06/25/2025 4   Final    Duration Sec Stage 2 06/25/2025 0   Final    Stress Comments Stage 2 06/25/2025 recovery   Final    Baseline HR 06/25/2025 109  bpm Final    Baseline BP 06/25/2025 134/62  mmHg Final    Peak HR 06/25/2025 103  bpm Final    Peak BP 06/25/2025 120/56  mmHg Final    Recovery HR 06/25/2025 94  bpm Final    Recovery BP 06/25/2025 118/57  mmHg Final    Target HR (85%) 06/25/2025 151  bpm Final    Max. Pred. HR (100%) 06/25/2025 178  bpm Final    Percent Max Pred HR 06/25/2025 57.87  % Final    Percent Target HR 06/25/2025 68  % Final    BH CV REST NUCLEAR ISOTOPE DOSE 06/25/2025 11.0  mCi Final    BH CV STRESS NUCLEAR ISOTOPE DOSE 06/25/2025 33.0  mCi Final    Nuc Stress EF 06/25/2025 90  % Final    Color, UA 06/24/2025 Yellow  Yellow, Straw Final    Appearance, UA 06/24/2025 Clear  Clear Final    pH, UA 06/24/2025 5.5  5.0 - 8.0 Final    Specific Gravity, UA 06/24/2025 1.044 (H)  1.005 - 1.030 Final    Glucose, UA 06/24/2025 >=1000 mg/dL (3+) (A)  Negative Final    Ketones, UA 06/24/2025 Negative  Negative Final    Bilirubin, UA 06/24/2025 Negative  Negative Final    Blood, UA 06/24/2025 Negative  Negative Final    Protein, UA 06/24/2025 Negative  Negative Final    Leuk Esterase, UA 06/24/2025 Negative  Negative Final    Nitrite, UA 06/24/2025 Negative  Negative Final    Urobilinogen, UA 06/24/2025 0.2 E.U./dL  0.2 - 1.0 E.U./dL Final    Color, UA 06/24/2025 Yellow  Yellow, Straw Final    Appearance, UA 06/24/2025 Clear  Clear Final    pH, UA  06/24/2025 5.5  5.0 - 8.0 Final    Specific Gravity, UA 06/24/2025 1.044 (H)  1.005 - 1.030 Final    Glucose, UA 06/24/2025 >=1000 mg/dL (3+) (A)  Negative Final    Ketones, UA 06/24/2025 Negative  Negative Final    Bilirubin, UA 06/24/2025 Negative  Negative Final    Blood, UA 06/24/2025 Negative  Negative Final    Protein, UA 06/24/2025 Negative  Negative Final    Leuk Esterase, UA 06/24/2025 Negative  Negative Final    Nitrite, UA 06/24/2025 Negative  Negative Final    Urobilinogen, UA 06/24/2025 0.2 E.U./dL  0.2 - 1.0 E.U./dL Final    Color, UA 06/25/2025 Yellow  Yellow, Straw Final    Appearance, UA 06/25/2025 Clear  Clear Final    pH, UA 06/25/2025 <=5.0  5.0 - 8.0 Final    Specific Gravity, UA 06/25/2025 1.011  1.005 - 1.030 Final    Glucose, UA 06/25/2025 100 mg/dL (Trace) (A)  Negative Final    Ketones, UA 06/25/2025 Negative  Negative Final    Bilirubin, UA 06/25/2025 Negative  Negative Final    Blood, UA 06/25/2025 Negative  Negative Final    Protein, UA 06/25/2025 Negative  Negative Final    Leuk Esterase, UA 06/25/2025 Moderate (2+) (A)  Negative Final    Nitrite, UA 06/25/2025 Negative  Negative Final    Urobilinogen, UA 06/25/2025 0.2 E.U./dL  0.2 - 1.0 E.U./dL Final    RBC, UA 06/25/2025 0-2  None Seen, 0-2 /HPF Final    WBC, UA 06/25/2025 21-50 (A)  None Seen, 0-2 /HPF Final    Bacteria, UA 06/25/2025 None Seen  None Seen /HPF Final    Squamous Epithelial Cells, UA 06/25/2025 3-6 (A)  None Seen, 0-2 /HPF Final    Hyaline Casts, UA 06/25/2025 3-6  None Seen /LPF Final    Methodology 06/25/2025 Automated Microscopy   Final    Urine Culture 06/25/2025 <25,000 CFU/mL Normal Urogenital Kathleen   Final    Hemoglobin A1C 06/24/2025 10.50 (H)  4.80 - 5.60 % Final    Glucose 06/25/2025 240 (H)  70 - 105 mg/dL Final    Serial Number: [id number]:  [id number]    Glucose 06/25/2025 243 (H)  70 - 105 mg/dL Final    Serial Number: [id number]:  [id number]    Total Cholesterol 06/25/2025 179  0 - 200 mg/dL  Final    Triglycerides 06/25/2025 854 (H)  0 - 150 mg/dL Final    HDL Cholesterol 06/25/2025 23 (L)  40 - 60 mg/dL Final    LDL Cholesterol  06/25/2025    Final    Unable to calculate    VLDL Cholesterol 06/25/2025    Final    Unable to calculate    LDL/HDL Ratio 06/25/2025    Final    Unable to calculate    LDL Cholesterol  06/25/2025 35  0 - 100 mg/dL Final    Glucose 06/25/2025 297 (H)  70 - 105 mg/dL Final    Serial Number: [id number]:  [id number]    Glucose 06/25/2025 231 (H)  70 - 105 mg/dL Final    Serial Number: [id number]:  [id number]   Office Visit on 04/14/2025   Component Date Value Ref Range Status    Hemoglobin A1C 04/14/2025 11.5 (H)  4.8 - 5.6 % Final    Comment:          Prediabetes: 5.7 - 6.4           Diabetes: >6.4           Glycemic control for adults with diabetes: <7.0      Glucose 04/14/2025 361 (H)  70 - 99 mg/dL Final    BUN 04/14/2025 8  6 - 24 mg/dL Final    Creatinine 04/14/2025 0.72  0.57 - 1.00 mg/dL Final    EGFR Result 04/14/2025 107  >59 mL/min/1.73 Final    BUN/Creatinine Ratio 04/14/2025 11  9 - 23 Final    Sodium 04/14/2025 137  134 - 144 mmol/L Final    Potassium 04/14/2025 4.2  3.5 - 5.2 mmol/L Final    Chloride 04/14/2025 100  96 - 106 mmol/L Final    Total CO2 04/14/2025 22  20 - 29 mmol/L Final    Calcium 04/14/2025 9.0  8.7 - 10.2 mg/dL Final    Total Protein 04/14/2025 7.1  6.0 - 8.5 g/dL Final    Albumin 04/14/2025 3.8 (L)  3.9 - 4.9 g/dL Final    Globulin 04/14/2025 3.3  1.5 - 4.5 g/dL Final    Total Bilirubin 04/14/2025 1.0  0.0 - 1.2 mg/dL Final    Alkaline Phosphatase 04/14/2025 142 (H)  44 - 121 IU/L Final    AST (SGOT) 04/14/2025 18  0 - 40 IU/L Final    ALT (SGPT) 04/14/2025 16  0 - 32 IU/L Final    WBC 04/14/2025 6.9  3.4 - 10.8 x10E3/uL Final    RBC 04/14/2025 4.15  3.77 - 5.28 x10E6/uL Final    Hemoglobin 04/14/2025 11.3  11.1 - 15.9 g/dL Final    Hematocrit 04/14/2025 36.6  34.0 - 46.6 % Final    MCV 04/14/2025 88  79 - 97 fL Final    MCH 04/14/2025  27.2  26.6 - 33.0 pg Final    MCHC 04/14/2025 30.9 (L)  31.5 - 35.7 g/dL Final    RDW 04/14/2025 14.1  11.7 - 15.4 % Final    Platelets 04/14/2025 257  150 - 450 x10E3/uL Final    Neutrophil Rel % 04/14/2025 64  Not Estab. % Final    Lymphocyte Rel % 04/14/2025 26  Not Estab. % Final    Monocyte Rel % 04/14/2025 7  Not Estab. % Final    Eosinophil Rel % 04/14/2025 2  Not Estab. % Final    Basophil Rel % 04/14/2025 0  Not Estab. % Final    Neutrophils Absolute 04/14/2025 4.5  1.4 - 7.0 x10E3/uL Final    Lymphocytes Absolute 04/14/2025 1.8  0.7 - 3.1 x10E3/uL Final    Monocytes Absolute 04/14/2025 0.5  0.1 - 0.9 x10E3/uL Final    Eosinophils Absolute 04/14/2025 0.1  0.0 - 0.4 x10E3/uL Final    Basophils Absolute 04/14/2025 0.0  0.0 - 0.2 x10E3/uL Final    Immature Granulocyte Rel % 04/14/2025 1  Not Estab. % Final    Immature Grans Absolute 04/14/2025 0.1  0.0 - 0.1 x10E3/uL Final    Creatinine, Urine 04/14/2025 126.4  Not Estab. mg/dL Final    Microalbumin, Urine 04/14/2025 24.2  Not Estab. ug/mL Final    Microalbumin/Creatinine Ratio 04/14/2025 19  0 - 29 mg/g creat Final    Comment:                        Normal:                0 -  29                         Moderately increased: 30 - 300                         Severely increased:       >300     Office Visit on 03/24/2025   Component Date Value Ref Range Status    SARS Antigen 03/24/2025 Not Detected  Not Detected, Presumptive Negative Final    Influenza A Antigen TEOFILO 03/24/2025 Not Detected  Not Detected Final    Influenza B Antigen TEOFILO 03/24/2025 Not Detected  Not Detected Final    Internal Control 03/24/2025 Passed  Passed Final    Lot Number 03/24/2025 [id number]   Final    Expiration Date 03/24/2025 11,[id number]   Final     Results  Labs   - Creatinine: 1.4 mg/dL   - Filtering rate: 40 mL/min   - A1c: 10.5%    Diagnostic Testing   - Stress test: Passed          Assessment & Plan   Diagnoses and all orders for this visit:    1. Type 2 diabetes mellitus with  hyperglycemia, without long-term current use of insulin (Primary)  -     Semaglutide (Rybelsus) 3 MG tablet; Take 1 tablet by mouth Daily. Then call MD for refill  Dispense: 30 tablet; Refill: 0  -     empagliflozin (Jardiance) 25 MG tablet tablet; Take 1 tablet by mouth Daily.  Dispense: 90 tablet; Refill: 3  -     Blood Glucose Monitoring Suppl kit; Use as directed to check blood glucose  Dispense: 1 each; Refill: 0  -     glucose blood test strip; Use as instructed to check blood glucose once daily  Dispense: 100 each; Refill: 3  -     Lancets misc; Use once daily with meter and strips to check blood glucose  Dispense: 100 each; Refill: 3    2. Class 3 severe obesity due to excess calories without serious comorbidity with body mass index (BMI) of 45.0 to 49.9 in adult    3. TURNER (acute kidney injury)    4. Severe recurrent major depression without psychotic features    5. NESHA (obstructive sleep apnea)    6. Mood disorder    7. Mixed hyperlipidemia  -     rosuvastatin (Crestor) 10 MG tablet; Take 1 tablet by mouth Every Night.  Dispense: 90 tablet; Refill: 1    8. Encounter for screening mammogram for malignant neoplasm of breast  -     Mammo Screening Digital Tomosynthesis Bilateral With CAD; Future    9. Precordial pain      Assessment & Plan  1. Dehydration.  - Creatinine level elevated at 1.4, indicating dehydration.  - Glomerular filtration rate (GFR) reduced to approximately 40.  - Elevated blood glucose levels may contribute to dehydration.  - Advised to increase water intake and consider using Gatorade Zero for hydration.  Will recheck her lab work at upcoming follow-up visit.    2. Diabetes mellitus.  - A1c level decreased from 11.5 to 10.5 over the past few months, but remains above the target of 6.5.  - Currently on Januvia, which is helping.  - Initiated on Rybelsus 3 mg daily for one month, followed by an increase to 7 mg daily for the subsequent month, and then 14 mg daily for the final month.  Additionally, Jardiance 25 mg daily will be started.  - Advised to monitor blood glucose levels at home three to four times per week. An order for a blood glucose monitor and test strips will be sent to the pharmacy.    3. Anxiety.  - Reports experiencing chest pain, which may be associated with anxiety, heartburn, or reflux.  - Currently on fluoxetine, Lamictal, and Vraylar for mood stabilization and anxiety management.  - Encouraged to continue sessions at [facility] to help manage anxiety.    4. Elevated cholesterol.  - Given diabetes and chest pain, along with elevated cholesterol levels.  - Recommended to start a cholesterol-lowering medication.  - Rosuvastatin will be prescribed, to be taken at night.    5. Health maintenance.  - Due for a mammogram, as she has not had one since turning 42.  - An order for a mammogram will be sent to [facility].    Follow-up  - Follow up in 6 weeks.        Call with any problems or concerns before next visit       Return in about 6 weeks (around 8/11/2025).  Patient or patient representative verbalized consent for the use of Ambient Listening during the visit with  Bree Wan MD for chart documentation. 6/30/2025  10:39 EDT    Part of this note may be an electronic transcription/translation of spoken language to printed text using the Dragon Dictation System    Bree Wan MD6/30/202510:40 EDT  This note has been electronically signed

## 2025-07-02 RX ORDER — HYDROCHLOROTHIAZIDE 12.5 MG/1
12.5 TABLET ORAL DAILY
Qty: 30 TABLET | Refills: 2 | Status: SHIPPED | OUTPATIENT
Start: 2025-07-02

## 2025-07-02 RX ORDER — BUSPIRONE HYDROCHLORIDE 5 MG/1
5 TABLET ORAL 3 TIMES DAILY
Qty: 90 TABLET | Refills: 2 | Status: SHIPPED | OUTPATIENT
Start: 2025-07-02

## 2025-07-04 DIAGNOSIS — E11.65 TYPE 2 DIABETES MELLITUS WITH HYPERGLYCEMIA, WITHOUT LONG-TERM CURRENT USE OF INSULIN: ICD-10-CM

## 2025-07-07 RX ORDER — AVOBENZONE, HOMOSALATE, OCTISALATE, OCTOCRYLENE 30; 40; 45; 26 MG/ML; MG/ML; MG/ML; MG/ML
CREAM TOPICAL
Qty: 100 EACH | Refills: 3 | Status: SHIPPED | OUTPATIENT
Start: 2025-07-07

## 2025-07-11 LAB
NCCN CRITERIA FLAG: NORMAL
TYRER CUZICK SCORE: 11.3

## 2025-08-18 ENCOUNTER — OFFICE VISIT (OUTPATIENT)
Dept: PAIN MEDICINE | Facility: CLINIC | Age: 45
End: 2025-08-18
Payer: COMMERCIAL

## 2025-08-18 VITALS
RESPIRATION RATE: 16 BRPM | HEART RATE: 90 BPM | DIASTOLIC BLOOD PRESSURE: 88 MMHG | SYSTOLIC BLOOD PRESSURE: 135 MMHG | OXYGEN SATURATION: 95 %

## 2025-08-18 DIAGNOSIS — M54.16 LUMBAR RADICULOPATHY: ICD-10-CM

## 2025-08-18 DIAGNOSIS — G89.29 CHRONIC PAIN OF BOTH KNEES: ICD-10-CM

## 2025-08-18 DIAGNOSIS — M54.42 CHRONIC BILATERAL LOW BACK PAIN WITH BILATERAL SCIATICA: Primary | ICD-10-CM

## 2025-08-18 DIAGNOSIS — M25.511 CHRONIC RIGHT SHOULDER PAIN: ICD-10-CM

## 2025-08-18 DIAGNOSIS — G89.29 CHRONIC BILATERAL LOW BACK PAIN WITH BILATERAL SCIATICA: Primary | ICD-10-CM

## 2025-08-18 DIAGNOSIS — M25.561 CHRONIC PAIN OF BOTH KNEES: ICD-10-CM

## 2025-08-18 DIAGNOSIS — M17.2 POST-TRAUMATIC OSTEOARTHRITIS OF BOTH KNEES: ICD-10-CM

## 2025-08-18 DIAGNOSIS — G89.29 CHRONIC RIGHT SHOULDER PAIN: ICD-10-CM

## 2025-08-18 DIAGNOSIS — M12.811 ROTATOR CUFF ARTHROPATHY OF RIGHT SHOULDER: ICD-10-CM

## 2025-08-18 DIAGNOSIS — M54.41 CHRONIC BILATERAL LOW BACK PAIN WITH BILATERAL SCIATICA: Primary | ICD-10-CM

## 2025-08-18 DIAGNOSIS — M25.562 CHRONIC PAIN OF BOTH KNEES: ICD-10-CM

## 2025-08-18 PROCEDURE — 1159F MED LIST DOCD IN RCRD: CPT | Performed by: PHYSICAL MEDICINE & REHABILITATION

## 2025-08-18 PROCEDURE — 3075F SYST BP GE 130 - 139MM HG: CPT | Performed by: PHYSICAL MEDICINE & REHABILITATION

## 2025-08-18 PROCEDURE — 99214 OFFICE O/P EST MOD 30 MIN: CPT | Performed by: PHYSICAL MEDICINE & REHABILITATION

## 2025-08-18 PROCEDURE — 1125F AMNT PAIN NOTED PAIN PRSNT: CPT | Performed by: PHYSICAL MEDICINE & REHABILITATION

## 2025-08-18 PROCEDURE — 3079F DIAST BP 80-89 MM HG: CPT | Performed by: PHYSICAL MEDICINE & REHABILITATION

## 2025-08-18 PROCEDURE — 1160F RVW MEDS BY RX/DR IN RCRD: CPT | Performed by: PHYSICAL MEDICINE & REHABILITATION

## 2025-08-18 PROCEDURE — G0463 HOSPITAL OUTPT CLINIC VISIT: HCPCS | Performed by: PHYSICAL MEDICINE & REHABILITATION

## 2025-08-18 RX ORDER — OXYCODONE AND ACETAMINOPHEN 10; 325 MG/1; MG/1
1 TABLET ORAL
COMMUNITY
Start: 2025-08-08 | End: 2025-08-18 | Stop reason: SDUPTHER

## 2025-08-18 RX ORDER — ONDANSETRON 4 MG/1
TABLET, ORALLY DISINTEGRATING ORAL
COMMUNITY
Start: 2025-08-12

## 2025-08-18 RX ORDER — OXYCODONE AND ACETAMINOPHEN 10; 325 MG/1; MG/1
1 TABLET ORAL EVERY 6 HOURS PRN
Qty: 120 TABLET | Refills: 0 | Status: SHIPPED | OUTPATIENT
Start: 2025-08-18

## 2025-08-18 RX ORDER — PREGABALIN 150 MG/1
150 CAPSULE ORAL 2 TIMES DAILY
Qty: 60 CAPSULE | Refills: 5 | Status: SHIPPED | OUTPATIENT
Start: 2025-08-18

## (undated) DEVICE — SLV SCD CALF HEMOFORCE DVT THERP REPROC MD

## (undated) DEVICE — SUT VIC 0 UR6 27IN VCP603H

## (undated) DEVICE — UNDERGLV SURG BIOGEL INDICATOR LTX PF 7

## (undated) DEVICE — CVR HNDL LT SURG ACCSSRY BLU STRL

## (undated) DEVICE — ST TBG INSUFL ENDOFLATOR50 HEAT W/GAS/FLTR 1P/U

## (undated) DEVICE — SCISS ENDOPATH CRV 5MM

## (undated) DEVICE — DRP C/ARM MOBL XRAY W/BNDS CLIPS 42X64IN

## (undated) DEVICE — SXN/IRR STRYKEFLOW2 W/TIPS 5 32CM

## (undated) DEVICE — APPL HEMO SURG ARISTA/AH/FLEXITIP XL 38CM

## (undated) DEVICE — BG RETRV TISS SUPERBAG INTRO RIP/STOP NLY 10MM 240ML MD

## (undated) DEVICE — GLV SURG BIOGEL LTX PF 7

## (undated) DEVICE — ADHS SKIN PREMIERPRO EXOFIN TOPICAL HI/VISC .5ML

## (undated) DEVICE — BLANKT WARM UPPR/BDY ARM/OUT 57X196CM

## (undated) DEVICE — SYR LUERLOK 30CC

## (undated) DEVICE — KT SURG TURNOVER 050

## (undated) DEVICE — SOL IRR H2O BTL 1000ML STRL

## (undated) DEVICE — TROC BLADLES XCEL/OPTI SLV THRD 12X100

## (undated) DEVICE — PK GENERAL LAPAROSCOPY 50

## (undated) DEVICE — TROC BLADLES XCEL/OPTIVW SLV THRD 5X100

## (undated) DEVICE — PASS SUT PRO BARIATRIC XL W/TROC SWABS

## (undated) DEVICE — Device

## (undated) DEVICE — SUT VIC 4/0 PS2 18IN VCP496H